# Patient Record
Sex: FEMALE | Race: BLACK OR AFRICAN AMERICAN | HISPANIC OR LATINO | Employment: UNEMPLOYED | URBAN - METROPOLITAN AREA
[De-identification: names, ages, dates, MRNs, and addresses within clinical notes are randomized per-mention and may not be internally consistent; named-entity substitution may affect disease eponyms.]

---

## 2018-11-14 ENCOUNTER — TELEPHONE (OUTPATIENT)
Dept: GASTROENTEROLOGY | Facility: CLINIC | Age: 48
End: 2018-11-14

## 2018-11-14 NOTE — TELEPHONE ENCOUNTER
Ma spoke to pt,     Pt stated she does not need to be seen anymore that her doctor decided that she does not need a second opinion    Pt stated to cancel appt and thank Ma

## 2018-11-26 ENCOUNTER — TELEPHONE (OUTPATIENT)
Dept: SURGERY | Facility: CLINIC | Age: 48
End: 2018-11-26

## 2018-11-26 NOTE — TELEPHONE ENCOUNTER
----- Message from Cynthia Flores sent at 11/26/2018 10:27 AM CST -----  Contact: [Patient   Pt would like to schedule to be seen for a consult with Dr Nelson for 2nd opinion for colon removal.  Was unsure if I could schedule so I decided to send a message. Pt also wants to be seen in [facility]? Pt can be reached at [phone]    Pt says Dr randolph will be sending a referral.    Thanks

## 2018-12-06 ENCOUNTER — OFFICE VISIT (OUTPATIENT)
Dept: SURGERY | Facility: CLINIC | Age: 48
End: 2018-12-06
Payer: MEDICARE

## 2018-12-06 VITALS — WEIGHT: 148.06 LBS | HEIGHT: 63 IN | BODY MASS INDEX: 26.23 KG/M2

## 2018-12-06 DIAGNOSIS — K57.92 DIVERTICULITIS: Primary | ICD-10-CM

## 2018-12-06 PROCEDURE — 99999 PR PBB SHADOW E&M-EST. PATIENT-LVL IV: CPT | Mod: PBBFAC,,, | Performed by: SURGERY

## 2018-12-06 PROCEDURE — 99214 OFFICE O/P EST MOD 30 MIN: CPT | Mod: PBBFAC | Performed by: SURGERY

## 2018-12-06 PROCEDURE — 99204 OFFICE O/P NEW MOD 45 MIN: CPT | Mod: S$PBB,,, | Performed by: SURGERY

## 2018-12-06 RX ORDER — ONDANSETRON HYDROCHLORIDE 8 MG/1
8 TABLET, FILM COATED ORAL
COMMUNITY
End: 2020-05-01

## 2018-12-06 RX ORDER — ASPIRIN 81 MG/1
81 TABLET ORAL DAILY
COMMUNITY
End: 2020-12-11

## 2018-12-06 RX ORDER — LUBIPROSTONE 24 UG/1
24 CAPSULE ORAL 2 TIMES DAILY
COMMUNITY
End: 2020-05-01

## 2018-12-06 RX ORDER — OXYCODONE AND ACETAMINOPHEN 7.5; 3 MG/1; MG/1
1 TABLET ORAL EVERY 4 HOURS PRN
COMMUNITY
End: 2020-12-11

## 2018-12-06 RX ORDER — TIZANIDINE 4 MG/1
4 TABLET ORAL EVERY 6 HOURS PRN
COMMUNITY
End: 2020-05-01

## 2018-12-06 RX ORDER — NEBIVOLOL 10 MG/1
10 TABLET ORAL 2 TIMES DAILY
COMMUNITY
End: 2020-05-01

## 2018-12-06 RX ORDER — METRONIDAZOLE 500 MG/1
500 TABLET ORAL SEE ADMIN INSTRUCTIONS
Qty: 3 TABLET | Refills: 0 | Status: ON HOLD | OUTPATIENT
Start: 2018-12-06 | End: 2018-12-17

## 2018-12-06 RX ORDER — DICYCLOMINE HYDROCHLORIDE 20 MG/1
20 TABLET ORAL EVERY 6 HOURS
COMMUNITY
End: 2020-05-01

## 2018-12-06 RX ORDER — ALENDRONATE SODIUM 10 MG/1
70 TABLET ORAL
COMMUNITY
End: 2020-12-11 | Stop reason: DRUGHIGH

## 2018-12-06 RX ORDER — NEOMYCIN SULFATE 500 MG/1
1000 TABLET ORAL SEE ADMIN INSTRUCTIONS
Qty: 6 TABLET | Refills: 0 | Status: ON HOLD | OUTPATIENT
Start: 2018-12-06 | End: 2018-12-20 | Stop reason: HOSPADM

## 2018-12-06 RX ORDER — POLYETHYLENE GLYCOL 3350, SODIUM SULFATE ANHYDROUS, SODIUM BICARBONATE, SODIUM CHLORIDE, POTASSIUM CHLORIDE 236; 22.74; 6.74; 5.86; 2.97 G/4L; G/4L; G/4L; G/4L; G/4L
4 POWDER, FOR SOLUTION ORAL ONCE
Qty: 4000 ML | Refills: 0 | Status: ON HOLD | OUTPATIENT
Start: 2018-12-09 | End: 2018-12-20 | Stop reason: HOSPADM

## 2018-12-06 RX ORDER — SODIUM CHLORIDE 9 MG/ML
INJECTION, SOLUTION INTRAVENOUS CONTINUOUS
Status: CANCELLED | OUTPATIENT
Start: 2018-12-06

## 2018-12-06 RX ORDER — DOCUSATE SODIUM 100 MG/1
100 CAPSULE, LIQUID FILLED ORAL 2 TIMES DAILY
Status: ON HOLD | COMMUNITY
End: 2018-12-20 | Stop reason: HOSPADM

## 2018-12-06 NOTE — PROGRESS NOTES
GENERAL SURGERY CLINIC  HISTORY AND PHYSICAL    CC:  History of Diverticulitis     HPI:  Gill Cleary is a 48 y.o.  female with Hx of HIV (viral loads currently undetectable), HTN, HLD, YANICK on home CPAP, and GERD who presents to clinic for evaluation of recurrent diverticulitis.  She has had 4-5 episodes of diverticulitis over the last 5 years, all requiring hospitalization.  Most recent episode was the end of October.  She was treated with IV antibiotics.  None of these episodes have been perforated diverticulitis associated with abscess that required drainage.  She did have an IR drainage of a cyst near her sigmoid colon during her last admission but there was no drain left and this cyst has recurred on her most recent CT scan.  She states that she currently feels good with just some suprapubic soreness.  She occasionally has N/V with dairy and eating too much.  She presents today to surgery clinic for second opinion regarding colectomy.  Has been off IV antibiotics for over a month      ROS: A 10-point review of systems is negative except for the above mentioned in the HPI.     PMHx:  HIV, HTN, HLD, YANICK, GERD    PSH:  Laparoscopic hysterectomy (5/2018)    SH:  Denies alcohol, tobacco, drug use    Review of patient's allergies indicates:   Allergen Reactions    Ace inhibitors Other (See Comments)     dizziness      Efavirenz-emtricitabin-tenofov Other (See Comments)     dizziness      Tramadol Nausea And Vomiting    Emtricita-rilpivirine-tenof df Other (See Comments)     Affecting patients kidneys      Labetalol     Metoprolol     Penicillins      Hives (skin)^       Current Outpatient Medications:     alendronate (FOSAMAX) 10 MG Tab, Take 5 mg by mouth once daily., Disp: , Rfl:     aspirin (ECOTRIN) 81 MG EC tablet, Take 81 mg by mouth once daily., Disp: , Rfl:     dicyclomine (BENTYL) 20 mg tablet, Take 20 mg by mouth every 6 (six) hours., Disp: , Rfl:     docusate sodium (COLACE) 100  MG capsule, Take 100 mg by mouth 2 (two) times daily., Disp: , Rfl:     lubiprostone (AMITIZA) 24 MCG Cap, Take 24 mcg by mouth 2 (two) times daily., Disp: , Rfl:     nebivolol (BYSTOLIC) 10 MG Tab, Take 10 mg by mouth once daily., Disp: , Rfl:     ondansetron (ZOFRAN) 8 MG tablet, Take 8 mg by mouth every 8 (eight) hours., Disp: , Rfl:     oxyCODONE-acetaminophen (LYNOX) 7.5-300 mg per tablet, Take 1 tablet by mouth every 4 (four) hours as needed for Pain., Disp: , Rfl:     tiZANidine (ZANAFLEX) 4 MG tablet, Take 4 mg by mouth every 6 (six) hours as needed., Disp: , Rfl:     bictegrav/emtricit/tenofov ala (BIKTARVY ORAL), Take by mouth., Disp: , Rfl:       PHYSICAL EXAM:  Vitals Reviewed     General: NAD  Neuro: AAOx3  Cardio: RRR  Resp: Breathing even and unlabored  Abd: Soft, ND, minimal suprapubic tenderness, lap hysterectomy scars well healed   Ext: Warm and well perfused      PERTINENT LABS:  Reviewed - WNL      PERTINENT IMAGING:  Reviewed outside imaging reports  - Multiple episodes of diverticulitis in the past  - Cystic mass adjacent to colon ~2cm    Endoscopy report 10/26/18  - Multiple diverticuli in sigmoid and descending colon  - No mass seen      ASSESSMENT/PLAN:  Gill Cleary is a 48 y.o. female with multiple episodes of diverticulitis in the past     - Will plan on lap/possible open sigmoid colectomy on Monday 12/10/2018  - Will order bowel prep pre op - abx and mechanical  - Consent signed in clinic today       Gage Davies M.D.  General Surgery PGY3  [phone]     I have personally performed a detailed history and physical examination on this patient. My findings are summarized in the resident's note included in the record.  Patient very anxious about deferring surgery any longer as she is convinced that she will suffer another bout of diverticulitis while waiting  She feels like she can tolerate a bowel prep  Will schedule for Monday 12/10  Will evaluate the cyst that is seen on imaging  If  oophorectomy is required we will do that procedure as well  Lap colectomy scheduled  All questions answered

## 2018-12-06 NOTE — PATIENT INSTRUCTIONS
After Bowel Surgery: Recovering in the Hospital and at Home    You may be in the hospital overnight or longer. Once you are out of the hospital, recovery may take up to several months. It depends on the type of surgery you had.  Right after surgery  After surgery, youll be taken to the recovery room. Here your blood pressure, pulse, and breathing will be checked. Youll also be given pain medicine as needed. When youre ready, youll be moved to a regular hospital room.  Your recovery in the hospital  · Soon after surgery youll be urged to get up and take short walks. This helps you heal faster. Gentle movement can help your digestive function. Walking also helps your heart and lungs, and can keep clots from forming in your legs.  · You may be able to have some liquids in the first day or two. If it seems unlikely that your bowel will recover quickly, you may get nutrition through an IV (intravenous) tube. You may first be given a tube that passes through your nose to your stomach (a nasogastric tube). This keeps your stomach empty. This can help your digestive tract heal.  · If you have a colostomy or ileostomy, you may also meet with an ostomy nurse. He or she will teach you how to care for yourself as you heal.  · You will be shown how to do breathing exercises using a special device (incentive spirometer). This can prevent complications such as pneumonia.  · You may have a tube (catheter) in your bladder to drain urine. This may be in place for the first day after surgery. In some cases it may be in place longer.  Getting back to normal at home  Depending on your surgery, even mild activity can make you tired in the first few weeks or months. After a few months, you may be feeling back to normal.  · Stay active. But avoid hard exercise and heavy lifting in the first few months.  · You can walk, climb stairs, shower, and bathe soon after surgery. But dont drive until your provider says you can.  · Follow all  special diet instructions you are given.  · Take care of your cut (incision) and any drains, as directed by your provider.  When to call your provider  Call your healthcare provider right away if you have any of the following:  · Fever of 100.4°F (38°C)  · Upset stomach (nausea) or vomiting  · Increased belly pain  · Constipation, diarrhea, or bloating  · Increased redness, swelling, drainage, or pain near the incision  · Trouble controlling bowel movements   · Bloody stool or black, tarry stool  · Vomiting blood  · Not able to urinate   Date Last Reviewed: 7/1/2016 © 2000-2017 [employer]. [address] [place] [address], [place] [address]. All rights reserved. This information is not intended as a substitute for professional medical care. Always follow your healthcare professional's instructions.        Recovering from Colorectal Surgery    When the surgery is done, youll be taken to the recovery room (also called the post-anesthesia care unit or PACU). Here, you will be carefully monitored for vital signs including breathing, temperature, blood pressure, and heart rate. Youll also receive pain medicine to keep you comfortable. When youre ready, youll be moved to a regular hospital room. Your hospital stay may last 5 to 10 days or longer.  Right after surgery  If you have a urinary catheter, it will probably be removed shortly after surgery. Your intravenous (IV) line will remain in place for a few days to give you fluids. And youll continue to receive medicine for pain. Soon after surgery, youll be up and walking around. This helps improve blood flow and prevent blood clots. It also helps your bowels return to normal. Youll be given breathing exercises to keep your lungs clear.  Eating again  You wont eat or drink much until your colon begins working again. You'll begin with a liquid diet, then move on to solid foods.  Recovering at home  In most cases, youll visit your healthcare provider within a  few weeks after leaving the hospital. You can get back to your normal routine about a month or 2 after surgery. Full recovery may take 6 weeks or longer. While your body heals, you may tire more easily. You also are likely to have some bloating. Loose stools and more frequent bowel movements are common after bowel surgery. This may get better over time, but may never disappear completely.  Resuming everyday activities  Being active helps your body heal. But you must protect your healing incisions:  · Walk as much as you feel up to.  · Avoid heavy lifting or vigorous exercise until your healthcare provider says its OK. Follow your healthcare providers advice about climbing stairs and bathing.  · You can drive when youre no longer taking pain medicines--in about 7 to 10 days.  · Follow your healthcare provider's advice about resuming sexual activity.         Call your healthcare provider  Call your healthcare provider if you have:  · Fever of 100.4°F (38°C) or higher, or as directed by your healthcare provider   · Persistent nausea or vomiting  · Unusual redness, swelling, drainage, or pain around your incision  · Severe constipation or diarrhea  · Worsening pain  · Leg swelling or trouble breathing  · Bleeding from the rectum  · Difficulty or inability to urinate   Date Last Reviewed: 8/1/2016  © 9031-9621 [employer]. [address] [place] [address], [place] [address]. All rights reserved. This information is not intended as a substitute for professional medical care. Always follow your healthcare professional's instructions.

## 2018-12-06 NOTE — H&P (VIEW-ONLY)
GENERAL SURGERY CLINIC  HISTORY AND PHYSICAL    CC:  History of Diverticulitis     HPI:  Gill Cleary is a 48 y.o.  female with Hx of HIV (viral loads currently undetectable), HTN, HLD, YANICK on home CPAP, and GERD who presents to clinic for evaluation of recurrent diverticulitis.  She has had 4-5 episodes of diverticulitis over the last 5 years, all requiring hospitalization.  Most recent episode was the end of October.  She was treated with IV antibiotics.  None of these episodes have been perforated diverticulitis associated with abscess that required drainage.  She did have an IR drainage of a cyst near her sigmoid colon during her last admission but there was no drain left and this cyst has recurred on her most recent CT scan.  She states that she currently feels good with just some suprapubic soreness.  She occasionally has N/V with dairy and eating too much.  She presents today to surgery clinic for second opinion regarding colectomy.  Has been off IV antibiotics for over a month      ROS: A 10-point review of systems is negative except for the above mentioned in the HPI.     PMHx:  HIV, HTN, HLD, YANICK, GERD    PSH:  Laparoscopic hysterectomy (5/2018)    SH:  Denies alcohol, tobacco, drug use    Review of patient's allergies indicates:   Allergen Reactions    Ace inhibitors Other (See Comments)     dizziness      Efavirenz-emtricitabin-tenofov Other (See Comments)     dizziness      Tramadol Nausea And Vomiting    Emtricita-rilpivirine-tenof df Other (See Comments)     Affecting patients kidneys      Labetalol     Metoprolol     Penicillins      Hives (skin)^       Current Outpatient Medications:     alendronate (FOSAMAX) 10 MG Tab, Take 5 mg by mouth once daily., Disp: , Rfl:     aspirin (ECOTRIN) 81 MG EC tablet, Take 81 mg by mouth once daily., Disp: , Rfl:     dicyclomine (BENTYL) 20 mg tablet, Take 20 mg by mouth every 6 (six) hours., Disp: , Rfl:     docusate sodium (COLACE) 100  MG capsule, Take 100 mg by mouth 2 (two) times daily., Disp: , Rfl:     lubiprostone (AMITIZA) 24 MCG Cap, Take 24 mcg by mouth 2 (two) times daily., Disp: , Rfl:     nebivolol (BYSTOLIC) 10 MG Tab, Take 10 mg by mouth once daily., Disp: , Rfl:     ondansetron (ZOFRAN) 8 MG tablet, Take 8 mg by mouth every 8 (eight) hours., Disp: , Rfl:     oxyCODONE-acetaminophen (LYNOX) 7.5-300 mg per tablet, Take 1 tablet by mouth every 4 (four) hours as needed for Pain., Disp: , Rfl:     tiZANidine (ZANAFLEX) 4 MG tablet, Take 4 mg by mouth every 6 (six) hours as needed., Disp: , Rfl:     bictegrav/emtricit/tenofov ala (BIKTARVY ORAL), Take by mouth., Disp: , Rfl:       PHYSICAL EXAM:  Vitals Reviewed     General: NAD  Neuro: AAOx3  Cardio: RRR  Resp: Breathing even and unlabored  Abd: Soft, ND, minimal suprapubic tenderness, lap hysterectomy scars well healed   Ext: Warm and well perfused      PERTINENT LABS:  Reviewed - WNL      PERTINENT IMAGING:  Reviewed outside imaging reports  - Multiple episodes of diverticulitis in the past  - Cystic mass adjacent to colon ~2cm    Endoscopy report 10/26/18  - Multiple diverticuli in sigmoid and descending colon  - No mass seen      ASSESSMENT/PLAN:  Gill Cleary is a 48 y.o. female with multiple episodes of diverticulitis in the past     - Will plan on lap/possible open sigmoid colectomy on Monday 12/10/2018  - Will order bowel prep pre op - abx and mechanical  - Consent signed in clinic today       Gage Davies M.D.  General Surgery PGY3  [phone]     I have personally performed a detailed history and physical examination on this patient. My findings are summarized in the resident's note included in the record.  Patient very anxious about deferring surgery any longer as she is convinced that she will suffer another bout of diverticulitis while waiting  She feels like she can tolerate a bowel prep  Will schedule for Monday 12/10  Will evaluate the cyst that is seen on imaging  If  oophorectomy is required we will do that procedure as well  Lap colectomy scheduled  All questions answered

## 2018-12-07 ENCOUNTER — TELEPHONE (OUTPATIENT)
Dept: SURGERY | Facility: CLINIC | Age: 48
End: 2018-12-07

## 2018-12-07 RX ORDER — HYDROMORPHONE HYDROCHLORIDE 2 MG/1
2 TABLET ORAL 2 TIMES DAILY
Status: ON HOLD | COMMUNITY
End: 2018-12-10

## 2018-12-07 NOTE — PRE-PROCEDURE INSTRUCTIONS
PreOp Instructions given:     - Verbal medication information (what to hold and what to take)   - NPO guidelines   - Arrival place directions given; time to be given the day before procedure by the   Surgeon's Office   - Bathing with antibacterial soap   - Don't wear any jewelry or bring any valuables AM of surgery   - No makeup or moisturizer to face   - No perfume/cologne, powder, lotions or aftershave     Pt. verbalized understanding.    Denies any  history of side effects or issues with anesthesia or sedation.    NO PORK PRODUCTS

## 2018-12-09 ENCOUNTER — NURSE TRIAGE (OUTPATIENT)
Dept: ADMINISTRATIVE | Facility: CLINIC | Age: 48
End: 2018-12-09

## 2018-12-09 NOTE — TELEPHONE ENCOUNTER
Reason for Disposition   Medication questions   Caller has URGENT medication question about med that PCP prescribed and triager unable to answer question    Protocols used: ST PCP CALL - NO TRIAGE-A-, ST MEDICATION QUESTION CALL-A-    Patient called with concerns she is having problems keeping down her bowel prep in advance of her colectomy on tomorrow with Dr. Nelson. Dr. Lee is on call and he said tell the patient to do as much as she can as every little bit helps. Patient verbalized understanding and states she will do her best.

## 2018-12-10 ENCOUNTER — ANESTHESIA EVENT (OUTPATIENT)
Dept: SURGERY | Facility: HOSPITAL | Age: 48
DRG: 330 | End: 2018-12-10
Payer: MEDICARE

## 2018-12-10 ENCOUNTER — HOSPITAL ENCOUNTER (INPATIENT)
Facility: HOSPITAL | Age: 48
LOS: 10 days | Discharge: HOME | DRG: 330 | End: 2018-12-20
Attending: SURGERY | Admitting: SURGERY
Payer: MEDICARE

## 2018-12-10 ENCOUNTER — ANESTHESIA (OUTPATIENT)
Dept: SURGERY | Facility: HOSPITAL | Age: 48
DRG: 330 | End: 2018-12-10
Payer: MEDICARE

## 2018-12-10 DIAGNOSIS — K57.92 DIVERTICULITIS: ICD-10-CM

## 2018-12-10 PROCEDURE — 63600175 PHARM REV CODE 636 W HCPCS: Performed by: NURSE ANESTHETIST, CERTIFIED REGISTERED

## 2018-12-10 PROCEDURE — 63600175 PHARM REV CODE 636 W HCPCS: Performed by: STUDENT IN AN ORGANIZED HEALTH CARE EDUCATION/TRAINING PROGRAM

## 2018-12-10 PROCEDURE — 25000003 PHARM REV CODE 250: Performed by: STUDENT IN AN ORGANIZED HEALTH CARE EDUCATION/TRAINING PROGRAM

## 2018-12-10 PROCEDURE — 71000033 HC RECOVERY, INTIAL HOUR: Performed by: SURGERY

## 2018-12-10 PROCEDURE — C1769 GUIDE WIRE: HCPCS | Performed by: SURGERY

## 2018-12-10 PROCEDURE — 44145 PARTIAL REMOVAL OF COLON: CPT | Mod: GC,,, | Performed by: SURGERY

## 2018-12-10 PROCEDURE — 25000003 PHARM REV CODE 250: Performed by: NURSE ANESTHETIST, CERTIFIED REGISTERED

## 2018-12-10 PROCEDURE — D9220A PRA ANESTHESIA: Mod: ANES,,, | Performed by: ANESTHESIOLOGY

## 2018-12-10 PROCEDURE — 71000039 HC RECOVERY, EACH ADD'L HOUR: Performed by: SURGERY

## 2018-12-10 PROCEDURE — C1758 CATHETER, URETERAL: HCPCS | Performed by: SURGERY

## 2018-12-10 PROCEDURE — 88307 TISSUE EXAM BY PATHOLOGIST: CPT | Performed by: PATHOLOGY

## 2018-12-10 PROCEDURE — 36000710: Performed by: SURGERY

## 2018-12-10 PROCEDURE — 52005 CYSTO W/URTRL CATHJ: CPT | Mod: ,,, | Performed by: UROLOGY

## 2018-12-10 PROCEDURE — 25000003 PHARM REV CODE 250: Performed by: PHYSICIAN ASSISTANT

## 2018-12-10 PROCEDURE — 37000009 HC ANESTHESIA EA ADD 15 MINS: Performed by: SURGERY

## 2018-12-10 PROCEDURE — 37000008 HC ANESTHESIA 1ST 15 MINUTES: Performed by: SURGERY

## 2018-12-10 PROCEDURE — 0T778DZ DILATION OF LEFT URETER WITH INTRALUMINAL DEVICE, VIA NATURAL OR ARTIFICIAL OPENING ENDOSCOPIC: ICD-10-PCS | Performed by: UROLOGY

## 2018-12-10 PROCEDURE — 25000242 PHARM REV CODE 250 ALT 637 W/ HCPCS: Performed by: STUDENT IN AN ORGANIZED HEALTH CARE EDUCATION/TRAINING PROGRAM

## 2018-12-10 PROCEDURE — 0WJJ4ZZ INSPECTION OF PELVIC CAVITY, PERCUTANEOUS ENDOSCOPIC APPROACH: ICD-10-PCS | Performed by: SURGERY

## 2018-12-10 PROCEDURE — 88307 TISSUE EXAM BY PATHOLOGIST: CPT | Mod: 26,,, | Performed by: PATHOLOGY

## 2018-12-10 PROCEDURE — D9220A PRA ANESTHESIA: Mod: CRNA,,, | Performed by: NURSE ANESTHETIST, CERTIFIED REGISTERED

## 2018-12-10 PROCEDURE — 36000711: Performed by: SURGERY

## 2018-12-10 PROCEDURE — 94761 N-INVAS EAR/PLS OXIMETRY MLT: CPT

## 2018-12-10 PROCEDURE — 27201423 OPTIME MED/SURG SUP & DEVICES STERILE SUPPLY: Performed by: SURGERY

## 2018-12-10 PROCEDURE — 94640 AIRWAY INHALATION TREATMENT: CPT

## 2018-12-10 PROCEDURE — S0077 INJECTION, CLINDAMYCIN PHOSP: HCPCS | Performed by: PHYSICIAN ASSISTANT

## 2018-12-10 PROCEDURE — 0DTN0ZZ RESECTION OF SIGMOID COLON, OPEN APPROACH: ICD-10-PCS | Performed by: SURGERY

## 2018-12-10 RX ORDER — ONDANSETRON 2 MG/ML
4 INJECTION INTRAMUSCULAR; INTRAVENOUS EVERY 6 HOURS PRN
Status: DISCONTINUED | OUTPATIENT
Start: 2018-12-10 | End: 2018-12-11

## 2018-12-10 RX ORDER — CLINDAMYCIN PHOSPHATE 900 MG/50ML
900 INJECTION, SOLUTION INTRAVENOUS
Status: COMPLETED | OUTPATIENT
Start: 2018-12-10 | End: 2018-12-10

## 2018-12-10 RX ORDER — LIDOCAINE HCL/PF 100 MG/5ML
SYRINGE (ML) INTRAVENOUS
Status: DISCONTINUED | OUTPATIENT
Start: 2018-12-10 | End: 2018-12-10

## 2018-12-10 RX ORDER — KETAMINE HCL IN 0.9 % NACL 50 MG/5 ML
SYRINGE (ML) INTRAVENOUS
Status: DISCONTINUED | OUTPATIENT
Start: 2018-12-10 | End: 2018-12-10

## 2018-12-10 RX ORDER — BUPROPION HYDROCHLORIDE 75 MG/1
75 TABLET ORAL 2 TIMES DAILY
COMMUNITY
End: 2020-05-01

## 2018-12-10 RX ORDER — ERGOCALCIFEROL 1.25 MG/1
50000 CAPSULE ORAL
COMMUNITY

## 2018-12-10 RX ORDER — NALOXONE HCL 0.4 MG/ML
0.02 VIAL (ML) INJECTION
Status: DISCONTINUED | OUTPATIENT
Start: 2018-12-10 | End: 2018-12-12

## 2018-12-10 RX ORDER — SODIUM CHLORIDE 0.9 % (FLUSH) 0.9 %
3 SYRINGE (ML) INJECTION
Status: DISCONTINUED | OUTPATIENT
Start: 2018-12-10 | End: 2018-12-10 | Stop reason: HOSPADM

## 2018-12-10 RX ORDER — SODIUM CHLORIDE 0.9 % (FLUSH) 0.9 %
3 SYRINGE (ML) INJECTION
Status: DISCONTINUED | OUTPATIENT
Start: 2018-12-10 | End: 2018-12-10

## 2018-12-10 RX ORDER — ONDANSETRON 2 MG/ML
INJECTION INTRAMUSCULAR; INTRAVENOUS
Status: DISCONTINUED | OUTPATIENT
Start: 2018-12-10 | End: 2018-12-10

## 2018-12-10 RX ORDER — VENLAFAXINE HYDROCHLORIDE 75 MG/1
150 CAPSULE, EXTENDED RELEASE ORAL DAILY
COMMUNITY
End: 2020-12-11

## 2018-12-10 RX ORDER — PROPOFOL 10 MG/ML
VIAL (ML) INTRAVENOUS
Status: DISCONTINUED | OUTPATIENT
Start: 2018-12-10 | End: 2018-12-10

## 2018-12-10 RX ORDER — FENTANYL CITRATE 50 UG/ML
25 INJECTION, SOLUTION INTRAMUSCULAR; INTRAVENOUS EVERY 5 MIN PRN
Status: DISCONTINUED | OUTPATIENT
Start: 2018-12-10 | End: 2018-12-10

## 2018-12-10 RX ORDER — FENTANYL CITRATE 50 UG/ML
INJECTION, SOLUTION INTRAMUSCULAR; INTRAVENOUS
Status: DISCONTINUED | OUTPATIENT
Start: 2018-12-10 | End: 2018-12-10

## 2018-12-10 RX ORDER — DEXAMETHASONE SODIUM PHOSPHATE 4 MG/ML
INJECTION, SOLUTION INTRA-ARTICULAR; INTRALESIONAL; INTRAMUSCULAR; INTRAVENOUS; SOFT TISSUE
Status: DISCONTINUED | OUTPATIENT
Start: 2018-12-10 | End: 2018-12-10

## 2018-12-10 RX ORDER — PHENYLEPHRINE HYDROCHLORIDE 10 MG/ML
INJECTION INTRAVENOUS
Status: DISCONTINUED | OUTPATIENT
Start: 2018-12-10 | End: 2018-12-10

## 2018-12-10 RX ORDER — CETIRIZINE HYDROCHLORIDE 5 MG/1
5 TABLET ORAL DAILY
COMMUNITY
End: 2020-12-11

## 2018-12-10 RX ORDER — HYDROMORPHONE HCL IN 0.9% NACL 6 MG/30 ML
PATIENT CONTROLLED ANALGESIA SYRINGE INTRAVENOUS CONTINUOUS
Status: DISCONTINUED | OUTPATIENT
Start: 2018-12-10 | End: 2018-12-12

## 2018-12-10 RX ORDER — ENOXAPARIN SODIUM 100 MG/ML
40 INJECTION SUBCUTANEOUS EVERY 24 HOURS
Status: DISCONTINUED | OUTPATIENT
Start: 2018-12-10 | End: 2018-12-20 | Stop reason: HOSPADM

## 2018-12-10 RX ORDER — LANSOPRAZOLE 30 MG/1
30 CAPSULE, DELAYED RELEASE ORAL DAILY
COMMUNITY
End: 2022-04-15

## 2018-12-10 RX ORDER — SODIUM CHLORIDE 9 MG/ML
INJECTION, SOLUTION INTRAVENOUS CONTINUOUS
Status: DISCONTINUED | OUTPATIENT
Start: 2018-12-10 | End: 2018-12-10

## 2018-12-10 RX ORDER — ACETAMINOPHEN 10 MG/ML
1000 INJECTION, SOLUTION INTRAVENOUS EVERY 8 HOURS
Status: COMPLETED | OUTPATIENT
Start: 2018-12-10 | End: 2018-12-10

## 2018-12-10 RX ORDER — DIPHENHYDRAMINE HYDROCHLORIDE 50 MG/ML
25 INJECTION INTRAMUSCULAR; INTRAVENOUS EVERY 6 HOURS PRN
Status: DISCONTINUED | OUTPATIENT
Start: 2018-12-10 | End: 2018-12-10

## 2018-12-10 RX ORDER — ATORVASTATIN CALCIUM 20 MG/1
20 TABLET, FILM COATED ORAL DAILY
COMMUNITY
End: 2020-12-11

## 2018-12-10 RX ORDER — EPHEDRINE SULFATE 50 MG/ML
INJECTION, SOLUTION INTRAVENOUS
Status: DISCONTINUED | OUTPATIENT
Start: 2018-12-10 | End: 2018-12-10

## 2018-12-10 RX ORDER — FENTANYL CITRATE 50 UG/ML
25 INJECTION, SOLUTION INTRAMUSCULAR; INTRAVENOUS EVERY 5 MIN PRN
Status: DISCONTINUED | OUTPATIENT
Start: 2018-12-10 | End: 2018-12-10 | Stop reason: HOSPADM

## 2018-12-10 RX ORDER — GLYCOPYRROLATE 0.2 MG/ML
INJECTION INTRAMUSCULAR; INTRAVENOUS
Status: DISCONTINUED | OUTPATIENT
Start: 2018-12-10 | End: 2018-12-10

## 2018-12-10 RX ORDER — HYDRALAZINE HYDROCHLORIDE 20 MG/ML
5 INJECTION INTRAMUSCULAR; INTRAVENOUS ONCE
Status: COMPLETED | OUTPATIENT
Start: 2018-12-10 | End: 2018-12-10

## 2018-12-10 RX ORDER — IPRATROPIUM BROMIDE AND ALBUTEROL SULFATE 2.5; .5 MG/3ML; MG/3ML
3 SOLUTION RESPIRATORY (INHALATION) EVERY 8 HOURS
Status: DISCONTINUED | OUTPATIENT
Start: 2018-12-10 | End: 2018-12-10 | Stop reason: HOSPADM

## 2018-12-10 RX ORDER — ROCURONIUM BROMIDE 10 MG/ML
INJECTION, SOLUTION INTRAVENOUS
Status: DISCONTINUED | OUTPATIENT
Start: 2018-12-10 | End: 2018-12-10

## 2018-12-10 RX ORDER — ONDANSETRON 2 MG/ML
4 INJECTION INTRAMUSCULAR; INTRAVENOUS DAILY PRN
Status: DISCONTINUED | OUTPATIENT
Start: 2018-12-10 | End: 2018-12-10 | Stop reason: HOSPADM

## 2018-12-10 RX ORDER — MIDAZOLAM HYDROCHLORIDE 1 MG/ML
INJECTION, SOLUTION INTRAMUSCULAR; INTRAVENOUS
Status: DISCONTINUED | OUTPATIENT
Start: 2018-12-10 | End: 2018-12-10

## 2018-12-10 RX ORDER — ACETAMINOPHEN 10 MG/ML
INJECTION, SOLUTION INTRAVENOUS
Status: DISCONTINUED | OUTPATIENT
Start: 2018-12-10 | End: 2018-12-10

## 2018-12-10 RX ORDER — NEOSTIGMINE METHYLSULFATE 1 MG/ML
INJECTION, SOLUTION INTRAVENOUS
Status: DISCONTINUED | OUTPATIENT
Start: 2018-12-10 | End: 2018-12-10

## 2018-12-10 RX ORDER — HYDRALAZINE HYDROCHLORIDE 20 MG/ML
INJECTION INTRAMUSCULAR; INTRAVENOUS
Status: DISPENSED
Start: 2018-12-10 | End: 2018-12-11

## 2018-12-10 RX ORDER — SODIUM CHLORIDE 9 MG/ML
INJECTION, SOLUTION INTRAVENOUS CONTINUOUS
Status: DISCONTINUED | OUTPATIENT
Start: 2018-12-10 | End: 2018-12-14

## 2018-12-10 RX ADMIN — EPHEDRINE SULFATE 10 MG: 50 INJECTION, SOLUTION INTRAMUSCULAR; INTRAVENOUS; SUBCUTANEOUS at 08:12

## 2018-12-10 RX ADMIN — SODIUM CHLORIDE 125 ML/HR: 0.9 INJECTION, SOLUTION INTRAVENOUS at 01:12

## 2018-12-10 RX ADMIN — CLINDAMYCIN IN 5 PERCENT DEXTROSE 900 MG: 18 INJECTION, SOLUTION INTRAVENOUS at 07:12

## 2018-12-10 RX ADMIN — Medication 10 MG: at 09:12

## 2018-12-10 RX ADMIN — ROCURONIUM BROMIDE 40 MG: 10 INJECTION, SOLUTION INTRAVENOUS at 07:12

## 2018-12-10 RX ADMIN — PROMETHAZINE HYDROCHLORIDE 6.25 MG: 25 INJECTION INTRAMUSCULAR; INTRAVENOUS at 02:12

## 2018-12-10 RX ADMIN — SODIUM CHLORIDE, SODIUM GLUCONATE, SODIUM ACETATE, POTASSIUM CHLORIDE, MAGNESIUM CHLORIDE, SODIUM PHOSPHATE, DIBASIC, AND POTASSIUM PHOSPHATE: .53; .5; .37; .037; .03; .012; .00082 INJECTION, SOLUTION INTRAVENOUS at 08:12

## 2018-12-10 RX ADMIN — PHENYLEPHRINE HYDROCHLORIDE 100 MCG: 10 INJECTION INTRAVENOUS at 08:12

## 2018-12-10 RX ADMIN — PHENYLEPHRINE HYDROCHLORIDE 100 MCG: 10 INJECTION INTRAVENOUS at 07:12

## 2018-12-10 RX ADMIN — LIDOCAINE HYDROCHLORIDE 100 MG: 20 INJECTION, SOLUTION INTRAVENOUS at 07:12

## 2018-12-10 RX ADMIN — ROCURONIUM BROMIDE 10 MG: 10 INJECTION, SOLUTION INTRAVENOUS at 11:12

## 2018-12-10 RX ADMIN — SODIUM CHLORIDE, SODIUM GLUCONATE, SODIUM ACETATE, POTASSIUM CHLORIDE, MAGNESIUM CHLORIDE, SODIUM PHOSPHATE, DIBASIC, AND POTASSIUM PHOSPHATE: .53; .5; .37; .037; .03; .012; .00082 INJECTION, SOLUTION INTRAVENOUS at 10:12

## 2018-12-10 RX ADMIN — Medication: at 01:12

## 2018-12-10 RX ADMIN — PHENYLEPHRINE HYDROCHLORIDE 200 MCG: 10 INJECTION INTRAVENOUS at 08:12

## 2018-12-10 RX ADMIN — FENTANYL CITRATE 100 MCG: 50 INJECTION, SOLUTION INTRAMUSCULAR; INTRAVENOUS at 07:12

## 2018-12-10 RX ADMIN — PROPOFOL 50 MG: 10 INJECTION, EMULSION INTRAVENOUS at 09:12

## 2018-12-10 RX ADMIN — ONDANSETRON 4 MG: 2 INJECTION INTRAMUSCULAR; INTRAVENOUS at 01:12

## 2018-12-10 RX ADMIN — PHENYLEPHRINE HYDROCHLORIDE 500 MCG: 10 INJECTION INTRAVENOUS at 08:12

## 2018-12-10 RX ADMIN — SODIUM CHLORIDE: 0.9 INJECTION, SOLUTION INTRAVENOUS at 08:12

## 2018-12-10 RX ADMIN — PHENYLEPHRINE HYDROCHLORIDE 100 MCG: 10 INJECTION INTRAVENOUS at 10:12

## 2018-12-10 RX ADMIN — FENTANYL CITRATE 50 MCG: 50 INJECTION, SOLUTION INTRAMUSCULAR; INTRAVENOUS at 11:12

## 2018-12-10 RX ADMIN — IPRATROPIUM BROMIDE AND ALBUTEROL SULFATE 3 ML: .5; 3 SOLUTION RESPIRATORY (INHALATION) at 04:12

## 2018-12-10 RX ADMIN — ROCURONIUM BROMIDE 20 MG: 10 INJECTION, SOLUTION INTRAVENOUS at 09:12

## 2018-12-10 RX ADMIN — ENOXAPARIN SODIUM 40 MG: 100 INJECTION SUBCUTANEOUS at 06:12

## 2018-12-10 RX ADMIN — ACETAMINOPHEN 1000 MG: 10 INJECTION, SOLUTION INTRAVENOUS at 08:12

## 2018-12-10 RX ADMIN — Medication 30 MG: at 07:12

## 2018-12-10 RX ADMIN — ACETAMINOPHEN 1000 MG: 10 INJECTION, SOLUTION INTRAVENOUS at 11:12

## 2018-12-10 RX ADMIN — HYDRALAZINE HYDROCHLORIDE 5 MG: 20 INJECTION INTRAMUSCULAR; INTRAVENOUS at 03:12

## 2018-12-10 RX ADMIN — PROPOFOL 150 MG: 10 INJECTION, EMULSION INTRAVENOUS at 07:12

## 2018-12-10 RX ADMIN — GLYCOPYRROLATE 0.4 MG: 0.2 INJECTION, SOLUTION INTRAMUSCULAR; INTRAVENOUS at 12:12

## 2018-12-10 RX ADMIN — EPHEDRINE SULFATE 5 MG: 50 INJECTION, SOLUTION INTRAMUSCULAR; INTRAVENOUS; SUBCUTANEOUS at 08:12

## 2018-12-10 RX ADMIN — DEXAMETHASONE SODIUM PHOSPHATE 8 MG: 4 INJECTION, SOLUTION INTRAMUSCULAR; INTRAVENOUS at 07:12

## 2018-12-10 RX ADMIN — SODIUM CHLORIDE: 0.9 INJECTION, SOLUTION INTRAVENOUS at 07:12

## 2018-12-10 RX ADMIN — MIDAZOLAM HYDROCHLORIDE 2 MG: 1 INJECTION, SOLUTION INTRAMUSCULAR; INTRAVENOUS at 07:12

## 2018-12-10 RX ADMIN — ONDANSETRON 4 MG: 2 INJECTION INTRAMUSCULAR; INTRAVENOUS at 11:12

## 2018-12-10 RX ADMIN — PROMETHAZINE HYDROCHLORIDE 6.25 MG: 25 INJECTION INTRAMUSCULAR; INTRAVENOUS at 03:12

## 2018-12-10 RX ADMIN — EPHEDRINE SULFATE 15 MG: 50 INJECTION, SOLUTION INTRAMUSCULAR; INTRAVENOUS; SUBCUTANEOUS at 08:12

## 2018-12-10 RX ADMIN — PHENYLEPHRINE HYDROCHLORIDE 200 MCG: 10 INJECTION INTRAVENOUS at 10:12

## 2018-12-10 RX ADMIN — NEOSTIGMINE METHYLSULFATE 4 MG: 1 INJECTION INTRAVENOUS at 12:12

## 2018-12-10 NOTE — PROGRESS NOTES
Pt's suitcase and belongings bag containing clothing, cell phone, contacts, and denture, taken to patient's room by PACU RN.

## 2018-12-10 NOTE — ANESTHESIA POSTPROCEDURE EVALUATION
"Anesthesia Post Evaluation    Patient: Gill Cleary    Procedure(s) Performed: Procedure(s) (LRB):  COLECTOMY, SIGMOID, LAPAROSCOPIC- (N/A)  INSERTION, STENT, URETER (Left)  COLECTOMY, SIGMOID (N/A)    Final Anesthesia Type: general  Patient location during evaluation: PACU  Patient participation: Yes- Able to Participate  Level of consciousness: awake and alert  Post-procedure vital signs: reviewed and stable  Pain management: adequate  Airway patency: patent  PONV status at discharge: No PONV  Anesthetic complications: no      Cardiovascular status: blood pressure returned to baseline and hemodynamically stable  Respiratory status: unassisted, spontaneous ventilation and room air  Hydration status: euvolemic  Follow-up not needed.        Visit Vitals  /66   Pulse 78   Temp 36.9 °C (98.4 °F) (Temporal)   Resp 18   Ht 5' 3" (1.6 m)   Wt 67.1 kg (148 lb)   SpO2 98%   Breastfeeding? No   BMI 26.22 kg/m²       Pain/Katy Score: Presence of Pain: non-verbal indicators absent (12/10/2018 12:45 PM)  Pain Rating Prior to Med Admin: 8 (12/10/2018  1:14 PM)  Katy Score: 9 (12/10/2018  2:40 PM)        "

## 2018-12-10 NOTE — OP NOTE
[facility]  Operative Note    Date: 12/10/2018    Pre-Op Diagnosis: diverticulitis  Patient Active Problem List   Diagnosis    Diverticulitis         Post-Op Diagnosis: same    Procedure(s) Performed:   1.  Cystoscopy with left ureteral catheter placement    Specimen(s): none    Staff Surgeon: Dr. Pearl White MD    Assistant Surgeon: Fern Cortes MD    Anesthesia: General endotracheal anesthesia    Indications: Gill Cleary is a 48 y.o. female with recurrent diverticulitis.  Dr. Nelson has requested intra-operative ureteral catheters to allow for early intra-operative identification and repair of any injuries.      Findings:   1.  No abnormalities noted, no tumors, no erythema  2.  L ureteral catheter placed in standard fashion    Estimated Blood Loss: min    Drains:   1.  left 5 Fr ureteral catheter  2.  16 Fr holliday catheter    Procedure in Detail: Upon entering the room the patient was under general anesthesia.  The patient was then placed in the dorsal lithotomy position and prepped and draped in the usual sterile fashion. Preoperative antibiotics were administered per the primary surgeon preference.  Timeout was performed.      A 22 Fr cystoscope was inserted into the urethra and formal cystourethroscopy was performed. The urethra was normal.  The right and left ureteral orifices were in the normal anatomic position. There were no mucosal abnormalities. A 0.38 glide wire was inserted into the left  ureteral orifice and advanced to the level of the left renal pelvis. A 5 Fr ureteral catheter was then inserted over the guide wire and the wire was removed. The cystoscope was then removed leaving the ureteral catheter in place.     A 16 Fr holliday catheter was inserted and the balloon was filled with 10mL of sterile water. The ureteral catheters were secured in the standard fashion. There were no complications with the procedure and the patient tolerated our procedure well.     The case was  then turned over to the primary surgeon.     Fern Cortes MD

## 2018-12-10 NOTE — PLAN OF CARE
Pt vital signs wnl.  Pt verbalizes pain is tolerable with pca.  Pt verbalizes nausea has improved.  Abdominal dressing intact.

## 2018-12-10 NOTE — ANESTHESIA PREPROCEDURE EVALUATION
12/10/2018  Pre-operative evaluation for Procedure(s) (LRB):  COLECTOMY, SIGMOID, LAPAROSCOPIC (N/A)    Gill Cleary is a 48 y.o. female HIV, YANICK on CPAP, HTN.     Patient Active Problem List   Diagnosis    Diverticulitis       Review of patient's allergies indicates:   Allergen Reactions    Ace inhibitors Other (See Comments)     dizziness      Efavirenz-emtricitabin-tenofov Other (See Comments)     dizziness      Penicillins Hives     Hives (skin)^ and causes yeast infection    Pork/porcine containing products      Pt would like pork added  Because of strong Pentecostalism beliefs    Tramadol Nausea And Vomiting    Emtricita-rilpivirine-tenof df Other (See Comments)     Affecting patients kidneys      Labetalol     Metoprolol        No current facility-administered medications on file prior to encounter.      Current Outpatient Medications on File Prior to Encounter   Medication Sig Dispense Refill    aspirin (ECOTRIN) 81 MG EC tablet Take 81 mg by mouth once daily.      atorvastatin (LIPITOR) 20 MG tablet Take 20 mg by mouth once daily.      bictegrav/emtricit/tenofov ala (BIKTARVY ORAL) Take by mouth every evening.       buPROPion (WELLBUTRIN) 75 MG tablet Take 75 mg by mouth 2 (two) times daily.      cetirizine (ZYRTEC) 5 MG tablet Take 5 mg by mouth once daily.      dicyclomine (BENTYL) 20 mg tablet Take 20 mg by mouth every 6 (six) hours.      docusate sodium (COLACE) 100 MG capsule Take 100 mg by mouth 2 (two) times daily.      ergocalciferol (VITAMIN D2) 50,000 unit Cap Take 50,000 Units by mouth every 7 days.      lansoprazole (PREVACID) 30 MG capsule Take 30 mg by mouth once daily.      lubiprostone (AMITIZA) 24 MCG Cap Take 24 mcg by mouth 2 (two) times daily.      metroNIDAZOLE (FLAGYL) 500 MG tablet Take 1 tablet (500 mg total) by mouth As instructed (Take one tablet at 2PM, one  tablet at 3PM, and one tablet at 10PM the day before surgery). 3 tablet 0    nebivolol (BYSTOLIC) 10 MG Tab Take 10 mg by mouth 2 (two) times daily.       neomycin (MYCIFRADIN) 500 mg Tab Take 2 tablets (1,000 mg total) by mouth As instructed (Take 2 tablets at 2PM, 2 tablets at 3PM, and 2 tablets at 10PM the day before surgery). 6 tablet 0    ondansetron (ZOFRAN) 8 MG tablet Take 8 mg by mouth every 8 (eight) hours.      oxyCODONE-acetaminophen (LYNOX) 7.5-300 mg per tablet Take 1 tablet by mouth every 4 (four) hours as needed for Pain.      polyethylene glycol (GOLYTELY,NULYTELY) 236-22.74-6.74 -5.86 gram suspension Take 4,000 mLs (4 L total) by mouth once. Start at 2PM the day before surgery for 1 dose 4000 mL 0    tiZANidine (ZANAFLEX) 4 MG tablet Take 4 mg by mouth every 6 (six) hours as needed.      venlafaxine (EFFEXOR-XR) 75 MG 24 hr capsule Take 150 mg by mouth once daily.      alendronate (FOSAMAX) 10 MG Tab Take 70 mg by mouth every 7 days.          Past Surgical History:   Procedure Laterality Date    HYSTERECTOMY         Social History     Socioeconomic History    Marital status:      Spouse name: Not on file    Number of children: Not on file    Years of education: Not on file    Highest education level: Not on file   Social Needs    Financial resource strain: Not on file    Food insecurity - worry: Not on file    Food insecurity - inability: Not on file    Transportation needs - medical: Not on file    Transportation needs - non-medical: Not on file   Occupational History    Not on file   Tobacco Use    Smoking status: Never Smoker    Smokeless tobacco: Never Used   Substance and Sexual Activity    Alcohol use: No     Frequency: Never    Drug use: Not on file    Sexual activity: No   Other Topics Concern    Not on file   Social History Narrative    Not on file         CBC: No results for input(s): WBC, RBC, HGB, HCT, PLT, MCV, MCH, MCHC in the last 72 hours.    CMP: No  results for input(s): NA, K, CL, CO2, BUN, CREATININE, GLU, MG, PHOS, CALCIUM, ALBUMIN, PROT, ALKPHOS, ALT, AST, BILITOT in the last 72 hours.    INR  No results for input(s): PT, INR, PROTIME, APTT in the last 72 hours.        Diagnostic Studies:      EKD Echo:  No results found for this or any previous visit.      Anesthesia Evaluation    I have reviewed the Patient Summary Reports.     I have reviewed the Medications.     Review of Systems  Anesthesia Hx:  History of prior surgery of interest to airway management or planning: Denies Family Hx of Anesthesia complications.   Denies Personal Hx of Anesthesia complications.       Physical Exam  General:  Well nourished    Airway/Jaw/Neck:  Airway Findings: Mouth Opening: Normal Tongue: Normal  General Airway Assessment: Adult  Mallampati: II  TM Distance: Normal, at least 6 cm  Jaw/Neck Findings:  Neck ROM: Normal ROM      Dental:  Dental Findings: In tact    Chest/Lungs:  Chest/Lungs Findings: Clear to auscultation, Normal Respiratory Rate         Mental Status:  Mental Status Findings:  Cooperative, Alert and Oriented         Anesthesia Plan  Type of Anesthesia, risks & benefits discussed:  Anesthesia Type:  general  Patient's Preference:   Intra-op Monitoring Plan: standard ASA monitors  Intra-op Monitoring Plan Comments:   Post Op Pain Control Plan: multimodal analgesia  Post Op Pain Control Plan Comments:   Induction:   IV  Beta Blocker:         Informed Consent: Patient understands risks and agrees with Anesthesia plan.  Questions answered. Anesthesia consent signed with patient.  ASA Score: 3     Day of Surgery Review of History & Physical:    H&P update referred to the surgeon.         Ready For Surgery From Anesthesia Perspective.

## 2018-12-10 NOTE — ANESTHESIA RELEASE NOTE
"Anesthesia Release from PACU Note    Patient: Gill Cleary    Procedure(s) Performed: Procedure(s) (LRB):  COLECTOMY, SIGMOID, LAPAROSCOPIC- (N/A)  INSERTION, STENT, URETER (Left)  COLECTOMY, SIGMOID (N/A)    Anesthesia type: general    Post pain: Adequate analgesia    Post assessment: no apparent anesthetic complications    Last Vitals:   Visit Vitals  BP (!) 150/81   Pulse 99   Temp 36.4 °C (97.6 °F) (Axillary)   Resp 18   Ht 5' 3" (1.6 m)   Wt 67.1 kg (148 lb)   SpO2 100%   Breastfeeding? No   BMI 26.22 kg/m²       Post vital signs: stable    Level of consciousness: awake    Nausea/Vomiting: no nausea/no vomiting    Complications: none    Airway Patency: patent    Respiratory: unassisted    Cardiovascular: stable and blood pressure at baseline    Hydration: euvolemic  "

## 2018-12-10 NOTE — OP NOTE
DATE OF PROCEDURE:  12/10/2018    PREOPERATIVE DIAGNOSIS:  Diverticulitis.    POSTOPERATIVE DIAGNOSIS:  Diverticulitis.    PROCEDURES PERFORMED:  Laparoscopy, mobilization of the splenic flexure, sigmoid   resection, primary anastomosis and flexible sigmoidoscopy.    SURGEON:  Reyes Nelson M.D.    ASSISTANTS:  Josefina Horton M.D. (RES) and Gage Davies M.D. (RES)    ANESTHESIA:  General.    BLOOD LOSS:  Minimal.    COMPLICATIONS:  None.    INDICATIONS FOR PROCEDURE:  This is a 48-year-old woman with multiple   hospitalizations with increasing frequency in the recent 6 months for   diverticulitis.  The patient presents for surgery now due to refractory   symptoms.    OPERATIVE REPORT IN DETAIL:  The patient was brought to the Operating Room,   placed in the supine position and prepped and draped in sterile fashion once   satisfactory general anesthesia was induced.  An incision was made in the lower   midline to allow for placement of a hand port.  Additionally, two 15 mm trocars   were placed in the right and left upper quadrants and then in the right lower   quadrant, a 5-mm trocar was placed.  The sigmoid colon was entirely mobilized   including complete mobilization of the splenic flexure.  Dissection of the   distal sigmoid and proximal rectum was also accomplished laparoscopically;   however, at the distalmost portion of the GI tract during the dissection, we   could not differentiate the anatomy, which ultimately proved due to a kinking of   the bowel and dense adherence of the small bowel to the distal sigmoid, so the   operation was converted to a lower midline incision where a loop of small bowel   was densely adherent to the distal sigmoid.  This was divided sharply and then   repaired with several interrupted Vicryl sutures.  The junction of the left   colon and sigmoid were divided with linear stapler.  The mesentery was taken   down with the LigaSure.  Several of the larger mesenteric vessels were    additionally controlled with 2-0 silk ligatures.  The distal sigmoid and   proximal rectum was circumferentially mobilized.  The kinking of the bowel was   corrected with this dissection and this kinking was the result of prior episodes   of inflammation.  Ultimately, the proximal rectum was divided with a linear   stapler and the specimen was removed.  An end-to-end 2-layer outer silk, inner   Vicryl anastomosis was performed of the distal left colon to the proximal   rectum.  A sigmoidoscope was introduced per rectum and it should be noted that   the patient was in lithotomy, not in supine position.  The sigmoidoscope was   introduced.  The anastomosis was evaluated.  It was noted to be patent without   evidence of leak on insufflation.  Next, the scope was removed.  The bowel was   decompressed.  The midline fascia was closed in 2 layers with an inner 2-0   Vicryl and an outer layer of #1 PDS.  Subq was irrigated.  The skin was closed   with clips.  Needle, sponge and instrument counts were correct.  The patient   tolerated the procedure well and was stable at the completion of the operation.      GF/IN  dd: 12/10/2018 11:45:22 (CST)  td: 12/10/2018 12:04:29 (CST)  Doc ID   #[id number]  Job ID #[id number]    CC:

## 2018-12-10 NOTE — TRANSFER OF CARE
"Anesthesia Transfer of Care Note    Patient: Gill Cleary    Procedure(s) Performed: Procedure(s) (LRB):  COLECTOMY, SIGMOID, LAPAROSCOPIC- (N/A)  INSERTION, STENT, URETER (Left)  COLECTOMY, SIGMOID (N/A)    Patient location: PACU    Anesthesia Type: general    Transport from OR: Transported from OR on 6-10 L/min O2 by face mask with adequate spontaneous ventilation    Post pain: adequate analgesia    Post assessment: no apparent anesthetic complications    Post vital signs: stable    Level of consciousness: awake and alert    Nausea/Vomiting: no nausea/vomiting    Complications: none    Transfer of care protocol was followed      Last vitals:   Visit Vitals  BP (!) 128/59   Pulse 73   Temp 36.7 °C (98 °F) (Oral)   Resp 19   Ht 5' 3" (1.6 m)   Wt 67.1 kg (148 lb)   SpO2 100%   Breastfeeding? No   BMI 26.22 kg/m²     "

## 2018-12-10 NOTE — INTERVAL H&P NOTE
The patient has been examined and the H&P has been reviewed:    I concur with the findings and no changes have occurred since H&P was written.   Her prep didn't go very well.     Anesthesia/Surgery risks, benefits and alternative options discussed and understood by patient/family.          Active Hospital Problems    Diagnosis  POA    Diverticulitis [K57.92]  Yes      Resolved Hospital Problems   No resolved problems to display.

## 2018-12-10 NOTE — NURSING TRANSFER
Nursing Transfer Note      12/10/2018     Transfer To: 1052 [address]    Transfer via bed    Transfer with IV pump    Transported by RN x 2    Medicines sent: MIVF and PCA    Chart send with patient: Yes    Notified: family    Patient reassessed at: 12/10/18

## 2018-12-10 NOTE — NURSING TRANSFER
Nursing Transfer Note      12/10/2018     Transfer To: 1052a    Transfer via bed    Transfer with ivf and pca    Transported by transporterx2    Medicines sent: ivf,pca    Chart send with patient: Yes    Notified: family informed during visiting at 1600    Patient reassessed at: 12/10/18

## 2018-12-11 LAB
ALBUMIN SERPL BCP-MCNC: 3.1 G/DL
ALP SERPL-CCNC: 59 U/L
ALT SERPL W/O P-5'-P-CCNC: 9 U/L
ANION GAP SERPL CALC-SCNC: 11 MMOL/L
AST SERPL-CCNC: 20 U/L
BASOPHILS # BLD AUTO: 0 K/UL
BASOPHILS NFR BLD: 0 %
BILIRUB SERPL-MCNC: 0.5 MG/DL
BUN SERPL-MCNC: 7 MG/DL
CALCIUM SERPL-MCNC: 8.2 MG/DL
CHLORIDE SERPL-SCNC: 106 MMOL/L
CO2 SERPL-SCNC: 20 MMOL/L
CREAT SERPL-MCNC: 0.7 MG/DL
DIFFERENTIAL METHOD: ABNORMAL
EOSINOPHIL # BLD AUTO: 0 K/UL
EOSINOPHIL NFR BLD: 0 %
ERYTHROCYTE [DISTWIDTH] IN BLOOD BY AUTOMATED COUNT: 13.3 %
EST. GFR  (AFRICAN AMERICAN): >60 ML/MIN/1.73 M^2
EST. GFR  (NON AFRICAN AMERICAN): >60 ML/MIN/1.73 M^2
GLUCOSE SERPL-MCNC: 107 MG/DL
HCT VFR BLD AUTO: 29.7 %
HGB BLD-MCNC: 10 G/DL
IMM GRANULOCYTES # BLD AUTO: 0.06 K/UL
IMM GRANULOCYTES NFR BLD AUTO: 0.4 %
LYMPHOCYTES # BLD AUTO: 1 K/UL
LYMPHOCYTES NFR BLD: 7 %
MAGNESIUM SERPL-MCNC: 2.1 MG/DL
MCH RBC QN AUTO: 29 PG
MCHC RBC AUTO-ENTMCNC: 33.7 G/DL
MCV RBC AUTO: 86 FL
MONOCYTES # BLD AUTO: 1 K/UL
MONOCYTES NFR BLD: 6.9 %
NEUTROPHILS # BLD AUTO: 12.5 K/UL
NEUTROPHILS NFR BLD: 85.7 %
NRBC BLD-RTO: 0 /100 WBC
PLATELET # BLD AUTO: 318 K/UL
PMV BLD AUTO: 11.9 FL
POTASSIUM SERPL-SCNC: 4.1 MMOL/L
POTASSIUM SERPL-SCNC: 4.1 MMOL/L
PROT SERPL-MCNC: 6.8 G/DL
RBC # BLD AUTO: 3.45 M/UL
SODIUM SERPL-SCNC: 137 MMOL/L
WBC # BLD AUTO: 14.61 K/UL

## 2018-12-11 PROCEDURE — 94761 N-INVAS EAR/PLS OXIMETRY MLT: CPT

## 2018-12-11 PROCEDURE — 80053 COMPREHEN METABOLIC PANEL: CPT

## 2018-12-11 PROCEDURE — 99900035 HC TECH TIME PER 15 MIN (STAT)

## 2018-12-11 PROCEDURE — 83735 ASSAY OF MAGNESIUM: CPT

## 2018-12-11 PROCEDURE — 63600175 PHARM REV CODE 636 W HCPCS: Performed by: STUDENT IN AN ORGANIZED HEALTH CARE EDUCATION/TRAINING PROGRAM

## 2018-12-11 PROCEDURE — 27000221 HC OXYGEN, UP TO 24 HOURS

## 2018-12-11 PROCEDURE — 20600001 HC STEP DOWN PRIVATE ROOM

## 2018-12-11 PROCEDURE — 25000003 PHARM REV CODE 250: Performed by: STUDENT IN AN ORGANIZED HEALTH CARE EDUCATION/TRAINING PROGRAM

## 2018-12-11 PROCEDURE — 27000190 HC CPAP FULL FACE MASK W/VALVE

## 2018-12-11 PROCEDURE — 94660 CPAP INITIATION&MGMT: CPT

## 2018-12-11 PROCEDURE — C9113 INJ PANTOPRAZOLE SODIUM, VIA: HCPCS | Performed by: STUDENT IN AN ORGANIZED HEALTH CARE EDUCATION/TRAINING PROGRAM

## 2018-12-11 PROCEDURE — 36415 COLL VENOUS BLD VENIPUNCTURE: CPT

## 2018-12-11 PROCEDURE — 85025 COMPLETE CBC W/AUTO DIFF WBC: CPT

## 2018-12-11 RX ORDER — ACETAMINOPHEN 10 MG/ML
1000 INJECTION, SOLUTION INTRAVENOUS EVERY 8 HOURS
Status: COMPLETED | OUTPATIENT
Start: 2018-12-11 | End: 2018-12-11

## 2018-12-11 RX ORDER — ONDANSETRON 2 MG/ML
4 INJECTION INTRAMUSCULAR; INTRAVENOUS EVERY 6 HOURS
Status: DISCONTINUED | OUTPATIENT
Start: 2018-12-11 | End: 2018-12-20 | Stop reason: HOSPADM

## 2018-12-11 RX ORDER — PANTOPRAZOLE SODIUM 40 MG/10ML
40 INJECTION, POWDER, LYOPHILIZED, FOR SOLUTION INTRAVENOUS DAILY
Status: DISCONTINUED | OUTPATIENT
Start: 2018-12-11 | End: 2018-12-16

## 2018-12-11 RX ADMIN — PROMETHAZINE HYDROCHLORIDE 12.5 MG: 25 INJECTION INTRAMUSCULAR; INTRAVENOUS at 12:12

## 2018-12-11 RX ADMIN — SODIUM CHLORIDE: 0.9 INJECTION, SOLUTION INTRAVENOUS at 05:12

## 2018-12-11 RX ADMIN — Medication: at 10:12

## 2018-12-11 RX ADMIN — ENOXAPARIN SODIUM 40 MG: 100 INJECTION SUBCUTANEOUS at 05:12

## 2018-12-11 RX ADMIN — PANTOPRAZOLE SODIUM 40 MG: 40 INJECTION, POWDER, FOR SOLUTION INTRAVENOUS at 10:12

## 2018-12-11 RX ADMIN — ACETAMINOPHEN 1000 MG: 10 INJECTION, SOLUTION INTRAVENOUS at 01:12

## 2018-12-11 RX ADMIN — ONDANSETRON 4 MG: 2 INJECTION INTRAMUSCULAR; INTRAVENOUS at 05:12

## 2018-12-11 RX ADMIN — Medication: at 12:12

## 2018-12-11 RX ADMIN — ONDANSETRON HYDROCHLORIDE 4 MG: 2 INJECTION, SOLUTION INTRAMUSCULAR; INTRAVENOUS at 05:12

## 2018-12-11 RX ADMIN — Medication: at 08:12

## 2018-12-11 NOTE — PLAN OF CARE
Patient lives in a 1 story house w/spouse & daughter. Her MIL is available to assist her spouse while she is in the hospital for patient states she is her spouse's caretaker at home. Her MIL will assist her as she needs, as well. Patient is independent & agile. No needs determined.     [facility] Packet given to patient after informed about it;patient verbalized their understanding.     FYI: [facility] due to she request BS delivery at discharge: Patient states PLEASE DO NOT FILL PAIN PILL RX for she has a pain contract w/her Pain management -Dr. Ariel Peralta. She wants other RX (non-narcotic) filled, though.       12/11/18 1150   Discharge Assessment   Assessment Type Discharge Planning Assessment   Confirmed/corrected address and phone number on facesheet? Yes   Assessment information obtained from? Patient;Medical Record   Expected Length of Stay (days) (4)   Communicated expected length of stay with patient/caregiver yes   Prior to hospitilization cognitive status: Alert/Oriented;No Deficits   Prior to hospitalization functional status: Independent;Assistive Equipment   Current cognitive status: Alert/Oriented;No Deficits   Current Functional Status: Independent;Assistive Equipment;Needs Assistance   Facility Arrived From: (N/A)   Lives With spouse;child(lena), dependent  (15 yo daughter,  w/frontal lobe syndrome s/p h/o stroke & stays in bed due to depression-he is physcially able to walk, & do ADL. )   Able to Return to Prior Arrangements yes   Is patient able to care for self after discharge? Yes   Who are your caregiver(s) and their phone number(s)? (Marleen Elizalde Relative     [phone] [facility] )   Patient's perception of discharge disposition home or selfcare   Readmission Within the Last 30 Days no previous admission in last 30 days   Patient currently being followed by outpatient case management? No   Patient currently receives any other outside agency  "services? No   Equipment Currently Used at Home other (see comments)  ("APAP")   Do you have any problems affording any of your prescribed medications? No   Is the patient taking medications as prescribed? yes   Does the patient have transportation home? Yes   Transportation Anticipated family or friend will provide   Dialysis Name and Scheduled days (N/A)   Does the patient receive services at the Coumadin Clinic? No   Discharge Plan A Home with family   Discharge Plan B Home with family   Patient/Family in Agreement with Plan yes     "

## 2018-12-11 NOTE — PROGRESS NOTES
[facility]  General Surgery  Progress Note    Subjective:     History of Present Illness:  No notes on file    Post-Op Info:  Procedure(s) (LRB):  COLECTOMY, SIGMOID, LAPAROSCOPIC- (N/A)  INSERTION, STENT, URETER (Left)  COLECTOMY, SIGMOID (N/A)   1 Day Post-Op     Interval History: NAEON.  Pain controlled.  Some nausea/vomiting but patient states that she takes zofran around the clock at home for nausea.  Making good urine.  VSS    Medications:  Continuous Infusions:   sodium chloride 0.9% 125 mL/hr at 12/11/18 0528    hydromorphone in 0.9 % NaCl 6 mg/30 ml       Scheduled Meds:   enoxaparin  40 mg Subcutaneous Daily     PRN Meds:naloxone, ondansetron, promethazine (PHENERGAN) IVPB     Review of patient's allergies indicates:   Allergen Reactions    Ace inhibitors Other (See Comments)     dizziness      Efavirenz-emtricitabin-tenofov Other (See Comments)     dizziness      Penicillins Hives     Hives (skin)^ and causes yeast infection    Pork/porcine containing products      Pt would like pork added  Because of strong Uatsdin beliefs    Tramadol Nausea And Vomiting    Emtricita-rilpivirine-tenof df Other (See Comments)     Affecting patients kidneys      Labetalol     Metoprolol      Objective:     Vital Signs (Most Recent):  Temp: 97.1 °F (36.2 °C) (12/11/18 0751)  Pulse: 84 (12/11/18 0751)  Resp: 15 (12/11/18 0751)  BP: (!) 122/59 (12/11/18 0751)  SpO2: 100 % (12/11/18 0751) Vital Signs (24h Range):  Temp:  [96.6 °F (35.9 °C)-98.4 °F (36.9 °C)] 97.1 °F (36.2 °C)  Pulse:  [] 84  Resp:  [0-23] 15  SpO2:  [96 %-100 %] 100 %  BP: (121-181)/(58-98) 122/59     Weight: 67.1 kg (148 lb)  Body mass index is 26.22 kg/m².    Intake/Output - Last 3 Shifts       12/09 0700 - 12/10 0659 12/10 0700 - 12/11 0659 12/11 0700 - 12/12 0659    P.O.  0     I.V. (mL/kg)  4599.2 (68.5)     IV Piggyback  200     Total Intake(mL/kg)  4799.2 (71.5)     Urine (mL/kg/hr)  1350 (0.8)     Stool  0      Blood  200     Total Output  1550     Net  +3249.2            Stool Occurrence  0 x           Physical Exam   Constitutional: She is oriented to person, place, and time. She appears well-developed and well-nourished. No distress.   Cardiovascular: Normal rate and intact distal pulses.   Pulmonary/Chest: Effort normal. No respiratory distress.   Abdominal:   Soft, ND, appropriately TTP, incision c/d/i with surgical dressing in place    Neurological: She is alert and oriented to person, place, and time.       Significant Labs:  CBC:   Recent Labs   Lab 12/11/18  0356   WBC 14.61*   RBC 3.45*   HGB 10.0*   HCT 29.7*      MCV 86   MCH 29.0   MCHC 33.7     CMP:   Recent Labs   Lab 12/11/18  0356      CALCIUM 8.2*   ALBUMIN 3.1*   PROT 6.8      K 4.1  4.1   CO2 20*      BUN 7   CREATININE 0.7   ALKPHOS 59   ALT 9*   AST 20   BILITOT 0.5       Significant Diagnostics:  None    Assessment/Plan:     * Diverticulitis    NPO  mIVF  PCA  PRN Zofran and Phenergan.  May schedule zofran if not effective  PT/OT  OOB/ambulate  DVT ppx         Gage Davies MD  General Surgery  [facility]

## 2018-12-11 NOTE — ASSESSMENT & PLAN NOTE
NPO  mIVF  PCA  PRN Zofran and Phenergan.  May schedule zofran if not effective  PT/OT  OOB/ambulate  DVT ppx

## 2018-12-11 NOTE — PLAN OF CARE
Problem: Pain Acute  Goal: Optimal Pain Control    Intervention: Prevent or Manage Pain  VS stable. PCA pump in use.Pt. States she was in pain overnight with minimal relief, c/o nausea this morning. PCa pump settings changed per MD's orders. Millan catheter with pinkish , clear urine. Midline dressing intact. NS at 125ml/hr. Will continue to monitor.

## 2018-12-11 NOTE — SUBJECTIVE & OBJECTIVE
Interval History: NAEON.  Pain controlled.  Some nausea/vomiting but patient states that she takes zofran around the clock at home for nausea.  Making good urine.  VSS    Medications:  Continuous Infusions:   sodium chloride 0.9% 125 mL/hr at 12/11/18 0528    hydromorphone in 0.9 % NaCl 6 mg/30 ml       Scheduled Meds:   enoxaparin  40 mg Subcutaneous Daily     PRN Meds:naloxone, ondansetron, promethazine (PHENERGAN) IVPB     Review of patient's allergies indicates:   Allergen Reactions    Ace inhibitors Other (See Comments)     dizziness      Efavirenz-emtricitabin-tenofov Other (See Comments)     dizziness      Penicillins Hives     Hives (skin)^ and causes yeast infection    Pork/porcine containing products      Pt would like pork added  Because of strong Mormonism beliefs    Tramadol Nausea And Vomiting    Emtricita-rilpivirine-tenof df Other (See Comments)     Affecting patients kidneys      Labetalol     Metoprolol      Objective:     Vital Signs (Most Recent):  Temp: 97.1 °F (36.2 °C) (12/11/18 0751)  Pulse: 84 (12/11/18 0751)  Resp: 15 (12/11/18 0751)  BP: (!) 122/59 (12/11/18 0751)  SpO2: 100 % (12/11/18 0751) Vital Signs (24h Range):  Temp:  [96.6 °F (35.9 °C)-98.4 °F (36.9 °C)] 97.1 °F (36.2 °C)  Pulse:  [] 84  Resp:  [0-23] 15  SpO2:  [96 %-100 %] 100 %  BP: (121-181)/(58-98) 122/59     Weight: 67.1 kg (148 lb)  Body mass index is 26.22 kg/m².    Intake/Output - Last 3 Shifts       12/09 0700 - 12/10 0659 12/10 0700 - 12/11 0659 12/11 0700 - 12/12 0659    P.O.  0     I.V. (mL/kg)  4599.2 (68.5)     IV Piggyback  200     Total Intake(mL/kg)  4799.2 (71.5)     Urine (mL/kg/hr)  1350 (0.8)     Stool  0     Blood  200     Total Output  1550     Net  +3249.2            Stool Occurrence  0 x           Physical Exam   Constitutional: She is oriented to person, place, and time. She appears well-developed and well-nourished. No distress.   Cardiovascular: Normal rate and intact distal pulses.    Pulmonary/Chest: Effort normal. No respiratory distress.   Abdominal:   Soft, ND, appropriately TTP, incision c/d/i with surgical dressing in place    Neurological: She is alert and oriented to person, place, and time.       Significant Labs:  CBC:   Recent Labs   Lab 12/11/18  0356   WBC 14.61*   RBC 3.45*   HGB 10.0*   HCT 29.7*      MCV 86   MCH 29.0   MCHC 33.7     CMP:   Recent Labs   Lab 12/11/18  0356      CALCIUM 8.2*   ALBUMIN 3.1*   PROT 6.8      K 4.1  4.1   CO2 20*      BUN 7   CREATININE 0.7   ALKPHOS 59   ALT 9*   AST 20   BILITOT 0.5       Significant Diagnostics:  None

## 2018-12-11 NOTE — PLAN OF CARE
Problem: Adult Inpatient Plan of Care  Goal: Plan of Care Review  Outcome: Ongoing (interventions implemented as appropriate)  POC reviewed with patient, verbalizes understanding. Received from PACU, aox4, drowsy & arouses easily. Receiving IVF & PCA. Midline dressing CDI. Millan patent with red/pink output. Swabs given for dry mouth, understands NPO status. IN bed with call light in reach, no s/s of distress.

## 2018-12-11 NOTE — PLAN OF CARE
Problem: Adult Inpatient Plan of Care  Goal: Plan of Care Review  Outcome: Ongoing (interventions implemented as appropriate)  Plan of care reviewed with pt. Pt AAOx's4, vital signs stable. Pt currently on room air. Pain well tolerated with pca pump. Midline with Telfa island dressing intact. Millan intact. Pt currently NPO but is tolerating small sips for sore throat. Pt has sat alongside edge of bed throughout shift, and remains free from falls. No acute events at this time. Bed in low and locked position with call light in reach. TM

## 2018-12-12 LAB
ALBUMIN SERPL BCP-MCNC: 3 G/DL
ALP SERPL-CCNC: 53 U/L
ALT SERPL W/O P-5'-P-CCNC: 9 U/L
ANION GAP SERPL CALC-SCNC: 11 MMOL/L
AST SERPL-CCNC: 19 U/L
BASOPHILS # BLD AUTO: 0.02 K/UL
BASOPHILS NFR BLD: 0.3 %
BILIRUB SERPL-MCNC: 0.4 MG/DL
BUN SERPL-MCNC: 5 MG/DL
CALCIUM SERPL-MCNC: 8.6 MG/DL
CHLORIDE SERPL-SCNC: 107 MMOL/L
CO2 SERPL-SCNC: 19 MMOL/L
CREAT SERPL-MCNC: 0.7 MG/DL
DIFFERENTIAL METHOD: ABNORMAL
EOSINOPHIL # BLD AUTO: 0 K/UL
EOSINOPHIL NFR BLD: 0.5 %
ERYTHROCYTE [DISTWIDTH] IN BLOOD BY AUTOMATED COUNT: 13.2 %
EST. GFR  (AFRICAN AMERICAN): >60 ML/MIN/1.73 M^2
EST. GFR  (NON AFRICAN AMERICAN): >60 ML/MIN/1.73 M^2
GLUCOSE SERPL-MCNC: 54 MG/DL
HCT VFR BLD AUTO: 30.1 %
HGB BLD-MCNC: 9.5 G/DL
IMM GRANULOCYTES # BLD AUTO: 0.03 K/UL
IMM GRANULOCYTES NFR BLD AUTO: 0.4 %
LYMPHOCYTES # BLD AUTO: 1.5 K/UL
LYMPHOCYTES NFR BLD: 20.3 %
MAGNESIUM SERPL-MCNC: 1.9 MG/DL
MCH RBC QN AUTO: 28.8 PG
MCHC RBC AUTO-ENTMCNC: 31.6 G/DL
MCV RBC AUTO: 91 FL
MONOCYTES # BLD AUTO: 0.7 K/UL
MONOCYTES NFR BLD: 9.1 %
NEUTROPHILS # BLD AUTO: 5.1 K/UL
NEUTROPHILS NFR BLD: 69.4 %
NRBC BLD-RTO: 0 /100 WBC
PLATELET # BLD AUTO: 269 K/UL
PMV BLD AUTO: 11.9 FL
POTASSIUM SERPL-SCNC: 3.9 MMOL/L
POTASSIUM SERPL-SCNC: 3.9 MMOL/L
PROT SERPL-MCNC: 6.8 G/DL
RBC # BLD AUTO: 3.3 M/UL
SODIUM SERPL-SCNC: 137 MMOL/L
WBC # BLD AUTO: 7.29 K/UL

## 2018-12-12 PROCEDURE — 25000003 PHARM REV CODE 250: Performed by: SURGERY

## 2018-12-12 PROCEDURE — 63600175 PHARM REV CODE 636 W HCPCS: Performed by: STUDENT IN AN ORGANIZED HEALTH CARE EDUCATION/TRAINING PROGRAM

## 2018-12-12 PROCEDURE — 94761 N-INVAS EAR/PLS OXIMETRY MLT: CPT

## 2018-12-12 PROCEDURE — 94660 CPAP INITIATION&MGMT: CPT

## 2018-12-12 PROCEDURE — 20600001 HC STEP DOWN PRIVATE ROOM

## 2018-12-12 PROCEDURE — 25000003 PHARM REV CODE 250: Performed by: STUDENT IN AN ORGANIZED HEALTH CARE EDUCATION/TRAINING PROGRAM

## 2018-12-12 PROCEDURE — 99900035 HC TECH TIME PER 15 MIN (STAT)

## 2018-12-12 PROCEDURE — 97161 PT EVAL LOW COMPLEX 20 MIN: CPT

## 2018-12-12 PROCEDURE — 80053 COMPREHEN METABOLIC PANEL: CPT

## 2018-12-12 PROCEDURE — C9113 INJ PANTOPRAZOLE SODIUM, VIA: HCPCS | Performed by: STUDENT IN AN ORGANIZED HEALTH CARE EDUCATION/TRAINING PROGRAM

## 2018-12-12 PROCEDURE — 85025 COMPLETE CBC W/AUTO DIFF WBC: CPT

## 2018-12-12 PROCEDURE — 94770 HC EXHALED C02 TEST: CPT

## 2018-12-12 PROCEDURE — 27000221 HC OXYGEN, UP TO 24 HOURS

## 2018-12-12 PROCEDURE — 36415 COLL VENOUS BLD VENIPUNCTURE: CPT

## 2018-12-12 PROCEDURE — 83735 ASSAY OF MAGNESIUM: CPT

## 2018-12-12 RX ORDER — NALOXONE HCL 0.4 MG/ML
0.02 VIAL (ML) INJECTION
Status: DISCONTINUED | OUTPATIENT
Start: 2018-12-12 | End: 2018-12-14

## 2018-12-12 RX ORDER — NEBIVOLOL 5 MG/1
10 TABLET ORAL 2 TIMES DAILY
Status: DISCONTINUED | OUTPATIENT
Start: 2018-12-12 | End: 2018-12-14

## 2018-12-12 RX ORDER — SCOLOPAMINE TRANSDERMAL SYSTEM 1 MG/1
1 PATCH, EXTENDED RELEASE TRANSDERMAL
Status: DISCONTINUED | OUTPATIENT
Start: 2018-12-12 | End: 2018-12-20 | Stop reason: HOSPADM

## 2018-12-12 RX ORDER — ACETAMINOPHEN 10 MG/ML
1000 INJECTION, SOLUTION INTRAVENOUS EVERY 8 HOURS
Status: COMPLETED | OUTPATIENT
Start: 2018-12-12 | End: 2018-12-12

## 2018-12-12 RX ORDER — HYDROMORPHONE HCL IN 0.9% NACL 6 MG/30 ML
PATIENT CONTROLLED ANALGESIA SYRINGE INTRAVENOUS CONTINUOUS
Status: DISCONTINUED | OUTPATIENT
Start: 2018-12-12 | End: 2018-12-14

## 2018-12-12 RX ORDER — DICYCLOMINE HYDROCHLORIDE 20 MG/1
20 TABLET ORAL EVERY 6 HOURS
Status: DISCONTINUED | OUTPATIENT
Start: 2018-12-12 | End: 2018-12-14

## 2018-12-12 RX ORDER — ACETAMINOPHEN 500 MG
1000 TABLET ORAL EVERY 8 HOURS
Status: DISCONTINUED | OUTPATIENT
Start: 2018-12-12 | End: 2018-12-14

## 2018-12-12 RX ORDER — OXYCODONE AND ACETAMINOPHEN 10; 325 MG/1; MG/1
1 TABLET ORAL EVERY 4 HOURS PRN
Status: DISCONTINUED | OUTPATIENT
Start: 2018-12-12 | End: 2018-12-12

## 2018-12-12 RX ORDER — OXYCODONE AND ACETAMINOPHEN 5; 325 MG/1; MG/1
1 TABLET ORAL EVERY 4 HOURS PRN
Status: DISCONTINUED | OUTPATIENT
Start: 2018-12-12 | End: 2018-12-12

## 2018-12-12 RX ORDER — SIMETHICONE 80 MG
1 TABLET,CHEWABLE ORAL 3 TIMES DAILY PRN
Status: DISCONTINUED | OUTPATIENT
Start: 2018-12-12 | End: 2018-12-20 | Stop reason: HOSPADM

## 2018-12-12 RX ORDER — VENLAFAXINE HYDROCHLORIDE 75 MG/1
150 CAPSULE, EXTENDED RELEASE ORAL DAILY
Status: DISCONTINUED | OUTPATIENT
Start: 2018-12-13 | End: 2018-12-14

## 2018-12-12 RX ADMIN — SCOPALAMINE 1 PATCH: 1 PATCH, EXTENDED RELEASE TRANSDERMAL at 11:12

## 2018-12-12 RX ADMIN — Medication: at 11:12

## 2018-12-12 RX ADMIN — SODIUM CHLORIDE: 0.9 INJECTION, SOLUTION INTRAVENOUS at 06:12

## 2018-12-12 RX ADMIN — Medication: at 07:12

## 2018-12-12 RX ADMIN — Medication: at 09:12

## 2018-12-12 RX ADMIN — ACETAMINOPHEN 1000 MG: 500 TABLET, FILM COATED ORAL at 12:12

## 2018-12-12 RX ADMIN — ACETAMINOPHEN 1000 MG: 500 TABLET, FILM COATED ORAL at 09:12

## 2018-12-12 RX ADMIN — ONDANSETRON HYDROCHLORIDE 4 MG: 2 INJECTION, SOLUTION INTRAMUSCULAR; INTRAVENOUS at 05:12

## 2018-12-12 RX ADMIN — ENOXAPARIN SODIUM 40 MG: 100 INJECTION SUBCUTANEOUS at 05:12

## 2018-12-12 RX ADMIN — SODIUM CHLORIDE: 0.9 INJECTION, SOLUTION INTRAVENOUS at 07:12

## 2018-12-12 RX ADMIN — SIMETHICONE CHEW TAB 80 MG 80 MG: 80 TABLET ORAL at 10:12

## 2018-12-12 RX ADMIN — NEBIVOLOL HYDROCHLORIDE 10 MG: 5 TABLET ORAL at 09:12

## 2018-12-12 RX ADMIN — PANTOPRAZOLE SODIUM 40 MG: 40 INJECTION, POWDER, FOR SOLUTION INTRAVENOUS at 09:12

## 2018-12-12 RX ADMIN — DICYCLOMINE HYDROCHLORIDE 20 MG: 20 TABLET ORAL at 05:12

## 2018-12-12 RX ADMIN — ACETAMINOPHEN 1000 MG: 10 INJECTION, SOLUTION INTRAVENOUS at 05:12

## 2018-12-12 RX ADMIN — ONDANSETRON HYDROCHLORIDE 4 MG: 2 INJECTION, SOLUTION INTRAMUSCULAR; INTRAVENOUS at 11:12

## 2018-12-12 RX ADMIN — ONDANSETRON HYDROCHLORIDE 4 MG: 2 INJECTION, SOLUTION INTRAMUSCULAR; INTRAVENOUS at 01:12

## 2018-12-12 NOTE — PT/OT/SLP EVAL
"Physical Therapy Evaluation    Patient Name:  Gill Cleary   MRN:  [id number]    Recommendations:     Discharge Recommendations:  Home  Discharge Equipment Recommendations: none   Barriers to discharge: Inaccessible home and Decreased caregiver support 2 JOSE MANUEL and pt cares for her  at home  Assessment:     Gill Cleary is a 48 y.o. female admitted with a medical diagnosis of Diverticulitis.  She presents with the following impairments/functional limitations:  weakness, impaired endurance, gait instability, impaired functional mobilty, pain . Pt is limited with gait distance due to pain and fatigue.    Rehab Prognosis: Good; patient would benefit from acute skilled PT services to address these deficits and reach maximum level of function.    Recent Surgery: Procedure(s) (LRB):  COLECTOMY, SIGMOID, LAPAROSCOPIC- (N/A)  INSERTION, STENT, URETER (Left)  COLECTOMY, SIGMOID (N/A) 2 Days Post-Op    Plan:     During this hospitalization, patient to be seen 3 x/week to address the identified rehab impairments via gait training, therapeutic activities, therapeutic exercises, neuromuscular re-education and progress toward the following goals:    GOALS:   Multidisciplinary Problems     Physical Therapy Goals        Problem: Physical Therapy Goal    Goal Priority Disciplines Outcome Goal Variances Interventions   Physical Therapy Goal     PT, PT/OT Ongoing (interventions implemented as appropriate)     Description:  PT goals until 12/19/18    1. Pt supine to sit with mod independent-not met  2. Pt sit to supine with mod independent-not met  3. Pt sit to stand with no AD with supervision-not met  4. Pt to perform gait 300ft with no AD with supervision.-not met  5. Pt to go up/down curb step with no AD with SBA.-not met                          · Plan of Care Expires:  01/11/19    Subjective   "It just hurts so bad, they are having a hard time controlling my pain"    Pain/Comfort:  · Pain Rating 1: 8/10  · Location - Side 1: " "(right more than left)  · Location - Orientation 1: generalized  · Location 1: abdomen  · Pain Addressed 1: Reposition, Cessation of Activity  · Pain Rating Post-Intervention 1: 8/10    Patients cultural, spiritual, Judaism conflicts given the current situation: no    Living Environment:  Pt lives in a 1 story with 2 steps with  who she cares for ("He has a frontal lobe injury so he needs constant supervision)  Prior to admission, patients level of function was independent.  Equipment used at home: none.  Upon discharge, patient will have assistance from unknown.    Objective:     Communicated with nurse prior to session.  Patient found all lines intact, call button in reach and nurse notified peripheral IV, PCA, oxygen  upon PT entry to room.    General Precautions: Standard, fall   Orthopedic Precautions:N/A   Braces: N/A     Exams:  · Cognitive Exam:  Patient is oriented to Person, Place, Time and Situation  · Sensation:    · -       Intact  light/touch B LE  · RLE ROM: WFL except hip flex limited due to pain  · RLE Strength: WFL except hip flex 3-/5  · LLE ROM: WFL except hip flex limited due to pain  · LLE Strength: WFL except hip flex 3-/5    Functional Mobility:  · Transfers:     · Sit to Stand:  contact guard assistance with no AD  · Gait: 120ft with no AD with minimal assist with pt reaching for the wall rails at times due to instability and pain. pt performed gait with flexed trunk, with decreased step length, and at slow pace    AM-PAC 6 CLICK MOBILITY  Total Score:18     Patient left up in chair with all lines intact, call button in reach and nurse notified.    GOALS:   Multidisciplinary Problems     Physical Therapy Goals        Problem: Physical Therapy Goal    Goal Priority Disciplines Outcome Goal Variances Interventions   Physical Therapy Goal     PT, PT/OT Ongoing (interventions implemented as appropriate)     Description:  PT goals until 12/19/18    1. Pt supine to sit with mod " independent-not met  2. Pt sit to supine with mod independent-not met  3. Pt sit to stand with no AD with supervision-not met  4. Pt to perform gait 300ft with no AD with supervision.-not met  5. Pt to go up/down curb step with no AD with SBA.-not met                          History:     Past Medical History:   Diagnosis Date    Diverticulitis     HIV (human immunodeficiency virus infection)     Hyperlipemia     Hypertension     Osteoporosis     Sleep apnea     Vertigo        Past Surgical History:   Procedure Laterality Date    COLECTOMY, SIGMOID N/A 12/10/2018    Performed by Reyes Nelson MD at [facility] OR [address] [place] [address]    COLECTOMY, SIGMOID, LAPAROSCOPIC- N/A 12/10/2018    Performed by Reyes Nelson MD at [facility] OR [address] [place] [address]    HYSTERECTOMY      INSERTION, STENT, URETER Left 12/10/2018    Performed by Reyes Nelson MD at [facility] OR [address] [place] [address]    LAPAROSCOPIC SIGMOIDECTOMY N/A 12/10/2018    Procedure: COLECTOMY, SIGMOID, LAPAROSCOPIC-;  Surgeon: Reyes Nelson MD;  Location: [facility] OR [address] [place] [address];  Service: General;  Laterality: N/A;    URETERAL STENT PLACEMENT Left 12/10/2018    Procedure: INSERTION, STENT, URETER;  Surgeon: Reyes Nelson MD;  Location: [facility] OR [address] [place] [address];  Service: General;  Laterality: Left;       Clinical Decision Making:     History  Co-morbidities and personal factors that may impact the plan of care Examination  Body Structures and Functions, activity limitations and participation restrictions that may impact the plan of care Clinical Presentation   Decision Making/ Complexity Score   Co-morbidities:   [] Time since onset of injury / illness / exacerbation  [] Status of current condition  []Patient's cognitive status and safety concerns    [] Multiple Medical Problems (see med hx)  Personal Factors:   [] Patient's age  [] Prior Level of function   [x] Patient's home situation (environment and family support)  [] Patient's level of motivation  [] Expected progression of  patient      HISTORY:(criteria)    [x] 93915 - no personal factors/history    [x] 76990 - has 1-2 personal factor/comorbidity     [] 19376 - has >3 personal factor/comorbidity     Body Regions:  [] Objective examination findings  [] Head     []  Neck  [] Trunk   [] Upper Extremity  [] Lower Extremity    Body Systems:  [] For communication ability, affect, cognition, language, and learning style: the assessment of the ability to make needs known, consciousness, orientation (person, place, and time), expected emotional /behavioral responses, and learning preferences (eg, learning barriers, education  needs)  [x] For the neuromuscular system: a general assessment of gross coordinated movement (eg, balance, gait, locomotion, transfers, and transitions) and motor function  (motor control and motor learning)  [] For the musculoskeletal system: the assessment of gross symmetry, gross range of motion, gross strength, height, and weight  [] For the integumentary system: the assessment of pliability(texture), presence of scar formation, skin color, and skin integrity  [] For cardiovascular/pulmonary system: the assessment of heart rate, respiratory rate, blood pressure, and edema     Activity limitations:    [] Patient's cognitive status and saf ety concerns          [x] Status of current condition      [] Weight bearing restriction  [] Cardiopulmunary Restriction    Participation Restrictions:   [] Goals and goal agreement with the patient     [] Rehab potential (prognosis) and probable outcome      Examination of Body System: (criteria)    [x] 81632 - addressing 1-2 elements    [] 41376 - addressing a total of 3 or more elements     [] 60744 -  Addressing a total of 4 or more elements         Clinical Presentation: (criteria)  Stable - 41898     On examination of body system using standardized tests and measures patient presents with (CHOOSE ONE) elements from any of the following: body structures and functions, activity  limitations, and/or participation restrictions.  Leading to a clinical presentation that is considered (CHOOSE ONE)                              Clinical Decision Making  (Eval Complexity):  Low- 19951     Time Tracking:     PT Received On: 12/12/18  PT Start Time: 1217     PT Stop Time: 1242  PT Total Time (min): 25 min     Billable Minutes: Evaluation 25      Sariah Almeida, PT  12/12/2018

## 2018-12-12 NOTE — SUBJECTIVE & OBJECTIVE
Interval History: NAEON.  Pain controlled.  Nausea/vomiting improved with scheduled zofran.  Still didn't get out of bed.  No BM/Flatus yet    Medications:  Continuous Infusions:   sodium chloride 0.9% 125 mL/hr at 12/12/18 0600     Scheduled Meds:   enoxaparin  40 mg Subcutaneous Daily    nebivolol  10 mg Oral BID    ondansetron  4 mg Intravenous Q6H    pantoprazole  40 mg Intravenous Daily    scopolamine  1 patch Transdermal Q3 Days     PRN Meds:oxyCODONE-acetaminophen, oxyCODONE-acetaminophen, promethazine (PHENERGAN) IVPB     Review of patient's allergies indicates:   Allergen Reactions    Ace inhibitors Other (See Comments)     dizziness      Efavirenz-emtricitabin-tenofov Other (See Comments)     dizziness      Penicillins Hives     Hives (skin)^ and causes yeast infection    Pork/porcine containing products      Pt would like pork added  Because of strong Samaritan beliefs    Tramadol Nausea And Vomiting    Emtricita-rilpivirine-tenof df Other (See Comments)     Affecting patients kidneys      Labetalol     Metoprolol      Objective:     Vital Signs (Most Recent):  Temp: 98.4 °F (36.9 °C) (12/12/18 0851)  Pulse: 85 (12/12/18 0851)  Resp: 18 (12/12/18 0851)  BP: 135/63 (12/12/18 0851)  SpO2: 98 % (12/12/18 0912) Vital Signs (24h Range):  Temp:  [97.9 °F (36.6 °C)-98.8 °F (37.1 °C)] 98.4 °F (36.9 °C)  Pulse:  [] 85  Resp:  [18-20] 18  SpO2:  [98 %-100 %] 98 %  BP: (131-158)/(61-91) 135/63     Weight: 67.1 kg (148 lb)  Body mass index is 26.22 kg/m².    Intake/Output - Last 3 Shifts       12/10 0700 - 12/11 0659 12/11 0700 - 12/12 0659 12/12 0700 - 12/13 0659    P.O. 0 120     I.V. (mL/kg) 4599.2 (68.5) 3087.8 (46)     IV Piggyback 200 150     Total Intake(mL/kg) 4799.2 (71.5) 3357.8 (50)     Urine (mL/kg/hr) 1350 (0.8) 2050 (1.3)     Stool 0      Blood 200      Total Output 1550 2050     Net +3249.2 +1307.8            Stool Occurrence 0 x            Physical Exam   Constitutional: She is  oriented to person, place, and time. She appears well-developed and well-nourished. No distress.   Cardiovascular: Normal rate and intact distal pulses.   Pulmonary/Chest: Effort normal. No respiratory distress.   Abdominal:   Soft, ND, appropriately TTP, incision c/d/i with staples    Neurological: She is alert and oriented to person, place, and time.       Significant Labs:  CBC:   Recent Labs   Lab 12/12/18  0445   WBC 7.29   RBC 3.30*   HGB 9.5*   HCT 30.1*      MCV 91   MCH 28.8   MCHC 31.6*     CMP:   Recent Labs   Lab 12/12/18  0445   GLU 54*   CALCIUM 8.6*   ALBUMIN 3.0*   PROT 6.8      K 3.9  3.9   CO2 19*      BUN 5*   CREATININE 0.7   ALKPHOS 53*   ALT 9*   AST 19   BILITOT 0.4       Significant Diagnostics:  None

## 2018-12-12 NOTE — PLAN OF CARE
Problem: Pain Acute  Goal: Optimal Pain Control    Intervention: Prevent or Manage Pain  VS stable. Pt. Remains NPO with ice chips sparingly. No c/o N/V. Scheduled Zofran given IVP. C/o headache, restarted Tylenol IVPB this morning. C/o muscle spasms, moderate abdominal pain through the night, relieved with PCA pump and hot packs. Millan catheter in place with yellow urine. Urinary catheter was placed by Urology, will hold to d/c'd per order until cleared by Urology, MD on call aware. NS at 125ml/hr. Midline abdominal dressing intact, will continue to monitor.

## 2018-12-12 NOTE — PLAN OF CARE
Problem: Physical Therapy Goal  Goal: Physical Therapy Goal  PT goals until 12/19/18    1. Pt supine to sit with mod independent-not met  2. Pt sit to supine with mod independent-not met  3. Pt sit to stand with no AD with supervision-not met  4. Pt to perform gait 300ft with no AD with supervision.-not met  5. Pt to go up/down curb step with no AD with SBA.-not met        Outcome: Ongoing (interventions implemented as appropriate)  Pt's goals set and pt will benefit from skilled PT services to work towards improved functional mobility including: bed mobility, transfers, up/down step, and gait.   Sariah Almeida, PT  12/12/2018

## 2018-12-12 NOTE — PROGRESS NOTES
[facility]  General Surgery  Progress Note    Subjective:     History of Present Illness:  No notes on file    Post-Op Info:  Procedure(s) (LRB):  COLECTOMY, SIGMOID, LAPAROSCOPIC- (N/A)  INSERTION, STENT, URETER (Left)  COLECTOMY, SIGMOID (N/A)   2 Days Post-Op     Interval History: NAEON.  Pain controlled.  Nausea/vomiting improved with scheduled zofran.  Still didn't get out of bed.  No BM/Flatus yet    Medications:  Continuous Infusions:   sodium chloride 0.9% 125 mL/hr at 12/12/18 0600     Scheduled Meds:   enoxaparin  40 mg Subcutaneous Daily    nebivolol  10 mg Oral BID    ondansetron  4 mg Intravenous Q6H    pantoprazole  40 mg Intravenous Daily    scopolamine  1 patch Transdermal Q3 Days     PRN Meds:oxyCODONE-acetaminophen, oxyCODONE-acetaminophen, promethazine (PHENERGAN) IVPB     Review of patient's allergies indicates:   Allergen Reactions    Ace inhibitors Other (See Comments)     dizziness      Efavirenz-emtricitabin-tenofov Other (See Comments)     dizziness      Penicillins Hives     Hives (skin)^ and causes yeast infection    Pork/porcine containing products      Pt would like pork added  Because of strong Adventist beliefs    Tramadol Nausea And Vomiting    Emtricita-rilpivirine-tenof df Other (See Comments)     Affecting patients kidneys      Labetalol     Metoprolol      Objective:     Vital Signs (Most Recent):  Temp: 98.4 °F (36.9 °C) (12/12/18 0851)  Pulse: 85 (12/12/18 0851)  Resp: 18 (12/12/18 0851)  BP: 135/63 (12/12/18 0851)  SpO2: 98 % (12/12/18 0912) Vital Signs (24h Range):  Temp:  [97.9 °F (36.6 °C)-98.8 °F (37.1 °C)] 98.4 °F (36.9 °C)  Pulse:  [] 85  Resp:  [18-20] 18  SpO2:  [98 %-100 %] 98 %  BP: (131-158)/(61-91) 135/63     Weight: 67.1 kg (148 lb)  Body mass index is 26.22 kg/m².    Intake/Output - Last 3 Shifts       12/10 0700 - 12/11 0659 12/11 0700 - 12/12 0659 12/12 0700 - 12/13 0659    P.O. 0 120     I.V. (mL/kg) 4599.2 (68.5) 3087.8  (46)     IV Piggyback 200 150     Total Intake(mL/kg) 4799.2 (71.5) 3357.8 (50)     Urine (mL/kg/hr) 1350 (0.8) 2050 (1.3)     Stool 0      Blood 200      Total Output 1550 2050     Net +3249.2 +1307.8            Stool Occurrence 0 x            Physical Exam   Constitutional: She is oriented to person, place, and time. She appears well-developed and well-nourished. No distress.   Cardiovascular: Normal rate and intact distal pulses.   Pulmonary/Chest: Effort normal. No respiratory distress.   Abdominal:   Soft, ND, appropriately TTP, incision c/d/i with staples    Neurological: She is alert and oriented to person, place, and time.       Significant Labs:  CBC:   Recent Labs   Lab 12/12/18  0445   WBC 7.29   RBC 3.30*   HGB 9.5*   HCT 30.1*      MCV 91   MCH 28.8   MCHC 31.6*     CMP:   Recent Labs   Lab 12/12/18  0445   GLU 54*   CALCIUM 8.6*   ALBUMIN 3.0*   PROT 6.8      K 3.9  3.9   CO2 19*      BUN 5*   CREATININE 0.7   ALKPHOS 53*   ALT 9*   AST 19   BILITOT 0.4       Significant Diagnostics:  None    Assessment/Plan:     * Diverticulitis    Clears  mIVF  D/c PCA, start PO pain meds  Scheduled zofran, PRN phenergan, scopolamine patch  PT/OT  OOB - must get out of bed today  Home CPAP QHS  DVT ppx         Gage Davies MD  General Surgery  [facility]

## 2018-12-12 NOTE — ASSESSMENT & PLAN NOTE
Clears  mIVF  D/c PCA, start PO pain meds  Scheduled zofran, PRN phenergan, scopolamine patch  PT/OT  OOB - must get out of bed today  Home CPAP QHS  DVT ppx

## 2018-12-13 LAB
ALBUMIN SERPL BCP-MCNC: 3 G/DL
ALP SERPL-CCNC: 56 U/L
ALT SERPL W/O P-5'-P-CCNC: 8 U/L
ANION GAP SERPL CALC-SCNC: 9 MMOL/L
AST SERPL-CCNC: 16 U/L
BASOPHILS # BLD AUTO: 0.03 K/UL
BASOPHILS NFR BLD: 0.6 %
BILIRUB SERPL-MCNC: 0.4 MG/DL
BUN SERPL-MCNC: 3 MG/DL
CALCIUM SERPL-MCNC: 9.1 MG/DL
CHLORIDE SERPL-SCNC: 104 MMOL/L
CO2 SERPL-SCNC: 27 MMOL/L
CREAT SERPL-MCNC: 0.7 MG/DL
DIFFERENTIAL METHOD: ABNORMAL
EOSINOPHIL # BLD AUTO: 0.1 K/UL
EOSINOPHIL NFR BLD: 1.7 %
ERYTHROCYTE [DISTWIDTH] IN BLOOD BY AUTOMATED COUNT: 13.2 %
EST. GFR  (AFRICAN AMERICAN): >60 ML/MIN/1.73 M^2
EST. GFR  (NON AFRICAN AMERICAN): >60 ML/MIN/1.73 M^2
GLUCOSE SERPL-MCNC: 80 MG/DL
HCT VFR BLD AUTO: 30.2 %
HGB BLD-MCNC: 9.9 G/DL
IMM GRANULOCYTES # BLD AUTO: 0.02 K/UL
IMM GRANULOCYTES NFR BLD AUTO: 0.4 %
LYMPHOCYTES # BLD AUTO: 1.5 K/UL
LYMPHOCYTES NFR BLD: 28.3 %
MAGNESIUM SERPL-MCNC: 1.6 MG/DL
MCH RBC QN AUTO: 28.4 PG
MCHC RBC AUTO-ENTMCNC: 32.8 G/DL
MCV RBC AUTO: 87 FL
MONOCYTES # BLD AUTO: 0.6 K/UL
MONOCYTES NFR BLD: 11.4 %
NEUTROPHILS # BLD AUTO: 3 K/UL
NEUTROPHILS NFR BLD: 57.6 %
NRBC BLD-RTO: 0 /100 WBC
PLATELET # BLD AUTO: 262 K/UL
PMV BLD AUTO: 11.9 FL
POTASSIUM SERPL-SCNC: 3.4 MMOL/L
POTASSIUM SERPL-SCNC: 3.4 MMOL/L
PROT SERPL-MCNC: 6.8 G/DL
RBC # BLD AUTO: 3.49 M/UL
SODIUM SERPL-SCNC: 140 MMOL/L
WBC # BLD AUTO: 5.16 K/UL

## 2018-12-13 PROCEDURE — 36415 COLL VENOUS BLD VENIPUNCTURE: CPT

## 2018-12-13 PROCEDURE — 83735 ASSAY OF MAGNESIUM: CPT

## 2018-12-13 PROCEDURE — 20600001 HC STEP DOWN PRIVATE ROOM

## 2018-12-13 PROCEDURE — C9113 INJ PANTOPRAZOLE SODIUM, VIA: HCPCS | Performed by: STUDENT IN AN ORGANIZED HEALTH CARE EDUCATION/TRAINING PROGRAM

## 2018-12-13 PROCEDURE — 25000003 PHARM REV CODE 250: Performed by: SURGERY

## 2018-12-13 PROCEDURE — 63600175 PHARM REV CODE 636 W HCPCS: Performed by: STUDENT IN AN ORGANIZED HEALTH CARE EDUCATION/TRAINING PROGRAM

## 2018-12-13 PROCEDURE — 97116 GAIT TRAINING THERAPY: CPT

## 2018-12-13 PROCEDURE — 97530 THERAPEUTIC ACTIVITIES: CPT

## 2018-12-13 PROCEDURE — 85025 COMPLETE CBC W/AUTO DIFF WBC: CPT

## 2018-12-13 PROCEDURE — 25000003 PHARM REV CODE 250: Performed by: STUDENT IN AN ORGANIZED HEALTH CARE EDUCATION/TRAINING PROGRAM

## 2018-12-13 PROCEDURE — 80053 COMPREHEN METABOLIC PANEL: CPT

## 2018-12-13 RX ORDER — LANOLIN ALCOHOL/MO/W.PET/CERES
400 CREAM (GRAM) TOPICAL ONCE
Status: COMPLETED | OUTPATIENT
Start: 2018-12-13 | End: 2018-12-13

## 2018-12-13 RX ORDER — KETOROLAC TROMETHAMINE 30 MG/ML
30 INJECTION, SOLUTION INTRAMUSCULAR; INTRAVENOUS EVERY 6 HOURS
Status: COMPLETED | OUTPATIENT
Start: 2018-12-13 | End: 2018-12-16

## 2018-12-13 RX ORDER — POTASSIUM CHLORIDE 20 MEQ/1
40 TABLET, EXTENDED RELEASE ORAL ONCE
Status: COMPLETED | OUTPATIENT
Start: 2018-12-13 | End: 2018-12-13

## 2018-12-13 RX ADMIN — NEBIVOLOL HYDROCHLORIDE 10 MG: 5 TABLET ORAL at 08:12

## 2018-12-13 RX ADMIN — Medication: at 12:12

## 2018-12-13 RX ADMIN — ONDANSETRON HYDROCHLORIDE 4 MG: 2 INJECTION, SOLUTION INTRAMUSCULAR; INTRAVENOUS at 05:12

## 2018-12-13 RX ADMIN — MAGNESIUM OXIDE TAB 400 MG (241.3 MG ELEMENTAL MG) 400 MG: 400 (241.3 MG) TAB at 08:12

## 2018-12-13 RX ADMIN — VENLAFAXINE HYDROCHLORIDE 150 MG: 75 CAPSULE, EXTENDED RELEASE ORAL at 08:12

## 2018-12-13 RX ADMIN — ACETAMINOPHEN 1000 MG: 500 TABLET, FILM COATED ORAL at 02:12

## 2018-12-13 RX ADMIN — KETOROLAC TROMETHAMINE 30 MG: 30 INJECTION, SOLUTION INTRAMUSCULAR at 11:12

## 2018-12-13 RX ADMIN — POTASSIUM CHLORIDE 40 MEQ: 1500 TABLET, EXTENDED RELEASE ORAL at 08:12

## 2018-12-13 RX ADMIN — ONDANSETRON HYDROCHLORIDE 4 MG: 2 INJECTION, SOLUTION INTRAMUSCULAR; INTRAVENOUS at 12:12

## 2018-12-13 RX ADMIN — PROMETHAZINE HYDROCHLORIDE 12.5 MG: 25 INJECTION INTRAMUSCULAR; INTRAVENOUS at 03:12

## 2018-12-13 RX ADMIN — ACETAMINOPHEN 1000 MG: 500 TABLET, FILM COATED ORAL at 06:12

## 2018-12-13 RX ADMIN — ONDANSETRON HYDROCHLORIDE 4 MG: 2 INJECTION, SOLUTION INTRAMUSCULAR; INTRAVENOUS at 11:12

## 2018-12-13 RX ADMIN — ENOXAPARIN SODIUM 40 MG: 100 INJECTION SUBCUTANEOUS at 05:12

## 2018-12-13 RX ADMIN — DICYCLOMINE HYDROCHLORIDE 20 MG: 20 TABLET ORAL at 12:12

## 2018-12-13 RX ADMIN — ONDANSETRON HYDROCHLORIDE 4 MG: 2 INJECTION, SOLUTION INTRAMUSCULAR; INTRAVENOUS at 06:12

## 2018-12-13 RX ADMIN — KETOROLAC TROMETHAMINE 30 MG: 30 INJECTION, SOLUTION INTRAMUSCULAR at 05:12

## 2018-12-13 RX ADMIN — PANTOPRAZOLE SODIUM 40 MG: 40 INJECTION, POWDER, FOR SOLUTION INTRAVENOUS at 09:12

## 2018-12-13 NOTE — PLAN OF CARE
Problem: Physical Therapy Goal  Goal: Physical Therapy Goal  PT goals until 12/19/18    1. Pt supine to sit with mod independent-not met  2. Pt sit to supine with mod independent-not met  3. Pt sit to stand with no AD with supervision-met  Revised goal: sit to stand with no AD with mod independent  4. Pt to perform gait 300ft with no AD with supervision.-not met  5. Pt to go up/down curb step with no AD with SBA.-not met         Outcome: Ongoing (interventions implemented as appropriate)  Pt's goals remain appropriate and pt will continue to benefit from skilled PT services to work towards improved functional mobility including: bed mobility, transfers, up/down step, and gait.   Sariah Almeida, PT  12/13/2018

## 2018-12-13 NOTE — NURSING
Spoke to Dr. Davies, pt. States pain is not being controlled with PCA Pump and that she is in excruciating pain. Per Dr. Davies, will place order for acetaminophen. Pt notified.

## 2018-12-13 NOTE — SUBJECTIVE & OBJECTIVE
Interval History: NAEON.  Pain controlled with increase in PCA.  Nausea/vomiting improved with scheduled zofran and scop patch.  Walked yesterday.  Tolerating clears.  No BM/Flatus yet    Medications:  Continuous Infusions:   sodium chloride 0.9% 75 mL/hr at 12/12/18 1926    hydromorphone in 0.9 % NaCl 6 mg/30 ml       Scheduled Meds:   acetaminophen  1,000 mg Oral Q8H    dicyclomine  20 mg Oral Q6H    enoxaparin  40 mg Subcutaneous Daily    magnesium oxide  400 mg Oral Once    nebivolol  10 mg Oral BID    ondansetron  4 mg Intravenous Q6H    pantoprazole  40 mg Intravenous Daily    potassium chloride  40 mEq Oral Once    scopolamine  1 patch Transdermal Q3 Days    venlafaxine  150 mg Oral Daily     PRN Meds:naloxone, promethazine (PHENERGAN) IVPB, simethicone     Review of patient's allergies indicates:   Allergen Reactions    Ace inhibitors Other (See Comments)     dizziness      Efavirenz-emtricitabin-tenofov Other (See Comments)     dizziness      Penicillins Hives     Hives (skin)^ and causes yeast infection    Pork/porcine containing products      Pt would like pork added  Because of strong Moravian beliefs    Tramadol Nausea And Vomiting    Emtricita-rilpivirine-tenof df Other (See Comments)     Affecting patients kidneys      Labetalol     Metoprolol      Objective:     Vital Signs (Most Recent):  Temp: 98 °F (36.7 °C) (12/13/18 0520)  Pulse: 74 (12/13/18 0520)  Resp: 18 (12/13/18 0520)  BP: 128/74 (12/13/18 0520)  SpO2: 100 % (12/13/18 0520) Vital Signs (24h Range):  Temp:  [97.4 °F (36.3 °C)-99.5 °F (37.5 °C)] 98 °F (36.7 °C)  Pulse:  [74-95] 74  Resp:  [17-18] 18  SpO2:  [90 %-100 %] 100 %  BP: (128-151)/(63-87) 128/74     Weight: 67.1 kg (148 lb)  Body mass index is 26.22 kg/m².    Intake/Output - Last 3 Shifts       12/11 0700 - 12/12 0659 12/12 0700 - 12/13 0659 12/13 0700 - 12/14 0659    P.O. 120 200     I.V. (mL/kg) 3087.8 (46) 1996.7 (29.8)     IV Piggyback 150      Total  Intake(mL/kg) 3357.8 (50) 2196.7 (32.7)     Urine (mL/kg/hr) 2050 (1.3) 1300 (0.8)     Stool  0     Blood       Total Output 2050 1300     Net +1307.8 +896.7            Urine Occurrence  6 x     Stool Occurrence  0 x           Physical Exam   Constitutional: She is oriented to person, place, and time. She appears well-developed and well-nourished. No distress.   Cardiovascular: Normal rate and intact distal pulses.   Pulmonary/Chest: Effort normal. No respiratory distress.   Abdominal:   Soft, ND, appropriately TTP, incision c/d/i with staples    Neurological: She is alert and oriented to person, place, and time.       Significant Labs:  CBC:   Recent Labs   Lab 12/13/18  0557   WBC 5.16   RBC 3.49*   HGB 9.9*   HCT 30.2*      MCV 87   MCH 28.4   MCHC 32.8     CMP:   Recent Labs   Lab 12/13/18  0557   GLU 80   CALCIUM 9.1   ALBUMIN 3.0*   PROT 6.8      K 3.4*  3.4*   CO2 27      BUN 3*   CREATININE 0.7   ALKPHOS 56   ALT 8*   AST 16   BILITOT 0.4       Significant Diagnostics:  None

## 2018-12-13 NOTE — ASSESSMENT & PLAN NOTE
Clears  mIVF  Cont PCA  Scheduled tylenol and toradol   Scheduled zofran, PRN phenergan, scopolamine patch  PT/OT  OOB - must get out of bed today  Home CPAP QHS  DVT ppx

## 2018-12-13 NOTE — NURSING
Spoke to Dr. Davies, pt. States pain is not being managed and that she wants to speak with the MD. Notified MD that PCA pump has reached limit and patient is unable to find relief. Per Dr. Davies, will increase PCA dose. Also notified MD that patient requests home medication bentyl for abdominal spasms, per MD will order bentyl. MD states currently in clinic and will be unable to come to see patient at this time. Patient notified and verbalizes understanding of medication changes and MD status.

## 2018-12-13 NOTE — NURSING
Pt PCA d'robel and oral pain medication ordered at 0930. Notified patient and pt. States that she will vomit if she takes oral pain medication without eating solid food. Md notified & Dc'ed oral pain meds to restart PCA pump.

## 2018-12-13 NOTE — PLAN OF CARE
Problem: Adult Inpatient Plan of Care  Goal: Plan of Care Review  Outcome: Ongoing (interventions implemented as appropriate)  Plan of care reviewed with patient. Pt verbalized understanding. AAO x 4, VSS on 2 L NC. Pain well tolerated with pca pump and heating packs. Midline QUIUQE, staples intact, no drainage noted. Pt voiding adequately to BS commode. Pt requested prn simethicone for gas pain, verbal order received from on call MD. Pt resting quietly with no signs of distress noted. Bed low and locked, call light within reach. Will continue to monitor.

## 2018-12-13 NOTE — PLAN OF CARE
Problem: Adult Inpatient Plan of Care  Goal: Plan of Care Review  Outcome: Ongoing (interventions implemented as appropriate)  POC is reviewed and understood by patient. BP elevated, other VS stable. Pt on clear liquid diet. Pt has little to no appetite. Pt c/o nausea. Scheduled Zofran given. PRN Phenergan offered, but denied at present moment. Pt AAOx4. Pt uses bedside commode to void. No BM noted. Pt on standby assist. Pt on Dilaudid PCA pump for pain control. Pt c/o spotting blood from nose. It is believed to be from irritation from NC used from the Dilaudid pump. MD Winn notified, and suggests patient stop using Dilaudid  PCA pump for pain, and take oral pain med. Pt prefers to continue on pain pump, due to excessive nausea. She's afraid she'll vomit the oral pain meds. HOB elevated 60 degrees. Call bell in reach. No signs of distress noted. Will continue to monitor.

## 2018-12-13 NOTE — PT/OT/SLP PROGRESS
"Physical Therapy Treatment    Patient Name:  Gill Cleary   MRN:  [id number]    Recommendations:     Discharge Recommendations:  nursing facility, skilled   Discharge Equipment Recommendations: none   Barriers to discharge: Decreased caregiver support pt cares for her      Assessment:     Gill Cleary is a 48 y.o. female admitted with a medical diagnosis of Diverticulitis.  She presents with the following impairments/functional limitations:  gait instability, impaired functional mobilty, pain . Pt is limited by pain but is motivated to walk with therapy    Rehab Prognosis:  good; patient would benefit from acute skilled PT services to address these deficits and reach maximum level of function.      Recent Surgery: Procedure(s) (LRB):  COLECTOMY, SIGMOID, LAPAROSCOPIC- (N/A)  INSERTION, STENT, URETER (Left)  COLECTOMY, SIGMOID (N/A) 3 Days Post-Op    Plan:     During this hospitalization, patient to be seen 3 x/week to address the above listed problems via gait training, therapeutic activities, therapeutic exercises, neuromuscular re-education  · Plan of Care Expires:  01/11/19   Plan of Care Reviewed with: patient    Subjective     Communicated with nurse prior to session.  Patient found supine upon PT entry to room, agreeable to treatment.    "I need to go to the bathroom first"    Pain/Comfort:  · Pain Rating 1: 6/10  · Location - Orientation 1: generalized  · Location 1: abdomen  · Pain Addressed 1: Reposition, Cessation of Activity  · Pain Rating Post-Intervention 1: 6/10    Patients cultural, spiritual, Sabianist conflicts given the current situation:      Objective:     Patient found with: peripheral IV, PCA, oxygen     General Precautions: Standard, fall   Orthopedic Precautions:N/A   Braces: N/A     Functional Mobility:  · Bed Mobility:     · Supine to Sit: supervision with HOB elevated  · Sit to Supine: supervision  · Transfers:     · Sit to Stand:  stand by assistance with no AD 1 trial from bed and 1 " trial from commode  · Gait: 150ft with no AD with pt reaching for the wall rail at times with CGA. pt performed gait with flexed trunk, wide ROCHELLE, decreased step length, and at slow pace.    AM-PAC 6 CLICK MOBILITY  Turning over in bed (including adjusting bedclothes, sheets and blankets)?: 4  Sitting down on and standing up from a chair with arms (e.g., wheelchair, bedside commode, etc.): 4  Moving from lying on back to sitting on the side of the bed?: 3  Moving to and from a bed to a chair (including a wheelchair)?: 3  Need to walk in hospital room?: 3  Climbing 3-5 steps with a railing?: 3  Basic Mobility Total Score: 20     Therapeutic Activities and Exercises:  Pt ambulated to the bathroom as above then urinated on the commode; able to clean herself with set up assist    Patient left supine with all lines intact, call button in reach and nurse notified..    GOALS:   Multidisciplinary Problems     Physical Therapy Goals        Problem: Physical Therapy Goal    Goal Priority Disciplines Outcome Goal Variances Interventions   Physical Therapy Goal     PT, PT/OT Ongoing (interventions implemented as appropriate)     Description:  PT goals until 12/19/18    1. Pt supine to sit with mod independent-not met  2. Pt sit to supine with mod independent-not met  3. Pt sit to stand with no AD with supervision-met  Revised goal: sit to stand with no AD with mod independent  4. Pt to perform gait 300ft with no AD with supervision.-not met  5. Pt to go up/down curb step with no AD with SBA.-not met                           Time Tracking:     PT Received On: 12/13/18  PT Start Time: 0928     PT Stop Time: 0953  PT Total Time (min): 25 min     Billable Minutes: Gait Training 15 and Therapeutic Activity 10    Treatment Type: Treatment  PT/PTA: PT           Sariah Almeida, PT  12/13/2018

## 2018-12-13 NOTE — PROGRESS NOTES
[facility]  General Surgery  Progress Note    Subjective:     History of Present Illness:  No notes on file    Post-Op Info:  Procedure(s) (LRB):  COLECTOMY, SIGMOID, LAPAROSCOPIC- (N/A)  INSERTION, STENT, URETER (Left)  COLECTOMY, SIGMOID (N/A)   3 Days Post-Op     Interval History: NAEON.  Pain controlled with increase in PCA.  Nausea/vomiting improved with scheduled zofran and scop patch.  Walked yesterday.  Tolerating clears.  No BM/Flatus yet    Medications:  Continuous Infusions:   sodium chloride 0.9% 75 mL/hr at 12/12/18 1926    hydromorphone in 0.9 % NaCl 6 mg/30 ml       Scheduled Meds:   acetaminophen  1,000 mg Oral Q8H    dicyclomine  20 mg Oral Q6H    enoxaparin  40 mg Subcutaneous Daily    magnesium oxide  400 mg Oral Once    nebivolol  10 mg Oral BID    ondansetron  4 mg Intravenous Q6H    pantoprazole  40 mg Intravenous Daily    potassium chloride  40 mEq Oral Once    scopolamine  1 patch Transdermal Q3 Days    venlafaxine  150 mg Oral Daily     PRN Meds:naloxone, promethazine (PHENERGAN) IVPB, simethicone     Review of patient's allergies indicates:   Allergen Reactions    Ace inhibitors Other (See Comments)     dizziness      Efavirenz-emtricitabin-tenofov Other (See Comments)     dizziness      Penicillins Hives     Hives (skin)^ and causes yeast infection    Pork/porcine containing products      Pt would like pork added  Because of strong Anglican beliefs    Tramadol Nausea And Vomiting    Emtricita-rilpivirine-tenof df Other (See Comments)     Affecting patients kidneys      Labetalol     Metoprolol      Objective:     Vital Signs (Most Recent):  Temp: 98 °F (36.7 °C) (12/13/18 0520)  Pulse: 74 (12/13/18 0520)  Resp: 18 (12/13/18 0520)  BP: 128/74 (12/13/18 0520)  SpO2: 100 % (12/13/18 0520) Vital Signs (24h Range):  Temp:  [97.4 °F (36.3 °C)-99.5 °F (37.5 °C)] 98 °F (36.7 °C)  Pulse:  [74-95] 74  Resp:  [17-18] 18  SpO2:  [90 %-100 %] 100 %  BP:  (128-151)/(63-87) 128/74     Weight: 67.1 kg (148 lb)  Body mass index is 26.22 kg/m².    Intake/Output - Last 3 Shifts       12/11 0700 - 12/12 0659 12/12 0700 - 12/13 0659 12/13 0700 - 12/14 0659    P.O. 120 200     I.V. (mL/kg) 3087.8 (46) 1996.7 (29.8)     IV Piggyback 150      Total Intake(mL/kg) 3357.8 (50) 2196.7 (32.7)     Urine (mL/kg/hr) 2050 (1.3) 1300 (0.8)     Stool  0     Blood       Total Output 2050 1300     Net +1307.8 +896.7            Urine Occurrence  6 x     Stool Occurrence  0 x           Physical Exam   Constitutional: She is oriented to person, place, and time. She appears well-developed and well-nourished. No distress.   Cardiovascular: Normal rate and intact distal pulses.   Pulmonary/Chest: Effort normal. No respiratory distress.   Abdominal:   Soft, ND, appropriately TTP, incision c/d/i with staples    Neurological: She is alert and oriented to person, place, and time.       Significant Labs:  CBC:   Recent Labs   Lab 12/13/18  0557   WBC 5.16   RBC 3.49*   HGB 9.9*   HCT 30.2*      MCV 87   MCH 28.4   MCHC 32.8     CMP:   Recent Labs   Lab 12/13/18  0557   GLU 80   CALCIUM 9.1   ALBUMIN 3.0*   PROT 6.8      K 3.4*  3.4*   CO2 27      BUN 3*   CREATININE 0.7   ALKPHOS 56   ALT 8*   AST 16   BILITOT 0.4       Significant Diagnostics:  None    Assessment/Plan:     * Diverticulitis    Clears  mIVF  Cont PCA  Scheduled tylenol and toradol   Scheduled zofran, PRN phenergan, scopolamine patch  PT/OT  OOB - must get out of bed today  Home CPAP QHS  DVT ppx         Gage Davies MD  General Surgery  [facility]

## 2018-12-13 NOTE — PLAN OF CARE
Problem: Adult Inpatient Plan of Care  Goal: Plan of Care Review  Outcome: Ongoing (interventions implemented as appropriate)  POC reviewed with patient, verbalizes understanding. Pt. Has difficulty managing pain & getting into a comfortable position. Pain medications adjusted by MD multiple times this shift & patient states unable to find relief. Encouraged mobility & ambulation to assist with gas pain. Also administered heating pads for comfort. Out of bed & in recliner for 1 1/2 hours then returned to bed due to pain. Ambulated with PT in gonzalez & did well with stand by. PtCindy Millan also NV'ed in a.m, voids without complications. IV LFA started leaking during shift change, IV attempt x1 to RUE unsuccessful, oncoming nurse notified. In bed with new heating pads given & call light within reach.

## 2018-12-14 PROBLEM — R11.0 CHRONIC NAUSEA: Status: ACTIVE | Noted: 2018-12-14

## 2018-12-14 LAB
ANION GAP SERPL CALC-SCNC: 7 MMOL/L
BASOPHILS # BLD AUTO: 0.02 K/UL
BASOPHILS NFR BLD: 0.4 %
BUN SERPL-MCNC: 5 MG/DL
CALCIUM SERPL-MCNC: 9 MG/DL
CHLORIDE SERPL-SCNC: 107 MMOL/L
CO2 SERPL-SCNC: 27 MMOL/L
CREAT SERPL-MCNC: 0.7 MG/DL
DIFFERENTIAL METHOD: ABNORMAL
EOSINOPHIL # BLD AUTO: 0.2 K/UL
EOSINOPHIL NFR BLD: 3.3 %
ERYTHROCYTE [DISTWIDTH] IN BLOOD BY AUTOMATED COUNT: 13 %
EST. GFR  (AFRICAN AMERICAN): >60 ML/MIN/1.73 M^2
EST. GFR  (NON AFRICAN AMERICAN): >60 ML/MIN/1.73 M^2
GLUCOSE SERPL-MCNC: 72 MG/DL
HCT VFR BLD AUTO: 29.9 %
HGB BLD-MCNC: 9.7 G/DL
IMM GRANULOCYTES # BLD AUTO: 0.02 K/UL
IMM GRANULOCYTES NFR BLD AUTO: 0.4 %
LYMPHOCYTES # BLD AUTO: 1 K/UL
LYMPHOCYTES NFR BLD: 22.9 %
MAGNESIUM SERPL-MCNC: 1.7 MG/DL
MCH RBC QN AUTO: 28.4 PG
MCHC RBC AUTO-ENTMCNC: 32.4 G/DL
MCV RBC AUTO: 88 FL
MONOCYTES # BLD AUTO: 0.4 K/UL
MONOCYTES NFR BLD: 9.3 %
NEUTROPHILS # BLD AUTO: 2.9 K/UL
NEUTROPHILS NFR BLD: 63.7 %
NRBC BLD-RTO: 0 /100 WBC
PHOSPHATE SERPL-MCNC: 3.4 MG/DL
PLATELET # BLD AUTO: 282 K/UL
PMV BLD AUTO: 11.1 FL
POTASSIUM SERPL-SCNC: 4 MMOL/L
RBC # BLD AUTO: 3.41 M/UL
SODIUM SERPL-SCNC: 141 MMOL/L
WBC # BLD AUTO: 4.5 K/UL

## 2018-12-14 PROCEDURE — 94761 N-INVAS EAR/PLS OXIMETRY MLT: CPT

## 2018-12-14 PROCEDURE — 63600175 PHARM REV CODE 636 W HCPCS: Performed by: STUDENT IN AN ORGANIZED HEALTH CARE EDUCATION/TRAINING PROGRAM

## 2018-12-14 PROCEDURE — 99900035 HC TECH TIME PER 15 MIN (STAT)

## 2018-12-14 PROCEDURE — 80048 BASIC METABOLIC PNL TOTAL CA: CPT

## 2018-12-14 PROCEDURE — 25000003 PHARM REV CODE 250: Performed by: SURGERY

## 2018-12-14 PROCEDURE — C9113 INJ PANTOPRAZOLE SODIUM, VIA: HCPCS | Performed by: STUDENT IN AN ORGANIZED HEALTH CARE EDUCATION/TRAINING PROGRAM

## 2018-12-14 PROCEDURE — 83735 ASSAY OF MAGNESIUM: CPT

## 2018-12-14 PROCEDURE — 25000003 PHARM REV CODE 250: Performed by: STUDENT IN AN ORGANIZED HEALTH CARE EDUCATION/TRAINING PROGRAM

## 2018-12-14 PROCEDURE — 36415 COLL VENOUS BLD VENIPUNCTURE: CPT

## 2018-12-14 PROCEDURE — 20600001 HC STEP DOWN PRIVATE ROOM

## 2018-12-14 PROCEDURE — 84100 ASSAY OF PHOSPHORUS: CPT

## 2018-12-14 PROCEDURE — 85025 COMPLETE CBC W/AUTO DIFF WBC: CPT

## 2018-12-14 PROCEDURE — 63600175 PHARM REV CODE 636 W HCPCS: Performed by: SURGERY

## 2018-12-14 PROCEDURE — 94660 CPAP INITIATION&MGMT: CPT

## 2018-12-14 RX ORDER — HYDROMORPHONE HYDROCHLORIDE 1 MG/ML
0.5 INJECTION, SOLUTION INTRAMUSCULAR; INTRAVENOUS; SUBCUTANEOUS
Status: DISCONTINUED | OUTPATIENT
Start: 2018-12-14 | End: 2018-12-16

## 2018-12-14 RX ORDER — HYDROMORPHONE HYDROCHLORIDE 1 MG/ML
0.2 INJECTION, SOLUTION INTRAMUSCULAR; INTRAVENOUS; SUBCUTANEOUS
Status: DISCONTINUED | OUTPATIENT
Start: 2018-12-14 | End: 2018-12-16

## 2018-12-14 RX ORDER — DEXTROSE MONOHYDRATE, SODIUM CHLORIDE, AND POTASSIUM CHLORIDE 50; 1.49; 4.5 G/1000ML; G/1000ML; G/1000ML
INJECTION, SOLUTION INTRAVENOUS CONTINUOUS
Status: DISCONTINUED | OUTPATIENT
Start: 2018-12-14 | End: 2018-12-16

## 2018-12-14 RX ADMIN — DICYCLOMINE HYDROCHLORIDE 20 MG: 20 TABLET ORAL at 05:12

## 2018-12-14 RX ADMIN — POTASSIUM CHLORIDE, DEXTROSE MONOHYDRATE AND SODIUM CHLORIDE: 150; 5; 450 INJECTION, SOLUTION INTRAVENOUS at 09:12

## 2018-12-14 RX ADMIN — KETOROLAC TROMETHAMINE 30 MG: 30 INJECTION, SOLUTION INTRAMUSCULAR at 05:12

## 2018-12-14 RX ADMIN — ONDANSETRON HYDROCHLORIDE 4 MG: 2 INJECTION, SOLUTION INTRAMUSCULAR; INTRAVENOUS at 05:12

## 2018-12-14 RX ADMIN — HYDROMORPHONE HYDROCHLORIDE 0.5 MG: 1 INJECTION, SOLUTION INTRAMUSCULAR; INTRAVENOUS; SUBCUTANEOUS at 02:12

## 2018-12-14 RX ADMIN — KETOROLAC TROMETHAMINE 30 MG: 30 INJECTION, SOLUTION INTRAMUSCULAR at 02:12

## 2018-12-14 RX ADMIN — DICYCLOMINE HYDROCHLORIDE 20 MG: 20 TABLET ORAL at 12:12

## 2018-12-14 RX ADMIN — ACETAMINOPHEN 1000 MG: 500 TABLET, FILM COATED ORAL at 12:12

## 2018-12-14 RX ADMIN — PANTOPRAZOLE SODIUM 40 MG: 40 INJECTION, POWDER, FOR SOLUTION INTRAVENOUS at 02:12

## 2018-12-14 RX ADMIN — ONDANSETRON HYDROCHLORIDE 4 MG: 2 INJECTION, SOLUTION INTRAMUSCULAR; INTRAVENOUS at 12:12

## 2018-12-14 RX ADMIN — ENOXAPARIN SODIUM 40 MG: 100 INJECTION SUBCUTANEOUS at 05:12

## 2018-12-14 RX ADMIN — HYDROMORPHONE HYDROCHLORIDE 0.5 MG: 1 INJECTION, SOLUTION INTRAMUSCULAR; INTRAVENOUS; SUBCUTANEOUS at 08:12

## 2018-12-14 RX ADMIN — ONDANSETRON HYDROCHLORIDE 4 MG: 2 INJECTION, SOLUTION INTRAMUSCULAR; INTRAVENOUS at 02:12

## 2018-12-14 RX ADMIN — KETOROLAC TROMETHAMINE 30 MG: 30 INJECTION, SOLUTION INTRAMUSCULAR at 12:12

## 2018-12-14 RX ADMIN — HYDROMORPHONE HYDROCHLORIDE 0.5 MG: 1 INJECTION, SOLUTION INTRAMUSCULAR; INTRAVENOUS; SUBCUTANEOUS at 05:12

## 2018-12-14 NOTE — PLAN OF CARE
Problem: Adult Inpatient Plan of Care  Goal: Plan of Care Review  Outcome: Ongoing (interventions implemented as appropriate)  Plan of care reviewed with patient. Pt verbalized understanding. AAO x 4, VSS on 2 L NC. Pain not well tolerated with pca pump, heat packs, and scheduled Toradol.  Midline QUIQUE, staples intact, no drainage noted. Pt up independently and voiding adequately to BS commode. Pt c/o no gas or BM. Pt nausea treated with scheduled Zofran per MAR. Pt resting quietly with no signs of distress noted. Bed low and locked, call light within reach. Will continue to monitor

## 2018-12-14 NOTE — PROGRESS NOTES
[facility]  General Surgery  Progress Note    Subjective:     History of Present Illness:  No notes on file    Post-Op Info:  Procedure(s) (LRB):  COLECTOMY, SIGMOID, LAPAROSCOPIC- (N/A)  INSERTION, STENT, URETER (Left)  COLECTOMY, SIGMOID (N/A)   4 Days Post-Op     Interval History: Patient continues to complain of nausea, pain, not ambulating, pt/ot recommending SNF    Medications:  Continuous Infusions:   sodium chloride 0.9% 75 mL/hr at 12/12/18 1926    hydromorphone in 0.9 % NaCl 6 mg/30 ml       Scheduled Meds:   acetaminophen  1,000 mg Oral Q8H    dicyclomine  20 mg Oral Q6H    enoxaparin  40 mg Subcutaneous Daily    ketorolac  30 mg Intravenous Q6H    nebivolol  10 mg Oral BID    ondansetron  4 mg Intravenous Q6H    pantoprazole  40 mg Intravenous Daily    scopolamine  1 patch Transdermal Q3 Days    venlafaxine  150 mg Oral Daily     PRN Meds:naloxone, promethazine (PHENERGAN) IVPB, simethicone     Review of patient's allergies indicates:   Allergen Reactions    Ace inhibitors Other (See Comments)     dizziness      Efavirenz-emtricitabin-tenofov Other (See Comments)     dizziness      Penicillins Hives     Hives (skin)^ and causes yeast infection    Pork/porcine containing products      Pt would like pork added  Because of strong Congregation beliefs    Tramadol Nausea And Vomiting    Emtricita-rilpivirine-tenof df Other (See Comments)     Affecting patients kidneys      Labetalol     Metoprolol      Objective:     Vital Signs (Most Recent):  Temp: 96.5 °F (35.8 °C) (12/14/18 0511)  Pulse: 78 (12/14/18 0746)  Resp: 16 (12/14/18 0746)  BP: (!) 146/67 (12/14/18 0746)  SpO2: 100 % (12/14/18 0746) Vital Signs (24h Range):  Temp:  [96.1 °F (35.6 °C)-98.5 °F (36.9 °C)] 96.5 °F (35.8 °C)  Pulse:  [68-78] 78  Resp:  [14-18] 16  SpO2:  [96 %-100 %] 100 %  BP: (119-159)/(58-83) 146/67     Weight: 67.1 kg (148 lb)  Body mass index is 26.22 kg/m².    Intake/Output - Last 3 Shifts        12/12 0700 - 12/13 0659 12/13 0700 - 12/14 0659 12/14 0700 - 12/15 0659    P.O. 200 400     I.V. (mL/kg) 1996.7 (29.8) 1548.8 (23.1)     IV Piggyback       Total Intake(mL/kg) 2196.7 (32.7) 1948.8 (29)     Urine (mL/kg/hr) 1300 (0.8) 580 (0.4) 125 (1.8)    Emesis/NG output  0     Stool 0 0     Total Output 1300 580 125    Net +896.7 +1368.8 -125           Urine Occurrence 6 x      Stool Occurrence 0 x 0 x     Emesis Occurrence  1 x           Physical Exam   Constitutional: She is oriented to person, place, and time. She appears well-developed and well-nourished. No distress.   Cardiovascular: Normal rate and intact distal pulses.   Pulmonary/Chest: Effort normal. No respiratory distress.   Abdominal:   Soft, ND, appropriately TTP, incision c/d/i with staples    Neurological: She is alert and oriented to person, place, and time.       Significant Labs:  CBC:   Recent Labs   Lab 12/13/18  0557   WBC 5.16   RBC 3.49*   HGB 9.9*   HCT 30.2*      MCV 87   MCH 28.4   MCHC 32.8     CMP:   Recent Labs   Lab 12/13/18  0557   GLU 80   CALCIUM 9.1   ALBUMIN 3.0*   PROT 6.8      K 3.4*  3.4*   CO2 27      BUN 3*   CREATININE 0.7   ALKPHOS 56   ALT 8*   AST 16   BILITOT 0.4       Significant Diagnostics:  None    Assessment/Plan:     * Diverticulitis    Check xray today.     Clears  mIVF  Cont PCA  Scheduled tylenol and toradol   Scheduled zofran, PRN phenergan, scopolamine patch  PT/OT  OOB - must get out of bed today  Home CPAP QHS  DVT ppx         Josefina Horton MD  General Surgery  [facility]

## 2018-12-14 NOTE — SUBJECTIVE & OBJECTIVE
Interval History: Patient continues to complain of nausea, pain, not ambulating, pt/ot recommending SNF    Medications:  Continuous Infusions:   sodium chloride 0.9% 75 mL/hr at 12/12/18 1926    hydromorphone in 0.9 % NaCl 6 mg/30 ml       Scheduled Meds:   acetaminophen  1,000 mg Oral Q8H    dicyclomine  20 mg Oral Q6H    enoxaparin  40 mg Subcutaneous Daily    ketorolac  30 mg Intravenous Q6H    nebivolol  10 mg Oral BID    ondansetron  4 mg Intravenous Q6H    pantoprazole  40 mg Intravenous Daily    scopolamine  1 patch Transdermal Q3 Days    venlafaxine  150 mg Oral Daily     PRN Meds:naloxone, promethazine (PHENERGAN) IVPB, simethicone     Review of patient's allergies indicates:   Allergen Reactions    Ace inhibitors Other (See Comments)     dizziness      Efavirenz-emtricitabin-tenofov Other (See Comments)     dizziness      Penicillins Hives     Hives (skin)^ and causes yeast infection    Pork/porcine containing products      Pt would like pork added  Because of strong Alevism beliefs    Tramadol Nausea And Vomiting    Emtricita-rilpivirine-tenof df Other (See Comments)     Affecting patients kidneys      Labetalol     Metoprolol      Objective:     Vital Signs (Most Recent):  Temp: 96.5 °F (35.8 °C) (12/14/18 0511)  Pulse: 78 (12/14/18 0746)  Resp: 16 (12/14/18 0746)  BP: (!) 146/67 (12/14/18 0746)  SpO2: 100 % (12/14/18 0746) Vital Signs (24h Range):  Temp:  [96.1 °F (35.6 °C)-98.5 °F (36.9 °C)] 96.5 °F (35.8 °C)  Pulse:  [68-78] 78  Resp:  [14-18] 16  SpO2:  [96 %-100 %] 100 %  BP: (119-159)/(58-83) 146/67     Weight: 67.1 kg (148 lb)  Body mass index is 26.22 kg/m².    Intake/Output - Last 3 Shifts       12/12 0700 - 12/13 0659 12/13 0700 - 12/14 0659 12/14 0700 - 12/15 0659    P.O. 200 400     I.V. (mL/kg) 1996.7 (29.8) 1548.8 (23.1)     IV Piggyback       Total Intake(mL/kg) 2196.7 (32.7) 1948.8 (29)     Urine (mL/kg/hr) 1300 (0.8) 580 (0.4) 125 (1.8)    Emesis/NG output  0      Stool 0 0     Total Output 1300 580 125    Net +896.7 +1368.8 -125           Urine Occurrence 6 x      Stool Occurrence 0 x 0 x     Emesis Occurrence  1 x           Physical Exam   Constitutional: She is oriented to person, place, and time. She appears well-developed and well-nourished. No distress.   Cardiovascular: Normal rate and intact distal pulses.   Pulmonary/Chest: Effort normal. No respiratory distress.   Abdominal:   Soft, ND, appropriately TTP, incision c/d/i with staples    Neurological: She is alert and oriented to person, place, and time.       Significant Labs:  CBC:   Recent Labs   Lab 12/13/18  0557   WBC 5.16   RBC 3.49*   HGB 9.9*   HCT 30.2*      MCV 87   MCH 28.4   MCHC 32.8     CMP:   Recent Labs   Lab 12/13/18  0557   GLU 80   CALCIUM 9.1   ALBUMIN 3.0*   PROT 6.8      K 3.4*  3.4*   CO2 27      BUN 3*   CREATININE 0.7   ALKPHOS 56   ALT 8*   AST 16   BILITOT 0.4       Significant Diagnostics:  None

## 2018-12-14 NOTE — PLAN OF CARE
Problem: Adult Inpatient Plan of Care  Goal: Plan of Care Review  Outcome: Ongoing (interventions implemented as appropriate)  POC is reviewed and understood by patient. Pt BP elevated at times. Other VS stable. Pt AAOx4. Patient on room air. Pt uses bedside commode to void. No BM noted. Pt on clear liquid diet. Dilaudid PCA pump discontinued. Patient continues to use heating pads for pain as well. Pt peripheral to left forearm was removed and inserted to right hand. Midline consult still ordered.  Pt remains in bed throughout the day. Call bell in reach. No signs of distress noted. Will continue to monitor.

## 2018-12-14 NOTE — ASSESSMENT & PLAN NOTE
Check xray today.     Clears  mIVF  Cont PCA  Scheduled tylenol and toradol   Scheduled zofran, PRN phenergan, scopolamine patch  PT/OT  OOB - must get out of bed today  Home CPAP QHS  DVT ppx

## 2018-12-15 LAB
ANION GAP SERPL CALC-SCNC: 8 MMOL/L
BASOPHILS # BLD AUTO: 0.02 K/UL
BASOPHILS NFR BLD: 0.4 %
BUN SERPL-MCNC: 2 MG/DL
CALCIUM SERPL-MCNC: 8.7 MG/DL
CHLORIDE SERPL-SCNC: 105 MMOL/L
CO2 SERPL-SCNC: 27 MMOL/L
CREAT SERPL-MCNC: 0.7 MG/DL
DIFFERENTIAL METHOD: ABNORMAL
EOSINOPHIL # BLD AUTO: 0.1 K/UL
EOSINOPHIL NFR BLD: 3 %
ERYTHROCYTE [DISTWIDTH] IN BLOOD BY AUTOMATED COUNT: 13.1 %
EST. GFR  (AFRICAN AMERICAN): >60 ML/MIN/1.73 M^2
EST. GFR  (NON AFRICAN AMERICAN): >60 ML/MIN/1.73 M^2
GLUCOSE SERPL-MCNC: 96 MG/DL
HCT VFR BLD AUTO: 26.9 %
HGB BLD-MCNC: 8.9 G/DL
IMM GRANULOCYTES # BLD AUTO: 0.01 K/UL
IMM GRANULOCYTES NFR BLD AUTO: 0.2 %
LYMPHOCYTES # BLD AUTO: 1.3 K/UL
LYMPHOCYTES NFR BLD: 28.2 %
MAGNESIUM SERPL-MCNC: 1.6 MG/DL
MCH RBC QN AUTO: 28.4 PG
MCHC RBC AUTO-ENTMCNC: 33.1 G/DL
MCV RBC AUTO: 86 FL
MONOCYTES # BLD AUTO: 0.5 K/UL
MONOCYTES NFR BLD: 9.8 %
NEUTROPHILS # BLD AUTO: 2.7 K/UL
NEUTROPHILS NFR BLD: 58.4 %
NRBC BLD-RTO: 0 /100 WBC
PHOSPHATE SERPL-MCNC: 3.1 MG/DL
PLATELET # BLD AUTO: 304 K/UL
PMV BLD AUTO: 11.7 FL
POTASSIUM SERPL-SCNC: 3.5 MMOL/L
RBC # BLD AUTO: 3.13 M/UL
SODIUM SERPL-SCNC: 140 MMOL/L
WBC # BLD AUTO: 4.61 K/UL

## 2018-12-15 PROCEDURE — 80048 BASIC METABOLIC PNL TOTAL CA: CPT

## 2018-12-15 PROCEDURE — 85025 COMPLETE CBC W/AUTO DIFF WBC: CPT

## 2018-12-15 PROCEDURE — 83735 ASSAY OF MAGNESIUM: CPT

## 2018-12-15 PROCEDURE — 20600001 HC STEP DOWN PRIVATE ROOM

## 2018-12-15 PROCEDURE — 36415 COLL VENOUS BLD VENIPUNCTURE: CPT

## 2018-12-15 PROCEDURE — 63600175 PHARM REV CODE 636 W HCPCS: Performed by: STUDENT IN AN ORGANIZED HEALTH CARE EDUCATION/TRAINING PROGRAM

## 2018-12-15 PROCEDURE — 94761 N-INVAS EAR/PLS OXIMETRY MLT: CPT

## 2018-12-15 PROCEDURE — 63600175 PHARM REV CODE 636 W HCPCS: Performed by: SURGERY

## 2018-12-15 PROCEDURE — C9113 INJ PANTOPRAZOLE SODIUM, VIA: HCPCS | Performed by: STUDENT IN AN ORGANIZED HEALTH CARE EDUCATION/TRAINING PROGRAM

## 2018-12-15 PROCEDURE — 25000003 PHARM REV CODE 250: Performed by: STUDENT IN AN ORGANIZED HEALTH CARE EDUCATION/TRAINING PROGRAM

## 2018-12-15 PROCEDURE — 25000003 PHARM REV CODE 250: Performed by: SURGERY

## 2018-12-15 PROCEDURE — 84100 ASSAY OF PHOSPHORUS: CPT

## 2018-12-15 RX ORDER — ADHESIVE BANDAGE
30 BANDAGE TOPICAL ONCE
Status: COMPLETED | OUTPATIENT
Start: 2018-12-15 | End: 2018-12-15

## 2018-12-15 RX ORDER — CYCLOBENZAPRINE HCL 5 MG
5 TABLET ORAL 3 TIMES DAILY PRN
Status: DISCONTINUED | OUTPATIENT
Start: 2018-12-15 | End: 2018-12-19

## 2018-12-15 RX ADMIN — HYDROMORPHONE HYDROCHLORIDE 0.5 MG: 1 INJECTION, SOLUTION INTRAMUSCULAR; INTRAVENOUS; SUBCUTANEOUS at 02:12

## 2018-12-15 RX ADMIN — HYDROMORPHONE HYDROCHLORIDE 0.5 MG: 1 INJECTION, SOLUTION INTRAMUSCULAR; INTRAVENOUS; SUBCUTANEOUS at 11:12

## 2018-12-15 RX ADMIN — CYCLOBENZAPRINE HYDROCHLORIDE 5 MG: 5 TABLET, FILM COATED ORAL at 11:12

## 2018-12-15 RX ADMIN — ONDANSETRON HYDROCHLORIDE 4 MG: 2 INJECTION, SOLUTION INTRAMUSCULAR; INTRAVENOUS at 05:12

## 2018-12-15 RX ADMIN — HYDROMORPHONE HYDROCHLORIDE 0.5 MG: 1 INJECTION, SOLUTION INTRAMUSCULAR; INTRAVENOUS; SUBCUTANEOUS at 04:12

## 2018-12-15 RX ADMIN — ENOXAPARIN SODIUM 40 MG: 100 INJECTION SUBCUTANEOUS at 04:12

## 2018-12-15 RX ADMIN — ONDANSETRON HYDROCHLORIDE 4 MG: 2 INJECTION, SOLUTION INTRAMUSCULAR; INTRAVENOUS at 11:12

## 2018-12-15 RX ADMIN — PROMETHAZINE HYDROCHLORIDE 12.5 MG: 25 INJECTION INTRAMUSCULAR; INTRAVENOUS at 03:12

## 2018-12-15 RX ADMIN — HYDROMORPHONE HYDROCHLORIDE 0.5 MG: 1 INJECTION, SOLUTION INTRAMUSCULAR; INTRAVENOUS; SUBCUTANEOUS at 07:12

## 2018-12-15 RX ADMIN — PANTOPRAZOLE SODIUM 40 MG: 40 INJECTION, POWDER, FOR SOLUTION INTRAVENOUS at 09:12

## 2018-12-15 RX ADMIN — ONDANSETRON HYDROCHLORIDE 4 MG: 2 INJECTION, SOLUTION INTRAMUSCULAR; INTRAVENOUS at 12:12

## 2018-12-15 RX ADMIN — MAGNESIUM HYDROXIDE 2400 MG: 400 SUSPENSION ORAL at 10:12

## 2018-12-15 RX ADMIN — SCOPALAMINE 1 PATCH: 1 PATCH, EXTENDED RELEASE TRANSDERMAL at 12:12

## 2018-12-15 RX ADMIN — HYDROMORPHONE HYDROCHLORIDE 0.5 MG: 1 INJECTION, SOLUTION INTRAMUSCULAR; INTRAVENOUS; SUBCUTANEOUS at 01:12

## 2018-12-15 RX ADMIN — KETOROLAC TROMETHAMINE 30 MG: 30 INJECTION, SOLUTION INTRAMUSCULAR at 05:12

## 2018-12-15 RX ADMIN — KETOROLAC TROMETHAMINE 30 MG: 30 INJECTION, SOLUTION INTRAMUSCULAR at 11:12

## 2018-12-15 RX ADMIN — KETOROLAC TROMETHAMINE 30 MG: 30 INJECTION, SOLUTION INTRAMUSCULAR at 12:12

## 2018-12-15 RX ADMIN — POTASSIUM CHLORIDE, DEXTROSE MONOHYDRATE AND SODIUM CHLORIDE: 150; 5; 450 INJECTION, SOLUTION INTRAVENOUS at 12:12

## 2018-12-15 RX ADMIN — POTASSIUM CHLORIDE, DEXTROSE MONOHYDRATE AND SODIUM CHLORIDE: 150; 5; 450 INJECTION, SOLUTION INTRAVENOUS at 07:12

## 2018-12-15 RX ADMIN — SIMETHICONE CHEW TAB 80 MG 80 MG: 80 TABLET ORAL at 11:12

## 2018-12-15 RX ADMIN — SIMETHICONE CHEW TAB 80 MG 80 MG: 80 TABLET ORAL at 03:12

## 2018-12-15 RX ADMIN — HYDROMORPHONE HYDROCHLORIDE 0.5 MG: 1 INJECTION, SOLUTION INTRAMUSCULAR; INTRAVENOUS; SUBCUTANEOUS at 10:12

## 2018-12-15 NOTE — ASSESSMENT & PLAN NOTE
48 y.o. female 5 Days Post-Op for Procedure(s) (LRB):  COLECTOMY, SIGMOID, LAPAROSCOPIC- (N/A)  INSERTION, STENT, URETER (Left)  COLECTOMY, SIGMOID (N/A)    Ileus. Milk of mag today.  Continue current care.

## 2018-12-15 NOTE — PLAN OF CARE
Problem: Adult Inpatient Plan of Care  Goal: Plan of Care Review  Outcome: Ongoing (interventions implemented as appropriate)  POC reviewed with patient. Verbalizes understanding. AAOx4. Patient denies any pain at this time. Tolerating full liquid diet. No reports of nausea or vomiting. Patient ambulated in gonzalez x2 independently. Remains free of any falls or trauma. Incision intact. Patient has no other concerns at this time. Call light within reach. [facility].

## 2018-12-15 NOTE — SUBJECTIVE & OBJECTIVE
Interval History: Patient continues to complain of nausea, pain. KUB yesterday wnl.    Medications:  Continuous Infusions:   dextrose 5 % and 0.45 % NaCl with KCl 20 mEq 75 mL/hr at 12/15/18 0028     Scheduled Meds:   enoxaparin  40 mg Subcutaneous Daily    ketorolac  30 mg Intravenous Q6H    magnesium hydroxide 400 mg/5 ml  30 mL Oral Once    ondansetron  4 mg Intravenous Q6H    pantoprazole  40 mg Intravenous Daily    scopolamine  1 patch Transdermal Q3 Days     PRN Meds:HYDROmorphone, HYDROmorphone, promethazine (PHENERGAN) IVPB, simethicone     Review of patient's allergies indicates:   Allergen Reactions    Ace inhibitors Other (See Comments)     dizziness      Efavirenz-emtricitabin-tenofov Other (See Comments)     dizziness      Penicillins Hives     Hives (skin)^ and causes yeast infection    Pork/porcine containing products      Pt would like pork added  Because of strong Rastafarian beliefs    Tramadol Nausea And Vomiting    Emtricita-rilpivirine-tenof df Other (See Comments)     Affecting patients kidneys      Labetalol     Metoprolol      Objective:     Vital Signs (Most Recent):  Temp: 98.2 °F (36.8 °C) (12/15/18 0758)  Pulse: 79 (12/15/18 0758)  Resp: 17 (12/15/18 0758)  BP: (!) 160/72 (12/15/18 0758)  SpO2: 98 % (12/15/18 0758) Vital Signs (24h Range):  Temp:  [98 °F (36.7 °C)-99 °F (37.2 °C)] 98.2 °F (36.8 °C)  Pulse:  [68-80] 79  Resp:  [15-18] 17  SpO2:  [96 %-100 %] 98 %  BP: (127-160)/(60-81) 160/72     Weight: 67.1 kg (148 lb)  Body mass index is 26.22 kg/m².    Intake/Output - Last 3 Shifts       12/13 0700 - 12/14 0659 12/14 0700 - 12/15 0659 12/15 0700 - 12/16 0659    P.O. 400 360     I.V. (mL/kg) 1548.8 (23.1)      Total Intake(mL/kg) 1948.8 (29) 360 (5.4)     Urine (mL/kg/hr) 580 (0.4) 775 (0.5)     Emesis/NG output 0      Stool 0 0     Total Output 580 775     Net +1368.8 -415            Stool Occurrence 0 x 0 x     Emesis Occurrence 1 x            Physical Exam    Constitutional: She is oriented to person, place, and time. She appears well-developed and well-nourished. No distress.   Cardiovascular: Normal rate and intact distal pulses.   Pulmonary/Chest: Effort normal. No respiratory distress.   Abdominal:   Soft, ND, appropriately TTP, incision c/d/i with staples    Neurological: She is alert and oriented to person, place, and time.       Significant Labs:  CBC:   Recent Labs   Lab 12/15/18  0557   WBC 4.61   RBC 3.13*   HGB 8.9*   HCT 26.9*      MCV 86   MCH 28.4   MCHC 33.1     CMP:   Recent Labs   Lab 12/13/18  0557  12/15/18  0557   GLU 80   < > 96   CALCIUM 9.1   < > 8.7   ALBUMIN 3.0*  --   --    PROT 6.8  --   --       < > 140   K 3.4*  3.4*   < > 3.5   CO2 27   < > 27      < > 105   BUN 3*   < > 2*   CREATININE 0.7   < > 0.7   ALKPHOS 56  --   --    ALT 8*  --   --    AST 16  --   --    BILITOT 0.4  --   --     < > = values in this interval not displayed.       Significant Diagnostics:  KUB reviewed, normal bowel gas

## 2018-12-15 NOTE — PLAN OF CARE
Problem: Adult Inpatient Plan of Care  Goal: Plan of Care Review  Outcome: Ongoing (interventions implemented as appropriate)  POC reviewed with patient, stated understanding, AAOx4, VSS, AMB to BCS to void, no BM reported this shift, [place] intact to ABD midline WDL. ABD rounded, soft.  PIV infusing per order, pain and nausea meds somewhat effective at managing symptoms, Transfers to [facility] IND, voids adequate amount. No adverse events this shift, bed low, call light in reach, will cont to manage POC.

## 2018-12-15 NOTE — PROGRESS NOTES
[facility]  General Surgery  Progress Note    Subjective:     History of Present Illness:  No notes on file    Post-Op Info:  Procedure(s) (LRB):  COLECTOMY, SIGMOID, LAPAROSCOPIC- (N/A)  INSERTION, STENT, URETER (Left)  COLECTOMY, SIGMOID (N/A)   5 Days Post-Op     Interval History: Patient continues to complain of nausea, pain. KUB yesterday wnl.    Medications:  Continuous Infusions:   dextrose 5 % and 0.45 % NaCl with KCl 20 mEq 75 mL/hr at 12/15/18 0028     Scheduled Meds:   enoxaparin  40 mg Subcutaneous Daily    ketorolac  30 mg Intravenous Q6H    magnesium hydroxide 400 mg/5 ml  30 mL Oral Once    ondansetron  4 mg Intravenous Q6H    pantoprazole  40 mg Intravenous Daily    scopolamine  1 patch Transdermal Q3 Days     PRN Meds:HYDROmorphone, HYDROmorphone, promethazine (PHENERGAN) IVPB, simethicone     Review of patient's allergies indicates:   Allergen Reactions    Ace inhibitors Other (See Comments)     dizziness      Efavirenz-emtricitabin-tenofov Other (See Comments)     dizziness      Penicillins Hives     Hives (skin)^ and causes yeast infection    Pork/porcine containing products      Pt would like pork added  Because of strong Oriental orthodox beliefs    Tramadol Nausea And Vomiting    Emtricita-rilpivirine-tenof df Other (See Comments)     Affecting patients kidneys      Labetalol     Metoprolol      Objective:     Vital Signs (Most Recent):  Temp: 98.2 °F (36.8 °C) (12/15/18 0758)  Pulse: 79 (12/15/18 0758)  Resp: 17 (12/15/18 0758)  BP: (!) 160/72 (12/15/18 0758)  SpO2: 98 % (12/15/18 0758) Vital Signs (24h Range):  Temp:  [98 °F (36.7 °C)-99 °F (37.2 °C)] 98.2 °F (36.8 °C)  Pulse:  [68-80] 79  Resp:  [15-18] 17  SpO2:  [96 %-100 %] 98 %  BP: (127-160)/(60-81) 160/72     Weight: 67.1 kg (148 lb)  Body mass index is 26.22 kg/m².    Intake/Output - Last 3 Shifts       12/13 0700 - 12/14 0659 12/14 0700 - 12/15 0659 12/15 0700 - 12/16 0659    P.O. 400 360     I.V. (mL/kg)  1548.8 (23.1)      Total Intake(mL/kg) 1948.8 (29) 360 (5.4)     Urine (mL/kg/hr) 580 (0.4) 775 (0.5)     Emesis/NG output 0      Stool 0 0     Total Output 580 775     Net +1368.8 -415            Stool Occurrence 0 x 0 x     Emesis Occurrence 1 x            Physical Exam   Constitutional: She is oriented to person, place, and time. She appears well-developed and well-nourished. No distress.   Cardiovascular: Normal rate and intact distal pulses.   Pulmonary/Chest: Effort normal. No respiratory distress.   Abdominal:   Soft, ND, appropriately TTP, incision c/d/i with staples    Neurological: She is alert and oriented to person, place, and time.       Significant Labs:  CBC:   Recent Labs   Lab 12/15/18  0557   WBC 4.61   RBC 3.13*   HGB 8.9*   HCT 26.9*      MCV 86   MCH 28.4   MCHC 33.1     CMP:   Recent Labs   Lab 12/13/18  0557  12/15/18  0557   GLU 80   < > 96   CALCIUM 9.1   < > 8.7   ALBUMIN 3.0*  --   --    PROT 6.8  --   --       < > 140   K 3.4*  3.4*   < > 3.5   CO2 27   < > 27      < > 105   BUN 3*   < > 2*   CREATININE 0.7   < > 0.7   ALKPHOS 56  --   --    ALT 8*  --   --    AST 16  --   --    BILITOT 0.4  --   --     < > = values in this interval not displayed.       Significant Diagnostics:  KUB reviewed, normal bowel gas    Assessment/Plan:     * Diverticulitis    48 y.o. female 5 Days Post-Op for Procedure(s) (LRB):  COLECTOMY, SIGMOID, LAPAROSCOPIC- (N/A)  INSERTION, STENT, URETER (Left)  COLECTOMY, SIGMOID (N/A)    Ileus. Milk of mag today.  Continue current care.         Josefina Horton MD  General Surgery  [facility]

## 2018-12-16 LAB
ANION GAP SERPL CALC-SCNC: 6 MMOL/L
BASOPHILS # BLD AUTO: 0.02 K/UL
BASOPHILS NFR BLD: 0.4 %
BUN SERPL-MCNC: 2 MG/DL
CALCIUM SERPL-MCNC: 8.7 MG/DL
CHLORIDE SERPL-SCNC: 109 MMOL/L
CO2 SERPL-SCNC: 27 MMOL/L
CREAT SERPL-MCNC: 0.7 MG/DL
DIFFERENTIAL METHOD: ABNORMAL
EOSINOPHIL # BLD AUTO: 0.1 K/UL
EOSINOPHIL NFR BLD: 2.5 %
ERYTHROCYTE [DISTWIDTH] IN BLOOD BY AUTOMATED COUNT: 13.2 %
EST. GFR  (AFRICAN AMERICAN): >60 ML/MIN/1.73 M^2
EST. GFR  (NON AFRICAN AMERICAN): >60 ML/MIN/1.73 M^2
GLUCOSE SERPL-MCNC: 96 MG/DL
HCT VFR BLD AUTO: 27.4 %
HGB BLD-MCNC: 9.1 G/DL
IMM GRANULOCYTES # BLD AUTO: 0.01 K/UL
IMM GRANULOCYTES NFR BLD AUTO: 0.2 %
LYMPHOCYTES # BLD AUTO: 1.4 K/UL
LYMPHOCYTES NFR BLD: 27.9 %
MAGNESIUM SERPL-MCNC: 1.7 MG/DL
MCH RBC QN AUTO: 28.7 PG
MCHC RBC AUTO-ENTMCNC: 33.2 G/DL
MCV RBC AUTO: 86 FL
MONOCYTES # BLD AUTO: 0.6 K/UL
MONOCYTES NFR BLD: 10.9 %
NEUTROPHILS # BLD AUTO: 3 K/UL
NEUTROPHILS NFR BLD: 58.1 %
NRBC BLD-RTO: 0 /100 WBC
PHOSPHATE SERPL-MCNC: 3.2 MG/DL
PLATELET # BLD AUTO: 327 K/UL
PMV BLD AUTO: 11.2 FL
POTASSIUM SERPL-SCNC: 3.5 MMOL/L
RBC # BLD AUTO: 3.17 M/UL
SODIUM SERPL-SCNC: 142 MMOL/L
WBC # BLD AUTO: 5.13 K/UL

## 2018-12-16 PROCEDURE — 25000003 PHARM REV CODE 250: Performed by: STUDENT IN AN ORGANIZED HEALTH CARE EDUCATION/TRAINING PROGRAM

## 2018-12-16 PROCEDURE — 63600175 PHARM REV CODE 636 W HCPCS: Performed by: STUDENT IN AN ORGANIZED HEALTH CARE EDUCATION/TRAINING PROGRAM

## 2018-12-16 PROCEDURE — 80048 BASIC METABOLIC PNL TOTAL CA: CPT

## 2018-12-16 PROCEDURE — 63600175 PHARM REV CODE 636 W HCPCS: Performed by: SURGERY

## 2018-12-16 PROCEDURE — 20600001 HC STEP DOWN PRIVATE ROOM

## 2018-12-16 PROCEDURE — 84100 ASSAY OF PHOSPHORUS: CPT

## 2018-12-16 PROCEDURE — 85025 COMPLETE CBC W/AUTO DIFF WBC: CPT

## 2018-12-16 PROCEDURE — C9113 INJ PANTOPRAZOLE SODIUM, VIA: HCPCS | Performed by: STUDENT IN AN ORGANIZED HEALTH CARE EDUCATION/TRAINING PROGRAM

## 2018-12-16 PROCEDURE — 83735 ASSAY OF MAGNESIUM: CPT

## 2018-12-16 PROCEDURE — 36415 COLL VENOUS BLD VENIPUNCTURE: CPT

## 2018-12-16 RX ORDER — OXYCODONE HYDROCHLORIDE 10 MG/1
10 TABLET ORAL EVERY 4 HOURS PRN
Status: DISCONTINUED | OUTPATIENT
Start: 2018-12-16 | End: 2018-12-17

## 2018-12-16 RX ORDER — HYDROMORPHONE HYDROCHLORIDE 1 MG/ML
0.5 INJECTION, SOLUTION INTRAMUSCULAR; INTRAVENOUS; SUBCUTANEOUS EVERY 6 HOURS PRN
Status: DISCONTINUED | OUTPATIENT
Start: 2018-12-16 | End: 2018-12-17

## 2018-12-16 RX ORDER — OXYCODONE HYDROCHLORIDE 5 MG/1
5 TABLET ORAL EVERY 6 HOURS PRN
Status: DISCONTINUED | OUTPATIENT
Start: 2018-12-16 | End: 2018-12-17

## 2018-12-16 RX ADMIN — CYCLOBENZAPRINE HYDROCHLORIDE 5 MG: 5 TABLET, FILM COATED ORAL at 05:12

## 2018-12-16 RX ADMIN — CYCLOBENZAPRINE HYDROCHLORIDE 5 MG: 5 TABLET, FILM COATED ORAL at 09:12

## 2018-12-16 RX ADMIN — ONDANSETRON HYDROCHLORIDE 4 MG: 2 INJECTION, SOLUTION INTRAMUSCULAR; INTRAVENOUS at 12:12

## 2018-12-16 RX ADMIN — HYDROMORPHONE HYDROCHLORIDE 0.5 MG: 1 INJECTION, SOLUTION INTRAMUSCULAR; INTRAVENOUS; SUBCUTANEOUS at 05:12

## 2018-12-16 RX ADMIN — ONDANSETRON HYDROCHLORIDE 4 MG: 2 INJECTION, SOLUTION INTRAMUSCULAR; INTRAVENOUS at 05:12

## 2018-12-16 RX ADMIN — ENOXAPARIN SODIUM 40 MG: 100 INJECTION SUBCUTANEOUS at 05:12

## 2018-12-16 RX ADMIN — SIMETHICONE CHEW TAB 80 MG 80 MG: 80 TABLET ORAL at 05:12

## 2018-12-16 RX ADMIN — PANTOPRAZOLE SODIUM 40 MG: 40 INJECTION, POWDER, FOR SOLUTION INTRAVENOUS at 09:12

## 2018-12-16 RX ADMIN — OXYCODONE HYDROCHLORIDE 10 MG: 10 TABLET ORAL at 05:12

## 2018-12-16 RX ADMIN — SIMETHICONE CHEW TAB 80 MG 80 MG: 80 TABLET ORAL at 09:12

## 2018-12-16 RX ADMIN — HYDROMORPHONE HYDROCHLORIDE 0.5 MG: 1 INJECTION, SOLUTION INTRAMUSCULAR; INTRAVENOUS; SUBCUTANEOUS at 09:12

## 2018-12-16 RX ADMIN — PROMETHAZINE HYDROCHLORIDE 12.5 MG: 25 INJECTION INTRAMUSCULAR; INTRAVENOUS at 10:12

## 2018-12-16 RX ADMIN — KETOROLAC TROMETHAMINE 30 MG: 30 INJECTION, SOLUTION INTRAMUSCULAR at 05:12

## 2018-12-16 RX ADMIN — HYDROMORPHONE HYDROCHLORIDE 0.5 MG: 1 INJECTION, SOLUTION INTRAMUSCULAR; INTRAVENOUS; SUBCUTANEOUS at 12:12

## 2018-12-16 RX ADMIN — OXYCODONE HYDROCHLORIDE 10 MG: 10 TABLET ORAL at 09:12

## 2018-12-16 RX ADMIN — HYDROMORPHONE HYDROCHLORIDE 0.5 MG: 1 INJECTION, SOLUTION INTRAMUSCULAR; INTRAVENOUS; SUBCUTANEOUS at 06:12

## 2018-12-16 NOTE — ASSESSMENT & PLAN NOTE
48 y.o. female 6 Days Post-Op for Procedure(s) (LRB):  COLECTOMY, SIGMOID, LAPAROSCOPIC- (N/A)  INSERTION, STENT, URETER (Left)  COLECTOMY, SIGMOID (N/A)    Ileus. Resolving. Reg diet, po pain meds later.  Continue current care.  Dispo: home tomorrow if tolerating po

## 2018-12-16 NOTE — PROGRESS NOTES
[facility]  General Surgery  Progress Note    Subjective:     History of Present Illness:  No notes on file    Post-Op Info:  Procedure(s) (LRB):  COLECTOMY, SIGMOID, LAPAROSCOPIC- (N/A)  INSERTION, STENT, URETER (Left)  COLECTOMY, SIGMOID (N/A)   6 Days Post-Op     Interval History: Passed gas, wants to eat food!     Medications:  Continuous Infusions:    Scheduled Meds:   enoxaparin  40 mg Subcutaneous Daily    ondansetron  4 mg Intravenous Q6H    scopolamine  1 patch Transdermal Q3 Days     PRN Meds:cyclobenzaprine, HYDROmorphone, HYDROmorphone, promethazine (PHENERGAN) IVPB, simethicone     Review of patient's allergies indicates:   Allergen Reactions    Ace inhibitors Other (See Comments)     dizziness      Efavirenz-emtricitabin-tenofov Other (See Comments)     dizziness      Penicillins Hives     Hives (skin)^ and causes yeast infection    Pork/porcine containing products      Pt would like pork added  Because of strong Mu-ism beliefs    Tramadol Nausea And Vomiting    Emtricita-rilpivirine-tenof df Other (See Comments)     Affecting patients kidneys      Labetalol     Metoprolol      Objective:     Vital Signs (Most Recent):  Temp: 98.3 °F (36.8 °C) (12/16/18 0734)  Pulse: 74 (12/16/18 0734)  Resp: 16 (12/16/18 0734)  BP: (!) 147/79 (12/16/18 0734)  SpO2: 98 % (12/16/18 0734) Vital Signs (24h Range):  Temp:  [98.3 °F (36.8 °C)-99.4 °F (37.4 °C)] 98.3 °F (36.8 °C)  Pulse:  [71-82] 74  Resp:  [16-18] 16  SpO2:  [96 %-99 %] 98 %  BP: (123-163)/(60-80) 147/79     Weight: 67.1 kg (148 lb)  Body mass index is 26.22 kg/m².    Intake/Output - Last 3 Shifts       12/14 0700 - 12/15 0659 12/15 0700 - 12/16 0659 12/16 0700 - 12/17 0659    P.O. 360 225     I.V. (mL/kg)  1420 (21.2)     Total Intake(mL/kg) 360 (5.4) 1645 (24.5)     Urine (mL/kg/hr) 775 (0.5) 300 (0.2)     Emesis/NG output       Stool 0      Total Output 775 300     Net -415 +1345            Urine Occurrence  6 x      Stool Occurrence 0 x 0 x 0 x          Physical Exam   Constitutional: She is oriented to person, place, and time. She appears well-developed and well-nourished. No distress.   Cardiovascular: Normal rate and intact distal pulses.   Pulmonary/Chest: Effort normal. No respiratory distress.   Abdominal:   Soft, ND, appropriately TTP, incision c/d/i with staples    Neurological: She is alert and oriented to person, place, and time.       Significant Labs:  CBC:   Recent Labs   Lab 12/16/18  0441   WBC 5.13   RBC 3.17*   HGB 9.1*   HCT 27.4*      MCV 86   MCH 28.7   MCHC 33.2     CMP:   Recent Labs   Lab 12/13/18  0557  12/16/18  0441   GLU 80   < > 96   CALCIUM 9.1   < > 8.7   ALBUMIN 3.0*  --   --    PROT 6.8  --   --       < > 142   K 3.4*  3.4*   < > 3.5   CO2 27   < > 27      < > 109   BUN 3*   < > 2*   CREATININE 0.7   < > 0.7   ALKPHOS 56  --   --    ALT 8*  --   --    AST 16  --   --    BILITOT 0.4  --   --     < > = values in this interval not displayed.       Significant Diagnostics:  KUB reviewed, normal bowel gas    Assessment/Plan:     * Diverticulitis    48 y.o. female 6 Days Post-Op for Procedure(s) (LRB):  COLECTOMY, SIGMOID, LAPAROSCOPIC- (N/A)  INSERTION, STENT, URETER (Left)  COLECTOMY, SIGMOID (N/A)    Ileus. Resolving. Reg diet, po pain meds later.  Continue current care.  Dispo: home tomorrow if tolerating po         Josefina Horton MD  General Surgery  [facility]

## 2018-12-16 NOTE — PLAN OF CARE
Problem: Adult Inpatient Plan of Care  Goal: Plan of Care Review  Outcome: Ongoing (interventions implemented as appropriate)  POC reviewed and understood by patient. Patient's AAOx4. Pain managed by prn pain meds per md order. IV remained patent and intact. Patient ambulated to [facility]. Gas discomfort managed by prn Simethicone per md order. Patient didn't c/o any nausea during the shift. Patient's VSS. Call light WNR. Bed in lowest position. No acute events at this time. TYLER.

## 2018-12-16 NOTE — SUBJECTIVE & OBJECTIVE
Interval History: Passed gas, wants to eat food!     Medications:  Continuous Infusions:    Scheduled Meds:   enoxaparin  40 mg Subcutaneous Daily    ondansetron  4 mg Intravenous Q6H    scopolamine  1 patch Transdermal Q3 Days     PRN Meds:cyclobenzaprine, HYDROmorphone, HYDROmorphone, promethazine (PHENERGAN) IVPB, simethicone     Review of patient's allergies indicates:   Allergen Reactions    Ace inhibitors Other (See Comments)     dizziness      Efavirenz-emtricitabin-tenofov Other (See Comments)     dizziness      Penicillins Hives     Hives (skin)^ and causes yeast infection    Pork/porcine containing products      Pt would like pork added  Because of strong Spiritism beliefs    Tramadol Nausea And Vomiting    Emtricita-rilpivirine-tenof df Other (See Comments)     Affecting patients kidneys      Labetalol     Metoprolol      Objective:     Vital Signs (Most Recent):  Temp: 98.3 °F (36.8 °C) (12/16/18 0734)  Pulse: 74 (12/16/18 0734)  Resp: 16 (12/16/18 0734)  BP: (!) 147/79 (12/16/18 0734)  SpO2: 98 % (12/16/18 0734) Vital Signs (24h Range):  Temp:  [98.3 °F (36.8 °C)-99.4 °F (37.4 °C)] 98.3 °F (36.8 °C)  Pulse:  [71-82] 74  Resp:  [16-18] 16  SpO2:  [96 %-99 %] 98 %  BP: (123-163)/(60-80) 147/79     Weight: 67.1 kg (148 lb)  Body mass index is 26.22 kg/m².    Intake/Output - Last 3 Shifts       12/14 0700 - 12/15 0659 12/15 0700 - 12/16 0659 12/16 0700 - 12/17 0659    P.O. 360 225     I.V. (mL/kg)  1420 (21.2)     Total Intake(mL/kg) 360 (5.4) 1645 (24.5)     Urine (mL/kg/hr) 775 (0.5) 300 (0.2)     Emesis/NG output       Stool 0      Total Output 775 300     Net -415 +1345            Urine Occurrence  6 x     Stool Occurrence 0 x 0 x 0 x          Physical Exam   Constitutional: She is oriented to person, place, and time. She appears well-developed and well-nourished. No distress.   Cardiovascular: Normal rate and intact distal pulses.   Pulmonary/Chest: Effort normal. No respiratory distress.    Abdominal:   Soft, ND, appropriately TTP, incision c/d/i with staples    Neurological: She is alert and oriented to person, place, and time.       Significant Labs:  CBC:   Recent Labs   Lab 12/16/18  0441   WBC 5.13   RBC 3.17*   HGB 9.1*   HCT 27.4*      MCV 86   MCH 28.7   MCHC 33.2     CMP:   Recent Labs   Lab 12/13/18  0557  12/16/18  0441   GLU 80   < > 96   CALCIUM 9.1   < > 8.7   ALBUMIN 3.0*  --   --    PROT 6.8  --   --       < > 142   K 3.4*  3.4*   < > 3.5   CO2 27   < > 27      < > 109   BUN 3*   < > 2*   CREATININE 0.7   < > 0.7   ALKPHOS 56  --   --    ALT 8*  --   --    AST 16  --   --    BILITOT 0.4  --   --     < > = values in this interval not displayed.       Significant Diagnostics:  KUB reviewed, normal bowel gas

## 2018-12-17 LAB
ANION GAP SERPL CALC-SCNC: 10 MMOL/L
BASOPHILS # BLD AUTO: 0.03 K/UL
BASOPHILS NFR BLD: 0.5 %
BUN SERPL-MCNC: 3 MG/DL
CALCIUM SERPL-MCNC: 9.1 MG/DL
CHLORIDE SERPL-SCNC: 104 MMOL/L
CO2 SERPL-SCNC: 27 MMOL/L
CREAT SERPL-MCNC: 0.8 MG/DL
DIFFERENTIAL METHOD: ABNORMAL
EOSINOPHIL # BLD AUTO: 0.2 K/UL
EOSINOPHIL NFR BLD: 2.4 %
ERYTHROCYTE [DISTWIDTH] IN BLOOD BY AUTOMATED COUNT: 13.2 %
EST. GFR  (AFRICAN AMERICAN): >60 ML/MIN/1.73 M^2
EST. GFR  (NON AFRICAN AMERICAN): >60 ML/MIN/1.73 M^2
GLUCOSE SERPL-MCNC: 88 MG/DL
HCT VFR BLD AUTO: 28.9 %
HGB BLD-MCNC: 9.3 G/DL
IMM GRANULOCYTES # BLD AUTO: 0.03 K/UL
IMM GRANULOCYTES NFR BLD AUTO: 0.5 %
LYMPHOCYTES # BLD AUTO: 1.9 K/UL
LYMPHOCYTES NFR BLD: 30.6 %
MAGNESIUM SERPL-MCNC: 1.7 MG/DL
MCH RBC QN AUTO: 27.6 PG
MCHC RBC AUTO-ENTMCNC: 32.2 G/DL
MCV RBC AUTO: 86 FL
MONOCYTES # BLD AUTO: 0.8 K/UL
MONOCYTES NFR BLD: 12.2 %
NEUTROPHILS # BLD AUTO: 3.3 K/UL
NEUTROPHILS NFR BLD: 53.8 %
NRBC BLD-RTO: 0 /100 WBC
PHOSPHATE SERPL-MCNC: 4.7 MG/DL
PLATELET # BLD AUTO: 305 K/UL
PMV BLD AUTO: 11.2 FL
POTASSIUM SERPL-SCNC: 3.3 MMOL/L
RBC # BLD AUTO: 3.37 M/UL
SODIUM SERPL-SCNC: 141 MMOL/L
WBC # BLD AUTO: 6.15 K/UL

## 2018-12-17 PROCEDURE — 99900035 HC TECH TIME PER 15 MIN (STAT)

## 2018-12-17 PROCEDURE — 25000003 PHARM REV CODE 250: Performed by: STUDENT IN AN ORGANIZED HEALTH CARE EDUCATION/TRAINING PROGRAM

## 2018-12-17 PROCEDURE — 25500020 PHARM REV CODE 255: Performed by: SURGERY

## 2018-12-17 PROCEDURE — 63600175 PHARM REV CODE 636 W HCPCS: Performed by: STUDENT IN AN ORGANIZED HEALTH CARE EDUCATION/TRAINING PROGRAM

## 2018-12-17 PROCEDURE — 25000003 PHARM REV CODE 250: Performed by: SURGERY

## 2018-12-17 PROCEDURE — 85025 COMPLETE CBC W/AUTO DIFF WBC: CPT

## 2018-12-17 PROCEDURE — 80048 BASIC METABOLIC PNL TOTAL CA: CPT

## 2018-12-17 PROCEDURE — 83735 ASSAY OF MAGNESIUM: CPT

## 2018-12-17 PROCEDURE — 94660 CPAP INITIATION&MGMT: CPT

## 2018-12-17 PROCEDURE — 63600175 PHARM REV CODE 636 W HCPCS: Performed by: SURGERY

## 2018-12-17 PROCEDURE — 84100 ASSAY OF PHOSPHORUS: CPT

## 2018-12-17 PROCEDURE — 20600001 HC STEP DOWN PRIVATE ROOM

## 2018-12-17 PROCEDURE — 36415 COLL VENOUS BLD VENIPUNCTURE: CPT

## 2018-12-17 RX ORDER — PANTOPRAZOLE SODIUM 40 MG/1
40 TABLET, DELAYED RELEASE ORAL DAILY
Status: DISCONTINUED | OUTPATIENT
Start: 2018-12-17 | End: 2018-12-20 | Stop reason: HOSPADM

## 2018-12-17 RX ORDER — NEBIVOLOL 5 MG/1
10 TABLET ORAL DAILY
Status: DISCONTINUED | OUTPATIENT
Start: 2018-12-17 | End: 2018-12-20 | Stop reason: HOSPADM

## 2018-12-17 RX ORDER — OXYCODONE AND ACETAMINOPHEN 5; 325 MG/1; MG/1
1 TABLET ORAL EVERY 4 HOURS PRN
Status: DISCONTINUED | OUTPATIENT
Start: 2018-12-17 | End: 2018-12-20 | Stop reason: HOSPADM

## 2018-12-17 RX ORDER — VENLAFAXINE HYDROCHLORIDE 75 MG/1
150 CAPSULE, EXTENDED RELEASE ORAL DAILY
Status: DISCONTINUED | OUTPATIENT
Start: 2018-12-17 | End: 2018-12-20 | Stop reason: HOSPADM

## 2018-12-17 RX ORDER — OXYCODONE AND ACETAMINOPHEN 10; 325 MG/1; MG/1
1 TABLET ORAL EVERY 4 HOURS PRN
Status: DISCONTINUED | OUTPATIENT
Start: 2018-12-17 | End: 2018-12-20 | Stop reason: HOSPADM

## 2018-12-17 RX ORDER — BUPROPION HYDROCHLORIDE 75 MG/1
75 TABLET ORAL 2 TIMES DAILY
Status: DISCONTINUED | OUTPATIENT
Start: 2018-12-17 | End: 2018-12-20 | Stop reason: HOSPADM

## 2018-12-17 RX ORDER — HYDROMORPHONE HYDROCHLORIDE 1 MG/ML
0.5 INJECTION, SOLUTION INTRAMUSCULAR; INTRAVENOUS; SUBCUTANEOUS
Status: DISCONTINUED | OUTPATIENT
Start: 2018-12-17 | End: 2018-12-17

## 2018-12-17 RX ADMIN — HYDROMORPHONE HYDROCHLORIDE 0.5 MG: 1 INJECTION, SOLUTION INTRAMUSCULAR; INTRAVENOUS; SUBCUTANEOUS at 12:12

## 2018-12-17 RX ADMIN — NEBIVOLOL HYDROCHLORIDE 10 MG: 5 TABLET ORAL at 12:12

## 2018-12-17 RX ADMIN — ONDANSETRON HYDROCHLORIDE 4 MG: 2 INJECTION, SOLUTION INTRAMUSCULAR; INTRAVENOUS at 06:12

## 2018-12-17 RX ADMIN — OXYCODONE HYDROCHLORIDE AND ACETAMINOPHEN 1 TABLET: 10; 325 TABLET ORAL at 01:12

## 2018-12-17 RX ADMIN — ONDANSETRON HYDROCHLORIDE 4 MG: 2 INJECTION, SOLUTION INTRAMUSCULAR; INTRAVENOUS at 11:12

## 2018-12-17 RX ADMIN — VENLAFAXINE HYDROCHLORIDE 150 MG: 75 CAPSULE, EXTENDED RELEASE ORAL at 12:12

## 2018-12-17 RX ADMIN — OXYCODONE HYDROCHLORIDE AND ACETAMINOPHEN 1 TABLET: 10; 325 TABLET ORAL at 06:12

## 2018-12-17 RX ADMIN — CYCLOBENZAPRINE HYDROCHLORIDE 5 MG: 5 TABLET, FILM COATED ORAL at 10:12

## 2018-12-17 RX ADMIN — ONDANSETRON HYDROCHLORIDE 4 MG: 2 INJECTION, SOLUTION INTRAMUSCULAR; INTRAVENOUS at 05:12

## 2018-12-17 RX ADMIN — HYDROMORPHONE HYDROCHLORIDE 0.5 MG: 1 INJECTION, SOLUTION INTRAMUSCULAR; INTRAVENOUS; SUBCUTANEOUS at 09:12

## 2018-12-17 RX ADMIN — SIMETHICONE CHEW TAB 80 MG 80 MG: 80 TABLET ORAL at 05:12

## 2018-12-17 RX ADMIN — ENOXAPARIN SODIUM 40 MG: 100 INJECTION SUBCUTANEOUS at 05:12

## 2018-12-17 RX ADMIN — PANTOPRAZOLE SODIUM 40 MG: 40 TABLET, DELAYED RELEASE ORAL at 12:12

## 2018-12-17 RX ADMIN — IOHEXOL 75 ML: 350 INJECTION, SOLUTION INTRAVENOUS at 08:12

## 2018-12-17 RX ADMIN — OXYCODONE HYDROCHLORIDE 10 MG: 10 TABLET ORAL at 01:12

## 2018-12-17 RX ADMIN — ONDANSETRON HYDROCHLORIDE 4 MG: 2 INJECTION, SOLUTION INTRAMUSCULAR; INTRAVENOUS at 01:12

## 2018-12-17 RX ADMIN — ONDANSETRON HYDROCHLORIDE 4 MG: 2 INJECTION, SOLUTION INTRAMUSCULAR; INTRAVENOUS at 12:12

## 2018-12-17 RX ADMIN — OXYCODONE HYDROCHLORIDE AND ACETAMINOPHEN 1 TABLET: 10; 325 TABLET ORAL at 11:12

## 2018-12-17 RX ADMIN — HYDROMORPHONE HYDROCHLORIDE 0.5 MG: 1 INJECTION, SOLUTION INTRAMUSCULAR; INTRAVENOUS; SUBCUTANEOUS at 06:12

## 2018-12-17 RX ADMIN — OXYCODONE HYDROCHLORIDE 10 MG: 10 TABLET ORAL at 05:12

## 2018-12-17 RX ADMIN — CYCLOBENZAPRINE HYDROCHLORIDE 5 MG: 5 TABLET, FILM COATED ORAL at 01:12

## 2018-12-17 RX ADMIN — BUPROPION HYDROCHLORIDE 75 MG: 75 TABLET, FILM COATED ORAL at 08:12

## 2018-12-17 RX ADMIN — BUPROPION HYDROCHLORIDE 75 MG: 75 TABLET, FILM COATED ORAL at 12:12

## 2018-12-17 RX ADMIN — CYCLOBENZAPRINE HYDROCHLORIDE 5 MG: 5 TABLET, FILM COATED ORAL at 08:12

## 2018-12-17 NOTE — RESIDENT HANDOFF
Was called about increasing abdominal pain/nausea.  Examined pt.  Abdomen is distended, but soft.  Still having flatus.  Elected for no further workup at this time.  Instructed nursing to call if clinical picture changes.  Receiving PRN nausea meds.

## 2018-12-17 NOTE — PLAN OF CARE
Problem: Adult Inpatient Plan of Care  Goal: Plan of Care Review  Care plan reviewed and understood by patient. Resp rate even and unlabored. VSS. Patient had CT Abd/plevis today. Patient tolerating diet with reports of nausea, meds given as ordered. Patient remain free of falls. No acute pain or distress noted. Will continue to monitor.

## 2018-12-17 NOTE — ASSESSMENT & PLAN NOTE
48 y.o. female 7 Days Post-Op for Procedure(s) (LRB):  COLECTOMY, SIGMOID, LAPAROSCOPIC- (N/A)  INSERTION, STENT, URETER (Left)  COLECTOMY, SIGMOID (N/A)    NPO  IV pain   Will scan today to r/o abscess at incision  Ambulate  DVT ppx

## 2018-12-17 NOTE — PHYSICIAN QUERY
PT Name: Gill Cleary  MR #: [id number]    Physician Query Form - HIV Clarification     CDS/: Anabel Mcrae RN               Contact information: [email or web address]    This form is a permanent document in the medical record.     Query Date: December 17, 2018      By submitting this query, we are merely seeking further clarification of documentation. Please utilize your independent clinical judgment when addressing the question(s) below.    The Medical record contains the following:   Indicators   Supporting Clinical Findings Location in Medical Record   x HIV or AIDS Hx of HIV (viral loads currently undetectable)     H&P    CD4 Count          CD4%        Viral Load      History of Opportunistic Infection      Cancer  Pneumocystis Pneumonia (PCP)  Cytomegalovirus (CMV)  Kaposi Sarcoma      HIV-Related Conditions (e.g. Dementia, Encephalopathy) documented     x Medications Tizanidine  Bictegrav/emtricit/tenofov ala (biktarvy oral) H&P     x Other 4-5 episodes of diverticulitis over the last 5 years, all requiring hospitalization.IR drainage of a cyst near her sigmoid colon during her last admission but there was no drain left and this cyst has recurred on her most recent CT scan    PERTINENT IMAGING:  Reviewed outside imaging reports  - Multiple episodes of diverticulitis in the past  - Cystic mass adjacent to colon ~2cm     Endoscopy report 10/26/18  - Multiple diverticuli in sigmoid and descending colon  - No mass seen    Laparoscopy, mobilization of the splenic flexure, sigmoid resection, primary anastomosis and flexible sigmoidoscopy.    7 cm heterogeneous collection in the dependent pelvis/cul-de-sac which may represent postoperative hematoma, seroma, or abscess.  Few scattered diverticula in the remaining sigmoid colon with surrounding fat haziness.  Findings may be postoperative in nature, however diverticulitis cannot be excluded.  Bilateral dependent consolidation which may represent  atelectasis versus airspace disease.  Recommend continued follow-up until resolution.  Postoperative inflammatory change in the abdominal wall without focal collection. H&P            H&P          H&P          OP note 12/10        CT abdomen 12/17     Please clarify the patients HIV status  Per CDC publication Vol 60 RR-17 https://www.cdc.gov/mmwr/preview/mmwrhtml/kd6904s4.htmRelating to the classification HIV Infection, once a patient is diagnosed with AIDS the diagnosis still stands even if, after treatment, the CD4+ T cell count rises to above 200 per ìL of blood or other AIDS-defining illnesses are cured.       The following are AIDS-Defining Illnesses or HIV-Related Diseases.  Bacterial infections, multiple or recurrent only in children aged <6 years Kaposi sarcoma   Candidiasis of bronchi, trachea, or lungs Lymphoma, Burkitt (or equivalent term)   Candidiasis of esophagus Lymphoma, immunoblastic (or equivalent term)   Cervical cancer, invasive Only among adults, adolescents, and children aged > 6 years. Lymphoma, primary, of brain   Coccidioidomycosis, disseminated or extrapulmonary Mycobacterium avium complex or Mycobacterium kansasii, disseminated or extrapulmonary   Cryptococcosis, extrapulmonary Mycobacterium tuberculosis of any site, pulmonary, disseminated, or extrapulmonary   Cryptosporidiosis, chronic intestinal (>1 months duration) Mycobacterium, other species or unidentified species, disseminated or extrapulmonary   Cytomegalovirus disease (other than liver, spleen, or nodes), onset at age >1 month Pneumocystis jirovecii (previously known as Pneumocystis carinii) pneumonia   Cytomegalovirus retinitis (with loss of vision) Pneumonia, recurrent Only among adults, adolescents, and children aged .6 years.   Encephalopathy attributed to HIV Progressive multifocal leukoencephalopathy   Herpes simplex: chronic ulcers (>1 months duration) or bronchitis, pneumonitis, or esophagitis (onset at age >1  month) Salmonella septicemia, recurrent   Histoplasmosis, disseminated or extrapulmonary Toxoplasmosis of brain, onset at age >1 month   Isosporiasis, chronic intestinal (>1 months duration) Wasting syndrome attributed to HIV       [ x  ] Asymptomatic HIV Infection - Positive Status Only - without any history of (or current) AIDS-Defining Illness or HIV-Related Illness     [   ] Patient is HIV Negative   [   ] Other (please specify):   [  ] Clinically Undetermined         Please document in your progress notes daily for the duration of treatment until resolved and include in your discharge summary.

## 2018-12-17 NOTE — PT/OT/SLP DISCHARGE
Physical Therapy Discharge Summary    Name: Gill Cleary  MRN: [id number]   Principal Problem: Diverticulitis     Patient Discharged from acute Physical Therapy on 12/17/2018.  Please refer to prior PT noted date on 12/13/2018 for functional status.    Pt. seen amb. in hallway without difficulty. Pt. denies need for further acute PT at this time.     Assessment:     Patient has met all goals and is not appropriate for therapy.    Objective:     GOALS:   Multidisciplinary Problems     Physical Therapy Goals     Not on file          Multidisciplinary Problems (Resolved)        Problem: Physical Therapy Goal    Goal Priority Disciplines Outcome Goal Variances Interventions   Physical Therapy Goal   (Resolved)     PT, PT/OT Outcome(s) achieved     Description:  PT goals until 12/19/18    1. Pt supine to sit with mod independent- met  2. Pt sit to supine with mod independent-met  3. Pt sit to stand with no AD with supervision-met  Revised goal: sit to stand with no AD with mod independent - met  4. Pt to perform gait 300ft with no AD with supervision.-met  5. Pt to go up/down curb step with no AD with SBA.-met                            Reasons for Discontinuation of Therapy Services  Satisfactory goal achievement.      Plan:     Patient Discharged to: In house with walking program.    David Pederson, PT  12/17/2018

## 2018-12-17 NOTE — PROGRESS NOTES
[facility]  General Surgery  Progress Note    Subjective:     History of Present Illness:  No notes on file    Post-Op Info:  Procedure(s) (LRB):  COLECTOMY, SIGMOID, LAPAROSCOPIC- (N/A)  INSERTION, STENT, URETER (Left)  COLECTOMY, SIGMOID (N/A)   7 Days Post-Op     Interval History: Still having pain. No BM/Flatus     Medications:  Continuous Infusions:    Scheduled Meds:   enoxaparin  40 mg Subcutaneous Daily    ondansetron  4 mg Intravenous Q6H    scopolamine  1 patch Transdermal Q3 Days     PRN Meds:cyclobenzaprine, HYDROmorphone, oxyCODONE, oxyCODONE, promethazine (PHENERGAN) IVPB, simethicone     Review of patient's allergies indicates:   Allergen Reactions    Ace inhibitors Other (See Comments)     dizziness      Efavirenz-emtricitabin-tenofov Other (See Comments)     dizziness      Penicillins Hives     Hives (skin)^ and causes yeast infection    Pork/porcine containing products      Pt would like pork added  Because of strong Christian beliefs    Tramadol Nausea And Vomiting    Emtricita-rilpivirine-tenof df Other (See Comments)     Affecting patients kidneys      Labetalol     Metoprolol      Objective:     Vital Signs (Most Recent):  Temp: 98.9 °F (37.2 °C) (12/17/18 0449)  Pulse: 68 (12/17/18 0449)  Resp: 18 (12/17/18 0449)  BP: (!) 140/78 (12/17/18 0449)  SpO2: 98 % (12/17/18 0449) Vital Signs (24h Range):  Temp:  [97 °F (36.1 °C)-100.2 °F (37.9 °C)] 98.9 °F (37.2 °C)  Pulse:  [68-82] 68  Resp:  [16-20] 18  SpO2:  [95 %-99 %] 98 %  BP: (140-164)/(78-84) 140/78     Weight: 67.1 kg (148 lb)  Body mass index is 26.22 kg/m².    Intake/Output - Last 3 Shifts       12/15 0700 - 12/16 0659 12/16 0700 - 12/17 0659    P.O. 225 1000    I.V. (mL/kg) 1420 (21.2)     IV Piggyback  150    Total Intake(mL/kg) 1645 (24.5) 1150 (17.1)    Urine (mL/kg/hr) 300 (0.2) 250 (0.2)    Stool  0    Total Output 300 250    Net +1345 +900          Urine Occurrence 6 x 7 x    Stool Occurrence 0 x 0 x           Physical Exam   Constitutional: She is oriented to person, place, and time. She appears well-developed and well-nourished. No distress.   Cardiovascular: Normal rate and intact distal pulses.   Pulmonary/Chest: Effort normal. No respiratory distress.   Abdominal:   Soft, ND, appropriately TTP, incision with some firmness lateral to staples   Neurological: She is alert and oriented to person, place, and time.       Significant Labs:  CBC:   Recent Labs   Lab 12/17/18  0523   WBC 6.15   RBC 3.37*   HGB 9.3*   HCT 28.9*      MCV 86   MCH 27.6   MCHC 32.2     CMP:   Recent Labs   Lab 12/13/18  0557  12/16/18  0441   GLU 80   < > 96   CALCIUM 9.1   < > 8.7   ALBUMIN 3.0*  --   --    PROT 6.8  --   --       < > 142   K 3.4*  3.4*   < > 3.5   CO2 27   < > 27      < > 109   BUN 3*   < > 2*   CREATININE 0.7   < > 0.7   ALKPHOS 56  --   --    ALT 8*  --   --    AST 16  --   --    BILITOT 0.4  --   --     < > = values in this interval not displayed.       Significant Diagnostics:  KUB reviewed, normal bowel gas    Assessment/Plan:     * Diverticulitis    48 y.o. female 7 Days Post-Op for Procedure(s) (LRB):  COLECTOMY, SIGMOID, LAPAROSCOPIC- (N/A)  INSERTION, STENT, URETER (Left)  COLECTOMY, SIGMOID (N/A)    NPO  IV pain   Will scan today to r/o abscess at incision  Ambulate  DVT ppx          Gage Davies MD  General Surgery  [facility]

## 2018-12-17 NOTE — PLAN OF CARE
12/17/18 1658   Discharge Reassessment   Assessment Type Discharge Planning Reassessment   Provided patient/caregiver education on the expected discharge date and the discharge plan No  (D/c date per MD. Not medically stable for discharge. D/c plan: Home, without any needs. )   Do you have any problems affording any of your prescribed medications? No   Discharge Plan A Home with family   Discharge Plan B Home with family   Patient choice form signed by patient/caregiver N/A   Anticipated Discharge Disposition Home

## 2018-12-17 NOTE — PLAN OF CARE
Problem: Adult Inpatient Plan of Care  Goal: Plan of Care Review  Outcome: Ongoing (interventions implemented as appropriate)  POC reviewed and understood by patient. Patient's AAOx4. Pain managed by prn pain meds per md order. IV remained patent and intact. Patient ambulated to Beds. Gas discomfort managed by prn Simethicone per md order. Patient abd distended but soft, MD is aware. Nausea managed by prn Phenergan and scheduled Zofran. Muscle spasms managed by prn Flexeril per md order. Patient's VSS. Call light WNR. Bed in lowest position. No acute events at this time. WCTM.

## 2018-12-17 NOTE — PLAN OF CARE
Problem: Adult Inpatient Plan of Care  Goal: Plan of Care Review  Outcome: Ongoing (interventions implemented as appropriate)  POC reviewed with patient. Verbalizes understanding. AAOx4. Patient pain managed by ordered pain medicine. Patient ambulated in [place] x2 independently; remains free of any falls or trauma. Patient tolerated breakfast; after lunch patient complained of pain in the abdomen. PRN medicine given as requested. No Bm reported this shift. Call light within reach. TM.

## 2018-12-17 NOTE — PLAN OF CARE
Problem: Physical Therapy Goal  Goal: Physical Therapy Goal  PT goals until 12/19/18    1. Pt supine to sit with mod independent- met  2. Pt sit to supine with mod independent-met  3. Pt sit to stand with no AD with supervision-met  Revised goal: sit to stand with no AD with mod independent  4. Pt to perform gait 300ft with no AD with supervision.-met  5. Pt to go up/down curb step with no AD with SBA.-met          Outcome: Outcome(s) achieved Date Met: 12/17/18  Goals grossly met. Pt. denies need for further acute PT.

## 2018-12-17 NOTE — SUBJECTIVE & OBJECTIVE
Interval History: Still having pain. No BM/Flatus     Medications:  Continuous Infusions:    Scheduled Meds:   enoxaparin  40 mg Subcutaneous Daily    ondansetron  4 mg Intravenous Q6H    scopolamine  1 patch Transdermal Q3 Days     PRN Meds:cyclobenzaprine, HYDROmorphone, oxyCODONE, oxyCODONE, promethazine (PHENERGAN) IVPB, simethicone     Review of patient's allergies indicates:   Allergen Reactions    Ace inhibitors Other (See Comments)     dizziness      Efavirenz-emtricitabin-tenofov Other (See Comments)     dizziness      Penicillins Hives     Hives (skin)^ and causes yeast infection    Pork/porcine containing products      Pt would like pork added  Because of strong Muslim beliefs    Tramadol Nausea And Vomiting    Emtricita-rilpivirine-tenof df Other (See Comments)     Affecting patients kidneys      Labetalol     Metoprolol      Objective:     Vital Signs (Most Recent):  Temp: 98.9 °F (37.2 °C) (12/17/18 0449)  Pulse: 68 (12/17/18 0449)  Resp: 18 (12/17/18 0449)  BP: (!) 140/78 (12/17/18 0449)  SpO2: 98 % (12/17/18 0449) Vital Signs (24h Range):  Temp:  [97 °F (36.1 °C)-100.2 °F (37.9 °C)] 98.9 °F (37.2 °C)  Pulse:  [68-82] 68  Resp:  [16-20] 18  SpO2:  [95 %-99 %] 98 %  BP: (140-164)/(78-84) 140/78     Weight: 67.1 kg (148 lb)  Body mass index is 26.22 kg/m².    Intake/Output - Last 3 Shifts       12/15 0700 - 12/16 0659 12/16 0700 - 12/17 0659    P.O. 225 1000    I.V. (mL/kg) 1420 (21.2)     IV Piggyback  150    Total Intake(mL/kg) 1645 (24.5) 1150 (17.1)    Urine (mL/kg/hr) 300 (0.2) 250 (0.2)    Stool  0    Total Output 300 250    Net +1345 +900          Urine Occurrence 6 x 7 x    Stool Occurrence 0 x 0 x          Physical Exam   Constitutional: She is oriented to person, place, and time. She appears well-developed and well-nourished. No distress.   Cardiovascular: Normal rate and intact distal pulses.   Pulmonary/Chest: Effort normal. No respiratory distress.   Abdominal:   Soft, ND,  appropriately TTP, incision with some firmness lateral to staples   Neurological: She is alert and oriented to person, place, and time.       Significant Labs:  CBC:   Recent Labs   Lab 12/17/18  0523   WBC 6.15   RBC 3.37*   HGB 9.3*   HCT 28.9*      MCV 86   MCH 27.6   MCHC 32.2     CMP:   Recent Labs   Lab 12/13/18  0557  12/16/18  0441   GLU 80   < > 96   CALCIUM 9.1   < > 8.7   ALBUMIN 3.0*  --   --    PROT 6.8  --   --       < > 142   K 3.4*  3.4*   < > 3.5   CO2 27   < > 27      < > 109   BUN 3*   < > 2*   CREATININE 0.7   < > 0.7   ALKPHOS 56  --   --    ALT 8*  --   --    AST 16  --   --    BILITOT 0.4  --   --     < > = values in this interval not displayed.       Significant Diagnostics:  KUB reviewed, normal bowel gas

## 2018-12-18 PROBLEM — B20 HIV DISEASE: Status: ACTIVE | Noted: 2018-12-18

## 2018-12-18 LAB
ANION GAP SERPL CALC-SCNC: 8 MMOL/L
BASOPHILS # BLD AUTO: 0.03 K/UL
BASOPHILS NFR BLD: 0.5 %
BUN SERPL-MCNC: 5 MG/DL
CALCIUM SERPL-MCNC: 9 MG/DL
CHLORIDE SERPL-SCNC: 102 MMOL/L
CO2 SERPL-SCNC: 30 MMOL/L
CREAT SERPL-MCNC: 0.8 MG/DL
DIFFERENTIAL METHOD: ABNORMAL
EOSINOPHIL # BLD AUTO: 0.2 K/UL
EOSINOPHIL NFR BLD: 2.8 %
ERYTHROCYTE [DISTWIDTH] IN BLOOD BY AUTOMATED COUNT: 13.4 %
EST. GFR  (AFRICAN AMERICAN): >60 ML/MIN/1.73 M^2
EST. GFR  (NON AFRICAN AMERICAN): >60 ML/MIN/1.73 M^2
GLUCOSE SERPL-MCNC: 89 MG/DL
HCT VFR BLD AUTO: 31.9 %
HGB BLD-MCNC: 10.2 G/DL
IMM GRANULOCYTES # BLD AUTO: 0.02 K/UL
IMM GRANULOCYTES NFR BLD AUTO: 0.3 %
LYMPHOCYTES # BLD AUTO: 1.8 K/UL
LYMPHOCYTES NFR BLD: 30.4 %
MAGNESIUM SERPL-MCNC: 1.9 MG/DL
MCH RBC QN AUTO: 27.9 PG
MCHC RBC AUTO-ENTMCNC: 32 G/DL
MCV RBC AUTO: 87 FL
MONOCYTES # BLD AUTO: 0.6 K/UL
MONOCYTES NFR BLD: 10.4 %
NEUTROPHILS # BLD AUTO: 3.2 K/UL
NEUTROPHILS NFR BLD: 55.6 %
NRBC BLD-RTO: 0 /100 WBC
PHOSPHATE SERPL-MCNC: 5.7 MG/DL
PLATELET # BLD AUTO: 329 K/UL
PMV BLD AUTO: 11.5 FL
POTASSIUM SERPL-SCNC: 4 MMOL/L
RBC # BLD AUTO: 3.65 M/UL
SODIUM SERPL-SCNC: 140 MMOL/L
WBC # BLD AUTO: 5.78 K/UL

## 2018-12-18 PROCEDURE — 80048 BASIC METABOLIC PNL TOTAL CA: CPT

## 2018-12-18 PROCEDURE — 85025 COMPLETE CBC W/AUTO DIFF WBC: CPT

## 2018-12-18 PROCEDURE — 83735 ASSAY OF MAGNESIUM: CPT

## 2018-12-18 PROCEDURE — 20600001 HC STEP DOWN PRIVATE ROOM

## 2018-12-18 PROCEDURE — 25000003 PHARM REV CODE 250: Performed by: STUDENT IN AN ORGANIZED HEALTH CARE EDUCATION/TRAINING PROGRAM

## 2018-12-18 PROCEDURE — 25000003 PHARM REV CODE 250: Performed by: SURGERY

## 2018-12-18 PROCEDURE — 99900035 HC TECH TIME PER 15 MIN (STAT)

## 2018-12-18 PROCEDURE — 84100 ASSAY OF PHOSPHORUS: CPT

## 2018-12-18 PROCEDURE — 63600175 PHARM REV CODE 636 W HCPCS: Performed by: STUDENT IN AN ORGANIZED HEALTH CARE EDUCATION/TRAINING PROGRAM

## 2018-12-18 PROCEDURE — 36415 COLL VENOUS BLD VENIPUNCTURE: CPT

## 2018-12-18 RX ORDER — POLYETHYLENE GLYCOL 3350, SODIUM SULFATE ANHYDROUS, SODIUM BICARBONATE, SODIUM CHLORIDE, POTASSIUM CHLORIDE 236; 22.74; 6.74; 5.86; 2.97 G/4L; G/4L; G/4L; G/4L; G/4L
1000 POWDER, FOR SOLUTION ORAL ONCE
Status: COMPLETED | OUTPATIENT
Start: 2018-12-18 | End: 2018-12-18

## 2018-12-18 RX ADMIN — SCOPALAMINE 1 PATCH: 1 PATCH, EXTENDED RELEASE TRANSDERMAL at 12:12

## 2018-12-18 RX ADMIN — BUPROPION HYDROCHLORIDE 75 MG: 75 TABLET, FILM COATED ORAL at 08:12

## 2018-12-18 RX ADMIN — CYCLOBENZAPRINE HYDROCHLORIDE 5 MG: 5 TABLET, FILM COATED ORAL at 06:12

## 2018-12-18 RX ADMIN — ONDANSETRON HYDROCHLORIDE 4 MG: 2 INJECTION, SOLUTION INTRAMUSCULAR; INTRAVENOUS at 12:12

## 2018-12-18 RX ADMIN — ONDANSETRON HYDROCHLORIDE 4 MG: 2 INJECTION, SOLUTION INTRAMUSCULAR; INTRAVENOUS at 06:12

## 2018-12-18 RX ADMIN — OXYCODONE HYDROCHLORIDE AND ACETAMINOPHEN 1 TABLET: 10; 325 TABLET ORAL at 06:12

## 2018-12-18 RX ADMIN — PANTOPRAZOLE SODIUM 40 MG: 40 TABLET, DELAYED RELEASE ORAL at 09:12

## 2018-12-18 RX ADMIN — OXYCODONE HYDROCHLORIDE AND ACETAMINOPHEN 1 TABLET: 10; 325 TABLET ORAL at 09:12

## 2018-12-18 RX ADMIN — NEBIVOLOL HYDROCHLORIDE 10 MG: 5 TABLET ORAL at 09:12

## 2018-12-18 RX ADMIN — SIMETHICONE CHEW TAB 80 MG 80 MG: 80 TABLET ORAL at 08:12

## 2018-12-18 RX ADMIN — POLYETHYLENE GLYCOL 3350, SODIUM SULFATE ANHYDROUS, SODIUM BICARBONATE, SODIUM CHLORIDE, POTASSIUM CHLORIDE 1000 ML: 236; 22.74; 6.74; 5.86; 2.97 POWDER, FOR SOLUTION ORAL at 09:12

## 2018-12-18 RX ADMIN — OXYCODONE HYDROCHLORIDE AND ACETAMINOPHEN 1 TABLET: 10; 325 TABLET ORAL at 01:12

## 2018-12-18 RX ADMIN — CYCLOBENZAPRINE HYDROCHLORIDE 5 MG: 5 TABLET, FILM COATED ORAL at 09:12

## 2018-12-18 RX ADMIN — BUPROPION HYDROCHLORIDE 75 MG: 75 TABLET, FILM COATED ORAL at 09:12

## 2018-12-18 RX ADMIN — ENOXAPARIN SODIUM 40 MG: 100 INJECTION SUBCUTANEOUS at 06:12

## 2018-12-18 RX ADMIN — VENLAFAXINE HYDROCHLORIDE 150 MG: 75 CAPSULE, EXTENDED RELEASE ORAL at 09:12

## 2018-12-18 RX ADMIN — OXYCODONE HYDROCHLORIDE AND ACETAMINOPHEN 1 TABLET: 10; 325 TABLET ORAL at 10:12

## 2018-12-18 NOTE — NURSING
"Called to room by pt. She drank one cup of Golytely and stated  "I got so nauseated and I felt like my stomach was about to bust". Pt stated she will not be able to continue drinking it. Will page and notify Dr. Horton.    1050--Dr. Horton returned call. Notified her of above. Stated to tell the patient just to try throughout the day. No new orders at time.  "

## 2018-12-18 NOTE — PLAN OF CARE
Problem: Adult Inpatient Plan of Care  Goal: Plan of Care Review  Outcome: Ongoing (interventions implemented as appropriate)  POC reviewed with pt. Pt verbalizes understanding of plan of care. Pt AAOx4. Questions and concerns addressed. Pt remained free of injury/trauma throughout the shift. Safety precautions maintained. Bedrails upx2, bed in lowest position and locked. Call bell in reach at all times. Pt able to ambulate and reposition self in bed independently. Pt c/o pain, prn administered. Pt c/o nausea, no emesis this shift. Pt progressing toward goals. No acute events throughout the shift.  WCTM

## 2018-12-18 NOTE — PROGRESS NOTES
[facility]  General Surgery  Progress Note    Subjective:     History of Present Illness:  No notes on file    Post-Op Info:  Procedure(s) (LRB):  COLECTOMY, SIGMOID, LAPAROSCOPIC- (N/A)  INSERTION, STENT, URETER (Left)  COLECTOMY, SIGMOID (N/A)   8 Days Post-Op     Interval History: Says she had a better night. No BM/Flatus     Medications:  Continuous Infusions:    Scheduled Meds:   buPROPion  75 mg Oral BID    enoxaparin  40 mg Subcutaneous Daily    nebivolol  10 mg Oral Daily    ondansetron  4 mg Intravenous Q6H    pantoprazole  40 mg Oral Daily    scopolamine  1 patch Transdermal Q3 Days    venlafaxine  150 mg Oral Daily     PRN Meds:cyclobenzaprine, oxyCODONE-acetaminophen, oxyCODONE-acetaminophen, promethazine (PHENERGAN) IVPB, simethicone     Review of patient's allergies indicates:   Allergen Reactions    Ace inhibitors Other (See Comments)     dizziness      Efavirenz-emtricitabin-tenofov Other (See Comments)     dizziness      Penicillins Hives     Hives (skin)^ and causes yeast infection    Pork/porcine containing products      Pt would like pork added  Because of strong Denominational beliefs    Tramadol Nausea And Vomiting    Emtricita-rilpivirine-tenof df Other (See Comments)     Affecting patients kidneys      Labetalol     Metoprolol      Objective:     Vital Signs (Most Recent):  Temp: 98 °F (36.7 °C) (12/18/18 0407)  Pulse: 60 (12/18/18 0407)  Resp: 16 (12/18/18 0407)  BP: 136/67 (12/18/18 0407)  SpO2: 95 % (12/18/18 0407) Vital Signs (24h Range):  Temp:  [96.3 °F (35.7 °C)-98.7 °F (37.1 °C)] 98 °F (36.7 °C)  Pulse:  [60-84] 60  Resp:  [16-20] 16  SpO2:  [95 %-98 %] 95 %  BP: (132-149)/(62-67) 136/67     Weight: 67.1 kg (148 lb)  Body mass index is 26.22 kg/m².    Intake/Output - Last 3 Shifts       12/16 0700 - 12/17 0659 12/17 0700 - 12/18 0659 12/18 0700 - 12/19 0659    P.O. 1000 1320 420    I.V. (mL/kg)       IV Piggyback 150      Total Intake(mL/kg) 1150 (17.1)  1320 (19.7) 420 (6.3)    Urine (mL/kg/hr) 250 (0.2)      Stool 0      Total Output 250      Net +900 +1320 +420           Urine Occurrence 7 x 4 x 2 x    Stool Occurrence 0 x            Physical Exam   Constitutional: She is oriented to person, place, and time. She appears well-developed and well-nourished. No distress.   Cardiovascular: Normal rate and intact distal pulses.   Pulmonary/Chest: Effort normal. No respiratory distress.   Abdominal:   Soft, ND, appropriately TTP, swelling at the incision (edema on CT scan, no fluid)   Neurological: She is alert and oriented to person, place, and time.       Significant Labs:  CBC:   Recent Labs   Lab 12/18/18  0416   WBC 5.78   RBC 3.65*   HGB 10.2*   HCT 31.9*      MCV 87   MCH 27.9   MCHC 32.0     CMP:   Recent Labs   Lab 12/13/18  0557  12/18/18  0416   GLU 80   < > 89   CALCIUM 9.1   < > 9.0   ALBUMIN 3.0*  --   --    PROT 6.8  --   --       < > 140   K 3.4*  3.4*   < > 4.0   CO2 27   < > 30*      < > 102   BUN 3*   < > 5*   CREATININE 0.7   < > 0.8   ALKPHOS 56  --   --    ALT 8*  --   --    AST 16  --   --    BILITOT 0.4  --   --     < > = values in this interval not displayed.       Significant Diagnostics:  KUB reviewed, normal bowel gas    Assessment/Plan:     * Diverticulitis    48 y.o. female 8 Days Post-Op for Procedure(s) (LRB):  COLECTOMY, SIGMOID, LAPAROSCOPIC- (N/A)  INSERTION, STENT, URETER (Left)  COLECTOMY, SIGMOID (N/A)    CT scan with depend fluid, clinically do not think this is a leak for now.  Also no infection at the incision.  Continue current care, try golytely today.         Josefina Horton MD  General Surgery  [facility]

## 2018-12-18 NOTE — SUBJECTIVE & OBJECTIVE
Interval History: Says she had a better night. No BM/Flatus     Medications:  Continuous Infusions:    Scheduled Meds:   buPROPion  75 mg Oral BID    enoxaparin  40 mg Subcutaneous Daily    nebivolol  10 mg Oral Daily    ondansetron  4 mg Intravenous Q6H    pantoprazole  40 mg Oral Daily    scopolamine  1 patch Transdermal Q3 Days    venlafaxine  150 mg Oral Daily     PRN Meds:cyclobenzaprine, oxyCODONE-acetaminophen, oxyCODONE-acetaminophen, promethazine (PHENERGAN) IVPB, simethicone     Review of patient's allergies indicates:   Allergen Reactions    Ace inhibitors Other (See Comments)     dizziness      Efavirenz-emtricitabin-tenofov Other (See Comments)     dizziness      Penicillins Hives     Hives (skin)^ and causes yeast infection    Pork/porcine containing products      Pt would like pork added  Because of strong Jain beliefs    Tramadol Nausea And Vomiting    Emtricita-rilpivirine-tenof df Other (See Comments)     Affecting patients kidneys      Labetalol     Metoprolol      Objective:     Vital Signs (Most Recent):  Temp: 98 °F (36.7 °C) (12/18/18 0407)  Pulse: 60 (12/18/18 0407)  Resp: 16 (12/18/18 0407)  BP: 136/67 (12/18/18 0407)  SpO2: 95 % (12/18/18 0407) Vital Signs (24h Range):  Temp:  [96.3 °F (35.7 °C)-98.7 °F (37.1 °C)] 98 °F (36.7 °C)  Pulse:  [60-84] 60  Resp:  [16-20] 16  SpO2:  [95 %-98 %] 95 %  BP: (132-149)/(62-67) 136/67     Weight: 67.1 kg (148 lb)  Body mass index is 26.22 kg/m².    Intake/Output - Last 3 Shifts       12/16 0700 - 12/17 0659 12/17 0700 - 12/18 0659 12/18 0700 - 12/19 0659    P.O. 1000 1320 420    I.V. (mL/kg)       IV Piggyback 150      Total Intake(mL/kg) 1150 (17.1) 1320 (19.7) 420 (6.3)    Urine (mL/kg/hr) 250 (0.2)      Stool 0      Total Output 250      Net +900 +1320 +420           Urine Occurrence 7 x 4 x 2 x    Stool Occurrence 0 x            Physical Exam   Constitutional: She is oriented to person, place, and time. She appears well-developed  and well-nourished. No distress.   Cardiovascular: Normal rate and intact distal pulses.   Pulmonary/Chest: Effort normal. No respiratory distress.   Abdominal:   Soft, ND, appropriately TTP, swelling at the incision (edema on CT scan, no fluid)   Neurological: She is alert and oriented to person, place, and time.       Significant Labs:  CBC:   Recent Labs   Lab 12/18/18  0416   WBC 5.78   RBC 3.65*   HGB 10.2*   HCT 31.9*      MCV 87   MCH 27.9   MCHC 32.0     CMP:   Recent Labs   Lab 12/13/18  0557  12/18/18  0416   GLU 80   < > 89   CALCIUM 9.1   < > 9.0   ALBUMIN 3.0*  --   --    PROT 6.8  --   --       < > 140   K 3.4*  3.4*   < > 4.0   CO2 27   < > 30*      < > 102   BUN 3*   < > 5*   CREATININE 0.7   < > 0.8   ALKPHOS 56  --   --    ALT 8*  --   --    AST 16  --   --    BILITOT 0.4  --   --     < > = values in this interval not displayed.       Significant Diagnostics:  KUB reviewed, normal bowel gas

## 2018-12-18 NOTE — PLAN OF CARE
Problem: Adult Inpatient Plan of Care  Goal: Plan of Care Review  Outcome: Ongoing (interventions implemented as appropriate)  POC reviewed with pt, pt verbalized understanding. pt c/o abdominal pain, prn given, pain moderately relieved. no c/o nausea during shift. pt AAOx4, VSS, no acute distress noted. instructed to call for assistance if needed, call light in reach. will continue to monitor.

## 2018-12-18 NOTE — NURSING
Report received. Care assumed. Pt lying supine in bed, respirations even and unlabored. Pt denies pain or any other concerns at this time. Siderails upx2, bed in lowest position and locked, call bell in reach. WCTM.

## 2018-12-18 NOTE — ASSESSMENT & PLAN NOTE
48 y.o. female 8 Days Post-Op for Procedure(s) (LRB):  COLECTOMY, SIGMOID, LAPAROSCOPIC- (N/A)  INSERTION, STENT, URETER (Left)  COLECTOMY, SIGMOID (N/A)    CT scan with depend fluid, clinically do not think this is a leak for now.  Also no infection at the incision.  Continue current care, try golytely today.

## 2018-12-18 NOTE — PROGRESS NOTES
Notified by Respiratory that patient is refusing to wear CPAP for rest of night. States that it makes her stomach feel bloated and uncomfortable, and she feels it is not necessary. As per respiratory's assessment patient is stable on RA on this point in time. She is breathing comfortably without distress. Currently maintaining good oxygenation saturations without difficulty. Respiratory states they feel patient is safe to go without CPAP for rest of night, as patient is refusing use. Will continue to monitor. Please monitor oxygen saturations and respiratory rate for any signs of respiratory distress throughout the night.    Caryn Wong MD  General Surgery, PGYI [facility]

## 2018-12-19 LAB
ANION GAP SERPL CALC-SCNC: 9 MMOL/L
BASOPHILS # BLD AUTO: 0.04 K/UL
BASOPHILS NFR BLD: 0.8 %
BUN SERPL-MCNC: 8 MG/DL
CALCIUM SERPL-MCNC: 9.2 MG/DL
CHLORIDE SERPL-SCNC: 103 MMOL/L
CO2 SERPL-SCNC: 29 MMOL/L
CREAT SERPL-MCNC: 0.8 MG/DL
DIFFERENTIAL METHOD: ABNORMAL
EOSINOPHIL # BLD AUTO: 0.2 K/UL
EOSINOPHIL NFR BLD: 4 %
ERYTHROCYTE [DISTWIDTH] IN BLOOD BY AUTOMATED COUNT: 13.5 %
EST. GFR  (AFRICAN AMERICAN): >60 ML/MIN/1.73 M^2
EST. GFR  (NON AFRICAN AMERICAN): >60 ML/MIN/1.73 M^2
GLUCOSE SERPL-MCNC: 80 MG/DL
HCT VFR BLD AUTO: 30.2 %
HGB BLD-MCNC: 9.3 G/DL
IMM GRANULOCYTES # BLD AUTO: 0.02 K/UL
IMM GRANULOCYTES NFR BLD AUTO: 0.4 %
LYMPHOCYTES # BLD AUTO: 1.7 K/UL
LYMPHOCYTES NFR BLD: 31.6 %
MAGNESIUM SERPL-MCNC: 2 MG/DL
MCH RBC QN AUTO: 27.8 PG
MCHC RBC AUTO-ENTMCNC: 30.8 G/DL
MCV RBC AUTO: 90 FL
MONOCYTES # BLD AUTO: 0.7 K/UL
MONOCYTES NFR BLD: 13.2 %
NEUTROPHILS # BLD AUTO: 2.7 K/UL
NEUTROPHILS NFR BLD: 50 %
NRBC BLD-RTO: 0 /100 WBC
PHOSPHATE SERPL-MCNC: 5 MG/DL
PLATELET # BLD AUTO: 343 K/UL
PMV BLD AUTO: 11.2 FL
POTASSIUM SERPL-SCNC: 3.9 MMOL/L
RBC # BLD AUTO: 3.35 M/UL
SODIUM SERPL-SCNC: 141 MMOL/L
WBC # BLD AUTO: 5.29 K/UL

## 2018-12-19 PROCEDURE — 63600175 PHARM REV CODE 636 W HCPCS: Performed by: STUDENT IN AN ORGANIZED HEALTH CARE EDUCATION/TRAINING PROGRAM

## 2018-12-19 PROCEDURE — 20600001 HC STEP DOWN PRIVATE ROOM

## 2018-12-19 PROCEDURE — 84100 ASSAY OF PHOSPHORUS: CPT

## 2018-12-19 PROCEDURE — 83735 ASSAY OF MAGNESIUM: CPT

## 2018-12-19 PROCEDURE — 94761 N-INVAS EAR/PLS OXIMETRY MLT: CPT

## 2018-12-19 PROCEDURE — 94660 CPAP INITIATION&MGMT: CPT

## 2018-12-19 PROCEDURE — 27000221 HC OXYGEN, UP TO 24 HOURS

## 2018-12-19 PROCEDURE — 85025 COMPLETE CBC W/AUTO DIFF WBC: CPT

## 2018-12-19 PROCEDURE — 80048 BASIC METABOLIC PNL TOTAL CA: CPT

## 2018-12-19 PROCEDURE — 36415 COLL VENOUS BLD VENIPUNCTURE: CPT

## 2018-12-19 PROCEDURE — 99900035 HC TECH TIME PER 15 MIN (STAT)

## 2018-12-19 PROCEDURE — 25000003 PHARM REV CODE 250: Performed by: SURGERY

## 2018-12-19 PROCEDURE — 25000003 PHARM REV CODE 250: Performed by: STUDENT IN AN ORGANIZED HEALTH CARE EDUCATION/TRAINING PROGRAM

## 2018-12-19 RX ORDER — DIAZEPAM 5 MG/1
5 TABLET ORAL EVERY 6 HOURS PRN
Status: DISCONTINUED | OUTPATIENT
Start: 2018-12-19 | End: 2018-12-20 | Stop reason: HOSPADM

## 2018-12-19 RX ORDER — CYCLOBENZAPRINE HCL 5 MG
5 TABLET ORAL 3 TIMES DAILY
Status: DISCONTINUED | OUTPATIENT
Start: 2018-12-19 | End: 2018-12-20 | Stop reason: HOSPADM

## 2018-12-19 RX ADMIN — CYCLOBENZAPRINE HYDROCHLORIDE 5 MG: 5 TABLET, FILM COATED ORAL at 09:12

## 2018-12-19 RX ADMIN — ONDANSETRON HYDROCHLORIDE 4 MG: 2 INJECTION, SOLUTION INTRAMUSCULAR; INTRAVENOUS at 12:12

## 2018-12-19 RX ADMIN — OXYCODONE HYDROCHLORIDE AND ACETAMINOPHEN 1 TABLET: 5; 325 TABLET ORAL at 03:12

## 2018-12-19 RX ADMIN — OXYCODONE HYDROCHLORIDE AND ACETAMINOPHEN 1 TABLET: 5; 325 TABLET ORAL at 09:12

## 2018-12-19 RX ADMIN — DIAZEPAM 5 MG: 5 TABLET ORAL at 09:12

## 2018-12-19 RX ADMIN — ONDANSETRON HYDROCHLORIDE 4 MG: 2 INJECTION, SOLUTION INTRAMUSCULAR; INTRAVENOUS at 05:12

## 2018-12-19 RX ADMIN — ONDANSETRON HYDROCHLORIDE 4 MG: 2 INJECTION, SOLUTION INTRAMUSCULAR; INTRAVENOUS at 06:12

## 2018-12-19 RX ADMIN — CYCLOBENZAPRINE HYDROCHLORIDE 5 MG: 5 TABLET, FILM COATED ORAL at 03:12

## 2018-12-19 RX ADMIN — BUPROPION HYDROCHLORIDE 75 MG: 75 TABLET, FILM COATED ORAL at 09:12

## 2018-12-19 RX ADMIN — SIMETHICONE CHEW TAB 80 MG 80 MG: 80 TABLET ORAL at 09:12

## 2018-12-19 RX ADMIN — ENOXAPARIN SODIUM 40 MG: 100 INJECTION SUBCUTANEOUS at 05:12

## 2018-12-19 RX ADMIN — VENLAFAXINE HYDROCHLORIDE 150 MG: 75 CAPSULE, EXTENDED RELEASE ORAL at 09:12

## 2018-12-19 RX ADMIN — NEBIVOLOL HYDROCHLORIDE 10 MG: 5 TABLET ORAL at 09:12

## 2018-12-19 RX ADMIN — DIAZEPAM 5 MG: 5 TABLET ORAL at 12:12

## 2018-12-19 RX ADMIN — PANTOPRAZOLE SODIUM 40 MG: 40 TABLET, DELAYED RELEASE ORAL at 09:12

## 2018-12-19 NOTE — SUBJECTIVE & OBJECTIVE
Interval History: Had BM yesterday.  Complains of muscle spasms      Medications:  Continuous Infusions:    Scheduled Meds:   buPROPion  75 mg Oral BID    enoxaparin  40 mg Subcutaneous Daily    nebivolol  10 mg Oral Daily    ondansetron  4 mg Intravenous Q6H    pantoprazole  40 mg Oral Daily    scopolamine  1 patch Transdermal Q3 Days    venlafaxine  150 mg Oral Daily     PRN Meds:cyclobenzaprine, oxyCODONE-acetaminophen, oxyCODONE-acetaminophen, promethazine (PHENERGAN) IVPB, simethicone     Review of patient's allergies indicates:   Allergen Reactions    Ace inhibitors Other (See Comments)     dizziness      Efavirenz-emtricitabin-tenofov Other (See Comments)     dizziness      Penicillins Hives     Hives (skin)^ and causes yeast infection    Pork/porcine containing products      Pt would like pork added  Because of strong Bahai beliefs    Tramadol Nausea And Vomiting    Emtricita-rilpivirine-tenof df Other (See Comments)     Affecting patients kidneys      Labetalol     Metoprolol      Objective:     Vital Signs (Most Recent):  Temp: 99 °F (37.2 °C) (12/19/18 0759)  Pulse: 97 (12/19/18 0759)  Resp: 20 (12/19/18 0759)  BP: 137/68 (12/19/18 0759)  SpO2: 98 % (12/19/18 0759) Vital Signs (24h Range):  Temp:  [97.4 °F (36.3 °C)-99 °F (37.2 °C)] 99 °F (37.2 °C)  Pulse:  [66-97] 97  Resp:  [17-20] 20  SpO2:  [95 %-99 %] 98 %  BP: (120-160)/(58-88) 137/68     Weight: 67.1 kg (148 lb)  Body mass index is 26.22 kg/m².    Intake/Output - Last 3 Shifts       12/17 0700 - 12/18 0659 12/18 0700 - 12/19 0659 12/19 0700 - 12/20 0659    P.O. 1320 1890     IV Piggyback       Total Intake(mL/kg) 1320 (19.7) 1890 (28.2)     Urine (mL/kg/hr)       Stool       Total Output       Net +1320 +1890            Urine Occurrence 4 x 9 x 1 x    Stool Occurrence  0 x 1 x    Emesis Occurrence  0 x           Physical Exam   Constitutional: She is oriented to person, place, and time. She appears well-developed and  well-nourished. No distress.   Cardiovascular: Normal rate and intact distal pulses.   Pulmonary/Chest: Effort normal. No respiratory distress.   Abdominal:   Soft, ND, appropriately TTP, swelling at the incision (edema on CT scan, no fluid)   Neurological: She is alert and oriented to person, place, and time.       Significant Labs:  CBC:   Recent Labs   Lab 12/19/18  0512   WBC 5.29   RBC 3.35*   HGB 9.3*   HCT 30.2*      MCV 90   MCH 27.8   MCHC 30.8*     CMP:   Recent Labs   Lab 12/13/18  0557  12/19/18  0512   GLU 80   < > 80   CALCIUM 9.1   < > 9.2   ALBUMIN 3.0*  --   --    PROT 6.8  --   --       < > 141   K 3.4*  3.4*   < > 3.9   CO2 27   < > 29      < > 103   BUN 3*   < > 8   CREATININE 0.7   < > 0.8   ALKPHOS 56  --   --    ALT 8*  --   --    AST 16  --   --    BILITOT 0.4  --   --     < > = values in this interval not displayed.       Significant Diagnostics:  KUB reviewed, normal bowel gas

## 2018-12-19 NOTE — PLAN OF CARE
Problem: Adult Inpatient Plan of Care  Goal: Plan of Care Review  Outcome: Ongoing (interventions implemented as appropriate)  VS stable. C/o abdominal distension and pain once. Percocet and simethicone given. Patient stopped taking Golytely due to nausea. No vomiting last night. Encouraged ambulation. Midline staples intact, dry, QUIQUE. Patient used CPAP last night. Will continue to monitor.

## 2018-12-19 NOTE — ASSESSMENT & PLAN NOTE
48 y.o. female 9 Days Post-Op for Procedure(s) (LRB):  COLECTOMY, SIGMOID, LAPAROSCOPIC- (N/A)  INSERTION, STENT, URETER (Left)  COLECTOMY, SIGMOID (N/A)    CT scan with depend fluid, clinically do not think this is a leak for now.  Had BM  Regular diet  Start valium for muscle spasms

## 2018-12-19 NOTE — PLAN OF CARE
Problem: Adult Inpatient Plan of Care  Goal: Plan of Care Review  Outcome: Ongoing (interventions implemented as appropriate)  POC reviewed with pt, all questions and concerns addressed. VSS on RA. Tolerating regular diet with no complaints of NVD. Pt is up independently to toilet, adequate UOP and one BM. Pt complains of abd muscle spasms, scheduled flexaril and PRN valium given but pt states no relief. Percocet 5mg given once to try and help with pain from spasms. Pt resting with call light in reach. Will continue to monitor.

## 2018-12-19 NOTE — PROGRESS NOTES
[facility]  General Surgery  Progress Note    Subjective:     History of Present Illness:  No notes on file    Post-Op Info:  Procedure(s) (LRB):  COLECTOMY, SIGMOID, LAPAROSCOPIC- (N/A)  INSERTION, STENT, URETER (Left)  COLECTOMY, SIGMOID (N/A)   9 Days Post-Op     Interval History: Had BM yesterday.  Complains of muscle spasms      Medications:  Continuous Infusions:    Scheduled Meds:   buPROPion  75 mg Oral BID    enoxaparin  40 mg Subcutaneous Daily    nebivolol  10 mg Oral Daily    ondansetron  4 mg Intravenous Q6H    pantoprazole  40 mg Oral Daily    scopolamine  1 patch Transdermal Q3 Days    venlafaxine  150 mg Oral Daily     PRN Meds:cyclobenzaprine, oxyCODONE-acetaminophen, oxyCODONE-acetaminophen, promethazine (PHENERGAN) IVPB, simethicone     Review of patient's allergies indicates:   Allergen Reactions    Ace inhibitors Other (See Comments)     dizziness      Efavirenz-emtricitabin-tenofov Other (See Comments)     dizziness      Penicillins Hives     Hives (skin)^ and causes yeast infection    Pork/porcine containing products      Pt would like pork added  Because of strong Judaism beliefs    Tramadol Nausea And Vomiting    Emtricita-rilpivirine-tenof df Other (See Comments)     Affecting patients kidneys      Labetalol     Metoprolol      Objective:     Vital Signs (Most Recent):  Temp: 99 °F (37.2 °C) (12/19/18 0759)  Pulse: 97 (12/19/18 0759)  Resp: 20 (12/19/18 0759)  BP: 137/68 (12/19/18 0759)  SpO2: 98 % (12/19/18 0759) Vital Signs (24h Range):  Temp:  [97.4 °F (36.3 °C)-99 °F (37.2 °C)] 99 °F (37.2 °C)  Pulse:  [66-97] 97  Resp:  [17-20] 20  SpO2:  [95 %-99 %] 98 %  BP: (120-160)/(58-88) 137/68     Weight: 67.1 kg (148 lb)  Body mass index is 26.22 kg/m².    Intake/Output - Last 3 Shifts       12/17 0700 - 12/18 0659 12/18 0700 - 12/19 0659 12/19 0700 - 12/20 0659    P.O. 1320 1890     IV Piggyback       Total Intake(mL/kg) 1320 (19.7) 1890 (28.2)     Urine  (mL/kg/hr)       Stool       Total Output       Net +1320 +1890            Urine Occurrence 4 x 9 x 1 x    Stool Occurrence  0 x 1 x    Emesis Occurrence  0 x           Physical Exam   Constitutional: She is oriented to person, place, and time. She appears well-developed and well-nourished. No distress.   Cardiovascular: Normal rate and intact distal pulses.   Pulmonary/Chest: Effort normal. No respiratory distress.   Abdominal:   Soft, ND, appropriately TTP, swelling at the incision (edema on CT scan, no fluid)   Neurological: She is alert and oriented to person, place, and time.       Significant Labs:  CBC:   Recent Labs   Lab 12/19/18  0512   WBC 5.29   RBC 3.35*   HGB 9.3*   HCT 30.2*      MCV 90   MCH 27.8   MCHC 30.8*     CMP:   Recent Labs   Lab 12/13/18  0557  12/19/18  0512   GLU 80   < > 80   CALCIUM 9.1   < > 9.2   ALBUMIN 3.0*  --   --    PROT 6.8  --   --       < > 141   K 3.4*  3.4*   < > 3.9   CO2 27   < > 29      < > 103   BUN 3*   < > 8   CREATININE 0.7   < > 0.8   ALKPHOS 56  --   --    ALT 8*  --   --    AST 16  --   --    BILITOT 0.4  --   --     < > = values in this interval not displayed.       Significant Diagnostics:  KUB reviewed, normal bowel gas    Assessment/Plan:     * Diverticulitis    48 y.o. female 9 Days Post-Op for Procedure(s) (LRB):  COLECTOMY, SIGMOID, LAPAROSCOPIC- (N/A)  INSERTION, STENT, URETER (Left)  COLECTOMY, SIGMOID (N/A)    CT scan with depend fluid, clinically do not think this is a leak for now.  Had BM  Regular diet  Start valium for muscle spasms         Gage Davies MD  General Surgery  [facility]

## 2018-12-20 VITALS
DIASTOLIC BLOOD PRESSURE: 71 MMHG | RESPIRATION RATE: 20 BRPM | BODY MASS INDEX: 26.22 KG/M2 | HEART RATE: 84 BPM | SYSTOLIC BLOOD PRESSURE: 136 MMHG | HEIGHT: 63 IN | OXYGEN SATURATION: 100 % | WEIGHT: 148 LBS | TEMPERATURE: 99 F

## 2018-12-20 LAB
ANION GAP SERPL CALC-SCNC: 11 MMOL/L
BASOPHILS # BLD AUTO: 0.03 K/UL
BASOPHILS NFR BLD: 0.5 %
BUN SERPL-MCNC: 10 MG/DL
CALCIUM SERPL-MCNC: 9.3 MG/DL
CHLORIDE SERPL-SCNC: 103 MMOL/L
CO2 SERPL-SCNC: 27 MMOL/L
CREAT SERPL-MCNC: 0.9 MG/DL
DIFFERENTIAL METHOD: ABNORMAL
EOSINOPHIL # BLD AUTO: 0.2 K/UL
EOSINOPHIL NFR BLD: 3.3 %
ERYTHROCYTE [DISTWIDTH] IN BLOOD BY AUTOMATED COUNT: 13.5 %
EST. GFR  (AFRICAN AMERICAN): >60 ML/MIN/1.73 M^2
EST. GFR  (NON AFRICAN AMERICAN): >60 ML/MIN/1.73 M^2
GLUCOSE SERPL-MCNC: 110 MG/DL
HCT VFR BLD AUTO: 29.8 %
HGB BLD-MCNC: 9.6 G/DL
IMM GRANULOCYTES # BLD AUTO: 0.03 K/UL
IMM GRANULOCYTES NFR BLD AUTO: 0.5 %
LYMPHOCYTES # BLD AUTO: 1.6 K/UL
LYMPHOCYTES NFR BLD: 30 %
MAGNESIUM SERPL-MCNC: 1.6 MG/DL
MCH RBC QN AUTO: 28 PG
MCHC RBC AUTO-ENTMCNC: 32.2 G/DL
MCV RBC AUTO: 87 FL
MONOCYTES # BLD AUTO: 0.7 K/UL
MONOCYTES NFR BLD: 12.4 %
NEUTROPHILS # BLD AUTO: 2.9 K/UL
NEUTROPHILS NFR BLD: 53.3 %
NRBC BLD-RTO: 0 /100 WBC
PHOSPHATE SERPL-MCNC: 4.7 MG/DL
PLATELET # BLD AUTO: 371 K/UL
PMV BLD AUTO: 11.4 FL
POTASSIUM SERPL-SCNC: 3.8 MMOL/L
RBC # BLD AUTO: 3.43 M/UL
SODIUM SERPL-SCNC: 141 MMOL/L
WBC # BLD AUTO: 5.47 K/UL

## 2018-12-20 PROCEDURE — 80048 BASIC METABOLIC PNL TOTAL CA: CPT

## 2018-12-20 PROCEDURE — 84100 ASSAY OF PHOSPHORUS: CPT

## 2018-12-20 PROCEDURE — 63600175 PHARM REV CODE 636 W HCPCS: Performed by: STUDENT IN AN ORGANIZED HEALTH CARE EDUCATION/TRAINING PROGRAM

## 2018-12-20 PROCEDURE — 85025 COMPLETE CBC W/AUTO DIFF WBC: CPT

## 2018-12-20 PROCEDURE — 25000003 PHARM REV CODE 250: Performed by: SURGERY

## 2018-12-20 PROCEDURE — 36415 COLL VENOUS BLD VENIPUNCTURE: CPT

## 2018-12-20 PROCEDURE — 99900035 HC TECH TIME PER 15 MIN (STAT)

## 2018-12-20 PROCEDURE — 83735 ASSAY OF MAGNESIUM: CPT

## 2018-12-20 PROCEDURE — 94761 N-INVAS EAR/PLS OXIMETRY MLT: CPT

## 2018-12-20 RX ORDER — POLYETHYLENE GLYCOL 3350 17 G/17G
17 POWDER, FOR SOLUTION ORAL DAILY
Qty: 255 G | Refills: 0 | Status: SHIPPED | OUTPATIENT
Start: 2018-12-20 | End: 2020-05-01

## 2018-12-20 RX ORDER — ONDANSETRON 4 MG/1
4 TABLET, ORALLY DISINTEGRATING ORAL EVERY 8 HOURS PRN
Qty: 20 TABLET | Refills: 0 | Status: SHIPPED | OUTPATIENT
Start: 2018-12-20 | End: 2020-05-01

## 2018-12-20 RX ORDER — CYCLOBENZAPRINE HCL 5 MG
5 TABLET ORAL 3 TIMES DAILY
Qty: 30 TABLET | Refills: 0 | Status: SHIPPED | OUTPATIENT
Start: 2018-12-20 | End: 2018-12-30

## 2018-12-20 RX ORDER — PROMETHAZINE HYDROCHLORIDE 25 MG/1
25 SUPPOSITORY RECTAL EVERY 6 HOURS PRN
Qty: 20 SUPPOSITORY | Refills: 0 | Status: SHIPPED | OUTPATIENT
Start: 2018-12-20 | End: 2020-05-01

## 2018-12-20 RX ADMIN — PANTOPRAZOLE SODIUM 40 MG: 40 TABLET, DELAYED RELEASE ORAL at 08:12

## 2018-12-20 RX ADMIN — NEBIVOLOL HYDROCHLORIDE 10 MG: 5 TABLET ORAL at 08:12

## 2018-12-20 RX ADMIN — VENLAFAXINE HYDROCHLORIDE 150 MG: 75 CAPSULE, EXTENDED RELEASE ORAL at 08:12

## 2018-12-20 RX ADMIN — BUPROPION HYDROCHLORIDE 75 MG: 75 TABLET, FILM COATED ORAL at 08:12

## 2018-12-20 RX ADMIN — ONDANSETRON HYDROCHLORIDE 4 MG: 2 INJECTION, SOLUTION INTRAMUSCULAR; INTRAVENOUS at 12:12

## 2018-12-20 RX ADMIN — ONDANSETRON HYDROCHLORIDE 4 MG: 2 INJECTION, SOLUTION INTRAMUSCULAR; INTRAVENOUS at 06:12

## 2018-12-20 RX ADMIN — CYCLOBENZAPRINE HYDROCHLORIDE 5 MG: 5 TABLET, FILM COATED ORAL at 08:12

## 2018-12-20 NOTE — PLAN OF CARE
12/20/18 1255   Final Note   Assessment Type Final Discharge Note   Anticipated Discharge Disposition Home   What phone number can be called within the next 1-3 days to see how you are doing after discharge? ([phone])   Hospital Follow Up  Appt(s) scheduled? Yes   Discharge plans and expectations educations in teach back method with documentation complete? Yes   Right Care Referral Info   Post Acute Recommendation (Incomplete)

## 2018-12-20 NOTE — PLAN OF CARE
12/20/18 1254   Final Note   Assessment Type Final Discharge Note   Anticipated Discharge Disposition Home   What phone number can be called within the next 1-3 days to see how you are doing after discharge? ([phone])   Hospital Follow Up  Appt(s) scheduled? Yes   Discharge plans and expectations educations in teach back method with documentation complete? Yes   Right Care Referral Info   Post Acute Recommendation No Care

## 2018-12-20 NOTE — PLAN OF CARE
Problem: Adult Inpatient Plan of Care  Goal: Plan of Care Review  Outcome: Ongoing (interventions implemented as appropriate)  VS stable. CPAP wore at nightime. Abdominal staples QUIQUE, dry, intact. Pt. Reports small BM x 1 last night. Abdomen is soft with normoactive bowel sounds, no N/V. No other complaints at this moment. Will continue to monitor.

## 2018-12-20 NOTE — NURSING
Discharge POC reviewed with pt, all questions and concerns addressed. Pt verbalized understanding of all instructions and follow up appointments. Refused flu shot. VSS at this time. Pt is waiting in her room for her ride.

## 2018-12-20 NOTE — NURSING
Spoke with Dr. Samson MD, about pt request to drive herself home from hospital. MD stated that he would prefer her not drive. Will speak with pt about possibility of someone coming to get her.

## 2018-12-25 NOTE — DISCHARGE SUMMARY
[facility]  General Surgery  Discharge Summary      Patient Name: Gill Cleary  MRN: [id number]  Admission Date: 12/10/2018  Hospital Length of Stay: 10 days  Discharge Date and Time:  12/20/18  Attending Physician: Corona  Discharging Provider: Josefina Horton MD  Primary Care Provider: Linette Odom MD (Inactive)     HPI: Gill Cleary is a 48 y.o.  female with Hx of HIV (viral loads currently undetectable), HTN, HLD, YANICK on home CPAP, and GERD who presents to clinic for evaluation of recurrent diverticulitis.  She has had 4-5 episodes of diverticulitis over the last 5 years, all requiring hospitalization.  Most recent episode was the end of October.  She was treated with IV antibiotics.  None of these episodes have been perforated diverticulitis associated with abscess that required drainage.  She did have an IR drainage of a cyst near her sigmoid colon during her last admission but there was no drain left and this cyst has recurred on her most recent CT scan.  She states that she currently feels good with just some suprapubic soreness.  She occasionally has N/V with dairy and eating too much.  She presents today to surgery clinic for second opinion regarding colectomy.  Has been off IV antibiotics for over a month        Procedure(s) (LRB):  COLECTOMY, SIGMOID, LAPAROSCOPIC- (N/A)  INSERTION, STENT, URETER (Left)  COLECTOMY, SIGMOID (N/A)     Hospital Course: Had an expected post op ileus. Had some swelling at the incision but CT negative for fluid. Treated expectantly and improved. She had some dependent fluid but no obvious leak on CT. Her ileus resolved. Pt having bm, tolerating regular diet, pain controlled, ambulating, spontaneously voiding, afebrile, stable vital signs prior to discharge.      Consults:   Consults (From admission, onward)        Status Ordering Provider     Inpatient consult to Midline team  Once     Provider:  (Not yet assigned)    Completed CORONA  MICHAEL WHITLEY          Significant Diagnostic Studies:     Pending Diagnostic Studies:     None        Final Active Diagnoses:    Diagnosis Date Noted POA    PRINCIPAL PROBLEM:  Diverticulitis [K57.92] 12/10/2018 Yes    HIV disease [B20] 12/18/2018 Yes    Chronic nausea [R11.0] 12/14/2018 Yes      Problems Resolved During this Admission:      Discharged Condition: good    Disposition: Home or Self Care    Follow Up:  Follow-up Information     Michael Nelson MD. Schedule an appointment as soon as possible for a visit in 2 weeks.    Specialties:  General Surgery, Surgery  Why:  For wound re-check appt scheduled for 1/10/19 1030a  Contact information:  [address]  [facility] [address]  [phone]                 Patient Instructions:      Diet Adult Regular     Lifting restrictions   Order Comments: No lifting greater than 10 pounds for 6 weeks from day of surgery.  No pushing/pulling such as vacuuming or raking.  No straining, avoid constipation and take stool softeners as described and laxatives as needed.  No driving while on narcotics and until you can react quickly without pain.     Call MD for:  temperature >100.4     Call MD for:  persistent nausea and vomiting or diarrhea     Call MD for:  severe uncontrolled pain     Call MD for:  redness, tenderness, or signs of infection (pain, swelling, redness, odor or green/yellow discharge around incision site)     Call MD for:  difficulty breathing or increased cough     Call MD for:  severe persistent headache     Call MD for:  worsening rash     Call MD for:  persistent dizziness, light-headedness, or visual disturbances     Call MD for:  increased confusion or weakness     No dressing needed     Activity as tolerated     Shower on day dressing removed (No bath)     Medications:  Reconciled Home Medications:      Medication List      START taking these medications    cyclobenzaprine 5 MG tablet  Commonly known as:  FLEXERIL  Take 1 tablet (5 mg total) by  mouth 3 (three) times daily for 10 days     ondansetron 4 MG Tbdl  Commonly known as:  ZOFRAN-ODT  Dissolve 1 tablet (4 mg total) by mouth every 8 (eight) hours as needed.     polyethylene glycol 17 gram/dose powder  Commonly known as:  GLYCOLAX  Mix 1 capful (17 grams total) with a liquid and take by mouth once daily.     PROMETHEGAN 25 MG suppository  Generic drug:  promethazine  Place 1 suppository (25 mg total) rectally every 6 (six) hours as needed for Nausea.        CONTINUE taking these medications    alendronate 10 MG Tab  Commonly known as:  FOSAMAX  Take 70 mg by mouth every 7 days.     aspirin 81 MG EC tablet  Commonly known as:  ECOTRIN  Take 81 mg by mouth once daily.     atorvastatin 20 MG tablet  Commonly known as:  LIPITOR  Take 20 mg by mouth once daily.     BIKTARVY ORAL  Take by mouth every evening.     buPROPion 75 MG tablet  Commonly known as:  WELLBUTRIN  Take 75 mg by mouth 2 (two) times daily.     cetirizine 5 MG tablet  Commonly known as:  ZYRTEC  Take 5 mg by mouth once daily.     dicyclomine 20 mg tablet  Commonly known as:  BENTYL  Take 20 mg by mouth every 6 (six) hours.     lansoprazole 30 MG capsule  Commonly known as:  PREVACID  Take 30 mg by mouth once daily.     lubiprostone 24 MCG Cap  Commonly known as:  AMITIZA  Take 24 mcg by mouth 2 (two) times daily.     nebivolol 10 MG Tab  Commonly known as:  BYSTOLIC  Take 10 mg by mouth 2 (two) times daily.     ondansetron 8 MG tablet  Commonly known as:  ZOFRAN  Take 8 mg by mouth every 8 (eight) hours.     oxyCODONE-acetaminophen 7.5-300 mg per tablet  Commonly known as:  LYNOX  Take 1 tablet by mouth every 4 (four) hours as needed for Pain.     tiZANidine 4 MG tablet  Commonly known as:  ZANAFLEX  Take 4 mg by mouth every 6 (six) hours as needed.     venlafaxine 75 MG 24 hr capsule  Commonly known as:  EFFEXOR-XR  Take 150 mg by mouth once daily.     VITAMIN D2 50,000 unit Cap  Generic drug:  ergocalciferol  Take 50,000 Units by mouth  every 7 days.        STOP taking these medications    docusate sodium 100 MG capsule  Commonly known as:  COLACE     metroNIDAZOLE 500 MG tablet  Commonly known as:  FLAGYL     neomycin 500 mg Tab  Commonly known as:  MYCIFRADIN     polyethylene glycol 236-22.74-6.74 -5.86 gram suspension  Commonly known as:  Glenny Garcia MD  General Surgery  [facility]

## 2018-12-27 ENCOUNTER — OFFICE VISIT (OUTPATIENT)
Dept: SURGERY | Facility: CLINIC | Age: 48
End: 2018-12-27
Payer: MEDICARE

## 2018-12-27 VITALS
TEMPERATURE: 98 F | WEIGHT: 141.88 LBS | BODY MASS INDEX: 25.14 KG/M2 | SYSTOLIC BLOOD PRESSURE: 132 MMHG | HEART RATE: 86 BPM | DIASTOLIC BLOOD PRESSURE: 67 MMHG | HEIGHT: 63 IN

## 2018-12-27 DIAGNOSIS — K57.92 DIVERTICULITIS: Primary | ICD-10-CM

## 2018-12-27 PROCEDURE — 99999 PR PBB SHADOW E&M-EST. PATIENT-LVL III: CPT | Mod: PBBFAC,,, | Performed by: SURGERY

## 2018-12-27 PROCEDURE — 99024 POSTOP FOLLOW-UP VISIT: CPT | Mod: POP,,, | Performed by: SURGERY

## 2018-12-27 PROCEDURE — 99213 OFFICE O/P EST LOW 20 MIN: CPT | Mod: PBBFAC | Performed by: SURGERY

## 2018-12-27 NOTE — PROGRESS NOTES
"GENERAL SURGERY CLINIC  Progress Note    CC:  History of Diverticulitis, s/p Laparoscopic Sigmoid Colectomy.    HPI:  Gill Cleary is a 48 y.o.  female with Hx of HIV (viral loads currently undetectable), HTN, HLD, YANICK on home CPAP, GERD is 2 weeks post op from laparoscopic sigmoid colectomy for recurrent diverticulitis on 12/10/18. She had an ileus post op, but was managed expectantly and was discharged on 12/20.     Since discharge she feels "like a new person." Having regular bowel movements, no constipation or diarrhea, no nausea or vomiting. No fevers or chills. No leakage or redness surrounding laparoscopic or midline incisions. No longer feels fatigued.       ROS: A 10-point review of systems is negative except for the above mentioned in the HPI.     PMHx:  HIV, HTN, HLD, YANICK, GERD    PSH:  Laparoscopic hysterectomy (5/2018)    SH:  Denies alcohol, tobacco, drug use    Review of patient's allergies indicates:   Allergen Reactions    Ace inhibitors Other (See Comments)     dizziness      Efavirenz-emtricitabin-tenofov Other (See Comments)     dizziness      Tramadol Nausea And Vomiting    Emtricita-rilpivirine-tenof df Other (See Comments)     Affecting patients kidneys      Labetalol     Metoprolol     Penicillins      Hives (skin)^       Current Outpatient Medications:     alendronate (FOSAMAX) 10 MG Tab, Take 70 mg by mouth every 7 days. , Disp: , Rfl:     aspirin (ECOTRIN) 81 MG EC tablet, Take 81 mg by mouth once daily., Disp: , Rfl:     atorvastatin (LIPITOR) 20 MG tablet, Take 20 mg by mouth once daily., Disp: , Rfl:     bictegrav/emtricit/tenofov ala (BIKTARVY ORAL), Take by mouth every evening. , Disp: , Rfl:     buPROPion (WELLBUTRIN) 75 MG tablet, Take 75 mg by mouth 2 (two) times daily., Disp: , Rfl:     cetirizine (ZYRTEC) 5 MG tablet, Take 5 mg by mouth once daily., Disp: , Rfl:     cyclobenzaprine (FLEXERIL) 5 MG tablet, Take 1 tablet (5 mg total) by mouth 3 (three) " times daily for 10 days, Disp: 30 tablet, Rfl: 0    dicyclomine (BENTYL) 20 mg tablet, Take 20 mg by mouth every 6 (six) hours., Disp: , Rfl:     ergocalciferol (VITAMIN D2) 50,000 unit Cap, Take 50,000 Units by mouth every 7 days., Disp: , Rfl:     lansoprazole (PREVACID) 30 MG capsule, Take 30 mg by mouth once daily., Disp: , Rfl:     lubiprostone (AMITIZA) 24 MCG Cap, Take 24 mcg by mouth 2 (two) times daily., Disp: , Rfl:     nebivolol (BYSTOLIC) 10 MG Tab, Take 10 mg by mouth 2 (two) times daily. , Disp: , Rfl:     ondansetron (ZOFRAN) 8 MG tablet, Take 8 mg by mouth every 8 (eight) hours., Disp: , Rfl:     ondansetron (ZOFRAN-ODT) 4 MG TbDL, Dissolve 1 tablet (4 mg total) by mouth every 8 (eight) hours as needed., Disp: 20 tablet, Rfl: 0    oxyCODONE-acetaminophen (LYNOX) 7.5-300 mg per tablet, Take 1 tablet by mouth every 4 (four) hours as needed for Pain., Disp: , Rfl:     polyethylene glycol (GLYCOLAX) 17 gram/dose powder, Mix 1 capful (17 grams total) with a liquid and take by mouth once daily., Disp: 255 g, Rfl: 0    promethazine (PHENERGAN) 25 MG suppository, Place 1 suppository (25 mg total) rectally every 6 (six) hours as needed for Nausea., Disp: 20 suppository, Rfl: 0    tiZANidine (ZANAFLEX) 4 MG tablet, Take 4 mg by mouth every 6 (six) hours as needed., Disp: , Rfl:     venlafaxine (EFFEXOR-XR) 75 MG 24 hr capsule, Take 150 mg by mouth once daily., Disp: , Rfl:       PHYSICAL EXAM:  Vitals Reviewed     General: NAD  Neuro: AAOx3  Cardio: RRR  Resp: Breathing even and unlabored  Abd: Soft, ND, minimal suprapubic tenderness, lap hysterectomy scars well healed   Ext: Warm and well perfused      PERTINENT LABS:  Reviewed - WNL      PERTINENT IMAGING:  Reviewed outside imaging reports  - Multiple episodes of diverticulitis in the past  - Cystic mass adjacent to colon ~2cm    Endoscopy report 10/26/18  - Multiple diverticuli in sigmoid and descending colon  - No mass  seen      ASSESSMENT/PLAN:  Gill Cleary is a 48 y.o. female s/p lap sigmoid colectomy for recurrent diverticulitis.     - Healing very well  - Follow up PRN    Nikki Galloway MD  General Surgery Resident, PGY 1  Pager [phone]     I have personally performed a detailed history and physical examination on this patient. My findings are summarized in the resident's note included in the record.

## 2019-01-09 ENCOUNTER — TELEPHONE (OUTPATIENT)
Dept: SURGERY | Facility: CLINIC | Age: 49
End: 2019-01-09

## 2019-01-09 NOTE — TELEPHONE ENCOUNTER
Phoned patient and discussed her recovery.  She stated that she is doing great and her incision is completely healed and she is eating and have bowel movements without issues.  Encouraged he rot call us if she had any further issue.  She verbalized understanding and appointment canceled as requested.

## 2019-01-09 NOTE — TELEPHONE ENCOUNTER
----- Message from Reyes Nelson MD sent at 1/9/2019 11:27 AM CST -----  Contact: pt   Yes  If she's doing well no need to come in  ----- Message -----  From: Vivian Harper RN  Sent: 1/9/2019   9:31 AM  To: Vivian Harper, RN, Reyes Nelson MD    Hi Dr Nelson,  Do you think that this patient can skip her 2 wk f/u appt?  Please advise.  Rama Mart    ----- Message -----  From: Sariah Little  Sent: 1/9/2019   9:14 AM  To: Omar HUNTER Staff    Needs Advice    Reason for call: pt is calling to speak with the nurse pt has an appt tomorrow morning and pt is asking if she needs to come pt was told by Dr Nelson told the pt she doesn't need to come if she doesn't have any problems pt said she isn't having any issues         Communication Preference: can you please call pt at [phone]    Additional Information: none    MAHI

## 2019-01-10 ENCOUNTER — TELEPHONE (OUTPATIENT)
Dept: SURGERY | Facility: CLINIC | Age: 49
End: 2019-01-10

## 2019-01-10 NOTE — TELEPHONE ENCOUNTER
Returned call to patient to discuss her concerns.  She is wondering if she can take the Amitiza every other day instead of twice a day due to it giving her diarrhea which she gets even when she takes it once a day.  Will confer with Dr Nelson and get back in touch with her.  She verbalized understanding.  Spoke with Dr Neslon and he said for her to contact the prescribing physician, Dr Fung and discuss the correct dosing with him.  Phoned patient back and informed her of this and she verbalized understanding.

## 2019-01-10 NOTE — TELEPHONE ENCOUNTER
----- Message from Juan Carlos Dirk sent at 1/10/2019  8:42 AM CST -----  Needs Advice    Reason for call:        Communication Preference:[phone]    Additional Information:Pt states Raciel gives her the runs but when she stops taking it she gets constipated.

## 2019-02-25 ENCOUNTER — TELEPHONE (OUTPATIENT)
Dept: SURGERY | Facility: CLINIC | Age: 49
End: 2019-02-25

## 2019-02-25 NOTE — TELEPHONE ENCOUNTER
Returned call and discussed her symptoms and seeing Dr Nelson on Thursday, February 28 at 1245 PM.  She stated that she would be able to make that and will see us then.

## 2019-02-25 NOTE — TELEPHONE ENCOUNTER
----- Message from Sariah Little sent at 2/25/2019  9:33 AM CST -----  Contact: pt   Needs Advice    Reason for call: pt is calling to speak with the nurse pt is experiencing some mild  pain from when she had surgery on Dec 10 pt said she doesn't need to be seen today just she would like to speak with the nurse to see what she needs to do         Communication Preference: can you please call pt at [phone]    Additional Information: none    MAHI

## 2019-04-04 DIAGNOSIS — M54.50 LOW BACK PAIN: Primary | ICD-10-CM

## 2019-04-04 DIAGNOSIS — M25.559 HIP PAIN: ICD-10-CM

## 2019-06-26 DIAGNOSIS — M54.50 LUMBAGO: Primary | ICD-10-CM

## 2019-06-26 DIAGNOSIS — M25.551 RIGHT HIP PAIN: ICD-10-CM

## 2019-11-15 ENCOUNTER — TELEPHONE (OUTPATIENT)
Dept: NEUROSURGERY | Facility: CLINIC | Age: 49
End: 2019-11-15

## 2020-05-01 ENCOUNTER — OFFICE VISIT (OUTPATIENT)
Dept: NEUROLOGY | Facility: CLINIC | Age: 50
End: 2020-05-01
Payer: MEDICARE

## 2020-05-01 DIAGNOSIS — G89.29 CHRONIC NONINTRACTABLE HEADACHE, UNSPECIFIED HEADACHE TYPE: ICD-10-CM

## 2020-05-01 DIAGNOSIS — R51.9 CHRONIC NONINTRACTABLE HEADACHE, UNSPECIFIED HEADACHE TYPE: ICD-10-CM

## 2020-05-01 PROBLEM — K57.90 DIVERTICULAR DISEASE: Status: ACTIVE | Noted: 2018-11-06

## 2020-05-01 PROCEDURE — 99204 OFFICE O/P NEW MOD 45 MIN: CPT | Mod: 95,,, | Performed by: PSYCHIATRY & NEUROLOGY

## 2020-05-01 PROCEDURE — 99204 PR OFFICE/OUTPT VISIT, NEW, LEVL IV, 45-59 MIN: ICD-10-PCS | Mod: 95,,, | Performed by: PSYCHIATRY & NEUROLOGY

## 2020-05-01 RX ORDER — TOPIRAMATE 100 MG/1
150 TABLET, FILM COATED ORAL NIGHTLY
COMMUNITY
Start: 2020-04-27 | End: 2020-05-07 | Stop reason: SDUPTHER

## 2020-05-01 RX ORDER — HYDROXYZINE HYDROCHLORIDE 25 MG/1
50 TABLET, FILM COATED ORAL
COMMUNITY
End: 2020-12-11

## 2020-05-01 RX ORDER — DILTIAZEM HYDROCHLORIDE 120 MG/1
240 TABLET, FILM COATED ORAL DAILY
COMMUNITY
Start: 2019-10-04 | End: 2022-06-08

## 2020-05-01 RX ORDER — ATOMOXETINE 100 MG/1
CAPSULE ORAL
COMMUNITY
End: 2020-12-11

## 2020-05-01 NOTE — PROGRESS NOTES
[facility] Department of Neurology  Virtual Visit      [facility] - NEUROLOGY EPILEPSY  [facility] [place]    Date: 5/1/20  Patient Name: Gill Cleary   MRN: [id number]   PCP: Linette Odom (Inactive)  Referring Provider: Self, Aaarefchelsea    The patient location is: Home  The chief complaint leading to consultation is: Headache  Visit type: Virtual visit with synchronous audio and video  Total time spent with patient: 17 minutes  Each patient to whom he or she provides medical services by telemedicine is:  (1) informed of the relationship between the physician and patient and the respective role of any other health care provider with respect to management of the patient; and (2) notified that he or she may decline to receive medical services by telemedicine and may withdraw from such care at any time.    Notes:   Assessment:   Gill Cleary is a 49 y.o. female presenting for management of headache provoked by focusing on tasks. Reviewed preventative options for headache including conservative measures. Will slightly increase patient's home topamax to 150 mg nightly. Counseled patient on appropriate use of OTC analgesics. Reviewed lifestyle modifications and strategies such as CBT. Patient states she is in ongoing therapy.   Plan:     Problem List Items Addressed This Visit        Neuro    Chronic nonintractable headache    Current Assessment & Plan     -- OTC analgesics PRN  -- increasing topamax to 150 mg nightly             Jevon Murray MD  [facility]   Department of Neurology    Patient note was created using MModal Dictation.  Any errors in syntax or even information may not have been identified and edited on initial review prior to signing this note.  Subjective:   HPI:   Ms. Gill Cleary is a 49 y.o. female presenting for evaluation of headache. The patient reports a history of headaches for decades. She states that her headaches occur only when she is  trying to focus on a task such as working outside the home or engaging in conversations. She states the headaches are a throbbing sensation over the top of her head and are not associated with aura, focal neurologic deficits or migrainous features. They last approximately an hour. She is uncertain as to their frequency and states it is variable. She does not take any medication for the headache which she states resolves on its own. She does use chronic narcotics for management of idiopathic bone pain and is on topamax for idiopathic facial pain (inconsistent with trigeminal neuralgia).       PAST MEDICAL HISTORY:  Past Medical History:   Diagnosis Date    Diverticulitis     HIV (human immunodeficiency virus infection)     Hyperlipemia     Hypertension     Osteoporosis     Sleep apnea     Vertigo        PAST SURGICAL HISTORY:  Past Surgical History:   Procedure Laterality Date    HYSTERECTOMY      LAPAROSCOPIC SIGMOIDECTOMY N/A 12/10/2018    Procedure: COLECTOMY, SIGMOID, LAPAROSCOPIC-;  Surgeon: Reyes Nelson MD;  Location: [facility] OR [address] [place] [address];  Service: General;  Laterality: N/A;    URETERAL STENT PLACEMENT Left 12/10/2018    Procedure: INSERTION, STENT, URETER;  Surgeon: Reyes Nelson MD;  Location: [facility] OR [address] [place] [address];  Service: General;  Laterality: Left;       CURRENT MEDS:  Current Outpatient Medications   Medication Sig Dispense Refill    diltiaZEM (CARDIZEM) 120 MG tablet diltiazem 120 mg tablet      tenofovir alafenamide (VEMLIDY) 25 mg Tab Vemlidy 25 mg tablet      alendronate (FOSAMAX) 10 MG Tab Take 70 mg by mouth every 7 days.       aspirin (ECOTRIN) 81 MG EC tablet Take 81 mg by mouth once daily.      atomoxetine (STRATTERA) 100 mg capsule atomoxetine 100 mg capsule      atorvastatin (LIPITOR) 20 MG tablet Take 20 mg by mouth once daily.      bictegrav/emtricit/tenofov ala (BIKTARVY ORAL) Take by mouth every evening.       cetirizine (ZYRTEC) 5 MG tablet Take 5 mg by mouth once  daily.      ergocalciferol (VITAMIN D2) 50,000 unit Cap Take 50,000 Units by mouth every 7 days.      hydroxyzine HCL (ATARAX) 25 MG tablet Take 50 mg by mouth.      lansoprazole (PREVACID) 30 MG capsule Take 30 mg by mouth once daily.      oxyCODONE 7.5 mg TbOr Take 7.5 mg by mouth.      oxyCODONE-acetaminophen (LYNOX) 7.5-300 mg per tablet Take 1 tablet by mouth every 4 (four) hours as needed for Pain.      raltegravir (ISENTRESS) 400 mg tablet Take 400 mg by mouth.      ranitidine (ZANTAC) 150 MG capsule Take 150 mg by mouth.      topiramate (TOPAMAX) 100 MG tablet Take 150 mg by mouth every evening.      venlafaxine (EFFEXOR-XR) 75 MG 24 hr capsule Take 150 mg by mouth once daily.       No current facility-administered medications for this visit.        ALLERGIES:  Review of patient's allergies indicates:   Allergen Reactions    Ace inhibitors Other (See Comments)     dizziness      Efavirenz-emtricitabin-tenofov Other (See Comments)     dizziness      Penicillins Hives     Hives (skin)^ and causes yeast infection    Pork/porcine containing products      Pt would like pork added  Because of strong Holiness beliefs    Tramadol Nausea And Vomiting    Emtricita-rilpivirine-tenof df Other (See Comments)     Affecting patients kidneys      Labetalol     Metoprolol        FAMILY HISTORY:  No family history on file.    SOCIAL HISTORY:  Social History     Tobacco Use    Smoking status: Never Smoker    Smokeless tobacco: Never Used   Substance Use Topics    Alcohol use: No     Frequency: Never    Drug use: Not on file       Review of Systems:  12 system review of systems is negative except for the symptoms mentioned in HPI.      Objective:   General: NAD, well nourished   Eyes: no tearing, discharge, no erythema   ENT: moist mucous membranes of the oral cavity, nares patent    Neck: Supple, full range of motion  Cardiovascular: Appears well perfused  Lungs: Normal work of breathing, normal chest  wall excursions  Skin: No rash, lesions, or breakdown on exposed skin  Psychiatry: Mood and affect are appropriate   Abdomen: nondistended, non tender  Extremeties: No cyanosis, clubbing or edema visible    Neurological   MENTAL STATUS: Alert and oriented to person, place, and time. Attention and concentration within normal limits. Speech without dysarthria, able to name and repeat without difficulty. Recent and remote memory within normal limits   CRANIAL NERVES: Visual fields intact. PERRL. EOMI. Facial sensation intact. Face symmetrical. Hearing grossly intact. Full shoulder shrug bilaterally. Tongue protrudes midline   SENSORY: Sensation is intact to light touch throughout.    MOTOR: Normal bulk. 5/5 deltoid, biceps, triceps, interosseous, hand  bilaterally. 5/5 iliopsoas, knee extension/flexion, foot dorsi/plantarflexion bilaterally.    REFLEXES: Deferred due to virtual visit  CEREBELLAR/COORDINATION/GAIT: Gait steady with normal arm swing and stride length. Finger to nose intact. Normal rapid alternating movements.

## 2020-05-07 ENCOUNTER — PATIENT MESSAGE (OUTPATIENT)
Dept: NEUROLOGY | Facility: CLINIC | Age: 50
End: 2020-05-07

## 2020-05-07 RX ORDER — TOPIRAMATE 100 MG/1
150 TABLET, FILM COATED ORAL NIGHTLY
Qty: 135 TABLET | Refills: 3 | Status: SHIPPED | OUTPATIENT
Start: 2020-05-07 | End: 2020-12-11

## 2020-06-11 ENCOUNTER — TELEPHONE (OUTPATIENT)
Dept: OPHTHALMOLOGY | Facility: CLINIC | Age: 50
End: 2020-06-11

## 2020-06-11 NOTE — TELEPHONE ENCOUNTER
Returned pt call, no answer, LM for pt that Dr Parras was gone for the day and would return the call tomorrow.

## 2020-06-11 NOTE — TELEPHONE ENCOUNTER
----- Message from Sandee Lee sent at 6/11/2020  2:47 PM CDT -----  Contact: Pt  Pt says a referral was sent over for to make an appt w/ the doctor. She couldn't confirm if her doctor faxed it over or electronically, but I'm not seeing it in her chart. If one was received, pt would like to be contacted to setup an appt.    Pts# [phone]

## 2020-06-12 ENCOUNTER — TELEPHONE (OUTPATIENT)
Dept: OPHTHALMOLOGY | Facility: CLINIC | Age: 50
End: 2020-06-12

## 2020-06-12 NOTE — TELEPHONE ENCOUNTER
----- Message from Radha Tracey MA sent at 6/11/2020  4:26 PM CDT -----  Contact: Pt  Tried to call pt, no answer.  Please call.  Thanks, Radha   ----- Message -----  From: Sandee Lee  Sent: 6/11/2020   2:47 PM CDT  To: Valdemar FLOWER Staff    Pt says a referral was sent over for to make an appt w/ the doctor. She couldn't confirm if her doctor faxed it over or electronically, but I'm not seeing it in her chart. If one was received, pt would like to be contacted to setup an appt.    Pts# [phone]

## 2020-06-30 ENCOUNTER — OFFICE VISIT (OUTPATIENT)
Dept: OPHTHALMOLOGY | Facility: CLINIC | Age: 50
End: 2020-06-30
Attending: OPHTHALMOLOGY
Payer: MEDICARE

## 2020-06-30 DIAGNOSIS — H18.719 CORNEAL ECTASIA DUE TO LASER IN SITU KERATOMILEUSIS: Primary | ICD-10-CM

## 2020-06-30 DIAGNOSIS — H59.89 CORNEAL ECTASIA DUE TO LASER IN SITU KERATOMILEUSIS: Primary | ICD-10-CM

## 2020-06-30 PROCEDURE — 99999 PR PBB SHADOW E&M-EST. PATIENT-LVL III: CPT | Mod: PBBFAC,,, | Performed by: OPHTHALMOLOGY

## 2020-06-30 PROCEDURE — 92004 PR EYE EXAM, NEW PATIENT,COMPREHESV: ICD-10-PCS | Mod: S$PBB,,, | Performed by: OPHTHALMOLOGY

## 2020-06-30 PROCEDURE — 99213 OFFICE O/P EST LOW 20 MIN: CPT | Mod: PBBFAC | Performed by: OPHTHALMOLOGY

## 2020-06-30 PROCEDURE — 92004 COMPRE OPH EXAM NEW PT 1/>: CPT | Mod: S$PBB,,, | Performed by: OPHTHALMOLOGY

## 2020-06-30 PROCEDURE — 99999 PR PBB SHADOW E&M-EST. PATIENT-LVL III: ICD-10-PCS | Mod: PBBFAC,,, | Performed by: OPHTHALMOLOGY

## 2020-06-30 NOTE — LETTER
June 30, 2020      Katie Cano II, MD  [address] [facility]  [address]  [place] [address]           [facility]  [address] [facility] [facility] [address]  Phone: [phone]  Fax: [phone]          Patient: Gill Cleary   MR Number: [id number]   YOB: 1970   Date of Visit: 6/30/2020       Dear Dr. Katie Cano II:    Thank you for referring Gill Cleary to me for evaluation. Attached you will find relevant portions of my assessment and plan of care.    If you have questions, please do not hesitate to call me. I look forward to following Gill Cleary along with you.    Sincerely,    Kim Aldrich MD    Enclosure  CC:  No Recipients    If you would like to receive this communication electronically, please contact [email or web address] or [phone] to request more information on [facility] access.    For providers and/or their staff who would like to refer a patient to [facility], please contact us through our one-stop-shop provider referral line, [facility], at [phone].    If you feel you have received this communication in error or would no longer like to receive these types of communications, please e-mail [email or web address]

## 2020-06-30 NOTE — LETTER
July 14, 2020    Gill Cleary  [address] [place] [address]             [facility] [address]  [address] 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 If you have any questions or concerns please contact our office at [phone].    Sincerely,     MD Maryjane Trevino COA

## 2020-06-30 NOTE — PROGRESS NOTES
HPI     Referred by: Dr. Cano @ 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 Patient worn hard contacts   in the past when she was 15. Currently patient is a soft contact lens   wearer     Last edited by Richardson Ying on 6/30/2020  2:54 PM. (History)            Assessment /Plan     For exam results, see Encounter Report.    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  Treatment with the use of specialty contact lenses, collagen cross linking, and corneal transplantation was explained in detail. This patient had LASIK in 1999.    This patient exhibits signs of progression of ectasia and failure of conservative treatments with spectacles and/or contact lenses.   Disease progression is indicated by   - An increase of >1D in the Kmax  - An increase of >1D of astigmatism in the MRx  - An increase in myopia of >0.50D on MRx    I recommend treatment with Collagen Crosslinking using the Avedro FDA approved epi-off protocol with Photrexa and the KXL System, in order to stabilize this condition.    Plan:   KXL treatment OD then OS  483/458    I have informed the patient that we will seek insurance pre-approval, but in case of failure of the insurance company to cover the cost of this medically necessary procedure, there may be a cost of $2,000 for the patient.

## 2020-07-01 ENCOUNTER — TELEPHONE (OUTPATIENT)
Dept: OPHTHALMOLOGY | Facility: CLINIC | Age: 50
End: 2020-07-01

## 2020-07-01 NOTE — TELEPHONE ENCOUNTER
----- Message from Sandee Lee sent at 7/1/2020  1:12 PM CDT -----  Contact: Pt @ [phone]  Pt was seen yesterday and says she is expecting a call from staff, but she doesn't remember the name of who she spoke to yesterday. The call is supposed to be about setting up another appt, but pt says she was also offered financial assistance. At first she declined, but now she is interested and wanting to learn more. She is also stating about a letter she was supposed to receive.

## 2020-07-14 ENCOUNTER — TELEPHONE (OUTPATIENT)
Dept: OPHTHALMOLOGY | Facility: CLINIC | Age: 50
End: 2020-07-14

## 2020-07-14 NOTE — TELEPHONE ENCOUNTER
----- Message from Brynn Remington sent at 7/14/2020  3:05 PM CDT -----  Regarding: Financial Assistance  Contact: Gill Lee calling to check on the status of her financial assistance application. She states it has been about a week and a half and she has not heard anything.  Please contact her at [phone]

## 2020-07-14 NOTE — TELEPHONE ENCOUNTER
----- Message from Tony Wagner sent at 7/14/2020  4:14 PM CDT -----  Regarding: Returning Maryjane's call  Contact: Gill  Ms. Cleary was returning [employer]'s call. She can be reached at [phone]

## 2020-07-15 ENCOUNTER — TELEPHONE (OUTPATIENT)
Dept: OPHTHALMOLOGY | Facility: CLINIC | Age: 50
End: 2020-07-15

## 2020-07-15 NOTE — TELEPHONE ENCOUNTER
----- Message from Brynn Macedo sent at 7/15/2020  3:01 PM CDT -----  Regarding: Returning Call  Contact: Gill Lee returning call from [place].  Please call back when you have a minute at [phone]

## 2020-07-15 NOTE — TELEPHONE ENCOUNTER
Spoke to patient answered questions in regards to needing CTL after CXL. Told her that would be with a different doctor not too sure how that goes, we will discuss after 1 month post-op visit. Also talked about recovery with patient.

## 2020-07-15 NOTE — TELEPHONE ENCOUNTER
----- Message from Majo Mayo sent at 7/15/2020  1:24 PM CDT -----  Contact: Gill  Pt is calling to speak with Maryjane. Pt contact number is [phone]

## 2020-09-01 ENCOUNTER — TELEPHONE (OUTPATIENT)
Dept: OPHTHALMOLOGY | Facility: CLINIC | Age: 50
End: 2020-09-01

## 2020-09-01 NOTE — TELEPHONE ENCOUNTER
Spoke to patient did not submit billing info due to a non-covered benefit and patient received financial assistance.

## 2020-09-01 NOTE — TELEPHONE ENCOUNTER
----- Message from Brynn Macedo sent at 9/1/2020 12:26 PM CDT -----  Regarding: Pre approval submission  Contact: Gill Lee calling to find out if we did a pre-submission for approval to medicare for her cross-linking?  If not she will need this to happen. Please contact her at [phone] for more detail

## 2020-09-16 ENCOUNTER — TELEPHONE (OUTPATIENT)
Dept: OPHTHALMOLOGY | Facility: CLINIC | Age: 50
End: 2020-09-16

## 2020-09-16 RX ORDER — PREDNISOLONE ACETATE 10 MG/ML
1 SUSPENSION/ DROPS OPHTHALMIC 4 TIMES DAILY
Qty: 1 BOTTLE | Refills: 1 | Status: SHIPPED | OUTPATIENT
Start: 2020-09-22 | End: 2021-09-22

## 2020-09-16 RX ORDER — OFLOXACIN 3 MG/ML
1 SOLUTION/ DROPS OPHTHALMIC 4 TIMES DAILY
Qty: 1 BOTTLE | Refills: 0 | Status: SHIPPED | OUTPATIENT
Start: 2020-09-22 | End: 2020-10-02

## 2020-09-16 NOTE — TELEPHONE ENCOUNTER
----- Message from Richardson Ying sent at 9/16/2020 10:33 AM CDT -----  Contact: Pt @ [phone]    ----- Message -----  From: Sandee Lee  Sent: 9/16/2020  10:28 AM CDT  To: Valdem[facility] FLOWER Staff    Pt has crosslinking procedure for next week. She says there is 2 medications listed on the paper. She is wanting to know if this is something the staff will be ordering for her or is it over the counter. I'm not seeing anything listed under her meds list.

## 2020-09-21 ENCOUNTER — TELEPHONE (OUTPATIENT)
Dept: OPHTHALMOLOGY | Facility: CLINIC | Age: 50
End: 2020-09-21

## 2020-09-21 NOTE — TELEPHONE ENCOUNTER
----- Message from Tony Wagner sent at 9/21/2020  3:35 PM CDT -----  Regarding: Would like to know if she can where her contact lens while using the eye drops  Contact: Gill  Ms. Cleary would like to know if she can still where her contact lens while using the eye drops for her surgery. She can be reached at [phone]

## 2020-09-21 NOTE — TELEPHONE ENCOUNTER
Spoke to patient and told her that she could still wear her contacts and use her drops as long as she removed the contacts before inserting the drops

## 2020-09-24 ENCOUNTER — CLINICAL SUPPORT (OUTPATIENT)
Dept: OPHTHALMOLOGY | Facility: CLINIC | Age: 50
End: 2020-09-24
Payer: MEDICARE

## 2020-09-24 DIAGNOSIS — H18.719 CORNEAL ECTASIA DUE TO LASER IN SITU KERATOMILEUSIS: Primary | ICD-10-CM

## 2020-09-24 DIAGNOSIS — H59.89 CORNEAL ECTASIA DUE TO LASER IN SITU KERATOMILEUSIS: Primary | ICD-10-CM

## 2020-09-24 PROCEDURE — 99999 PR PBB SHADOW E&M-EST. PATIENT-LVL III: CPT | Mod: PBBFAC,,,

## 2020-09-24 PROCEDURE — 66999 UNLISTED PX ANT SEGMENT EYE: CPT | Mod: CSM,,, | Performed by: OPHTHALMOLOGY

## 2020-09-24 PROCEDURE — 99999 PR PBB SHADOW E&M-EST. PATIENT-LVL III: ICD-10-PCS | Mod: PBBFAC,,,

## 2020-09-24 PROCEDURE — 92014 PR EYE EXAM, EST PATIENT,COMPREHESV: ICD-10-PCS | Mod: S$PBB,,, | Performed by: OPHTHALMOLOGY

## 2020-09-24 PROCEDURE — 66999 PR LIMBAL RELAXING INCISION: ICD-10-PCS | Mod: CSM,,, | Performed by: OPHTHALMOLOGY

## 2020-09-24 PROCEDURE — 92014 COMPRE OPH EXAM EST PT 1/>: CPT | Mod: S$PBB,,, | Performed by: OPHTHALMOLOGY

## 2020-09-24 PROCEDURE — 99213 OFFICE O/P EST LOW 20 MIN: CPT | Mod: PBBFAC

## 2020-09-25 ENCOUNTER — TELEPHONE (OUTPATIENT)
Dept: OPHTHALMOLOGY | Facility: CLINIC | Age: 50
End: 2020-09-25

## 2020-09-25 NOTE — TELEPHONE ENCOUNTER
Spoke to patient she can use cool compresses for pain. Leave eye un patched and put in drops QID.

## 2020-09-25 NOTE — PROGRESS NOTES
HPI     Patient presents today for CXL OD.    Pred OD QID  Ocuflox OD QID     Last edited by Maryjane Castillo, PCT on 9/24/2020 10:34 AM. (History)            Assessment /Plan     For exam results, see Encounter Report.    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    PREOPERATIVE DIAGNOSIS: Keratoconus    POSTOPERATIVE DIAGNOSIS:  keratoconus    PROCEDURES:    Collagen Crosslinking right eye  This patient was approved for the patiant assistance program at [facility], so we are charging $500 for this procedure only. Please do not process any other charges except the $500. Other charges should be written off.      ANESTHESIA: Topical Tetracaine 0.5%    COMPLICATIONS:  None    ESTIMATED BLOOD LOSS: None    SPECIMENS: None    INDICATIONS:  The patient has a history a progressive corneal ectasia.  During the initial consultation, a thorough discussion regarding of the risks, benefits, and alternatives to this procedure was undertaken.  Risks were listed on the consent form and were reviewed with emphasis on the remote possibility of infection, inflammation, scarring, and progression of ectasia - all of which can potentially lead to loss of vision.  Common side effects from the procedure, including pain, dry eye, glare, and haloes, were also explained, as well as defining realistic expectations of stabilizing the disease but not decreasing the need for glasses or contact lenses.  The patient voices understanding of these risks and side effects and communicates realistic expectations from the procedure.     DESCRIPTION OF PROCEDURE:  The patient was admitted to the [facility] at [facility] where the operative eye and procedure were verified, vital signs were taken, and pre-operatively 5-10mg of oral diazepam and drops of Zymar and Pred Forte were administered.  The patient was brought into the LASER suite and positioned on the bed.  A central 9mm epithelial debridement  was achieved with the [facility] epithelial scrubber, and the initial riboflavin drops administered. A 30 minute induction with drops every 2 minutes was followed by pachymetry. When corneal pachy is less than 400um, hypotonic riboflavin drops are used to thicken the cornea to a minimum of 400um. Next, a 30 min UV light treatment, centered on the cornea was delivered, and riboflavin drops were continued. Both 3ml vials (2 units) of Riboflavin were used or wasted upon completion of the procedure. Antibiotics and a bandage contact lens were placed.  The patient was discharged with care instructions and a follow up appointment in the eye clinic.

## 2020-09-25 NOTE — TELEPHONE ENCOUNTER
----- Message from Majo Mayo sent at 9/25/2020  9:22 AM CDT -----  Contact: Gill Wilcox  Patient is calling to speak with someone in the office. Patient wanted to know if she could tie on her eye. Patient contact number is [phone]. Patient stated she's having a hard time opening her eye. Patient stated the light is hurting her eye really bad.

## 2020-09-28 ENCOUNTER — TELEPHONE (OUTPATIENT)
Dept: OPTOMETRY | Facility: CLINIC | Age: 50
End: 2020-09-28

## 2020-09-28 NOTE — TELEPHONE ENCOUNTER
Called patient and had no answer was unable to leave VM. If patient calls back she needs to be informed that Dr. Aldrich has to leave and that she needs to come in at her scheduled time.

## 2020-09-28 NOTE — TELEPHONE ENCOUNTER
----- Message from Tony Wagner sent at 9/28/2020 11:14 AM CDT -----  Regarding: would like to know if she can come in around 11am for her post op tomorrow  Contact: Gill  Ms. Cleary would like to know if she can come in around 11am for her post op tomorrow. She can be reached at [phone]

## 2020-09-29 ENCOUNTER — OFFICE VISIT (OUTPATIENT)
Dept: OPHTHALMOLOGY | Facility: CLINIC | Age: 50
End: 2020-09-29
Attending: OPHTHALMOLOGY
Payer: MEDICARE

## 2020-09-29 DIAGNOSIS — H18.719 CORNEAL ECTASIA DUE TO LASER IN SITU KERATOMILEUSIS: Primary | ICD-10-CM

## 2020-09-29 DIAGNOSIS — H59.89 CORNEAL ECTASIA DUE TO LASER IN SITU KERATOMILEUSIS: Primary | ICD-10-CM

## 2020-09-29 PROCEDURE — 99213 OFFICE O/P EST LOW 20 MIN: CPT | Mod: PBBFAC | Performed by: OPHTHALMOLOGY

## 2020-09-29 PROCEDURE — 99999 PR PBB SHADOW E&M-EST. PATIENT-LVL III: ICD-10-PCS | Mod: PBBFAC,,, | Performed by: OPHTHALMOLOGY

## 2020-09-29 PROCEDURE — 99999 PR PBB SHADOW E&M-EST. PATIENT-LVL III: CPT | Mod: PBBFAC,,, | Performed by: OPHTHALMOLOGY

## 2020-09-29 PROCEDURE — 92014 PR EYE EXAM, EST PATIENT,COMPREHESV: ICD-10-PCS | Mod: S$PBB,,, | Performed by: OPHTHALMOLOGY

## 2020-09-29 PROCEDURE — 92014 COMPRE OPH EXAM EST PT 1/>: CPT | Mod: S$PBB,,, | Performed by: OPHTHALMOLOGY

## 2020-09-29 NOTE — PROGRESS NOTES
HPI     1 week PO KXL OD 09/24/2020    Drops:   PRED QID OD  Oflox QID OD     Patient reports that she is doing better than what she was after the   procedure, vision blurred. No pain. Using drops as directed     Last edited by Richardson Ying on 9/29/2020  9:02 AM. (History)            Assessment /Plan     For exam results, see Encounter Report.    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  No signs of infection.  Ok to d/c abx. Cont Pred bid for 1 month.  FU 1 month.

## 2020-11-02 ENCOUNTER — PATIENT OUTREACH (OUTPATIENT)
Dept: ADMINISTRATIVE | Facility: HOSPITAL | Age: 50
End: 2020-11-02

## 2020-11-02 NOTE — PROGRESS NOTES
Chart audit performed - Outreach regarding HM - patient states had not had a mammogram in many years and can't remember where she had it.  Patient had colectomy 2018.

## 2020-11-02 NOTE — LETTER
AUTHORIZATION FOR RELEASE OF   CONFIDENTIAL INFORMATION    Dear [facility],    We are seeing Gill Cleary, date of birth 1970, in the clinic at No Department Specified. Linette Odom MD (Inactive) is the patient's PCP. Gill Cleary has an outstanding lab/procedure at the time we reviewed her chart. In order to help keep her health information updated, she has authorized us to request the following medical record(s):        (X)  COLONOSCOPY              Please fax records to [facility], Shelly Swindler MD,         [facility]                                                                     Please Fax to [facility] at [phone].     Thank you for your help, Susana Felder LPLUCIUS-CCC. If I can be of any assistance you can call at [phone]                       Patient Name: Gill Cleary  : 1970  Patient Phone #: [phone]

## 2020-11-05 ENCOUNTER — TELEPHONE (OUTPATIENT)
Dept: INTERNAL MEDICINE | Facility: CLINIC | Age: 50
End: 2020-11-05

## 2020-11-05 NOTE — TELEPHONE ENCOUNTER
----- Message from Kenia Santos sent at 11/5/2020 12:27 PM CST -----  Contact: [phone]/self  Who Called: Pt  Regarding: appt questions   Would the patient rather a call back or a response via [facility]? 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

## 2020-11-10 ENCOUNTER — OFFICE VISIT (OUTPATIENT)
Dept: OPHTHALMOLOGY | Facility: CLINIC | Age: 50
End: 2020-11-10
Attending: OPHTHALMOLOGY
Payer: MEDICARE

## 2020-11-10 DIAGNOSIS — H18.719 CORNEAL ECTASIA DUE TO LASER IN SITU KERATOMILEUSIS: Primary | ICD-10-CM

## 2020-11-10 DIAGNOSIS — H59.89 CORNEAL ECTASIA DUE TO LASER IN SITU KERATOMILEUSIS: Primary | ICD-10-CM

## 2020-11-10 PROCEDURE — 99213 OFFICE O/P EST LOW 20 MIN: CPT | Mod: PBBFAC | Performed by: OPHTHALMOLOGY

## 2020-11-10 PROCEDURE — 92012 PR EYE EXAM, EST PATIENT,INTERMED: ICD-10-PCS | Mod: S$PBB,,, | Performed by: OPHTHALMOLOGY

## 2020-11-10 PROCEDURE — 99999 PR PBB SHADOW E&M-EST. PATIENT-LVL III: CPT | Mod: PBBFAC,,, | Performed by: OPHTHALMOLOGY

## 2020-11-10 PROCEDURE — 92012 INTRM OPH EXAM EST PATIENT: CPT | Mod: S$PBB,,, | Performed by: OPHTHALMOLOGY

## 2020-11-10 PROCEDURE — 99999 PR PBB SHADOW E&M-EST. PATIENT-LVL III: ICD-10-PCS | Mod: PBBFAC,,, | Performed by: OPHTHALMOLOGY

## 2020-11-10 NOTE — PROGRESS NOTES
HPI     1 month  PO KXL OD 09/24/2020    Pt rerports using AT's as directed , pt feel that vision   Is better than she expected       Drops:    AT's PRN OD            Last edited by Mahi Brooke on 11/10/2020  2:57 PM. (History)            Assessment /Plan     For exam results, see Encounter Report.    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  No complaints.  Pentacam done today.        The diagnosis of ectasia and its etiology and clinical course were discussed.  Treatment with the use of specialty contact lenses, collagen cross linking, and corneal transplantation was explained in detail. This patient had LASIK in 1999.    This patient exhibits signs of progression of ectasia and failure of conservative treatments with spectacles and/or contact lenses.   Disease progression is indicated by   - An increase of >1D in the Kmax  - An increase of >1D of astigmatism in the MRx  - An increase in myopia of >0.50D on MRx    I recommend treatment with Collagen Crosslinking using the Avedro FDA approved epi-off protocol with Photrexa and the KXL System, in order to stabilize this condition.    Plan:   KXL treatment  OS  458  [place] approved Medicaid    I have informed the patient that we will seek insurance pre-approval, but in case of failure of the insurance company to cover the cost of this medically necessary procedure, there may be a cost of $2,000 for the patient.

## 2020-11-12 ENCOUNTER — TELEPHONE (OUTPATIENT)
Dept: OPHTHALMOLOGY | Facility: CLINIC | Age: 50
End: 2020-11-12

## 2020-11-12 DIAGNOSIS — H18.603 KERATOCONUS OF BOTH EYES: Primary | ICD-10-CM

## 2020-11-24 LAB — HCV AB SER-ACNC: NORMAL

## 2020-12-11 ENCOUNTER — OFFICE VISIT (OUTPATIENT)
Dept: FAMILY MEDICINE | Facility: CLINIC | Age: 50
End: 2020-12-11
Payer: MEDICARE

## 2020-12-11 VITALS
TEMPERATURE: 97 F | HEART RATE: 95 BPM | OXYGEN SATURATION: 99 % | BODY MASS INDEX: 30.3 KG/M2 | WEIGHT: 171.06 LBS | DIASTOLIC BLOOD PRESSURE: 72 MMHG | SYSTOLIC BLOOD PRESSURE: 122 MMHG

## 2020-12-11 DIAGNOSIS — Z76.89 ESTABLISHING CARE WITH NEW DOCTOR, ENCOUNTER FOR: ICD-10-CM

## 2020-12-11 DIAGNOSIS — G89.29 CHRONIC NONINTRACTABLE HEADACHE, UNSPECIFIED HEADACHE TYPE: ICD-10-CM

## 2020-12-11 DIAGNOSIS — R51.9 CHRONIC NONINTRACTABLE HEADACHE, UNSPECIFIED HEADACHE TYPE: ICD-10-CM

## 2020-12-11 DIAGNOSIS — F41.9 ANXIETY: ICD-10-CM

## 2020-12-11 DIAGNOSIS — F32.A DEPRESSION, UNSPECIFIED DEPRESSION TYPE: ICD-10-CM

## 2020-12-11 DIAGNOSIS — B20 HUMAN IMMUNODEFICIENCY VIRUS (HIV) DISEASE: ICD-10-CM

## 2020-12-11 DIAGNOSIS — I10 ESSENTIAL HYPERTENSION: Primary | ICD-10-CM

## 2020-12-11 PROCEDURE — 99999 PR PBB SHADOW E&M-EST. PATIENT-LVL III: ICD-10-PCS | Mod: PBBFAC,,, | Performed by: STUDENT IN AN ORGANIZED HEALTH CARE EDUCATION/TRAINING PROGRAM

## 2020-12-11 PROCEDURE — 99213 OFFICE O/P EST LOW 20 MIN: CPT | Mod: PBBFAC,PO | Performed by: STUDENT IN AN ORGANIZED HEALTH CARE EDUCATION/TRAINING PROGRAM

## 2020-12-11 PROCEDURE — 99204 OFFICE O/P NEW MOD 45 MIN: CPT | Mod: S$PBB,,, | Performed by: STUDENT IN AN ORGANIZED HEALTH CARE EDUCATION/TRAINING PROGRAM

## 2020-12-11 PROCEDURE — 99999 PR PBB SHADOW E&M-EST. PATIENT-LVL III: CPT | Mod: PBBFAC,,, | Performed by: STUDENT IN AN ORGANIZED HEALTH CARE EDUCATION/TRAINING PROGRAM

## 2020-12-11 PROCEDURE — 99204 PR OFFICE/OUTPT VISIT, NEW, LEVL IV, 45-59 MIN: ICD-10-PCS | Mod: S$PBB,,, | Performed by: STUDENT IN AN ORGANIZED HEALTH CARE EDUCATION/TRAINING PROGRAM

## 2020-12-11 RX ORDER — DICYCLOMINE HYDROCHLORIDE 20 MG/1
TABLET ORAL
COMMUNITY
End: 2021-11-01

## 2020-12-11 RX ORDER — BICTEGRAVIR SODIUM, EMTRICITABINE, AND TENOFOVIR ALAFENAMIDE FUMARATE 50; 200; 25 MG/1; MG/1; MG/1
1 TABLET ORAL DAILY
COMMUNITY

## 2020-12-11 RX ORDER — ALENDRONATE SODIUM 70 MG/1
TABLET ORAL
COMMUNITY
End: 2022-02-22 | Stop reason: SDUPTHER

## 2020-12-11 RX ORDER — ERGOCALCIFEROL 1.25 MG/1
50000 CAPSULE ORAL
COMMUNITY
End: 2021-11-01

## 2020-12-11 RX ORDER — VENLAFAXINE HYDROCHLORIDE 150 MG/1
150 CAPSULE, EXTENDED RELEASE ORAL EVERY MORNING
COMMUNITY

## 2020-12-11 RX ORDER — SUMATRIPTAN SUCCINATE 25 MG/1
25 TABLET ORAL EVERY 4 HOURS PRN
Qty: 90 TABLET | Refills: 0 | Status: SHIPPED | OUTPATIENT
Start: 2020-12-11 | End: 2021-11-01

## 2020-12-11 RX ORDER — TOPIRAMATE 200 MG/1
200 TABLET ORAL NIGHTLY
Qty: 90 TABLET | Refills: 1 | Status: SHIPPED | OUTPATIENT
Start: 2020-12-11 | End: 2022-04-15

## 2020-12-11 RX ORDER — VENLAFAXINE 75 MG/1
150 TABLET ORAL
COMMUNITY
End: 2022-04-15

## 2020-12-11 RX ORDER — BUPROPION HYDROCHLORIDE 150 MG/1
TABLET ORAL
COMMUNITY
End: 2020-12-11

## 2020-12-11 RX ORDER — LISDEXAMFETAMINE DIMESYLATE 60 MG/1
CAPSULE ORAL
COMMUNITY
Start: 2020-11-18 | End: 2021-11-01

## 2020-12-11 RX ORDER — ATORVASTATIN CALCIUM 10 MG/1
10 TABLET, FILM COATED ORAL
COMMUNITY
End: 2021-11-01

## 2020-12-11 RX ORDER — OXYCODONE AND ACETAMINOPHEN 7.5; 325 MG/1; MG/1
TABLET ORAL
COMMUNITY
End: 2021-11-01

## 2020-12-11 RX ORDER — BREXPIPRAZOLE 1 MG/1
TABLET ORAL
COMMUNITY
Start: 2020-11-18 | End: 2020-12-11

## 2020-12-11 NOTE — PATIENT INSTRUCTIONS
It was a pleasure seeing you again today Mrs. Gill Cleary!     Riboflavin 400mg take daily for headaches  Imitrex abortive headache medication.   Topamax     If you have any questions, please call the [facility] [phone] or message through [employer].       Have a great day!  Dr. Capone

## 2020-12-11 NOTE — PROGRESS NOTES
Subjective:       Patient ID: Gill Cleary is a 50 y.o. female.    Chief Complaint: Establish Care    HPI    Past Medical History    Hypertension  She reports adherence to medications (Cardizem 120mg) . She has not check it lately. Her blood pressure today is 122/72.  She attempts to maintain a low Na+ diet.She does not exercise.  Shortness of breath with activity such as movement. She reports visual changes due to hx of corneal ectasia due to laser in situ keratomileusis. Denies episodes of  urinary changes, dizziness or chest pain. She is seen by Nephrology who manages her Hypertension. She denies kidney problems and seen Nephrology (Dr. Gianna Espinoza-[place])  due to side effect with one of her hypertensive medications.    She has several allergies to other hypertensive medications (Ace inhibitors, Labetalol, Metoprolol, Amlodipine, HCTZ).     Hx of Depression/Anxiety  Several years. She has tried multiple medications for Depression/Anxiety. She currently on Effexor 225mg at max dose. She reports increase panic attacks this year which last about 30-60 minutes.  She is seen by a counselor who she speaks to on the phone weekly. She is seen by Dr. Gandhi ([place], [place]) who recommended due to multiple unsuccessful treatment for TMS therapy.      HIV  She is currently on Biktarvy by ID (Dr. Michael Hernandes MD- [facility]) seen every 4 months. Last seen by ID yesterday      Chronic nonintractable headache   She currently on Topamax 150mg for relief. She has tried Aleve, Tylenol, and Excedrin. She seen by Neurology x1 in 05/2020. She reports her headache are constant. Frontal headaches and occipital and occurs all throughout the day. She reports stress maybe secondary. She reports the topamax relieves the headaches. Denies nausea or vomiting.   She reports some symptoms include photophobia, noise, and easily agitated.     Obstructive Sleep Apnea  CPAP use 4 years. She reports use nightly and use it  entire sleep.      Diverticulitis hx of colonectomy (2018)     Ob/Gyn  Hysterectomy  Pregnancy? 4   Abortions/ Miscarriages? 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   Alcohol: Denies  Smoker: Denies; 8 years ago Cigarettes 15 years old started about 1 ppd  Recreational drugs:Denies  Diet: Healthy choice meals for lunch and dinner, vegetables, fruits. Snacks are veggie and fruit tray.     Past medical, family, and social histories were reviewed and updated.    Review of Systems   Constitutional: Negative for activity change, appetite change, fatigue and fever.   HENT: Negative for rhinorrhea and sneezing.    Eyes: Negative for redness.   Respiratory: Negative for chest tightness, shortness of breath and wheezing.    Cardiovascular: Positive for palpitations (due to panic attacks). Negative for chest pain and leg swelling.   Gastrointestinal: Negative for abdominal pain, nausea and vomiting.   Genitourinary: Negative for decreased urine volume, difficulty urinating and urgency.   Musculoskeletal: Negative for arthralgias and myalgias.   Neurological: Positive for headaches. Negative for dizziness and weakness.   Psychiatric/Behavioral: Positive for agitation and sleep disturbance. Negative for behavioral problems and suicidal ideas. The patient is nervous/anxious.        Objective:      Vitals:    20 1501   BP: 122/72   BP Location: Left arm   Patient Position: Sitting   BP Method: Large (Manual)   Pulse: 95   Temp: 97.2 °F (36.2 °C)   SpO2: 99%   Weight: 77.6 kg (171 lb 1.2 oz)      77.6 kg (171 lb 1.2 oz)  Physical Exam  Constitutional:       Appearance: Normal appearance. She is normal weight.   HENT:      Head: Normocephalic.      Right Ear: Tympanic membrane, ear canal and external ear normal.      Left Ear: Tympanic membrane, ear canal and external ear normal.      Nose: Nose normal.   Eyes:      General: Lids are normal.      Conjunctiva/sclera:  Conjunctivae normal.   Neck:      Thyroid: No thyroid mass or thyroid tenderness.   Cardiovascular:      Rate and Rhythm: Normal rate and regular rhythm.      Pulses: Normal pulses.      Heart sounds: Normal heart sounds, S1 normal and S2 normal.   Pulmonary:      Effort: Pulmonary effort is normal.      Breath sounds: Normal breath sounds. No stridor. No wheezing or rales.   Abdominal:      General: Abdomen is flat. Bowel sounds are normal.      Palpations: Abdomen is soft.      Tenderness: There is no abdominal tenderness.   Musculoskeletal:      Right lower leg: No edema.      Left lower leg: No edema.   Skin:     General: Skin is warm and dry.   Neurological:      Mental Status: She is alert.   Psychiatric:         Attention and Perception: Attention normal.         Mood and Affect: Mood normal.         Behavior: Behavior normal. Behavior is cooperative.         Judgment: Judgment normal.         Assessment:       1. Essential hypertension    2. Establishing care with new doctor, encounter for    3. Chronic nonintractable headache, unspecified headache type    4. Depression, unspecified depression type    5. Anxiety    6. Human immunodeficiency virus (HIV) disease        Plan:     1. Establishing care with new doctor, encounter for  - Blood pressure is within normal limits today     2. Essential hypertension  - Lipid Panel; Future  - Comprehensive Metabolic Panel; Future  - Microalbumin/Creatinine Ratio, Urine; Future  - Seen by Nephrology once a year     3. Chronic nonintractable headache, unspecified headache type  - Recommend Sumatriptan as abortive for headache. Increase Topamax to 200mg nightly. Advise to take Riboflavin daily for headache.   - sumatriptan (IMITREX) 25 MG Tab; Take 1 tablet (25 mg total) by mouth every 4 (four) hours as needed.  Dispense: 90 tablet; Refill: 0  - topiramate (TOPAMAX) 200 MG Tab; Take 1 tablet (200 mg total) by mouth every evening.  Dispense: 90 tablet; Refill: 1    4.  Depression, unspecified depression type  5. Anxiety  - Seen by Dr. Brunner/[facility]  ([facility])    6. Human immunodeficiency virus (HIV) disease  - Seen by  (Dr. Michael Hernnades MD- [facility])

## 2021-01-19 ENCOUNTER — TELEPHONE (OUTPATIENT)
Dept: OPHTHALMOLOGY | Facility: CLINIC | Age: 51
End: 2021-01-19

## 2021-01-29 ENCOUNTER — TELEPHONE (OUTPATIENT)
Dept: OPHTHALMOLOGY | Facility: CLINIC | Age: 51
End: 2021-01-29

## 2021-02-05 RX ORDER — OFLOXACIN 3 MG/ML
1 SOLUTION/ DROPS OPHTHALMIC 4 TIMES DAILY
Qty: 1 BOTTLE | Refills: 0 | Status: SHIPPED | OUTPATIENT
Start: 2021-02-09 | End: 2021-02-19

## 2021-02-05 RX ORDER — PREDNISOLONE ACETATE 10 MG/ML
1 SUSPENSION/ DROPS OPHTHALMIC 4 TIMES DAILY
Qty: 1 BOTTLE | Refills: 1 | Status: SHIPPED | OUTPATIENT
Start: 2021-02-09 | End: 2021-11-01

## 2021-02-11 ENCOUNTER — CLINICAL SUPPORT (OUTPATIENT)
Dept: OPHTHALMOLOGY | Facility: CLINIC | Age: 51
End: 2021-02-11
Payer: MEDICARE

## 2021-02-11 DIAGNOSIS — H18.603 KERATOCONUS OF BOTH EYES: Primary | ICD-10-CM

## 2021-02-11 PROCEDURE — 66999 UNLISTED PX ANT SEGMENT EYE: CPT | Mod: LT,,, | Performed by: OPHTHALMOLOGY

## 2021-02-11 PROCEDURE — 66999 PR LIMBAL RELAXING INCISION: ICD-10-PCS | Mod: LT,,, | Performed by: OPHTHALMOLOGY

## 2021-02-11 PROCEDURE — 0402T COLGN CRS-LINK CRN&PACHYMTRY: CPT | Mod: PBBFAC,LT | Performed by: OPHTHALMOLOGY

## 2021-02-11 PROCEDURE — 92012 PR EYE EXAM, EST PATIENT,INTERMED: ICD-10-PCS | Mod: S$PBB,,, | Performed by: OPHTHALMOLOGY

## 2021-02-11 PROCEDURE — 92012 INTRM OPH EXAM EST PATIENT: CPT | Mod: S$PBB,,, | Performed by: OPHTHALMOLOGY

## 2021-02-11 PROCEDURE — 96372 PR INJECTION,THERAP/PROPH/DIAG2ST, IM OR SUBCUT: ICD-10-PCS | Mod: LT,,, | Performed by: OPHTHALMOLOGY

## 2021-02-11 PROCEDURE — 0402T PR COLLAGEN CROSSLINKING CORNEA: ICD-10-PCS | Mod: S$PBB,LT,, | Performed by: OPHTHALMOLOGY

## 2021-02-11 PROCEDURE — 0402T COLGN CRS-LINK CRN&PACHYMTRY: CPT | Mod: S$PBB,LT,, | Performed by: OPHTHALMOLOGY

## 2021-02-11 PROCEDURE — 96372 THER/PROPH/DIAG INJ SC/IM: CPT | Mod: LT,,, | Performed by: OPHTHALMOLOGY

## 2021-02-22 ENCOUNTER — TELEPHONE (OUTPATIENT)
Dept: OPHTHALMOLOGY | Facility: CLINIC | Age: 51
End: 2021-02-22

## 2021-03-11 ENCOUNTER — TELEPHONE (OUTPATIENT)
Dept: OPHTHALMOLOGY | Facility: CLINIC | Age: 51
End: 2021-03-11

## 2021-03-12 ENCOUNTER — OFFICE VISIT (OUTPATIENT)
Dept: OPHTHALMOLOGY | Facility: CLINIC | Age: 51
End: 2021-03-12
Attending: OPHTHALMOLOGY
Payer: MEDICARE

## 2021-03-12 DIAGNOSIS — H18.603 KERATOCONUS OF BOTH EYES: Primary | ICD-10-CM

## 2021-03-12 PROCEDURE — 92012 PR EYE EXAM, EST PATIENT,INTERMED: ICD-10-PCS | Mod: S$GLB,,, | Performed by: OPHTHALMOLOGY

## 2021-03-12 PROCEDURE — 92012 INTRM OPH EXAM EST PATIENT: CPT | Mod: S$GLB,,, | Performed by: OPHTHALMOLOGY

## 2021-03-12 PROCEDURE — 99999 PR PBB SHADOW E&M-EST. PATIENT-LVL III: ICD-10-PCS | Mod: PBBFAC,,, | Performed by: OPHTHALMOLOGY

## 2021-03-12 PROCEDURE — 99213 OFFICE O/P EST LOW 20 MIN: CPT | Mod: PBBFAC | Performed by: OPHTHALMOLOGY

## 2021-03-12 PROCEDURE — 99999 PR PBB SHADOW E&M-EST. PATIENT-LVL III: CPT | Mod: PBBFAC,,, | Performed by: OPHTHALMOLOGY

## 2021-03-17 ENCOUNTER — TELEPHONE (OUTPATIENT)
Dept: OPHTHALMOLOGY | Facility: CLINIC | Age: 51
End: 2021-03-17

## 2021-03-19 ENCOUNTER — TELEPHONE (OUTPATIENT)
Dept: OPHTHALMOLOGY | Facility: CLINIC | Age: 51
End: 2021-03-19

## 2021-03-22 ENCOUNTER — TELEPHONE (OUTPATIENT)
Dept: OPHTHALMOLOGY | Facility: CLINIC | Age: 51
End: 2021-03-22

## 2021-03-24 NOTE — TELEPHONE ENCOUNTER
----- Message from Clinton Rosas sent at 11/26/2018  1:56 PM CST -----  Pt is being referred to Dr Nelson. I have scanned the referral and records into media mgr. Please review and contact pt for scheduling,thanks   
Phoned patient to see what she needed to be seen for and scheduled appointment for December 6 at 9:00 AM.  
24-Mar-2021 13:34

## 2021-03-27 ENCOUNTER — IMMUNIZATION (OUTPATIENT)
Dept: PRIMARY CARE CLINIC | Facility: CLINIC | Age: 51
End: 2021-03-27
Payer: MEDICARE

## 2021-03-27 DIAGNOSIS — Z23 NEED FOR VACCINATION: Primary | ICD-10-CM

## 2021-03-27 PROCEDURE — 0011A COVID-19, MRNA, LNP-S, PF, 100 MCG/0.5 ML DOSE VACCINE: CPT | Mod: PBBFAC | Performed by: EMERGENCY MEDICINE

## 2021-04-23 LAB — HM MAMMOGRAM: NORMAL

## 2021-04-24 ENCOUNTER — IMMUNIZATION (OUTPATIENT)
Dept: PRIMARY CARE CLINIC | Facility: CLINIC | Age: 51
End: 2021-04-24
Payer: MEDICARE

## 2021-04-24 DIAGNOSIS — Z23 NEED FOR VACCINATION: Primary | ICD-10-CM

## 2021-04-24 PROCEDURE — 91301 COVID-19, MRNA, LNP-S, PF, 100 MCG/0.5 ML DOSE VACCINE: ICD-10-PCS | Mod: S$GLB,,, | Performed by: EMERGENCY MEDICINE

## 2021-04-24 PROCEDURE — 91301 COVID-19, MRNA, LNP-S, PF, 100 MCG/0.5 ML DOSE VACCINE: CPT | Mod: S$GLB,,, | Performed by: EMERGENCY MEDICINE

## 2021-04-24 PROCEDURE — 0012A COVID-19, MRNA, LNP-S, PF, 100 MCG/0.5 ML DOSE VACCINE: CPT | Mod: CV19,S$GLB,, | Performed by: EMERGENCY MEDICINE

## 2021-04-24 PROCEDURE — 0012A COVID-19, MRNA, LNP-S, PF, 100 MCG/0.5 ML DOSE VACCINE: ICD-10-PCS | Mod: CV19,S$GLB,, | Performed by: EMERGENCY MEDICINE

## 2021-09-08 ENCOUNTER — TELEPHONE (OUTPATIENT)
Dept: NEUROLOGY | Facility: CLINIC | Age: 51
End: 2021-09-08

## 2021-11-01 ENCOUNTER — OFFICE VISIT (OUTPATIENT)
Dept: OBSTETRICS AND GYNECOLOGY | Facility: CLINIC | Age: 51
End: 2021-11-01
Payer: MEDICARE

## 2021-11-01 VITALS
DIASTOLIC BLOOD PRESSURE: 70 MMHG | SYSTOLIC BLOOD PRESSURE: 138 MMHG | BODY MASS INDEX: 34.83 KG/M2 | WEIGHT: 196.56 LBS | HEIGHT: 63 IN

## 2021-11-01 DIAGNOSIS — G47.30 SLEEP APNEA, UNSPECIFIED TYPE: ICD-10-CM

## 2021-11-01 DIAGNOSIS — Z12.31 VISIT FOR SCREENING MAMMOGRAM: ICD-10-CM

## 2021-11-01 DIAGNOSIS — M81.0 OSTEOPOROSIS, UNSPECIFIED OSTEOPOROSIS TYPE, UNSPECIFIED PATHOLOGICAL FRACTURE PRESENCE: ICD-10-CM

## 2021-11-01 DIAGNOSIS — Z00.00 PHYSICAL EXAM: ICD-10-CM

## 2021-11-01 DIAGNOSIS — I10 HYPERTENSION, UNSPECIFIED TYPE: ICD-10-CM

## 2021-11-01 DIAGNOSIS — Z01.419 ENCOUNTER FOR GYNECOLOGICAL EXAMINATION WITHOUT ABNORMAL FINDING: Primary | ICD-10-CM

## 2021-11-01 DIAGNOSIS — E78.5 HYPERLIPIDEMIA, UNSPECIFIED HYPERLIPIDEMIA TYPE: ICD-10-CM

## 2021-11-01 DIAGNOSIS — B20 HIV INFECTION, UNSPECIFIED SYMPTOM STATUS: ICD-10-CM

## 2021-11-01 PROCEDURE — 3008F BODY MASS INDEX DOCD: CPT | Mod: CPTII,S$GLB,, | Performed by: OBSTETRICS & GYNECOLOGY

## 2021-11-01 PROCEDURE — 1160F RVW MEDS BY RX/DR IN RCRD: CPT | Mod: CPTII,S$GLB,, | Performed by: OBSTETRICS & GYNECOLOGY

## 2021-11-01 PROCEDURE — 3008F PR BODY MASS INDEX (BMI) DOCUMENTED: ICD-10-PCS | Mod: CPTII,S$GLB,, | Performed by: OBSTETRICS & GYNECOLOGY

## 2021-11-01 PROCEDURE — 3078F DIAST BP <80 MM HG: CPT | Mod: CPTII,S$GLB,, | Performed by: OBSTETRICS & GYNECOLOGY

## 2021-11-01 PROCEDURE — G0101 CA SCREEN;PELVIC/BREAST EXAM: HCPCS | Mod: S$GLB,,, | Performed by: OBSTETRICS & GYNECOLOGY

## 2021-11-01 PROCEDURE — 3078F PR MOST RECENT DIASTOLIC BLOOD PRESSURE < 80 MM HG: ICD-10-PCS | Mod: CPTII,S$GLB,, | Performed by: OBSTETRICS & GYNECOLOGY

## 2021-11-01 PROCEDURE — 1160F PR REVIEW ALL MEDS BY PRESCRIBER/CLIN PHARMACIST DOCUMENTED: ICD-10-PCS | Mod: CPTII,S$GLB,, | Performed by: OBSTETRICS & GYNECOLOGY

## 2021-11-01 PROCEDURE — 88175 CYTOPATH C/V AUTO FLUID REDO: CPT | Performed by: OBSTETRICS & GYNECOLOGY

## 2021-11-01 PROCEDURE — 3075F PR MOST RECENT SYSTOLIC BLOOD PRESS GE 130-139MM HG: ICD-10-PCS | Mod: CPTII,S$GLB,, | Performed by: OBSTETRICS & GYNECOLOGY

## 2021-11-01 PROCEDURE — 99999 PR PBB SHADOW E&M-EST. PATIENT-LVL IV: ICD-10-PCS | Mod: PBBFAC,,, | Performed by: OBSTETRICS & GYNECOLOGY

## 2021-11-01 PROCEDURE — 1159F MED LIST DOCD IN RCRD: CPT | Mod: CPTII,S$GLB,, | Performed by: OBSTETRICS & GYNECOLOGY

## 2021-11-01 PROCEDURE — 3075F SYST BP GE 130 - 139MM HG: CPT | Mod: CPTII,S$GLB,, | Performed by: OBSTETRICS & GYNECOLOGY

## 2021-11-01 PROCEDURE — 99999 PR PBB SHADOW E&M-EST. PATIENT-LVL IV: CPT | Mod: PBBFAC,,, | Performed by: OBSTETRICS & GYNECOLOGY

## 2021-11-01 PROCEDURE — G0101 PR CA SCREEN;PELVIC/BREAST EXAM: ICD-10-PCS | Mod: S$GLB,,, | Performed by: OBSTETRICS & GYNECOLOGY

## 2021-11-01 PROCEDURE — 1159F PR MEDICATION LIST DOCUMENTED IN MEDICAL RECORD: ICD-10-PCS | Mod: CPTII,S$GLB,, | Performed by: OBSTETRICS & GYNECOLOGY

## 2021-11-01 PROCEDURE — 87624 HPV HI-RISK TYP POOLED RSLT: CPT | Performed by: OBSTETRICS & GYNECOLOGY

## 2021-11-01 RX ORDER — DILTIAZEM HYDROCHLORIDE 120 MG/1
120 TABLET, FILM COATED ORAL
COMMUNITY
Start: 2021-01-29 | End: 2021-11-01

## 2021-11-01 RX ORDER — ERGOCALCIFEROL 1.25 MG/1
50000 CAPSULE ORAL
COMMUNITY
Start: 2021-01-29 | End: 2021-11-01

## 2021-11-01 RX ORDER — OXYCODONE HYDROCHLORIDE 5 MG/1
TABLET ORAL
COMMUNITY
Start: 2021-08-19 | End: 2021-11-01

## 2021-11-01 RX ORDER — PROPRANOLOL HYDROCHLORIDE 10 MG/1
10 TABLET ORAL
COMMUNITY
End: 2022-04-15

## 2021-11-01 RX ORDER — VENLAFAXINE HYDROCHLORIDE 75 MG/1
CAPSULE, EXTENDED RELEASE ORAL
COMMUNITY
Start: 2021-08-09 | End: 2021-11-01

## 2021-11-01 RX ORDER — PROPRANOLOL HYDROCHLORIDE 40 MG/1
40 TABLET ORAL NIGHTLY
COMMUNITY
Start: 2021-08-09

## 2021-11-01 RX ORDER — ALENDRONATE SODIUM 70 MG/1
1 TABLET ORAL
COMMUNITY
End: 2021-11-01

## 2021-11-01 RX ORDER — POLYETHYLENE GLYCOL 3350 17 G/17G
POWDER, FOR SOLUTION ORAL
COMMUNITY
End: 2021-11-01

## 2021-11-01 RX ORDER — RISPERIDONE 1 MG/1
TABLET ORAL
COMMUNITY
Start: 2021-04-16 | End: 2021-11-01

## 2021-11-01 RX ORDER — ATORVASTATIN CALCIUM 80 MG/1
TABLET, FILM COATED ORAL
COMMUNITY
Start: 2021-08-09 | End: 2021-11-01

## 2021-11-01 RX ORDER — LISDEXAMFETAMINE DIMESYLATE 60 MG/1
60 CAPSULE ORAL EVERY MORNING
COMMUNITY

## 2021-11-01 RX ORDER — RISPERIDONE 1 MG/1
1 TABLET ORAL NIGHTLY
COMMUNITY
Start: 2021-08-09

## 2021-11-01 RX ORDER — TIZANIDINE 4 MG/1
TABLET ORAL
COMMUNITY
End: 2022-04-15

## 2021-11-01 RX ORDER — ATORVASTATIN CALCIUM 80 MG/1
1 TABLET, FILM COATED ORAL DAILY
COMMUNITY
Start: 2021-05-12 | End: 2023-10-10

## 2021-11-01 RX ORDER — BICTEGRAVIR SODIUM, EMTRICITABINE, AND TENOFOVIR ALAFENAMIDE FUMARATE 50; 200; 25 MG/1; MG/1; MG/1
1 TABLET ORAL
COMMUNITY
Start: 2021-08-19 | End: 2021-11-01

## 2021-11-01 RX ORDER — HYDRALAZINE HYDROCHLORIDE 50 MG/1
50 TABLET, FILM COATED ORAL NIGHTLY
COMMUNITY
Start: 2021-03-29

## 2021-11-05 LAB
FINAL PATHOLOGIC DIAGNOSIS: NORMAL
Lab: NORMAL

## 2021-11-09 LAB
HPV HR 12 DNA SPEC QL NAA+PROBE: POSITIVE
HPV16 AG SPEC QL: NEGATIVE
HPV18 DNA SPEC QL NAA+PROBE: NEGATIVE

## 2021-11-15 ENCOUNTER — TELEPHONE (OUTPATIENT)
Dept: OBSTETRICS AND GYNECOLOGY | Facility: CLINIC | Age: 51
End: 2021-11-15
Payer: MEDICARE

## 2021-11-18 ENCOUNTER — PATIENT OUTREACH (OUTPATIENT)
Dept: ADMINISTRATIVE | Facility: HOSPITAL | Age: 51
End: 2021-11-18
Payer: MEDICARE

## 2021-12-02 ENCOUNTER — OFFICE VISIT (OUTPATIENT)
Dept: PRIMARY CARE CLINIC | Facility: CLINIC | Age: 51
End: 2021-12-02
Payer: MEDICARE

## 2021-12-02 ENCOUNTER — LAB VISIT (OUTPATIENT)
Dept: PRIMARY CARE CLINIC | Facility: CLINIC | Age: 51
End: 2021-12-02
Payer: MEDICARE

## 2021-12-02 VITALS
SYSTOLIC BLOOD PRESSURE: 136 MMHG | RESPIRATION RATE: 18 BRPM | BODY MASS INDEX: 35.08 KG/M2 | WEIGHT: 198 LBS | HEIGHT: 63 IN | TEMPERATURE: 98 F | OXYGEN SATURATION: 98 % | HEART RATE: 62 BPM | DIASTOLIC BLOOD PRESSURE: 66 MMHG

## 2021-12-02 DIAGNOSIS — Z13.6 SCREENING FOR CARDIOVASCULAR CONDITION: ICD-10-CM

## 2021-12-02 DIAGNOSIS — R63.5 WEIGHT GAIN: Primary | ICD-10-CM

## 2021-12-02 DIAGNOSIS — K21.9 GASTROESOPHAGEAL REFLUX DISEASE WITHOUT ESOPHAGITIS: Chronic | ICD-10-CM

## 2021-12-02 DIAGNOSIS — G89.29 CHRONIC NONINTRACTABLE HEADACHE, UNSPECIFIED HEADACHE TYPE: Chronic | ICD-10-CM

## 2021-12-02 DIAGNOSIS — I10 PRIMARY HYPERTENSION: Chronic | ICD-10-CM

## 2021-12-02 DIAGNOSIS — E66.01 SEVERE OBESITY (BMI 35.0-39.9) WITH COMORBIDITY: Chronic | ICD-10-CM

## 2021-12-02 DIAGNOSIS — R51.9 CHRONIC NONINTRACTABLE HEADACHE, UNSPECIFIED HEADACHE TYPE: Chronic | ICD-10-CM

## 2021-12-02 DIAGNOSIS — B20 HUMAN IMMUNODEFICIENCY VIRUS (HIV) DISEASE: Chronic | ICD-10-CM

## 2021-12-02 DIAGNOSIS — M81.0 OSTEOPOROSIS, UNSPECIFIED OSTEOPOROSIS TYPE, UNSPECIFIED PATHOLOGICAL FRACTURE PRESENCE: Chronic | ICD-10-CM

## 2021-12-02 DIAGNOSIS — F11.20 UNCOMPLICATED OPIOID DEPENDENCE: Chronic | ICD-10-CM

## 2021-12-02 DIAGNOSIS — F90.2 ATTENTION DEFICIT HYPERACTIVITY DISORDER, COMBINED TYPE: Chronic | ICD-10-CM

## 2021-12-02 DIAGNOSIS — E78.5 HYPERLIPIDEMIA, UNSPECIFIED HYPERLIPIDEMIA TYPE: Chronic | ICD-10-CM

## 2021-12-02 DIAGNOSIS — E55.9 VITAMIN D DEFICIENCY: Chronic | ICD-10-CM

## 2021-12-02 DIAGNOSIS — R63.5 WEIGHT GAIN: ICD-10-CM

## 2021-12-02 DIAGNOSIS — G89.4 CHRONIC PAIN SYNDROME: Chronic | ICD-10-CM

## 2021-12-02 DIAGNOSIS — I77.9 CAROTID ARTERY DISEASE, UNSPECIFIED LATERALITY, UNSPECIFIED TYPE: Chronic | ICD-10-CM

## 2021-12-02 DIAGNOSIS — G47.33 OSA (OBSTRUCTIVE SLEEP APNEA): Chronic | ICD-10-CM

## 2021-12-02 PROBLEM — D63.1 ANEMIA OF CHRONIC RENAL FAILURE: Status: ACTIVE | Noted: 2021-01-25

## 2021-12-02 PROBLEM — R80.9 PROTEINURIA: Status: ACTIVE | Noted: 2021-01-25

## 2021-12-02 PROBLEM — N18.9 ANEMIA OF CHRONIC RENAL FAILURE: Status: ACTIVE | Noted: 2021-01-25

## 2021-12-02 PROBLEM — E87.20 METABOLIC ACIDOSIS: Status: ACTIVE | Noted: 2021-01-29

## 2021-12-02 PROBLEM — I12.9 HYPERTENSIVE CHRONIC KIDNEY DISEASE WITH STAGE 1 THROUGH STAGE 4 CHRONIC KIDNEY DISEASE, OR UNSPECIFIED CHRONIC KIDNEY DISEASE: Status: ACTIVE | Noted: 2021-01-25

## 2021-12-02 PROBLEM — Z91.89 QUIT USING TOBACCO IN REMOTE PAST: Status: ACTIVE | Noted: 2020-09-10

## 2021-12-02 LAB
ALBUMIN SERPL BCP-MCNC: 4.3 G/DL (ref 3.5–5.2)
ALP SERPL-CCNC: 98 U/L (ref 55–135)
ALT SERPL W/O P-5'-P-CCNC: 19 U/L (ref 10–44)
ANION GAP SERPL CALC-SCNC: 12 MMOL/L (ref 8–16)
AST SERPL-CCNC: 20 U/L (ref 10–40)
BASOPHILS # BLD AUTO: 0.04 K/UL (ref 0–0.2)
BASOPHILS NFR BLD: 0.7 % (ref 0–1.9)
BILIRUB SERPL-MCNC: 0.6 MG/DL (ref 0.1–1)
BUN SERPL-MCNC: 8 MG/DL (ref 6–20)
CALCIUM SERPL-MCNC: 9.9 MG/DL (ref 8.7–10.5)
CHLORIDE SERPL-SCNC: 104 MMOL/L (ref 95–110)
CHOLEST SERPL-MCNC: 155 MG/DL (ref 120–199)
CHOLEST/HDLC SERPL: 3.1 {RATIO} (ref 2–5)
CO2 SERPL-SCNC: 23 MMOL/L (ref 23–29)
CREAT SERPL-MCNC: 0.9 MG/DL (ref 0.5–1.4)
DIFFERENTIAL METHOD: NORMAL
EOSINOPHIL # BLD AUTO: 0 K/UL (ref 0–0.5)
EOSINOPHIL NFR BLD: 0.7 % (ref 0–8)
ERYTHROCYTE [DISTWIDTH] IN BLOOD BY AUTOMATED COUNT: 12 % (ref 11.5–14.5)
EST. GFR  (AFRICAN AMERICAN): >60 ML/MIN/1.73 M^2
EST. GFR  (NON AFRICAN AMERICAN): >60 ML/MIN/1.73 M^2
FSH SERPL-ACNC: 68.73 MIU/ML
GLUCOSE SERPL-MCNC: 90 MG/DL (ref 70–110)
HCT VFR BLD AUTO: 40.1 % (ref 37–48.5)
HDLC SERPL-MCNC: 50 MG/DL (ref 40–75)
HDLC SERPL: 32.3 % (ref 20–50)
HGB BLD-MCNC: 13.1 G/DL (ref 12–16)
IMM GRANULOCYTES # BLD AUTO: 0.01 K/UL (ref 0–0.04)
IMM GRANULOCYTES NFR BLD AUTO: 0.2 % (ref 0–0.5)
LDLC SERPL CALC-MCNC: 91.4 MG/DL (ref 63–159)
LYMPHOCYTES # BLD AUTO: 1.8 K/UL (ref 1–4.8)
LYMPHOCYTES NFR BLD: 29.4 % (ref 18–48)
MCH RBC QN AUTO: 30.1 PG (ref 27–31)
MCHC RBC AUTO-ENTMCNC: 32.7 G/DL (ref 32–36)
MCV RBC AUTO: 92 FL (ref 82–98)
MONOCYTES # BLD AUTO: 0.6 K/UL (ref 0.3–1)
MONOCYTES NFR BLD: 9.7 % (ref 4–15)
NEUTROPHILS # BLD AUTO: 3.6 K/UL (ref 1.8–7.7)
NEUTROPHILS NFR BLD: 59.3 % (ref 38–73)
NONHDLC SERPL-MCNC: 105 MG/DL
NRBC BLD-RTO: 0 /100 WBC
PLATELET # BLD AUTO: 322 K/UL (ref 150–450)
PMV BLD AUTO: 12.2 FL (ref 9.2–12.9)
POTASSIUM SERPL-SCNC: 4.1 MMOL/L (ref 3.5–5.1)
PROT SERPL-MCNC: 7.8 G/DL (ref 6–8.4)
RBC # BLD AUTO: 4.35 M/UL (ref 4–5.4)
SODIUM SERPL-SCNC: 139 MMOL/L (ref 136–145)
TRIGL SERPL-MCNC: 68 MG/DL (ref 30–150)
TSH SERPL DL<=0.005 MIU/L-ACNC: 1.44 UIU/ML (ref 0.4–4)
WBC # BLD AUTO: 5.99 K/UL (ref 3.9–12.7)

## 2021-12-02 PROCEDURE — 36415 PR COLLECTION VENOUS BLOOD,VENIPUNCTURE: ICD-10-PCS | Mod: S$GLB,,, | Performed by: FAMILY MEDICINE

## 2021-12-02 PROCEDURE — 36415 COLL VENOUS BLD VENIPUNCTURE: CPT | Performed by: FAMILY MEDICINE

## 2021-12-02 PROCEDURE — 36415 COLL VENOUS BLD VENIPUNCTURE: CPT | Mod: S$GLB,,, | Performed by: FAMILY MEDICINE

## 2021-12-02 PROCEDURE — 80061 LIPID PANEL: CPT | Performed by: FAMILY MEDICINE

## 2021-12-02 PROCEDURE — 83036 HEMOGLOBIN GLYCOSYLATED A1C: CPT | Performed by: FAMILY MEDICINE

## 2021-12-02 PROCEDURE — 99215 OFFICE O/P EST HI 40 MIN: CPT | Mod: S$GLB,,, | Performed by: FAMILY MEDICINE

## 2021-12-02 PROCEDURE — 99999 PR PBB SHADOW E&M-EST. PATIENT-LVL IV: ICD-10-PCS | Mod: PBBFAC,,, | Performed by: FAMILY MEDICINE

## 2021-12-02 PROCEDURE — 99999 PR PBB SHADOW E&M-EST. PATIENT-LVL IV: CPT | Mod: PBBFAC,,, | Performed by: FAMILY MEDICINE

## 2021-12-02 PROCEDURE — 99215 PR OFFICE/OUTPT VISIT, EST, LEVL V, 40-54 MIN: ICD-10-PCS | Mod: S$GLB,,, | Performed by: FAMILY MEDICINE

## 2021-12-02 PROCEDURE — 83001 ASSAY OF GONADOTROPIN (FSH): CPT | Performed by: FAMILY MEDICINE

## 2021-12-02 PROCEDURE — 85025 COMPLETE CBC W/AUTO DIFF WBC: CPT | Performed by: FAMILY MEDICINE

## 2021-12-02 PROCEDURE — 80053 COMPREHEN METABOLIC PANEL: CPT | Performed by: FAMILY MEDICINE

## 2021-12-02 PROCEDURE — 84443 ASSAY THYROID STIM HORMONE: CPT | Performed by: FAMILY MEDICINE

## 2021-12-03 LAB
ESTIMATED AVG GLUCOSE: 105 MG/DL (ref 68–131)
HBA1C MFR BLD: 5.3 % (ref 4–5.6)

## 2021-12-05 ENCOUNTER — PATIENT MESSAGE (OUTPATIENT)
Dept: PRIMARY CARE CLINIC | Facility: CLINIC | Age: 51
End: 2021-12-05
Payer: MEDICARE

## 2021-12-05 DIAGNOSIS — R63.5 WEIGHT GAIN: Primary | ICD-10-CM

## 2021-12-05 DIAGNOSIS — E66.01 CLASS 2 SEVERE OBESITY WITH SERIOUS COMORBIDITY AND BODY MASS INDEX (BMI) OF 35.0 TO 35.9 IN ADULT, UNSPECIFIED OBESITY TYPE: ICD-10-CM

## 2021-12-05 PROBLEM — D63.1 ANEMIA OF CHRONIC RENAL FAILURE: Status: RESOLVED | Noted: 2021-01-25 | Resolved: 2021-12-05

## 2021-12-05 PROBLEM — B20 HIV DISEASE: Status: RESOLVED | Noted: 2018-12-18 | Resolved: 2021-12-05

## 2021-12-05 PROBLEM — N18.9 ANEMIA OF CHRONIC RENAL FAILURE: Status: RESOLVED | Noted: 2021-01-25 | Resolved: 2021-12-05

## 2021-12-05 PROBLEM — E87.20 METABOLIC ACIDOSIS: Status: RESOLVED | Noted: 2021-01-29 | Resolved: 2021-12-05

## 2021-12-06 ENCOUNTER — TELEPHONE (OUTPATIENT)
Dept: BARIATRICS | Facility: CLINIC | Age: 51
End: 2021-12-06
Payer: MEDICARE

## 2022-01-19 ENCOUNTER — PATIENT MESSAGE (OUTPATIENT)
Dept: PRIMARY CARE CLINIC | Facility: CLINIC | Age: 52
End: 2022-01-19
Payer: MEDICARE

## 2022-01-19 DIAGNOSIS — G89.29 CHRONIC NONINTRACTABLE HEADACHE, UNSPECIFIED HEADACHE TYPE: ICD-10-CM

## 2022-01-19 DIAGNOSIS — R51.9 CHRONIC NONINTRACTABLE HEADACHE, UNSPECIFIED HEADACHE TYPE: ICD-10-CM

## 2022-01-19 RX ORDER — ALENDRONATE SODIUM 70 MG/1
TABLET ORAL
OUTPATIENT
Start: 2022-01-19

## 2022-01-19 RX ORDER — TOPIRAMATE 200 MG/1
200 TABLET ORAL NIGHTLY
Qty: 90 TABLET | Refills: 1 | OUTPATIENT
Start: 2022-01-19 | End: 2023-01-19

## 2022-02-12 ENCOUNTER — PATIENT MESSAGE (OUTPATIENT)
Dept: PRIMARY CARE CLINIC | Facility: CLINIC | Age: 52
End: 2022-02-12
Payer: MEDICARE

## 2022-02-12 DIAGNOSIS — Z23 NEED FOR SHINGLES VACCINE: Primary | ICD-10-CM

## 2022-02-14 ENCOUNTER — PATIENT MESSAGE (OUTPATIENT)
Dept: PRIMARY CARE CLINIC | Facility: CLINIC | Age: 52
End: 2022-02-14
Payer: MEDICARE

## 2022-02-14 RX ORDER — ZOSTER VACCINE RECOMBINANT, ADJUVANTED 50 MCG/0.5
0.5 KIT INTRAMUSCULAR ONCE
Qty: 1 EACH | Refills: 0 | Status: SHIPPED | OUTPATIENT
Start: 2022-02-14 | End: 2022-02-14

## 2022-02-15 ENCOUNTER — TELEPHONE (OUTPATIENT)
Dept: BARIATRICS | Facility: CLINIC | Age: 52
End: 2022-02-15
Payer: MEDICARE

## 2022-02-22 ENCOUNTER — TELEPHONE (OUTPATIENT)
Dept: PRIMARY CARE CLINIC | Facility: CLINIC | Age: 52
End: 2022-02-22
Payer: MEDICARE

## 2022-02-22 ENCOUNTER — PATIENT MESSAGE (OUTPATIENT)
Dept: PRIMARY CARE CLINIC | Facility: CLINIC | Age: 52
End: 2022-02-22
Payer: MEDICARE

## 2022-02-22 DIAGNOSIS — M81.0 OSTEOPOROSIS, UNSPECIFIED OSTEOPOROSIS TYPE, UNSPECIFIED PATHOLOGICAL FRACTURE PRESENCE: Primary | ICD-10-CM

## 2022-02-22 RX ORDER — ALENDRONATE SODIUM 70 MG/1
70 TABLET ORAL
Qty: 12 TABLET | Refills: 0 | Status: SHIPPED | OUTPATIENT
Start: 2022-02-22 | End: 2022-12-18 | Stop reason: SDUPTHER

## 2022-02-22 NOTE — TELEPHONE ENCOUNTER
Please let patient know she is due for bone density screen. Order placed for DEXA.     Dr. Boykin    Pt verbalized understanding of information communicated in this encounter.

## 2022-02-24 ENCOUNTER — PATIENT MESSAGE (OUTPATIENT)
Dept: PRIMARY CARE CLINIC | Facility: CLINIC | Age: 52
End: 2022-02-24
Payer: MEDICARE

## 2022-03-21 ENCOUNTER — PATIENT MESSAGE (OUTPATIENT)
Dept: PRIMARY CARE CLINIC | Facility: CLINIC | Age: 52
End: 2022-03-21
Payer: MEDICARE

## 2022-03-21 DIAGNOSIS — J30.2 SEASONAL ALLERGIES: Primary | ICD-10-CM

## 2022-03-21 RX ORDER — HYDROXYZINE PAMOATE 25 MG/1
25 CAPSULE ORAL EVERY 8 HOURS PRN
Qty: 30 CAPSULE | Refills: 1 | Status: SHIPPED | OUTPATIENT
Start: 2022-03-21 | End: 2022-04-15

## 2022-03-21 RX ORDER — CETIRIZINE HYDROCHLORIDE 10 MG/1
10 TABLET ORAL DAILY
Qty: 90 TABLET | Refills: 1 | Status: SHIPPED | OUTPATIENT
Start: 2022-03-21 | End: 2022-12-28 | Stop reason: SDUPTHER

## 2022-03-23 ENCOUNTER — TELEPHONE (OUTPATIENT)
Dept: BARIATRICS | Facility: CLINIC | Age: 52
End: 2022-03-23
Payer: MEDICARE

## 2022-03-23 NOTE — TELEPHONE ENCOUNTER
----- Message from Elisabeth Swift sent at 3/23/2022  7:55 AM CDT -----  Regarding: Appt R/S  Regarding:Pt called to r/s appt set for today at 1:30.      Requesting Call back number:[phone]

## 2022-03-23 NOTE — TELEPHONE ENCOUNTER
Spoke to pt in regard to rescheduling consult for medical weight loss. Pt stated she had to cancel appt on today and would like the soonest available consult. Scheduled pt successfully.

## 2022-04-13 ENCOUNTER — PATIENT MESSAGE (OUTPATIENT)
Dept: PRIMARY CARE CLINIC | Facility: CLINIC | Age: 52
End: 2022-04-13
Payer: MEDICARE

## 2022-04-13 ENCOUNTER — TELEPHONE (OUTPATIENT)
Dept: PRIMARY CARE CLINIC | Facility: CLINIC | Age: 52
End: 2022-04-13
Payer: MEDICARE

## 2022-04-13 NOTE — TELEPHONE ENCOUNTER
Relayed message from provider for patient to go to the emergency room for the concerns she is having patient verbalize understanding.

## 2022-04-14 ENCOUNTER — HOSPITAL ENCOUNTER (OUTPATIENT)
Facility: HOSPITAL | Age: 52
Discharge: HOME | End: 2022-04-16
Attending: EMERGENCY MEDICINE | Admitting: INTERNAL MEDICINE
Payer: MEDICARE

## 2022-04-14 DIAGNOSIS — R07.9 CHEST PAIN: ICD-10-CM

## 2022-04-14 DIAGNOSIS — R10.9 ABDOMINAL PAIN: ICD-10-CM

## 2022-04-14 DIAGNOSIS — N12 PYELONEPHRITIS: Primary | ICD-10-CM

## 2022-04-14 DIAGNOSIS — R11.10 VOMITING: ICD-10-CM

## 2022-04-14 LAB
ALBUMIN SERPL BCP-MCNC: 3.9 G/DL (ref 3.5–5.2)
ALLENS TEST: NORMAL
ALP SERPL-CCNC: 99 U/L (ref 55–135)
ALT SERPL W/O P-5'-P-CCNC: 18 U/L (ref 10–44)
ANION GAP SERPL CALC-SCNC: 14 MMOL/L (ref 8–16)
AST SERPL-CCNC: 17 U/L (ref 10–40)
B-HCG UR QL: NEGATIVE
BACTERIA #/AREA URNS AUTO: ABNORMAL /HPF
BASOPHILS # BLD AUTO: 0.04 K/UL (ref 0–0.2)
BASOPHILS NFR BLD: 0.6 % (ref 0–1.9)
BILIRUB SERPL-MCNC: 0.6 MG/DL (ref 0.1–1)
BILIRUB UR QL STRIP: NEGATIVE
BUN SERPL-MCNC: 14 MG/DL (ref 6–20)
BUN SERPL-MCNC: 16 MG/DL (ref 6–30)
CALCIUM SERPL-MCNC: 9.1 MG/DL (ref 8.7–10.5)
CHLORIDE SERPL-SCNC: 102 MMOL/L (ref 95–110)
CHLORIDE SERPL-SCNC: 103 MMOL/L (ref 95–110)
CLARITY UR REFRACT.AUTO: ABNORMAL
CO2 SERPL-SCNC: 19 MMOL/L (ref 23–29)
COLOR UR AUTO: YELLOW
CREAT SERPL-MCNC: 0.8 MG/DL (ref 0.5–1.4)
CREAT SERPL-MCNC: 0.9 MG/DL (ref 0.5–1.4)
CTP QC/QA: YES
DIFFERENTIAL METHOD: ABNORMAL
EOSINOPHIL # BLD AUTO: 0.3 K/UL (ref 0–0.5)
EOSINOPHIL NFR BLD: 3.9 % (ref 0–8)
ERYTHROCYTE [DISTWIDTH] IN BLOOD BY AUTOMATED COUNT: 12.4 % (ref 11.5–14.5)
EST. GFR  (AFRICAN AMERICAN): >60 ML/MIN/1.73 M^2
EST. GFR  (NON AFRICAN AMERICAN): >60 ML/MIN/1.73 M^2
GLUCOSE SERPL-MCNC: 126 MG/DL (ref 70–110)
GLUCOSE SERPL-MCNC: 132 MG/DL (ref 70–110)
GLUCOSE UR QL STRIP: NEGATIVE
HCT VFR BLD AUTO: 49 % (ref 37–48.5)
HCT VFR BLD CALC: 51 %PCV (ref 36–54)
HGB BLD-MCNC: 16.9 G/DL (ref 12–16)
HGB UR QL STRIP: ABNORMAL
HYALINE CASTS UR QL AUTO: 0 /LPF
IMM GRANULOCYTES # BLD AUTO: 0.07 K/UL (ref 0–0.04)
IMM GRANULOCYTES NFR BLD AUTO: 1 % (ref 0–0.5)
KETONES UR QL STRIP: ABNORMAL
LACTATE SERPL-SCNC: 2.3 MMOL/L (ref 0.5–2.2)
LDH SERPL L TO P-CCNC: 1.13 MMOL/L (ref 0.5–2.2)
LEUKOCYTE ESTERASE UR QL STRIP: ABNORMAL
LIPASE SERPL-CCNC: 19 U/L (ref 4–60)
LYMPHOCYTES # BLD AUTO: 1.5 K/UL (ref 1–4.8)
LYMPHOCYTES NFR BLD: 21.7 % (ref 18–48)
MCH RBC QN AUTO: 30 PG (ref 27–31)
MCHC RBC AUTO-ENTMCNC: 34.5 G/DL (ref 32–36)
MCV RBC AUTO: 87 FL (ref 82–98)
MICROSCOPIC COMMENT: ABNORMAL
MONOCYTES # BLD AUTO: 1.2 K/UL (ref 0.3–1)
MONOCYTES NFR BLD: 17.3 % (ref 4–15)
NEUTROPHILS # BLD AUTO: 3.8 K/UL (ref 1.8–7.7)
NEUTROPHILS NFR BLD: 55.5 % (ref 38–73)
NITRITE UR QL STRIP: NEGATIVE
NRBC BLD-RTO: 0 /100 WBC
PH UR STRIP: 5 [PH] (ref 5–8)
PLATELET # BLD AUTO: 300 K/UL (ref 150–450)
PMV BLD AUTO: 11.1 FL (ref 9.2–12.9)
POC IONIZED CALCIUM: 1.12 MMOL/L (ref 1.06–1.42)
POC MOLECULAR INFLUENZA A AGN: NEGATIVE
POC MOLECULAR INFLUENZA B AGN: NEGATIVE
POC TCO2 (MEASURED): 22 MMOL/L (ref 23–29)
POTASSIUM BLD-SCNC: 3.6 MMOL/L (ref 3.5–5.1)
POTASSIUM SERPL-SCNC: 3.6 MMOL/L (ref 3.5–5.1)
PROT SERPL-MCNC: 8.3 G/DL (ref 6–8.4)
PROT UR QL STRIP: ABNORMAL
RBC # BLD AUTO: 5.63 M/UL (ref 4–5.4)
RBC #/AREA URNS AUTO: 15 /HPF (ref 0–4)
SAMPLE: ABNORMAL
SAMPLE: NORMAL
SARS-COV-2 RDRP RESP QL NAA+PROBE: NEGATIVE
SITE: NORMAL
SODIUM BLD-SCNC: 137 MMOL/L (ref 136–145)
SODIUM SERPL-SCNC: 135 MMOL/L (ref 136–145)
SP GR UR STRIP: 1.03 (ref 1–1.03)
SQUAMOUS #/AREA URNS AUTO: 8 /HPF
URN SPEC COLLECT METH UR: ABNORMAL
WBC # BLD AUTO: 6.92 K/UL (ref 3.9–12.7)
WBC #/AREA URNS AUTO: >100 /HPF (ref 0–5)

## 2022-04-14 PROCEDURE — 85025 COMPLETE CBC W/AUTO DIFF WBC: CPT | Performed by: NURSE PRACTITIONER

## 2022-04-14 PROCEDURE — 99285 EMERGENCY DEPT VISIT HI MDM: CPT | Mod: GC,CS,, | Performed by: EMERGENCY MEDICINE

## 2022-04-14 PROCEDURE — 83690 ASSAY OF LIPASE: CPT | Performed by: NURSE PRACTITIONER

## 2022-04-14 PROCEDURE — 96375 TX/PRO/DX INJ NEW DRUG ADDON: CPT

## 2022-04-14 PROCEDURE — 80053 COMPREHEN METABOLIC PANEL: CPT | Performed by: NURSE PRACTITIONER

## 2022-04-14 PROCEDURE — 63600175 PHARM REV CODE 636 W HCPCS

## 2022-04-14 PROCEDURE — 87502 INFLUENZA DNA AMP PROBE: CPT

## 2022-04-14 PROCEDURE — 96361 HYDRATE IV INFUSION ADD-ON: CPT

## 2022-04-14 PROCEDURE — U0002 COVID-19 LAB TEST NON-CDC: HCPCS

## 2022-04-14 PROCEDURE — 25500020 PHARM REV CODE 255: Performed by: EMERGENCY MEDICINE

## 2022-04-14 PROCEDURE — 81025 URINE PREGNANCY TEST: CPT | Performed by: EMERGENCY MEDICINE

## 2022-04-14 PROCEDURE — 83605 ASSAY OF LACTIC ACID: CPT

## 2022-04-14 PROCEDURE — 87086 URINE CULTURE/COLONY COUNT: CPT | Performed by: NURSE PRACTITIONER

## 2022-04-14 PROCEDURE — 25000003 PHARM REV CODE 250

## 2022-04-14 PROCEDURE — 96365 THER/PROPH/DIAG IV INF INIT: CPT

## 2022-04-14 PROCEDURE — 96367 TX/PROPH/DG ADDL SEQ IV INF: CPT

## 2022-04-14 PROCEDURE — 99285 EMERGENCY DEPT VISIT HI MDM: CPT | Mod: 25,CS

## 2022-04-14 PROCEDURE — 99285 PR EMERGENCY DEPT VISIT,LEVEL V: ICD-10-PCS | Mod: GC,CS,, | Performed by: EMERGENCY MEDICINE

## 2022-04-14 PROCEDURE — 80047 BASIC METABLC PNL IONIZED CA: CPT

## 2022-04-14 PROCEDURE — 63600175 PHARM REV CODE 636 W HCPCS: Performed by: EMERGENCY MEDICINE

## 2022-04-14 PROCEDURE — 81001 URINALYSIS AUTO W/SCOPE: CPT | Performed by: NURSE PRACTITIONER

## 2022-04-14 PROCEDURE — 99900035 HC TECH TIME PER 15 MIN (STAT)

## 2022-04-14 RX ORDER — ONDANSETRON 2 MG/ML
4 INJECTION INTRAMUSCULAR; INTRAVENOUS
Status: COMPLETED | OUTPATIENT
Start: 2022-04-14 | End: 2022-04-14

## 2022-04-14 RX ORDER — KETOROLAC TROMETHAMINE 30 MG/ML
15 INJECTION, SOLUTION INTRAMUSCULAR; INTRAVENOUS
Status: COMPLETED | OUTPATIENT
Start: 2022-04-14 | End: 2022-04-14

## 2022-04-14 RX ADMIN — ONDANSETRON 4 MG: 2 INJECTION INTRAMUSCULAR; INTRAVENOUS at 07:04

## 2022-04-14 RX ADMIN — KETOROLAC TROMETHAMINE 15 MG: 30 INJECTION, SOLUTION INTRAMUSCULAR at 11:04

## 2022-04-14 RX ADMIN — CEFTRIAXONE 1 G: 1 INJECTION, SOLUTION INTRAVENOUS at 09:04

## 2022-04-14 RX ADMIN — SODIUM CHLORIDE, SODIUM LACTATE, POTASSIUM CHLORIDE, AND CALCIUM CHLORIDE 1000 ML: .6; .31; .03; .02 INJECTION, SOLUTION INTRAVENOUS at 07:04

## 2022-04-14 RX ADMIN — SODIUM CHLORIDE, SODIUM LACTATE, POTASSIUM CHLORIDE, AND CALCIUM CHLORIDE 1000 ML: .6; .31; .03; .02 INJECTION, SOLUTION INTRAVENOUS at 10:04

## 2022-04-14 RX ADMIN — IOHEXOL 100 ML: 350 INJECTION, SOLUTION INTRAVENOUS at 08:04

## 2022-04-14 RX ADMIN — PROMETHAZINE HYDROCHLORIDE 12.5 MG: 25 INJECTION INTRAMUSCULAR; INTRAVENOUS at 11:04

## 2022-04-14 NOTE — ED NOTES
iSTAT Chem 8    Na+ 137   K+ 3.6   Cl 103   iCa 1.12   TCO2 22   Glu 132   BUN 16   Creat 0.8   Hct% 51

## 2022-04-14 NOTE — ED PROVIDER NOTES
Encounter Date: 4/14/2022       History     Chief Complaint   Patient presents with    Diarrhea     N/V/D x 2 days. Received 4mg zofran IV in route.      Gill Cleary 51F with HIV, HTN, GERD, YANICK, ADHD, DJD w/ opioid dependence, and h/o diverticulitis requiring multiple hospital admissions who presented to the ED with 2 days of intractable N/V/D. Per patient, her symptoms started the night before last and have not relented. She took Imodium which decreased the frequency of her diarrhea, but did not stop it. She has been unable to keep and food and minimal liquids down since symptoms began. Denies any recent travel, medication changes, or food changes. She c/o fevers/chills, and full body myalgias. 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    The history is provided by the patient and medical records. No  was used.     Review of patient's allergies indicates:   Allergen Reactions    Ace inhibitors Other (See Comments)     dizziness      Efavirenz-emtricitabin-tenofov Other (See Comments)     dizziness      Penicillins Hives     Hives (skin)^ and causes yeast infection    Pork extract      Pt would like pork added  Because of strong Alevism beliefs    Pork/porcine containing products      Pt would like pork added  Because of strong Alevism beliefs    Tramadol Nausea And Vomiting    Emtricita-rilpivirine-tenof df Other (See Comments)     Affecting patients kidneys      Labetalol     Lactase     Metoprolol     Amlodipine Rash    Clonidine Rash    Doxazosin Rash    Hydrochlorothiazide Rash     Past Medical History:   Diagnosis Date    Diverticulitis     HIV (human immunodeficiency virus infection)     Hyperlipemia     Hypertension     Osteoporosis     Sleep apnea     Vertigo      Past Surgical History:   Procedure Laterality Date    LAPAROSCOPIC SIGMOIDECTOMY N/A 12/10/2018    Procedure: COLECTOMY,  SIGMOID, LAPAROSCOPIC-;  Surgeon: Reyes Nelson MD;  Location: [facility] OR [address] [place] [address];  Service: General;  Laterality: N/A;    LAPAROSCOPIC TOTAL HYSTERECTOMY  2018    URETERAL STENT PLACEMENT Left 12/10/2018    Procedure: INSERTION, STENT, URETER;  Surgeon: Reyes Nelson MD;  Location: [facility] OR [address] [place] [address];  Service: General;  Laterality: Left;     Family History   Problem Relation Age of Onset    Hypertension Mother     Thyroid disease Mother     Hypertension Father     Heart attacks under age 50 Father     Other Father         Brain tumor    Other Sister         Brain tumor     Social History     Tobacco Use    Smoking status: Never Smoker    Smokeless tobacco: Never Used   Substance Use Topics    Alcohol use: No    Drug use: Never     Review of Systems   Constitutional: Positive for appetite change, chills and fever. Negative for fatigue.   HENT: Negative for congestion, rhinorrhea and sore throat.    Eyes: Negative for visual disturbance.   Respiratory: Negative for cough and shortness of breath.    Cardiovascular: Negative for palpitations and leg swelling.   Gastrointestinal: Positive for abdominal distention, abdominal pain, diarrhea, nausea and vomiting. Negative for constipation.   Genitourinary: Negative for dysuria and hematuria.   Musculoskeletal: Positive for myalgias. Negative for arthralgias.   Skin: Negative for rash.   Neurological: Negative for syncope.   Psychiatric/Behavioral: Negative for agitation and confusion. The patient is not nervous/anxious.        Physical Exam     Initial Vitals   BP Pulse Resp Temp SpO2   04/14/22 1713 04/14/22 1713 04/14/22 1713 04/14/22 1714 04/14/22 1713   (!) 160/96 (!) 114 18 98.6 °F (37 °C) 99 %      MAP       --                Physical Exam    Nursing note and vitals reviewed.  Constitutional: She appears well-developed.   HENT:   Head: Normocephalic and atraumatic.   Eyes: Conjunctivae are normal. Right eye exhibits no discharge. Left eye exhibits no  discharge.   Neck: Neck supple. No tracheal deviation present.   Cardiovascular: Regular rhythm. Tachycardia present.    Pulmonary/Chest: Breath sounds normal. No respiratory distress. She has no wheezes. She has no rales.   Abdominal: Abdomen is soft. She exhibits distension. Bowel sounds are increased. There is abdominal tenderness.   Tender with light palpation that induced vomiting   Musculoskeletal:         General: No tenderness or edema. Normal range of motion.      Cervical back: Neck supple.     Neurological: She is alert and oriented to person, place, and time.   Skin: Skin is warm.   Psychiatric: She has a normal mood and affect. Her behavior is normal. Judgment and thought content normal.         ED Course   Procedures  Labs Reviewed   CBC W/ AUTO DIFFERENTIAL - Abnormal; Notable for the following components:       Result Value    RBC 5.63 (*)     Hemoglobin 16.9 (*)     Hematocrit 49.0 (*)     Immature Granulocytes 1.0 (*)     Immature Grans (Abs) 0.07 (*)     Mono # 1.2 (*)     Mono % 17.3 (*)     All other components within normal limits   COMPREHENSIVE METABOLIC PANEL - Abnormal; Notable for the following components:    Sodium 135 (*)     CO2 19 (*)     Glucose 126 (*)     All other components within normal limits   URINALYSIS, REFLEX TO URINE CULTURE - Abnormal; Notable for the following components:    Appearance, UA Cloudy (*)     Protein, UA 2+ (*)     Ketones, UA 1+ (*)     Occult Blood UA 2+ (*)     Leukocytes, UA 3+ (*)     All other components within normal limits    Narrative:     Specimen Source->Urine   URINALYSIS MICROSCOPIC - Abnormal; Notable for the following components:    RBC, UA 15 (*)     WBC, UA >100 (*)     Bacteria Few (*)     All other components within normal limits    Narrative:     Specimen Source->Urine   LACTIC ACID, PLASMA - Abnormal; Notable for the following components:    Lactate (Lactic Acid) 2.3 (*)     All other components within normal limits   ISTAT PROCEDURE -  Abnormal; Notable for the following components:    POC Glucose 132 (*)     POC TCO2 (MEASURED) 22 (*)     All other components within normal limits   CULTURE, URINE   LIPASE   SARS-COV-2 RDRP GENE   POCT INFLUENZA A/B MOLECULAR   POCT URINE PREGNANCY   ISTAT LACTATE   ISTAT CHEM8     EKG Readings: (Independently Interpreted)   Initial Reading: No STEMI. Rhythm: Normal Sinus Rhythm. Heart Rate: 87. Ectopy: No Ectopy. Clinical Impression: Normal Sinus Rhythm       Imaging Results          CT Abdomen Pelvis With Contrast (Final result)  Result time 04/14/22 20:37:59    Final result by David Wade MD (04/14/22 20:37:59)                 Impression:      1. No acute findings in the abdomen or pelvis.  2. Diverticulosis coli without evidence of acute diverticulitis.    Electronically signed by resident: Modesto Felder  Date:    04/14/2022  Time:    20:18    Electronically signed by: David Wade  Date:    04/14/2022  Time:    20:37             Narrative:    EXAMINATION:  CT ABDOMEN PELVIS WITH CONTRAST    CLINICAL HISTORY:  Nausea/vomiting;    TECHNIQUE:  Axial images of the abdomen and pelvis were acquired  after the use of 100 cc [id number] IV contrast.  Coronal and sagittal reconstructions were also obtained    COMPARISON:  CT abdomen pelvis 12/17/2018    FINDINGS:  Heart: Normal in size. No pericardial effusion.    Lungs: Visualized portions of the lungs are clear.    Liver: Normal in size and contour.  No focal hepatic lesion.    Gallbladder: No calcified gallstones.    Bile Ducts: No evidence of dilated ducts.    Pancreas: No mass or peripancreatic fat stranding.    Spleen: Unremarkable.    Stomach and duodenum: Unremarkable.    Adrenals: Unremarkable.    Kidneys/Ureters: Normal in size and location. Normal enhancement. No hydronephrosis or nephrolithiasis. No ureteral dilatation.  Right renal hypodensity too small to characterize.    Bladder: No evidence of wall thickening.    Reproductive organs: Uterus  surgically absent.    Bowel/Mesentery: Small bowel is normal in caliber with no evidence of obstruction. No evidence of inflammation or wall thickening.  Appendix is visualized and appears normal.  Colon demonstrates no focal wall thickening.  Diverticulosis coli without evidence of acute diverticulitis.    Peritoneum: No intraperitoneal free air or fluid    Lymph nodes: No retroperitoneal lymphadenopathy.    Vasculature: No aneurysm. No calcific atherosclerosis.    Abdominal wall:  Unremarkable.    Bones: No acute fracture. No suspicious osseous lesions.                                 Medications   sodium chloride 0.9% flush 10 mL (has no administration in time range)   melatonin tablet 6 mg (has no administration in time range)   lactated ringers bolus 1,000 mL (0 mLs Intravenous Stopped 4/14/22 1953)   ondansetron injection 4 mg (4 mg Intravenous Given 4/14/22 1907)   iohexoL (OMNIPAQUE 350) injection 100 mL (100 mLs Intravenous Given 4/14/22 2016)   cefTRIAXone (ROCEPHIN) 1 g/50 mL D5W IVPB (0 g Intravenous Stopped 4/14/22 2212)   lactated ringers bolus 1,000 mL (0 mLs Intravenous Stopped 4/14/22 2347)   promethazine (PHENERGAN) 12.5 mg in dextrose 5 % 50 mL IVPB (0 mg Intravenous Stopped 4/15/22 0033)   ketorolac injection 15 mg (15 mg Intravenous Given 4/14/22 2348)     Medical Decision Making:   History:   Old Medical Records: I decided to obtain old medical records.  Initial Assessment:   51F with HIV, HTN, HLD, GERD, YANICK, ADHD, chronic opioid use, and h/o diverticulitis s/p sigmoid colectomy who presented with 2 days of intractable nausea, vomiting, and diarrhea. On arrival to the ED she was hypertensive and tachycardic. Afebrile. She was diaphoretic and actively vomiting on exam. Light palpation to abdomen induced vomiting. She says she has had almost no PO intake since symptoms started. No relief with Imodium.   Differential Diagnosis:   Including, but not limited to:  Viral vs bacterial gastroenteritis,  diverticulitis, colitis, pyelonephritis  Independently Interpreted Test(s):   I have ordered and independently interpreted EKG Reading(s) - see prior notes  Clinical Tests:   Lab Tests: Ordered and Reviewed  The following lab test(s) were unremarkable: CBC       <> Summary of Lab: Mild hypokalemia -- K 3.6  UA c/w UTI -- 2+ prot, 1+ ket, 2+ blood, 3+ leuk  Urine microscopy with 15 RBC, >100 WBC  Radiological Study: Ordered and Reviewed  Medical Tests: Reviewed and Ordered  ED Management:  Basic labs, lipase, COVID and flu ordered. Patient given Zofran 4mg for nausea and 1L LR. Given hx of diverticulitis and previous sigmoid colectomy, CT A/P w/ contrast ordered. No evidence of diverticulitis on CT A/P. Urine microscopy with >100 WBC. Patient's presentation c/w pyelonephritis. After IVF + anti-emetics, patient still persistently nauseous and not tolerating PO. Needs to be admitted to hospital for further management of pyelonephritis.            Attending Attestation:   Physician Attestation Statement for Resident:  As the supervising MD   Physician Attestation Statement: I have personally seen and examined this patient.   I agree with the above history. -: 51-year-old female presenting with nausea, vomiting, diarrhea.  Status post hysterectomy, lap sigmoid colectomy.     As the supervising MD I agree with the above PE.   -: Actively vomiting  Non peritonitic abdomen  Mild tenderness lower quadrants  Appears uncomfortable secondary to pain   As the supervising MD I agree with the above treatment, course, plan, and disposition.   -: Lipase normal, no leukocytosis, UA consistent with infection.  Received Rocephin.  CT abdomen/pelvis without acute diverticulitis.  Patient required multiple rounds of IV medication for her symptoms, unable to tolerate p.o..  Plan to admit for clinical pyelo, intractable N/V.  I have reviewed and agree with the residents interpretation of the following: lab data, CT scans and EKG.  I have  reviewed the following: old records at this facility.                ED Course as of 04/15/22 0205   Thu Apr 14, 2022 1920 WBC: 6.92  No leukocytosis  [AB]      ED Course User Index  [AB] Shaw Andino MD             Clinical Impression:   Final diagnoses:  [R11.10] Vomiting  [N12] Pyelonephritis (Primary)  [R10.9] Abdominal pain          ED Disposition Condition    Observation               Joselo Randall MD  Resident  04/15/22 0115       Shaw Andino MD  04/15/22 0206

## 2022-04-14 NOTE — FIRST PROVIDER EVALUATION
Emergency Department TeleTriage Encounter Note      CHIEF COMPLAINT    Chief Complaint   Patient presents with    Diarrhea     N/V/D x 2 days. Received 4mg zofran IV in route.        VITAL SIGNS   Initial Vitals   BP Pulse Resp Temp SpO2   04/14/22 1713 04/14/22 1713 04/14/22 1713 04/14/22 1714 04/14/22 1713   (!) 160/96 (!) 114 18 98.6 °F (37 °C) 99 %      MAP       --                   ALLERGIES    Review of patient's allergies indicates:   Allergen Reactions    Ace inhibitors Other (See Comments)     dizziness      Efavirenz-emtricitabin-tenofov Other (See Comments)     dizziness      Penicillins Hives     Hives (skin)^ and causes yeast infection    Pork extract      Pt would like pork added  Because of strong Samaritan beliefs    Pork/porcine containing products      Pt would like pork added  Because of strong Samaritan beliefs    Tramadol Nausea And Vomiting    Emtricita-rilpivirine-tenof df Other (See Comments)     Affecting patients kidneys      Labetalol     Lactase     Metoprolol     Amlodipine Rash    Clonidine Rash    Doxazosin Rash    Hydrochlorothiazide Rash       PROVIDER TRIAGE NOTE  TeleTriage Note: Gill Cleary, a nontoxic/well appearing, 51 y.o. female, presented to the ED with c/o N/V/D since night before last. Feels like she is having fever because . Upper abdominal pain with distention. Pt given zofran in via EMS. Patient actively vomiting in triage.     All ED beds are full at present; patient notified of this status.  Patient seen and medically screened by Nurse Practitioner via teletriage. Orders initiated at triage to expedite care.  Patient is stable to return to the waiting room and will be placed in an ED bed when available.  Care will be transferred to an alternate provider when patient has been placed in an Exam Room from the [facility] for physical exam, additional orders, and disposition.  6:10 PM Mireille Finley DNP, RAYO        ORDERS  Labs Reviewed   CBC W/ AUTO  DIFFERENTIAL   COMPREHENSIVE METABOLIC PANEL   LIPASE   URINALYSIS, REFLEX TO URINE CULTURE   ISTAT CHEM8       ED Orders (720h ago, onward)    Start Ordered     Status Ordering Provider    04/14/22 1814 04/14/22 1813  Vital signs  Every 2 hours         Ordered SANGEETA, PIPENOE WHITLEY    04/14/22 1813 04/14/22 1813  Diet NPO  Diet effective now         Ordered SANGEETA, PIPE WHITLEY    04/14/22 1813 04/14/22 1813  Insert peripheral IV  Once         Ordered SANGEETA, PIPE WHITLEY    04/14/22 1813 04/14/22 1813  CBC W/ AUTO DIFFERENTIAL  STAT         Ordered SANGEETA, PIPE WHITLEY    04/14/22 1813 04/14/22 1813  Comp. Metabolic Panel  STAT         Ordered SANGEETA, PIPE WHITLEY    04/14/22 1813 04/14/22 1813  ISTAT CHEM8  Once         Ordered SANGEETA, PIPE WHITLEY    04/14/22 1813 04/14/22 1813  Lipase  STAT         Ordered SANGEETA, PIPE WHITLEY    04/14/22 1813 04/14/22 1813  Urinalysis, Reflex to Urine Culture Urine, Clean Catch  STAT         Ordered SANGEETAPIPE            Virtual Visit Note: The provider triage portion of this emergency department evaluation and documentation was performed via [employer], a HIPAA-compliant telemedicine application, in concert with a tele-presenter in the room. A face to face patient evaluation with one of my colleagues will occur once the patient is placed in an emergency department room.      DISCLAIMER: This note was prepared with [employer] voice recognition transcription software. Garbled syntax, mangled pronouns, and other bizarre constructions may be attributed to that software system.

## 2022-04-15 ENCOUNTER — PATIENT MESSAGE (OUTPATIENT)
Dept: PRIMARY CARE CLINIC | Facility: CLINIC | Age: 52
End: 2022-04-15
Payer: MEDICARE

## 2022-04-15 PROBLEM — N12 PYELONEPHRITIS: Status: ACTIVE | Noted: 2022-04-15

## 2022-04-15 LAB
ALBUMIN SERPL BCP-MCNC: 3.3 G/DL (ref 3.5–5.2)
ALP SERPL-CCNC: 76 U/L (ref 55–135)
ALT SERPL W/O P-5'-P-CCNC: 16 U/L (ref 10–44)
ANION GAP SERPL CALC-SCNC: 10 MMOL/L (ref 8–16)
AST SERPL-CCNC: 15 U/L (ref 10–40)
BILIRUB SERPL-MCNC: 0.5 MG/DL (ref 0.1–1)
BUN SERPL-MCNC: 10 MG/DL (ref 6–20)
CALCIUM SERPL-MCNC: 8.5 MG/DL (ref 8.7–10.5)
CHLORIDE SERPL-SCNC: 107 MMOL/L (ref 95–110)
CO2 SERPL-SCNC: 24 MMOL/L (ref 23–29)
CREAT SERPL-MCNC: 0.8 MG/DL (ref 0.5–1.4)
EST. GFR  (AFRICAN AMERICAN): >60 ML/MIN/1.73 M^2
EST. GFR  (NON AFRICAN AMERICAN): >60 ML/MIN/1.73 M^2
GLUCOSE SERPL-MCNC: 110 MG/DL (ref 70–110)
GLUCOSE SERPL-MCNC: 135 MG/DL (ref 70–110)
LACTATE SERPL-SCNC: 0.8 MMOL/L (ref 0.5–2.2)
POCT GLUCOSE: 113 MG/DL (ref 70–110)
POCT GLUCOSE: 135 MG/DL (ref 70–110)
POTASSIUM SERPL-SCNC: 3.8 MMOL/L (ref 3.5–5.1)
PROT SERPL-MCNC: 6.8 G/DL (ref 6–8.4)
SODIUM SERPL-SCNC: 141 MMOL/L (ref 136–145)

## 2022-04-15 PROCEDURE — 25000003 PHARM REV CODE 250: Performed by: PHYSICIAN ASSISTANT

## 2022-04-15 PROCEDURE — 27000190 HC CPAP FULL FACE MASK W/VALVE

## 2022-04-15 PROCEDURE — 94761 N-INVAS EAR/PLS OXIMETRY MLT: CPT

## 2022-04-15 PROCEDURE — 27000221 HC OXYGEN, UP TO 24 HOURS

## 2022-04-15 PROCEDURE — G0378 HOSPITAL OBSERVATION PER HR: HCPCS

## 2022-04-15 PROCEDURE — 94660 CPAP INITIATION&MGMT: CPT

## 2022-04-15 PROCEDURE — 63600175 PHARM REV CODE 636 W HCPCS

## 2022-04-15 PROCEDURE — 96366 THER/PROPH/DIAG IV INF ADDON: CPT

## 2022-04-15 PROCEDURE — 80053 COMPREHEN METABOLIC PANEL: CPT

## 2022-04-15 PROCEDURE — 93010 ELECTROCARDIOGRAM REPORT: CPT | Mod: ,,, | Performed by: INTERNAL MEDICINE

## 2022-04-15 PROCEDURE — 99499 UNLISTED E&M SERVICE: CPT | Mod: ,,,

## 2022-04-15 PROCEDURE — 99220 PR INITIAL OBSERVATION CARE,LEVL III: ICD-10-PCS | Mod: ,,, | Performed by: PHYSICIAN ASSISTANT

## 2022-04-15 PROCEDURE — 99900035 HC TECH TIME PER 15 MIN (STAT)

## 2022-04-15 PROCEDURE — 83605 ASSAY OF LACTIC ACID: CPT

## 2022-04-15 PROCEDURE — 93010 EKG 12-LEAD: ICD-10-PCS | Mod: ,,, | Performed by: INTERNAL MEDICINE

## 2022-04-15 PROCEDURE — 82962 GLUCOSE BLOOD TEST: CPT

## 2022-04-15 PROCEDURE — 99220 PR INITIAL OBSERVATION CARE,LEVL III: CPT | Mod: ,,, | Performed by: PHYSICIAN ASSISTANT

## 2022-04-15 PROCEDURE — 93005 ELECTROCARDIOGRAM TRACING: CPT

## 2022-04-15 PROCEDURE — 99499 NO LOS: ICD-10-PCS | Mod: ,,,

## 2022-04-15 RX ORDER — MAG HYDROX/ALUMINUM HYD/SIMETH 200-200-20
30 SUSPENSION, ORAL (FINAL DOSE FORM) ORAL 4 TIMES DAILY PRN
Status: DISCONTINUED | OUTPATIENT
Start: 2022-04-15 | End: 2022-04-16 | Stop reason: HOSPADM

## 2022-04-15 RX ORDER — ACETAMINOPHEN 325 MG/1
650 TABLET ORAL EVERY 4 HOURS PRN
Status: DISCONTINUED | OUTPATIENT
Start: 2022-04-15 | End: 2022-04-16 | Stop reason: HOSPADM

## 2022-04-15 RX ORDER — RISPERIDONE 1 MG/1
4 TABLET ORAL NIGHTLY
Status: DISCONTINUED | OUTPATIENT
Start: 2022-04-15 | End: 2022-04-16 | Stop reason: HOSPADM

## 2022-04-15 RX ORDER — OXYCODONE HYDROCHLORIDE 5 MG/1
5 TABLET ORAL EVERY 6 HOURS PRN
Status: DISCONTINUED | OUTPATIENT
Start: 2022-04-15 | End: 2022-04-16 | Stop reason: HOSPADM

## 2022-04-15 RX ORDER — POLYETHYLENE GLYCOL 3350 17 G/17G
17 POWDER, FOR SOLUTION ORAL DAILY
Status: DISCONTINUED | OUTPATIENT
Start: 2022-04-15 | End: 2022-04-15

## 2022-04-15 RX ORDER — NALOXONE HCL 0.4 MG/ML
0.02 VIAL (ML) INJECTION
Status: DISCONTINUED | OUTPATIENT
Start: 2022-04-15 | End: 2022-04-15

## 2022-04-15 RX ORDER — ONDANSETRON 8 MG/1
8 TABLET, ORALLY DISINTEGRATING ORAL EVERY 8 HOURS PRN
Status: DISCONTINUED | OUTPATIENT
Start: 2022-04-15 | End: 2022-04-15

## 2022-04-15 RX ORDER — LANOLIN ALCOHOL/MO/W.PET/CERES
800 CREAM (GRAM) TOPICAL
Status: DISCONTINUED | OUTPATIENT
Start: 2022-04-15 | End: 2022-04-15

## 2022-04-15 RX ORDER — PROPRANOLOL HYDROCHLORIDE 10 MG/1
20 TABLET ORAL 2 TIMES DAILY
Status: DISCONTINUED | OUTPATIENT
Start: 2022-04-15 | End: 2022-04-16 | Stop reason: HOSPADM

## 2022-04-15 RX ORDER — TALC
6 POWDER (GRAM) TOPICAL NIGHTLY PRN
Status: CANCELLED | OUTPATIENT
Start: 2022-04-15

## 2022-04-15 RX ORDER — OXYCODONE HYDROCHLORIDE 5 MG/1
7.5 TABLET ORAL 3 TIMES DAILY PRN
COMMUNITY
Start: 2022-03-21

## 2022-04-15 RX ORDER — HYDRALAZINE HYDROCHLORIDE 50 MG/1
50 TABLET, FILM COATED ORAL 3 TIMES DAILY
Status: DISCONTINUED | OUTPATIENT
Start: 2022-04-15 | End: 2022-04-16 | Stop reason: HOSPADM

## 2022-04-15 RX ORDER — BISACODYL 10 MG
10 SUPPOSITORY, RECTAL RECTAL DAILY PRN
Status: DISCONTINUED | OUTPATIENT
Start: 2022-04-15 | End: 2022-04-16 | Stop reason: HOSPADM

## 2022-04-15 RX ORDER — IBUPROFEN 200 MG
16 TABLET ORAL
Status: DISCONTINUED | OUTPATIENT
Start: 2022-04-15 | End: 2022-04-16 | Stop reason: HOSPADM

## 2022-04-15 RX ORDER — IBUPROFEN 200 MG
24 TABLET ORAL
Status: DISCONTINUED | OUTPATIENT
Start: 2022-04-15 | End: 2022-04-16 | Stop reason: HOSPADM

## 2022-04-15 RX ORDER — SODIUM CHLORIDE 0.9 % (FLUSH) 0.9 %
10 SYRINGE (ML) INJECTION
Status: DISCONTINUED | OUTPATIENT
Start: 2022-04-15 | End: 2022-04-16 | Stop reason: HOSPADM

## 2022-04-15 RX ORDER — PROCHLORPERAZINE EDISYLATE 5 MG/ML
5 INJECTION INTRAMUSCULAR; INTRAVENOUS EVERY 6 HOURS PRN
Status: DISCONTINUED | OUTPATIENT
Start: 2022-04-15 | End: 2022-04-15

## 2022-04-15 RX ORDER — GLUCAGON 1 MG
1 KIT INJECTION
Status: DISCONTINUED | OUTPATIENT
Start: 2022-04-15 | End: 2022-04-16 | Stop reason: HOSPADM

## 2022-04-15 RX ORDER — TALC
6 POWDER (GRAM) TOPICAL NIGHTLY PRN
Status: DISCONTINUED | OUTPATIENT
Start: 2022-04-15 | End: 2022-04-16 | Stop reason: HOSPADM

## 2022-04-15 RX ORDER — ACETAMINOPHEN 500 MG
1000 TABLET ORAL EVERY 8 HOURS PRN
Status: DISCONTINUED | OUTPATIENT
Start: 2022-04-15 | End: 2022-04-16 | Stop reason: HOSPADM

## 2022-04-15 RX ORDER — IPRATROPIUM BROMIDE AND ALBUTEROL SULFATE 2.5; .5 MG/3ML; MG/3ML
3 SOLUTION RESPIRATORY (INHALATION) EVERY 4 HOURS PRN
Status: DISCONTINUED | OUTPATIENT
Start: 2022-04-15 | End: 2022-04-16 | Stop reason: HOSPADM

## 2022-04-15 RX ORDER — SODIUM CHLORIDE 0.9 % (FLUSH) 0.9 %
5 SYRINGE (ML) INJECTION
Status: DISCONTINUED | OUTPATIENT
Start: 2022-04-15 | End: 2022-04-15

## 2022-04-15 RX ORDER — SIMETHICONE 80 MG
1 TABLET,CHEWABLE ORAL 4 TIMES DAILY PRN
Status: DISCONTINUED | OUTPATIENT
Start: 2022-04-15 | End: 2022-04-16 | Stop reason: HOSPADM

## 2022-04-15 RX ORDER — ATORVASTATIN CALCIUM 40 MG/1
80 TABLET, FILM COATED ORAL NIGHTLY
Status: DISCONTINUED | OUTPATIENT
Start: 2022-04-15 | End: 2022-04-16 | Stop reason: HOSPADM

## 2022-04-15 RX ORDER — SODIUM,POTASSIUM PHOSPHATES 280-250MG
2 POWDER IN PACKET (EA) ORAL
Status: DISCONTINUED | OUTPATIENT
Start: 2022-04-15 | End: 2022-04-15

## 2022-04-15 RX ORDER — ONDANSETRON 8 MG/1
8 TABLET, ORALLY DISINTEGRATING ORAL EVERY 6 HOURS
Status: DISCONTINUED | OUTPATIENT
Start: 2022-04-15 | End: 2022-04-16 | Stop reason: HOSPADM

## 2022-04-15 RX ORDER — VENLAFAXINE HYDROCHLORIDE 75 MG/1
75 CAPSULE, EXTENDED RELEASE ORAL EVERY MORNING
COMMUNITY
Start: 2022-04-06

## 2022-04-15 RX ADMIN — ATORVASTATIN CALCIUM 80 MG: 40 TABLET, FILM COATED ORAL at 10:04

## 2022-04-15 RX ADMIN — PROPRANOLOL HYDROCHLORIDE 20 MG: 20 TABLET ORAL at 10:04

## 2022-04-15 RX ADMIN — ONDANSETRON 8 MG: 8 TABLET, ORALLY DISINTEGRATING ORAL at 11:04

## 2022-04-15 RX ADMIN — RISPERIDONE 4 MG: 1 TABLET ORAL at 10:04

## 2022-04-15 RX ADMIN — CEFTRIAXONE 1 G: 1 INJECTION, SOLUTION INTRAVENOUS at 03:04

## 2022-04-15 RX ADMIN — ATORVASTATIN CALCIUM 80 MG: 40 TABLET, FILM COATED ORAL at 03:04

## 2022-04-15 RX ADMIN — ONDANSETRON 8 MG: 8 TABLET, ORALLY DISINTEGRATING ORAL at 03:04

## 2022-04-15 RX ADMIN — HYDRALAZINE HYDROCHLORIDE 50 MG: 25 TABLET, FILM COATED ORAL at 09:04

## 2022-04-15 RX ADMIN — HYDRALAZINE HYDROCHLORIDE 50 MG: 25 TABLET, FILM COATED ORAL at 10:04

## 2022-04-15 RX ADMIN — HYDRALAZINE HYDROCHLORIDE 50 MG: 25 TABLET, FILM COATED ORAL at 03:04

## 2022-04-15 RX ADMIN — BICTEGRAVIR SODIUM, EMTRICITABINE, AND TENOFOVIR ALAFENAMIDE FUMARATE 1 TABLET: 50; 200; 25 TABLET ORAL at 10:04

## 2022-04-15 RX ADMIN — VENLAFAXINE HYDROCHLORIDE 225 MG: 150 CAPSULE, EXTENDED RELEASE ORAL at 09:04

## 2022-04-15 RX ADMIN — ONDANSETRON 8 MG: 8 TABLET, ORALLY DISINTEGRATING ORAL at 07:04

## 2022-04-15 RX ADMIN — RISPERIDONE 4 MG: 1 TABLET ORAL at 03:04

## 2022-04-15 NOTE — ASSESSMENT & PLAN NOTE
There is no height or weight on file to calculate BMI. Morbid obesity complicates all aspects of disease management from diagnostic modalities to treatment. Weight loss encouraged and health benefits explained to patient.

## 2022-04-15 NOTE — PROGRESS NOTES
[facility] - Emergency Dept  [facility] Medicine  Progress Note    Patient Name: Gill Cleary  MRN: [id number]  Patient Class: OP- Observation   Admission Date: 4/14/2022  Length of Stay: 0 days  Attending Physician: Bi Templeton MD  Primary Care Provider: Jaya Boykin MD        Subjective:     Principal Problem:Pyelonephritis        HPI:  Patient is a 51 y.o female with hx of HIV, HTN, HLD, YANICK, DJD w/ opioid dependence, and h/o of diverticulitis being admitted to observation for pyelonephritis. She presented with 2 days of intractable nausea, vomiting and 7/10 diffuse abdominal pain. Pt states she has not been able to tolerate anything PO. She reports diarrhea that improved mildly with Imodium. Diarrhea has since resolved. No BM today. She denies changes to her diet prior to onset of illness. She also reports fever, diaphoresis, fatigue, and lightheadedness. She denies hematemesis, hematochezia, or pain with urination. Patient reports lower back pain, but states that it is not new and takes pain medication at home.     ED: Hypertensive and tachycardic. Zofran 4mg for nausea. CT abdomen and pelvis displayed no acute findings. EKG NSR. Lactic acid 2.3. Urinalysis displayed WBC>100. Rocephin initiated. She was given 2L LR and admitted for further management.      Overview/Hospital Course:  Patient admitted to observation for pyelnonephritis. Continuing IV ceftriaxone 1g q24 hours. Repeat lactic WNL. Advancing diet as tolerated.      Interval History: Patient seen and examined today. VSSAF, NAEON, resting comfortably in bed. Patient still complaining of epigastric pain and nausea but reports that it is improving. Patient requests to advance to soft mechanical diet. Continue IV ceftriaxone and prn pain medications and antiemetics. Urine culture pending.     Review of Systems   Constitutional:  Positive for appetite change, diaphoresis and fatigue.   HENT: Negative.     Eyes: Negative.    Respiratory:  Positive for  wheezing. Negative for chest tightness and shortness of breath.    Cardiovascular: Negative.  Negative for chest pain, palpitations and leg swelling.   Gastrointestinal:  Positive for abdominal distention, abdominal pain, diarrhea, nausea and vomiting.   Genitourinary:  Negative for dysuria, flank pain and hematuria.   Musculoskeletal:  Positive for back pain (lumbar).   Skin:  Negative for rash and wound.   Neurological:  Positive for light-headedness. Negative for dizziness and weakness.   Psychiatric/Behavioral: Negative.  Negative for confusion and hallucinations.    Objective:     Vital Signs (Most Recent):  Temp: 97.9 °F (36.6 °C) (04/15/22 1634)  Pulse: 96 (04/15/22 1634)  Resp: 18 (04/15/22 1634)  BP: 109/69 (04/15/22 1634)  SpO2: 96 % (04/15/22 1634) Vital Signs (24h Range):  Temp:  [97.9 °F (36.6 °C)-99 °F (37.2 °C)] 97.9 °F (36.6 °C)  Pulse:  [] 96  Resp:  [16-18] 18  SpO2:  [95 %-99 %] 96 %  BP: (109-183)/() 109/69        There is no height or weight on file to calculate BMI.    Intake/Output Summary (Last 24 hours) at 4/15/2022 1642  Last data filed at 4/14/2022 2212  Gross per 24 hour   Intake 1050 ml   Output --   Net 1050 ml      Physical Exam  Constitutional:       Appearance: She is diaphoretic.   HENT:      Head: Normocephalic and atraumatic.   Eyes:      Extraocular Movements: Extraocular movements intact.      Pupils: Pupils are equal, round, and reactive to light.   Cardiovascular:      Rate and Rhythm: Regular rhythm. Tachycardia present.      Heart sounds: Normal heart sounds.   Pulmonary:      Effort: Pulmonary effort is normal.      Breath sounds: Wheezing present.   Abdominal:      General: Bowel sounds are increased. There is distension.      Tenderness: There is abdominal tenderness (diffuse, increased in epigastric region). There is no right CVA tenderness or left CVA tenderness.   Musculoskeletal:         General: No swelling or tenderness.      Right lower leg: No edema.       Left lower leg: No edema.   Skin:     General: Skin is warm.   Neurological:      General: No focal deficit present.      Mental Status: She is alert and oriented to person, place, and time.   Psychiatric:         Mood and Affect: Mood normal.         Behavior: Behavior normal.       Significant Labs: All pertinent labs within the past 24 hours have been reviewed.  BMP:   Recent Labs   Lab 04/15/22  1352         K 3.8      CO2 24   BUN 10   CREATININE 0.8   CALCIUM 8.5*     CBC:   Recent Labs   Lab 04/14/22  1820 04/14/22  1832   WBC 6.92  --    HGB 16.9*  --    HCT 49.0* 51     --      Lactic Acid:   Recent Labs   Lab 04/14/22  2055 04/15/22  1352   LACTATE 2.3* 0.8       Significant Imaging: I have reviewed all pertinent imaging results/findings within the past 24 hours.      Assessment/Plan:      * Pyelonephritis  - Treat empirically with IV Rocephin  - Urine culture pending  - Urine Microscopy WBC>100  - CT abdomen and pelvis negative for diverticulitis  - Clear liquid diet as tolerated >> advance to soft mechanical diet  - IV Zofran and phenergen PRN for nausea  - Hold PO meds until tolerable >> able to tolerate    Severe obesity (BMI 35.0-39.9) with comorbidity  There is no height or weight on file to calculate BMI. Morbid obesity complicates all aspects of disease management from diagnostic modalities to treatment. Weight loss encouraged and health benefits explained to patient.     Opioid dependence  -IV oxycodone TID PRN for pain    YANICK (obstructive sleep apnea)  -CPAP at night    Essential hypertension  - stable  - PO Hydralazine and and diltiazem    Human immunodeficiency virus (HIV) disease  This patient in known to have HIV+ status (Have detected HIV PCR but never CD4 <200 or AIDS defining illness). Labs reviewed- No results found for: CD4, No results found for: HIVDNAPCR. Patient is on HAART. Will continue HAART. Prophylaxis was not indicated at this time- . Continue to  monitor routine labs. Last CD4 count was No results found for: ABSOLUTECD4    We will not consult Infectious disease at this time. Evaluate and treat HIV-associated diseases as indicated by specific problems listed below.   - continue Biktarvy daily      VTE Risk Mitigation (From admission, onward)         Ordered     IP VTE LOW RISK PATIENT  Once         04/15/22 0228     Place sequential compression device  Until discontinued         04/15/22 0228                Discharge Planning   LINH: 4/16/2022     Code Status: Full Code   Is the patient medically ready for discharge?: No    Reason for patient still in hospital (select all that apply): Patient trending condition and Treatment           Discussed patient's plan of care with Dr. Jareth Yin, PA-C  Department of Hospital Medicine   [facility] - Emergency Dept

## 2022-04-15 NOTE — HPI
Patient is a 51 y.o female with hx of HIV, HTN, HLD, YANICK, DJD w/ opioid dependence, and h/o of diverticulitis being admitted to observation for pyelonephritis. She presented with 2 days of intractable nausea, vomiting and 7/10 diffuse abdominal pain. Pt states she has not been able to tolerate anything PO. She reports diarrhea that improved mildly with Imodium. Diarrhea has since resolved. No BM today. She denies changes to her diet prior to onset of illness. She also reports fever, diaphoresis, fatigue, and lightheadedness. She denies hematemesis, hematochezia, or pain with urination. Patient reports lower back pain, but states that it is not new and takes pain medication at home.     ED: Hypertensive and tachycardic. Zofran 4mg for nausea. CT abdomen and pelvis displayed no acute findings. EKG NSR. Lactic acid 2.3. Urinalysis displayed WBC>100. Rocephin initiated. She was given 2L LR and admitted for further management.

## 2022-04-15 NOTE — ASSESSMENT & PLAN NOTE
-Treat empirically with IV Rocephin  -Urine culture pending  -Urine Microscopy WBC>100  -CT abdomen and pelvis negative for diverticulitis  -Clear liquid diet as tolerated  -IV Zofran and phenergen PRN for nausea  -Hold PO meds until tolerable

## 2022-04-15 NOTE — PHARMACY MED REC
"Admission Medication History     The home medication history was taken by Michelle Glaser.    You may go to "Admission" then "Reconcile Home Medications" tabs to review and/or act upon these items.      The home medication list has been updated by the Pharmacy department.    Please read ALL comments highlighted in yellow.    Please address this information as you see fit.     Feel free to contact us if you have any questions or require assistance.      The medications listed below were removed from the home medication list. Please reorder if appropriate:  Patient reports no longer taking the following medication(s):   HYDROXYZINE PAMOATE 25 MG   LANSOPRAZOLE 30 MG   TIZANIDINE 4 MG   TOPIRAMATE 200 MG      Medications listed below were obtained from: Patient/family  Current Outpatient Medications on File Prior to Encounter   Medication Sig    alendronate (FOSAMAX) 70 MG tablet Take 70 mg by mouth every Sunday.    atorvastatin (LIPITOR) 80 MG tablet Take 1 tablet by mouth once daily.    nheevtkcb-oyznubdh-fqqeoss ala (BIKTARVY) -25 mg per tablet Take 1 tablet by mouth once daily.    cetirizine (ZYRTEC) 10 MG tablet Take 1 tablet (10 mg total) by mouth once daily.    diltiaZEM (CARDIZEM) 120 MG tablet Take 240 mg by mouth once daily.    ergocalciferol (ERGOCALCIFEROL) 50,000 unit Cap Take 50,000 Units by mouth every 30 days.    hydrALAZINE (APRESOLINE) 50 MG tablet Take 50 mg by mouth every evening.    lisdexamfetamine (VYVANSE) 60 MG capsule Take 60 mg by mouth every morning.    oxyCODONE (ROXICODONE) 5 MG immediate release tablet Take 7.5 mg by mouth 3 (three) times daily as needed for Pain.    propranoloL (INDERAL) 40 MG tablet Take 40 mg by mouth every evening.    risperiDONE (RISPERDAL) 1 MG tablet Take 1 mg by mouth every evening.    venlafaxine (EFFEXOR-XR) 150 MG Cp24 Take 150 mg by mouth every morning.    venlafaxine (EFFEXOR-XR) 75 MG 24 hr capsule Take 75 mg by mouth every morning. "           Michelle Glaser  EXT [id number]                    .

## 2022-04-15 NOTE — ED NOTES
Pt reports feeling a lot better. Reports she wants to eat and have something to drink. 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 Will notify MD.

## 2022-04-15 NOTE — SUBJECTIVE & OBJECTIVE
Interval History: Patient seen and examined today. ESTHER, SHELBY, resting comfortably in bed. Patient still complaining of epigastric pain and nausea but reports that it is improving. Patient requests to advance to soft mechanical diet. Continue IV ceftriaxone and prn pain medications and antiemetics. Urine culture pending.     Review of Systems   Constitutional:  Positive for appetite change, diaphoresis and fatigue.   HENT: Negative.     Eyes: Negative.    Respiratory:  Positive for wheezing. Negative for chest tightness and shortness of breath.    Cardiovascular: Negative.  Negative for chest pain, palpitations and leg swelling.   Gastrointestinal:  Positive for abdominal distention, abdominal pain, diarrhea, nausea and vomiting.   Genitourinary:  Negative for dysuria, flank pain and hematuria.   Musculoskeletal:  Positive for back pain (lumbar).   Skin:  Negative for rash and wound.   Neurological:  Positive for light-headedness. Negative for dizziness and weakness.   Psychiatric/Behavioral: Negative.  Negative for confusion and hallucinations.    Objective:     Vital Signs (Most Recent):  Temp: 97.9 °F (36.6 °C) (04/15/22 1634)  Pulse: 96 (04/15/22 1634)  Resp: 18 (04/15/22 1634)  BP: 109/69 (04/15/22 1634)  SpO2: 96 % (04/15/22 1634) Vital Signs (24h Range):  Temp:  [97.9 °F (36.6 °C)-99 °F (37.2 °C)] 97.9 °F (36.6 °C)  Pulse:  [] 96  Resp:  [16-18] 18  SpO2:  [95 %-99 %] 96 %  BP: (109-183)/() 109/69        There is no height or weight on file to calculate BMI.    Intake/Output Summary (Last 24 hours) at 4/15/2022 1642  Last data filed at 4/14/2022 2212  Gross per 24 hour   Intake 1050 ml   Output --   Net 1050 ml      Physical Exam  Constitutional:       Appearance: She is diaphoretic.   HENT:      Head: Normocephalic and atraumatic.   Eyes:      Extraocular Movements: Extraocular movements intact.      Pupils: Pupils are equal, round, and reactive to light.   Cardiovascular:      Rate and Rhythm:  Regular rhythm. Tachycardia present.      Heart sounds: Normal heart sounds.   Pulmonary:      Effort: Pulmonary effort is normal.      Breath sounds: Wheezing present.   Abdominal:      General: Bowel sounds are increased. There is distension.      Tenderness: There is abdominal tenderness (diffuse, increased in epigastric region). There is no right CVA tenderness or left CVA tenderness.   Musculoskeletal:         General: No swelling or tenderness.      Right lower leg: No edema.      Left lower leg: No edema.   Skin:     General: Skin is warm.   Neurological:      General: No focal deficit present.      Mental Status: She is alert and oriented to person, place, and time.   Psychiatric:         Mood and Affect: Mood normal.         Behavior: Behavior normal.       Significant Labs: All pertinent labs within the past 24 hours have been reviewed.  BMP:   Recent Labs   Lab 04/15/22  1352         K 3.8      CO2 24   BUN 10   CREATININE 0.8   CALCIUM 8.5*     CBC:   Recent Labs   Lab 04/14/22  1820 04/14/22  1832   WBC 6.92  --    HGB 16.9*  --    HCT 49.0* 51     --      Lactic Acid:   Recent Labs   Lab 04/14/22  2055 04/15/22  1352   LACTATE 2.3* 0.8       Significant Imaging: I have reviewed all pertinent imaging results/findings within the past 24 hours.

## 2022-04-15 NOTE — ED TRIAGE NOTES
Pt to ED with complaints of epigastric pain, headache, nausea and vomiting x 2 days.     Patient identifiers for Gill Cleary 51 y.o. female checked and correct.  Chief Complaint   Patient presents with    Diarrhea     N/V/D x 2 days. Received 4mg zofran IV in route.      Past Medical History:   Diagnosis Date    Diverticulitis     HIV (human immunodeficiency virus infection)     Hyperlipemia     Hypertension     Osteoporosis     Sleep apnea     Vertigo      Allergies reported:   Review of patient's allergies indicates:   Allergen Reactions    Ace inhibitors Other (See Comments)     dizziness      Efavirenz-emtricitabin-tenofov Other (See Comments)     dizziness      Penicillins Hives     Hives (skin)^ and causes yeast infection    Pork extract      Pt would like pork added  Because of strong Oriental orthodox beliefs    Pork/porcine containing products      Pt would like pork added  Because of strong Oriental orthodox beliefs    Tramadol Nausea And Vomiting    Emtricita-rilpivirine-tenof df Other (See Comments)     Affecting patients kidneys      Labetalol     Lactase     Metoprolol     Amlodipine Rash    Clonidine Rash    Doxazosin Rash    Hydrochlorothiazide Rash         LOC: Patient is awake, alert, and aware of environment with an appropriate affect. Patient is oriented x 4 and speaking appropriately.  APPEARANCE: Patient resting comfortably and in no acute distress. Patient is clean and well groomed, patient's clothing is properly fastened.  HEENT: mm pink, moist, intact.   SKIN: The skin is warm and dry. Patient has normal skin turgor and moist mucus membranes.   MUSKULOSKELETAL: Patient is moving all extremities well, no obvious deformities noted. Pulses intact.   RESPIRATORY: Airway is open and patent. Respirations are spontaneous and non-labored with normal effort and rate.  CARDIAC: Patient has a normal rate and rhythm.  No peripheral edema noted.   ABDOMEN: No distention noted. Soft and non-tender  upon palpation.  NEUROLOGICAL: pupils 4mm, PERRL. Facial expression is symmetrical. Hand grasps are equal bilaterally. Normal sensation in all extremities when touched with finger.

## 2022-04-15 NOTE — ASSESSMENT & PLAN NOTE
This patient in known to have HIV+ status (Have detected HIV PCR but never CD4 <200 or AIDS defining illness). Labs reviewed- No results found for: CD4, No results found for: HIVDNAPCR. Patient is on HAART. Will continue HAART. Prophylaxis was not indicated at this time- . Continue to monitor routine labs. Last CD4 count was No results found for: ABSOLUTECD4    We will not consult Infectious disease at this time. Evaluate and treat HIV-associated diseases as indicated by specific problems listed below.   - continue Biktarvy daily

## 2022-04-15 NOTE — HOSPITAL COURSE
Patient admitted to observation for nausea, vomiting, and diarrhea. UTI positive, continuing IV ceftriaxone 1g q24 hours. Repeat lactic WNL. Advancing diet as tolerated. Urine culture negative, discontinue abx. Patient able to tolerate solid foods. Denies any further nausea or vomiting. VSSAF, no electrolyte abnormalities. Nausea, vomiting, and diarrhea likely gastroenteritis due to eating processed food that she usually obtains from. Stable for discharge. Return precautions given and patient verbalized understanding.

## 2022-04-15 NOTE — ED NOTES
Pt wants to know if she can drink juice, feeling up to po intake. Will ask provider if we can po challenge patient.

## 2022-04-15 NOTE — SUBJECTIVE & OBJECTIVE
Past Medical History:   Diagnosis Date    Diverticulitis     HIV (human immunodeficiency virus infection)     Hyperlipemia     Hypertension     Osteoporosis     Sleep apnea     Vertigo        Past Surgical History:   Procedure Laterality Date    LAPAROSCOPIC SIGMOIDECTOMY N/A 12/10/2018    Procedure: COLECTOMY, SIGMOID, LAPAROSCOPIC-;  Surgeon: Reyes Nelson MD;  Location: [address];  Service: General;  Laterality: N/A;    LAPAROSCOPIC TOTAL HYSTERECTOMY  2018    URETERAL STENT PLACEMENT Left 12/10/2018    Procedure: INSERTION, STENT, URETER;  Surgeon: Reyes Nelson MD;  Location: [address];  Service: General;  Laterality: Left;       Review of patient's allergies indicates:   Allergen Reactions    Ace inhibitors Other (See Comments)     dizziness      Efavirenz-emtricitabin-tenofov Other (See Comments)     dizziness      Penicillins Hives     Hives (skin)^ and causes yeast infection    Pork extract      Pt would like pork added  Because of strong Yazidism beliefs    Pork/porcine containing products      Pt would like pork added  Because of strong Yazidism beliefs    Tramadol Nausea And Vomiting    Emtricita-rilpivirine-tenof df Other (See Comments)     Affecting patients kidneys      Labetalol     Lactase     Metoprolol     Amlodipine Rash    Clonidine Rash    Doxazosin Rash    Hydrochlorothiazide Rash       No current facility-administered medications on file prior to encounter.     Current Outpatient Medications on File Prior to Encounter   Medication Sig    alendronate (FOSAMAX) 70 MG tablet Take 1 tablet (70 mg total) by mouth every 7 days.    atorvastatin (LIPITOR) 80 MG tablet Take 1 tablet by mouth once daily.    buiubwqxy-tahtctph-cjbdseh ala (BIKTARVY) -25 mg per tablet Biktarvy 50 mg-200 mg-25 mg tablet    cetirizine (ZYRTEC) 10 MG tablet Take 1 tablet (10 mg total) by mouth once daily.    diltiaZEM (CARDIZEM) 120 MG tablet diltiazem 120 mg tablet    ergocalciferol  (ERGOCALCIFEROL) 50,000 unit Cap Take 50,000 Units by mouth every 7 days.    hydrALAZINE (APRESOLINE) 50 MG tablet Take 50 mg by mouth.    hydrOXYzine pamoate (VISTARIL) 25 MG Cap Take 1 capsule (25 mg total) by mouth every 8 (eight) hours as needed (allergies).    lansoprazole (PREVACID) 30 MG capsule Take 30 mg by mouth once daily.    lisdexamfetamine (VYVANSE) 60 MG capsule Take 60 mg by mouth.    oxyCODONE (OXAYDO) 7.5 mg TbOr Take 1 tablet by mouth as needed.    propranoloL (INDERAL) 10 MG tablet Take 10 mg by mouth.    propranoloL (INDERAL) 40 MG tablet TKAE 1/2 TABLEET BY MOUTH TWICE DAILY    risperiDONE (RISPERDAL) 0.5 MG Tab     tiZANidine (ZANAFLEX) 4 MG tablet tizanidine 4 mg tablet    topiramate (TOPAMAX) 200 MG Tab Take 1 tablet (200 mg total) by mouth every evening.    venlafaxine (EFFEXOR) 75 MG tablet Take 150 mg by mouth.    venlafaxine (EFFEXOR-XR) 150 MG Cp24 venlafaxine  mg capsule,extended release 24 hr     Family History       Problem Relation (Age of Onset)    Heart attacks under age 50 Father    Hypertension Mother, Father    Other Father, Sister    Thyroid disease Mother          Tobacco Use    Smoking status: Never Smoker    Smokeless tobacco: Never Used   Substance and Sexual Activity    Alcohol use: No    Drug use: Never    Sexual activity: Never     Review of Systems   Constitutional:  Positive for appetite change, diaphoresis and fatigue.   HENT: Negative.     Eyes: Negative.    Respiratory:  Positive for wheezing. Negative for chest tightness and shortness of breath.    Cardiovascular: Negative.  Negative for chest pain, palpitations and leg swelling.   Gastrointestinal:  Positive for abdominal distention, abdominal pain, diarrhea, nausea and vomiting.   Genitourinary:  Negative for dysuria, flank pain and hematuria.   Musculoskeletal:  Positive for back pain (lumbar).   Skin:  Negative for rash and wound.   Neurological:  Positive for light-headedness. Negative for dizziness and  weakness.   Psychiatric/Behavioral: Negative.  Negative for confusion and hallucinations.    Objective:     Vital Signs (Most Recent):  Temp: 99 °F (37.2 °C) (04/14/22 2355)  Pulse: 93 (04/15/22 0030)  Resp: 16 (04/14/22 2355)  BP: (!) 167/74 (04/15/22 0030)  SpO2: 96 % (04/15/22 0030)   Vital Signs (24h Range):  Temp:  [98.6 °F (37 °C)-99 °F (37.2 °C)] 99 °F (37.2 °C)  Pulse:  [] 93  Resp:  [16-18] 16  SpO2:  [96 %-99 %] 96 %  BP: (144-183)/() 167/74        There is no height or weight on file to calculate BMI.    Physical Exam  Constitutional:       Appearance: She is diaphoretic.   HENT:      Head: Normocephalic and atraumatic.   Eyes:      Extraocular Movements: Extraocular movements intact.      Pupils: Pupils are equal, round, and reactive to light.   Cardiovascular:      Rate and Rhythm: Regular rhythm. Tachycardia present.      Heart sounds: Normal heart sounds.   Pulmonary:      Effort: Pulmonary effort is normal.      Breath sounds: Wheezing present.   Abdominal:      General: Bowel sounds are increased. There is distension.      Tenderness: There is abdominal tenderness (diffuse, increased in epigastric region). There is no right CVA tenderness or left CVA tenderness.   Musculoskeletal:         General: No swelling or tenderness.      Right lower leg: No edema.      Left lower leg: No edema.   Skin:     General: Skin is warm.   Neurological:      General: No focal deficit present.      Mental Status: She is alert and oriented to person, place, and time.   Psychiatric:         Mood and Affect: Mood normal.         Behavior: Behavior normal.         CRANIAL NERVES     CN III, IV, VI   Pupils are equal, round, and reactive to light.     Significant Labs: All pertinent labs within the past 24 hours have been reviewed.  CBC:   Recent Labs   Lab 04/14/22 1820 04/14/22 1832   WBC 6.92  --    HGB 16.9*  --    HCT 49.0* 51     --      CMP:   Recent Labs   Lab 04/14/22 1820   *   K 3.6       CO2 19*   *   BUN 14   CREATININE 0.9   CALCIUM 9.1   PROT 8.3   ALBUMIN 3.9   BILITOT 0.6   ALKPHOS 99   AST 17   ALT 18   ANIONGAP 14   EGFRNONAA >60.0     Lactic Acid:   Recent Labs   Lab 04/14/22 2055   LACTATE 2.3*       Urine Studies:   Recent Labs   Lab 04/14/22  1844   COLORU Yellow   APPEARANCEUA Cloudy*   PHUR 5.0   SPECGRAV 1.030   PROTEINUA 2+*   GLUCUA Negative   KETONESU 1+*   BILIRUBINUA Negative   OCCULTUA 2+*   NITRITE Negative   LEUKOCYTESUR 3+*   RBCUA 15*   WBCUA >100*   BACTERIA Few*   SQUAMEPITHEL 8   HYALINECASTS 0       Significant Imaging: I have reviewed all pertinent imaging results/findings within the past 24 hours.

## 2022-04-15 NOTE — ED NOTES
Pt sleeping with resp even/non-labored.  Siderails up/call light in reach, will continue to monitor

## 2022-04-15 NOTE — ASSESSMENT & PLAN NOTE
- Treat empirically with IV Rocephin  - Urine culture pending  - Urine Microscopy WBC>100  - CT abdomen and pelvis negative for diverticulitis  - Clear liquid diet as tolerated >> advance to soft mechanical diet  - IV Zofran and phenergen PRN for nausea  - Hold PO meds until tolerable >> able to tolerate

## 2022-04-15 NOTE — ED NOTES
Pt sleeping, arouses to verbal stimuli, reports relief of pain at present.  Siderails up and call light in reach.  Pt updated on status and v/u.      LOC: The patient is awake, alert and aware of environment with an appropriate affect, the patient is oriented x 3 and speaking appropriately.  APPEARANCE: Patient resting comfortably and in no acute distress, patient is clean and well groomed, patient's clothing is properly fastened.  MUSCULOSKELETAL: Patient moving all extremities spontaneously, no obvious swelling or deformities noted.  RESPIRATORY: Airway is open and patent, respirations are spontaneous, patient has a normal effort and rate, no accessory muscle use noted.  ABDOMEN: Soft and non tender to palpation, no distention noted.

## 2022-04-15 NOTE — H&P
[facility] Replaced by [facility] - Emergency Dept  [facility]  History & Physical    Patient Name: Gill Cleary  MRN: [id number]  Patient Class: OP- Observation  Admission Date: 4/14/2022  Attending Physician: Bi Templeton MD   Primary Care Provider: Jaya Boykin MD         Patient information was obtained from patient, past medical records and ER records.     Subjective:     Principal Problem:Pyelonephritis    Chief Complaint:   Chief Complaint   Patient presents with    Diarrhea     N/V/D x 2 days. Received 4mg zofran IV in route.         HPI: Patient is a 51 y.o female with hx of HIV, HTN, HLD, YANICK, DJD w/ opioid dependence, and h/o of diverticulitis being admitted to observation for pyelonephritis. She presented with 2 days of intractable nausea, vomiting and 7/10 diffuse abdominal pain. Pt states she has not been able to tolerate anything PO. She reports diarrhea that improved mildly with Imodium. Diarrhea has since resolved. No BM today. She denies changes to her diet prior to onset of illness. She also reports fever, diaphoresis, fatigue, and lightheadedness. She denies hematemesis, hematochezia, or pain with urination. Patient reports lower back pain, but states that it is not new and takes pain medication at home.     ED: Hypertensive and tachycardic. Zofran 4mg for nausea. CT abdomen and pelvis displayed no acute findings. EKG NSR. Lactic acid 2.3. Urinalysis displayed WBC>100. Rocephin initiated. She was given 2L LR and admitted for further management.      Past Medical History:   Diagnosis Date    Diverticulitis     HIV (human immunodeficiency virus infection)     Hyperlipemia     Hypertension     Osteoporosis     Sleep apnea     Vertigo        Past Surgical History:   Procedure Laterality Date    LAPAROSCOPIC SIGMOIDECTOMY N/A 12/10/2018    Procedure: COLECTOMY, SIGMOID, LAPAROSCOPIC-;  Surgeon: Reyes Nelson MD;  Location: [facility] OR [address] [place] [address];  Service: General;  Laterality: N/A;    LAPAROSCOPIC  TOTAL HYSTERECTOMY  2018    URETERAL STENT PLACEMENT Left 12/10/2018    Procedure: INSERTION, STENT, URETER;  Surgeon: Reyes Nelson MD;  Location: [facility] OR [address] [place] [address];  Service: General;  Laterality: Left;       Review of patient's allergies indicates:   Allergen Reactions    Ace inhibitors Other (See Comments)     dizziness      Efavirenz-emtricitabin-tenofov Other (See Comments)     dizziness      Penicillins Hives     Hives (skin)^ and causes yeast infection    Pork extract      Pt would like pork added  Because of strong Zoroastrianism beliefs    Pork/porcine containing products      Pt would like pork added  Because of strong Zoroastrianism beliefs    Tramadol Nausea And Vomiting    Emtricita-rilpivirine-tenof df Other (See Comments)     Affecting patients kidneys      Labetalol     Lactase     Metoprolol     Amlodipine Rash    Clonidine Rash    Doxazosin Rash    Hydrochlorothiazide Rash       No current facility-administered medications on file prior to encounter.     Current Outpatient Medications on File Prior to Encounter   Medication Sig    alendronate (FOSAMAX) 70 MG tablet Take 1 tablet (70 mg total) by mouth every 7 days.    atorvastatin (LIPITOR) 80 MG tablet Take 1 tablet by mouth once daily.    rihqzeseo-mpszuehz-ugupxfp ala (BIKTARVY) -25 mg per tablet Biktarvy 50 mg-200 mg-25 mg tablet    cetirizine (ZYRTEC) 10 MG tablet Take 1 tablet (10 mg total) by mouth once daily.    diltiaZEM (CARDIZEM) 120 MG tablet diltiazem 120 mg tablet    ergocalciferol (ERGOCALCIFEROL) 50,000 unit Cap Take 50,000 Units by mouth every 7 days.    hydrALAZINE (APRESOLINE) 50 MG tablet Take 50 mg by mouth.    hydrOXYzine pamoate (VISTARIL) 25 MG Cap Take 1 capsule (25 mg total) by mouth every 8 (eight) hours as needed (allergies).    lansoprazole (PREVACID) 30 MG capsule Take 30 mg by mouth once daily.    lisdexamfetamine (VYVANSE) 60 MG capsule Take 60 mg by mouth.    oxyCODONE (OXAYDO) 7.5 mg TbOr  Take 1 tablet by mouth as needed.    propranoloL (INDERAL) 10 MG tablet Take 10 mg by mouth.    propranoloL (INDERAL) 40 MG tablet TKAE 1/2 TABLEET BY MOUTH TWICE DAILY    risperiDONE (RISPERDAL) 0.5 MG Tab     tiZANidine (ZANAFLEX) 4 MG tablet tizanidine 4 mg tablet    topiramate (TOPAMAX) 200 MG Tab Take 1 tablet (200 mg total) by mouth every evening.    venlafaxine (EFFEXOR) 75 MG tablet Take 150 mg by mouth.    venlafaxine (EFFEXOR-XR) 150 MG Cp24 venlafaxine  mg capsule,extended release 24 hr     Family History       Problem Relation (Age of Onset)    Heart attacks under age 50 Father    Hypertension Mother, Father    Other Father, Sister    Thyroid disease Mother          Tobacco Use    Smoking status: Never Smoker    Smokeless tobacco: Never Used   Substance and Sexual Activity    Alcohol use: No    Drug use: Never    Sexual activity: Never     Review of Systems   Constitutional:  Positive for appetite change, diaphoresis and fatigue.   HENT: Negative.     Eyes: Negative.    Respiratory:  Positive for wheezing. Negative for chest tightness and shortness of breath.    Cardiovascular: Negative.  Negative for chest pain, palpitations and leg swelling.   Gastrointestinal:  Positive for abdominal distention, abdominal pain, diarrhea, nausea and vomiting.   Genitourinary:  Negative for dysuria, flank pain and hematuria.   Musculoskeletal:  Positive for back pain (lumbar).   Skin:  Negative for rash and wound.   Neurological:  Positive for light-headedness. Negative for dizziness and weakness.   Psychiatric/Behavioral: Negative.  Negative for confusion and hallucinations.    Objective:     Vital Signs (Most Recent):  Temp: 99 °F (37.2 °C) (04/14/22 2355)  Pulse: 93 (04/15/22 0030)  Resp: 16 (04/14/22 2355)  BP: (!) 167/74 (04/15/22 0030)  SpO2: 96 % (04/15/22 0030)   Vital Signs (24h Range):  Temp:  [98.6 °F (37 °C)-99 °F (37.2 °C)] 99 °F (37.2 °C)  Pulse:  [] 93  Resp:  [16-18] 16  SpO2:   [96 %-99 %] 96 %  BP: (144-183)/() 167/74        There is no height or weight on file to calculate BMI.    Physical Exam  Constitutional:       Appearance: She is diaphoretic.   HENT:      Head: Normocephalic and atraumatic.   Eyes:      Extraocular Movements: Extraocular movements intact.      Pupils: Pupils are equal, round, and reactive to light.   Cardiovascular:      Rate and Rhythm: Regular rhythm. Tachycardia present.      Heart sounds: Normal heart sounds.   Pulmonary:      Effort: Pulmonary effort is normal.      Breath sounds: Wheezing present.   Abdominal:      General: Bowel sounds are increased. There is distension.      Tenderness: There is abdominal tenderness (diffuse, increased in epigastric region). There is no right CVA tenderness or left CVA tenderness.   Musculoskeletal:         General: No swelling or tenderness.      Right lower leg: No edema.      Left lower leg: No edema.   Skin:     General: Skin is warm.   Neurological:      General: No focal deficit present.      Mental Status: She is alert and oriented to person, place, and time.   Psychiatric:         Mood and Affect: Mood normal.         Behavior: Behavior normal.         CRANIAL NERVES     CN III, IV, VI   Pupils are equal, round, and reactive to light.     Significant Labs: All pertinent labs within the past 24 hours have been reviewed.  CBC:   Recent Labs   Lab 04/14/22  1820 04/14/22  1832   WBC 6.92  --    HGB 16.9*  --    HCT 49.0* 51     --      CMP:   Recent Labs   Lab 04/14/22  1820   *   K 3.6      CO2 19*   *   BUN 14   CREATININE 0.9   CALCIUM 9.1   PROT 8.3   ALBUMIN 3.9   BILITOT 0.6   ALKPHOS 99   AST 17   ALT 18   ANIONGAP 14   EGFRNONAA >60.0     Lactic Acid:   Recent Labs   Lab 04/14/22  2055   LACTATE 2.3*       Urine Studies:   Recent Labs   Lab 04/14/22  1844   COLORU Yellow   APPEARANCEUA Cloudy*   PHUR 5.0   SPECGRAV 1.030   PROTEINUA 2+*   GLUCUA Negative   KETONESU 1+*    BILIRUBINUA Negative   OCCULTUA 2+*   NITRITE Negative   LEUKOCYTESUR 3+*   RBCUA 15*   WBCUA >100*   BACTERIA Few*   SQUAMEPITHEL 8   HYALINECASTS 0       Significant Imaging: I have reviewed all pertinent imaging results/findings within the past 24 hours.    Assessment/Plan:     * Pyelonephritis  -Treat empirically with IV Rocephin  -Urine culture pending  -Urine Microscopy WBC>100  -CT abdomen and pelvis negative for diverticulitis  -Clear liquid diet as tolerated  -IV Zofran and phenergen PRN for nausea  -Hold PO meds until tolerable     Severe obesity (BMI 35.0-39.9) with comorbidity  There is no height or weight on file to calculate BMI. Morbid obesity complicates all aspects of disease management from diagnostic modalities to treatment. Weight loss encouraged and health benefits explained to patient.     Opioid dependence  -IV oxycodone TID PRN for pain    YANICK (obstructive sleep apnea)  -CPAP at night    Essential hypertension  -IV hydralazine   -PO Hydralazine and and diltiazem once pt can tolerate PO    Human immunodeficiency virus (HIV) disease  This patient in known to have HIV+ status (Have detected HIV PCR but never CD4 <200 or AIDS defining illness). Labs reviewed- No results found for: CD4, No results found for: HIVDNAPCR. Patient is on HAART. Will continue HAART. Prophylaxis was not indicated at this time- . Continue to monitor routine labs. Last CD4 count was No results found for: ABSOLUTECD4    We will not consult Infectious disease at this time. Evaluate and treat HIV-associated diseases as indicated by specific problems listed below.   - continue Biktarvy daily      VTE Risk Mitigation (From admission, onward)         Ordered     IP VTE LOW RISK PATIENT  Once         04/15/22 0228     Place sequential compression device  Until discontinued         04/15/22 0228                   Daljit Matias PA-C  Department of Hospital Medicine   [facility] - Emergency Dept

## 2022-04-16 VITALS
RESPIRATION RATE: 18 BRPM | OXYGEN SATURATION: 95 % | TEMPERATURE: 98 F | DIASTOLIC BLOOD PRESSURE: 58 MMHG | SYSTOLIC BLOOD PRESSURE: 120 MMHG | HEART RATE: 83 BPM

## 2022-04-16 LAB
ALBUMIN SERPL BCP-MCNC: 3.1 G/DL (ref 3.5–5.2)
ALP SERPL-CCNC: 69 U/L (ref 55–135)
ALT SERPL W/O P-5'-P-CCNC: 15 U/L (ref 10–44)
ANION GAP SERPL CALC-SCNC: 9 MMOL/L (ref 8–16)
AST SERPL-CCNC: 13 U/L (ref 10–40)
BACTERIA UR CULT: NORMAL
BACTERIA UR CULT: NORMAL
BASOPHILS # BLD AUTO: 0.01 K/UL (ref 0–0.2)
BASOPHILS NFR BLD: 0.2 % (ref 0–1.9)
BILIRUB SERPL-MCNC: 0.5 MG/DL (ref 0.1–1)
BUN SERPL-MCNC: 9 MG/DL (ref 6–20)
CALCIUM SERPL-MCNC: 8.5 MG/DL (ref 8.7–10.5)
CHLORIDE SERPL-SCNC: 107 MMOL/L (ref 95–110)
CO2 SERPL-SCNC: 24 MMOL/L (ref 23–29)
CREAT SERPL-MCNC: 0.8 MG/DL (ref 0.5–1.4)
DIFFERENTIAL METHOD: ABNORMAL
EOSINOPHIL # BLD AUTO: 0.2 K/UL (ref 0–0.5)
EOSINOPHIL NFR BLD: 2.8 % (ref 0–8)
ERYTHROCYTE [DISTWIDTH] IN BLOOD BY AUTOMATED COUNT: 12.3 % (ref 11.5–14.5)
EST. GFR  (AFRICAN AMERICAN): >60 ML/MIN/1.73 M^2
EST. GFR  (NON AFRICAN AMERICAN): >60 ML/MIN/1.73 M^2
GLUCOSE SERPL-MCNC: 111 MG/DL (ref 70–110)
HCT VFR BLD AUTO: 36.6 % (ref 37–48.5)
HGB BLD-MCNC: 12.2 G/DL (ref 12–16)
IMM GRANULOCYTES # BLD AUTO: 0.02 K/UL (ref 0–0.04)
IMM GRANULOCYTES NFR BLD AUTO: 0.3 % (ref 0–0.5)
LYMPHOCYTES # BLD AUTO: 1.4 K/UL (ref 1–4.8)
LYMPHOCYTES NFR BLD: 20.8 % (ref 18–48)
MCH RBC QN AUTO: 29.7 PG (ref 27–31)
MCHC RBC AUTO-ENTMCNC: 33.3 G/DL (ref 32–36)
MCV RBC AUTO: 89 FL (ref 82–98)
MONOCYTES # BLD AUTO: 0.5 K/UL (ref 0.3–1)
MONOCYTES NFR BLD: 8.2 % (ref 4–15)
NEUTROPHILS # BLD AUTO: 4.4 K/UL (ref 1.8–7.7)
NEUTROPHILS NFR BLD: 67.7 % (ref 38–73)
NRBC BLD-RTO: 0 /100 WBC
PLATELET # BLD AUTO: 230 K/UL (ref 150–450)
PMV BLD AUTO: 11.1 FL (ref 9.2–12.9)
POCT GLUCOSE: 94 MG/DL (ref 70–110)
POCT GLUCOSE: 99 MG/DL (ref 70–110)
POTASSIUM SERPL-SCNC: 3.9 MMOL/L (ref 3.5–5.1)
PROT SERPL-MCNC: 5.8 G/DL (ref 6–8.4)
RBC # BLD AUTO: 4.11 M/UL (ref 4–5.4)
SODIUM SERPL-SCNC: 140 MMOL/L (ref 136–145)
WBC # BLD AUTO: 6.48 K/UL (ref 3.9–12.7)

## 2022-04-16 PROCEDURE — 25000003 PHARM REV CODE 250

## 2022-04-16 PROCEDURE — G0378 HOSPITAL OBSERVATION PER HR: HCPCS

## 2022-04-16 PROCEDURE — 80053 COMPREHEN METABOLIC PANEL: CPT

## 2022-04-16 PROCEDURE — 25000003 PHARM REV CODE 250: Performed by: PHYSICIAN ASSISTANT

## 2022-04-16 PROCEDURE — 99217 PR OBSERVATION CARE DISCHARGE: CPT | Mod: ,,,

## 2022-04-16 PROCEDURE — 99217 PR OBSERVATION CARE DISCHARGE: ICD-10-PCS | Mod: ,,,

## 2022-04-16 PROCEDURE — 85025 COMPLETE CBC W/AUTO DIFF WBC: CPT

## 2022-04-16 PROCEDURE — 36415 COLL VENOUS BLD VENIPUNCTURE: CPT

## 2022-04-16 RX ORDER — FAMOTIDINE 20 MG/1
20 TABLET, FILM COATED ORAL 2 TIMES DAILY
Status: DISCONTINUED | OUTPATIENT
Start: 2022-04-16 | End: 2022-04-16 | Stop reason: HOSPADM

## 2022-04-16 RX ORDER — MAG HYDROX/ALUMINUM HYD/SIMETH 200-200-20
30 SUSPENSION, ORAL (FINAL DOSE FORM) ORAL
Status: DISCONTINUED | OUTPATIENT
Start: 2022-04-16 | End: 2022-04-16 | Stop reason: HOSPADM

## 2022-04-16 RX ADMIN — ONDANSETRON 8 MG: 8 TABLET, ORALLY DISINTEGRATING ORAL at 12:04

## 2022-04-16 RX ADMIN — ONDANSETRON 8 MG: 8 TABLET, ORALLY DISINTEGRATING ORAL at 06:04

## 2022-04-16 RX ADMIN — FAMOTIDINE 20 MG: 20 TABLET, FILM COATED ORAL at 11:04

## 2022-04-16 RX ADMIN — ALUMINUM HYDROXIDE, MAGNESIUM HYDROXIDE, AND SIMETHICONE 30 ML: 200; 200; 20 SUSPENSION ORAL at 11:04

## 2022-04-16 RX ADMIN — ONDANSETRON 8 MG: 8 TABLET, ORALLY DISINTEGRATING ORAL at 01:04

## 2022-04-16 RX ADMIN — VENLAFAXINE HYDROCHLORIDE 225 MG: 150 CAPSULE, EXTENDED RELEASE ORAL at 09:04

## 2022-04-16 NOTE — NURSING
Pt refused cpap due to the noise it makes while in use.  Asked if she can sleep safely without a cpap and she stated she has not worn one in a couple weeks.  Instructed to call for assistance if she detects sob.  Stated understanding.  Call light in reach. Will cont to monitor.

## 2022-04-16 NOTE — NURSING
"[facility] medicine F reference patient stated, " she takes Hydralazine 50 mg at night and cardizem 240 mg in am . Shanon EUGENE in to see patient   "

## 2022-04-16 NOTE — NURSING
Received report from Nahed MORALES patient alert oriented x 3 independent 18 g to left ac saline lock. CPAP at night . No c/o of pain no c/o of N/V accucheks ac and hs last BS was 99 no noted distress.

## 2022-04-16 NOTE — NURSING
Pt received copy of discharged papers instructions. Pt denies pain at this time. Pt educated on signs and symptoms of when to call the doctor. All belongings with the patient . Pt verbalized understanding. Patient waiting for trasport

## 2022-04-16 NOTE — PLAN OF CARE
POC reviewed with patient. All questions and concerns reviewed. Safety precautions implemented and maintained. No acute changes noted since pts floor admit. Pt VSS and pt denies any further needs. Pt resting in bed in NAD at this time. Bed locked in lowest position. Call bell within reach.        Problem: Adult Inpatient Plan of Care  Goal: Plan of Care Review  Outcome: Ongoing, Progressing  Goal: Patient-Specific Goal (Individualized)  Outcome: Ongoing, Progressing  Goal: Absence of Hospital-Acquired Illness or Injury  Outcome: Ongoing, Progressing  Goal: Optimal Comfort and Wellbeing  Outcome: Ongoing, Progressing  Goal: Readiness for Transition of Care  Outcome: Ongoing, Progressing     Problem: Infection  Goal: Absence of Infection Signs and Symptoms  Outcome: Ongoing, Progressing

## 2022-04-16 NOTE — NURSING
Pt up to bedside requesting a gown to take a shower this morning.  No acute changes noted during shift.  Call light  In reach.  Safety maintained.

## 2022-04-28 PROBLEM — R19.7 NAUSEA, VOMITING, AND DIARRHEA: Status: ACTIVE | Noted: 2022-04-28

## 2022-04-28 PROBLEM — R11.2 NAUSEA, VOMITING, AND DIARRHEA: Status: ACTIVE | Noted: 2022-04-28

## 2022-04-28 NOTE — DISCHARGE SUMMARY
[facility] [facility]  Discharge Summary      Patient Name: Gill Cleary  MRN: [id number]  Patient Class: OP- Observation  Admission Date: 4/14/2022  Hospital Length of Stay: 0 days  Discharge Date and Time: 4/16/2022  3:02 PM  Attending Physician: Alysia att. providers found   Discharging Provider: Shanon Yin PA-C  Primary Care Provider: Jaya Boykin MD  [facility] Medicine Team: [facility] HOSP MED F Shanon Yin PA-C    HPI:   Patient is a 51 y.o female with hx of HIV, HTN, HLD, YANICK, DJD w/ opioid dependence, and h/o of diverticulitis being admitted to observation for pyelonephritis. She presented with 2 days of intractable nausea, vomiting and 7/10 diffuse abdominal pain. Pt states she has not been able to tolerate anything PO. She reports diarrhea that improved mildly with Imodium. Diarrhea has since resolved. No BM today. She denies changes to her diet prior to onset of illness. She also reports fever, diaphoresis, fatigue, and lightheadedness. She denies hematemesis, hematochezia, or pain with urination. Patient reports lower back pain, but states that it is not new and takes pain medication at home.     ED: Hypertensive and tachycardic. Zofran 4mg for nausea. CT abdomen and pelvis displayed no acute findings. EKG NSR. Lactic acid 2.3. Urinalysis displayed WBC>100. Rocephin initiated. She was given 2L LR and admitted for further management.      * No surgery found *      Hospital Course:   Patient admitted to observation for nausea, vomiting, and diarrhea. UTI positive, continuing IV ceftriaxone 1g q24 hours. Repeat lactic WNL. Advancing diet as tolerated. Urine culture negative, discontinue abx. Patient able to tolerate solid foods. Denies any further nausea or vomiting. VSSAF, no electrolyte abnormalities. Nausea, vomiting, and diarrhea likely gastroenteritis due to eating processed food that she usually obtains from. Stable for discharge. Return precautions given  and patient verbalized understanding.        Goals of Care Treatment Preferences:  Code Status: Full Code      Consults:     * Nausea, vomiting, and diarrhea  - Urine Microscopy WBC>100   - Treat empirically with IV Rocephin   - Urine culture negative -- d/c abx  - CT abdomen and pelvis without acute findings  - Advanced to regular diet without issues  - IV Zofran and phenergen PRN for nausea  - Nausea, vomiting, and diarrhea resolved  - likely caused by gastroenteritis from eating processed food that she is not accustomed to  - stable for discharge      Severe obesity (BMI 35.0-39.9) with comorbidity  Morbid obesity complicates all aspects of disease management from diagnostic modalities to treatment. Weight loss encouraged and health benefits explained to patient.     Essential hypertension  - stable  - PO Hydralazine and and diltiazem    Human immunodeficiency virus (HIV) disease  This patient in known to have HIV+ status (Have detected HIV PCR but never CD4 <200 or AIDS defining illness). Labs reviewed- No results found for: CD4, No results found for: HIVDNAPCR. Patient is on HAART. Will continue HAART. Prophylaxis was not indicated at this time- . Continue to monitor routine labs. Last CD4 count was No results found for: ABSOLUTECD4    We will not consult Infectious disease at this time. Evaluate and treat HIV-associated diseases as indicated by specific problems listed below.   - continue Biktarvy daily      Final Active Diagnoses:    Diagnosis Date Noted POA    PRINCIPAL PROBLEM:  Nausea, vomiting, and diarrhea [R11.2, R19.7] 04/28/2022 Yes    Severe obesity (BMI 35.0-39.9) with comorbidity [E66.01] 12/05/2021 Yes    YANICK (obstructive sleep apnea) [G47.33]  Yes    Opioid dependence [F11.20] 08/26/2016 Yes    Essential hypertension [I10] 01/19/2012 Yes    Human immunodeficiency virus (HIV) disease [B20] 07/16/2008 Yes      Problems Resolved During this Admission:       Discharged Condition:  good    Disposition: Home or Self Care    Follow Up:   Follow-up Information     Jaya Boykin MD In 1 week.    Specialty: Family Medicine  Contact information:  [address]  [address]  [facility] [address]  [phone]             [facility] - Emergency Dept.    Specialty: Emergency Medicine  Why: As needed, If symptoms worsen  Contact information:  [address]  [facility] [address] [phone]                     Patient Instructions:      Diet Cardiac     Notify your health care provider if you experience any of the following:  temperature >100.4     Notify your health care provider if you experience any of the following:  severe uncontrolled pain     Notify your health care provider if you experience any of the following:  persistent dizziness, light-headedness, or visual disturbances     Activity as tolerated       Significant Diagnostic Studies: Radiology: CT scan: CT ABDOMEN PELVIS WITH CONTRAST:   Results for orders placed or performed during the hospital encounter of 04/14/22   CT Abdomen Pelvis With Contrast    Narrative    EXAMINATION:  CT ABDOMEN PELVIS WITH CONTRAST    CLINICAL HISTORY:  Nausea/vomiting;    TECHNIQUE:  Axial images of the abdomen and pelvis were acquired  after the use of 100 cc Iwoi235 IV contrast.  Coronal and sagittal reconstructions were also obtained    COMPARISON:  CT abdomen pelvis 12/17/2018    FINDINGS:  Heart: Normal in size. No pericardial effusion.    Lungs: Visualized portions of the lungs are clear.    Liver: Normal in size and contour.  No focal hepatic lesion.    Gallbladder: No calcified gallstones.    Bile Ducts: No evidence of dilated ducts.    Pancreas: No mass or peripancreatic fat stranding.    Spleen: Unremarkable.    Stomach and duodenum: Unremarkable.    Adrenals: Unremarkable.    Kidneys/Ureters: Normal in size and location. Normal enhancement. No hydronephrosis or nephrolithiasis. No ureteral dilatation.  Right renal hypodensity  too small to characterize.    Bladder: No evidence of wall thickening.    Reproductive organs: Uterus surgically absent.    Bowel/Mesentery: Small bowel is normal in caliber with no evidence of obstruction. No evidence of inflammation or wall thickening.  Appendix is visualized and appears normal.  Colon demonstrates no focal wall thickening.  Diverticulosis coli without evidence of acute diverticulitis.    Peritoneum: No intraperitoneal free air or fluid    Lymph nodes: No retroperitoneal lymphadenopathy.    Vasculature: No aneurysm. No calcific atherosclerosis.    Abdominal wall:  Unremarkable.    Bones: No acute fracture. No suspicious osseous lesions.      Impression    1. No acute findings in the abdomen or pelvis.  2. Diverticulosis coli without evidence of acute diverticulitis.    Electronically signed by resident: Modesto Felder  Date:    04/14/2022  Time:    20:18    Electronically signed by: David Wade  Date:    04/14/2022  Time:    20:37       Pending Diagnostic Studies:     None         Medications:  Reconciled Home Medications:      Medication List      CHANGE how you take these medications    alendronate 70 MG tablet  Commonly known as: FOSAMAX  Take 1 tablet (70 mg total) by mouth every 7 days.  What changed: when to take this        CONTINUE taking these medications    atorvastatin 80 MG tablet  Commonly known as: LIPITOR  Take 1 tablet by mouth once daily.     BIKTARVY -25 mg (25 kg or greater)  Generic drug: jdhvuxaog-toiqdjjf-rczwiue ala  Take 1 tablet by mouth once daily.     cetirizine 10 MG tablet  Commonly known as: ZYRTEC  Take 1 tablet (10 mg total) by mouth once daily.     diltiaZEM 120 MG tablet  Commonly known as: CARDIZEM  Take 240 mg by mouth once daily.     ergocalciferol 50,000 unit Cap  Commonly known as: ERGOCALCIFEROL  Take 50,000 Units by mouth every 30 days.     hydrALAZINE 50 MG tablet  Commonly known as: APRESOLINE  Take 50 mg by mouth every evening.      lisdexamfetamine 60 MG capsule  Commonly known as: VYVANSE  Take 60 mg by mouth every morning.     oxyCODONE 5 MG immediate release tablet  Commonly known as: ROXICODONE  Take 7.5 mg by mouth 3 (three) times daily as needed for Pain.     propranoloL 40 MG tablet  Commonly known as: INDERAL  Take 40 mg by mouth every evening.     risperiDONE 1 MG tablet  Commonly known as: RISPERDAL  Take 1 mg by mouth every evening.     * venlafaxine 150 MG Cp24  Commonly known as: EFFEXOR-XR  Take 150 mg by mouth every morning.     * venlafaxine 75 MG 24 hr capsule  Commonly known as: EFFEXOR-XR  Take 75 mg by mouth every morning.         * This list has 2 medication(s) that are the same as other medications prescribed for you. Read the directions carefully, and ask your doctor or other care provider to review them with you.                Indwelling Lines/Drains at time of discharge:   Lines/Drains/Airways     None                 Time spent on the discharge of patient: 35 minutes     Discussed patient's plan of care with Dr. Jareth Yin PA-C  Department of Hospital Medicine  [facility] - Telemetry Stepdown ([place]-[facility])

## 2022-04-28 NOTE — ASSESSMENT & PLAN NOTE
- Urine Microscopy WBC>100   - Treat empirically with IV Rocephin   - Urine culture negative -- d/c abx  - CT abdomen and pelvis without acute findings  - Advanced to regular diet without issues  - IV Zofran and phenergen PRN for nausea  - Nausea, vomiting, and diarrhea resolved  - likely caused by gastroenteritis from eating processed food that she is not accustomed to  - stable for discharge

## 2022-04-28 NOTE — ASSESSMENT & PLAN NOTE
- Treat empirically with IV Rocephin  - Urine culture negative -- d/c abx  - Urine Microscopy WBC>100  - CT abdomen and pelvis negative for diverticulitis  - Clear liquid diet as tolerated >> advance to soft mechanical diet  - IV Zofran and phenergen PRN for nausea  - Hold PO meds until tolerable >> able to tolerate

## 2022-04-28 NOTE — ASSESSMENT & PLAN NOTE
Morbid obesity complicates all aspects of disease management from diagnostic modalities to treatment. Weight loss encouraged and health benefits explained to patient.

## 2022-05-06 ENCOUNTER — PATIENT MESSAGE (OUTPATIENT)
Dept: PRIMARY CARE CLINIC | Facility: CLINIC | Age: 52
End: 2022-05-06
Payer: MEDICARE

## 2022-05-09 LAB — POCT GLUCOSE: 102 MG/DL (ref 70–110)

## 2022-05-24 ENCOUNTER — PATIENT MESSAGE (OUTPATIENT)
Dept: PRIMARY CARE CLINIC | Facility: CLINIC | Age: 52
End: 2022-05-24
Payer: MEDICARE

## 2022-06-08 ENCOUNTER — PATIENT MESSAGE (OUTPATIENT)
Dept: PRIMARY CARE CLINIC | Facility: CLINIC | Age: 52
End: 2022-06-08
Payer: MEDICARE

## 2022-06-08 DIAGNOSIS — I10 PRIMARY HYPERTENSION: Primary | ICD-10-CM

## 2022-06-08 RX ORDER — DILTIAZEM HYDROCHLORIDE 240 MG/1
240 CAPSULE, COATED, EXTENDED RELEASE ORAL DAILY
Qty: 30 CAPSULE | Refills: 1 | Status: SHIPPED | OUTPATIENT
Start: 2022-06-08 | End: 2023-08-03 | Stop reason: SDUPTHER

## 2022-08-15 ENCOUNTER — TELEPHONE (OUTPATIENT)
Dept: PRIMARY CARE CLINIC | Facility: CLINIC | Age: 52
End: 2022-08-15
Payer: MEDICARE

## 2022-08-15 NOTE — TELEPHONE ENCOUNTER
----- Message from Clovis Kang sent at 8/15/2022 12:05 PM CDT -----  Contact: self [phone]  Pt requesting a call stated she is out the hospital.    Please call and advise

## 2022-08-20 ENCOUNTER — PATIENT MESSAGE (OUTPATIENT)
Dept: OBSTETRICS AND GYNECOLOGY | Facility: CLINIC | Age: 52
End: 2022-08-20
Payer: MEDICARE

## 2022-08-23 ENCOUNTER — PATIENT MESSAGE (OUTPATIENT)
Dept: PRIMARY CARE CLINIC | Facility: CLINIC | Age: 52
End: 2022-08-23

## 2022-08-23 ENCOUNTER — TELEMEDICINE (OUTPATIENT)
Dept: PRIMARY CARE CLINIC | Facility: CLINIC | Age: 52
End: 2022-08-23
Payer: MEDICARE

## 2022-08-23 DIAGNOSIS — U07.1 COVID-19: Primary | ICD-10-CM

## 2022-08-23 DIAGNOSIS — U07.1 COVID-19: ICD-10-CM

## 2022-08-23 RX ORDER — BENZONATATE 100 MG/1
100 CAPSULE ORAL 3 TIMES DAILY PRN
Qty: 30 CAPSULE | Refills: 0 | Status: SHIPPED | OUTPATIENT
Start: 2022-08-23 | End: 2022-09-02

## 2022-08-23 RX ORDER — PROMETHAZINE HYDROCHLORIDE AND DEXTROMETHORPHAN HYDROBROMIDE 6.25; 15 MG/5ML; MG/5ML
5 SYRUP ORAL EVERY 4 HOURS PRN
Qty: 180 ML | Refills: 0 | Status: SHIPPED | OUTPATIENT
Start: 2022-08-23 | End: 2022-08-23 | Stop reason: SDUPTHER

## 2022-08-23 RX ORDER — PROMETHAZINE HYDROCHLORIDE AND DEXTROMETHORPHAN HYDROBROMIDE 6.25; 15 MG/5ML; MG/5ML
5 SYRUP ORAL EVERY 4 HOURS PRN
Qty: 180 ML | Refills: 0 | Status: SHIPPED | OUTPATIENT
Start: 2022-08-23 | End: 2022-09-02

## 2022-08-23 RX ORDER — BENZONATATE 100 MG/1
100 CAPSULE ORAL 3 TIMES DAILY PRN
Qty: 30 CAPSULE | Refills: 0 | Status: SHIPPED | OUTPATIENT
Start: 2022-08-23 | End: 2022-08-23 | Stop reason: SDUPTHER

## 2022-08-23 RX ORDER — FLUTICASONE PROPIONATE 50 MCG
2 SPRAY, SUSPENSION (ML) NASAL DAILY
Qty: 16 G | Refills: 0 | Status: SHIPPED | OUTPATIENT
Start: 2022-08-23 | End: 2022-10-07 | Stop reason: SDUPTHER

## 2022-08-23 NOTE — PROGRESS NOTES
The patient location is: [place]  The chief complaint leading to consultation is: ER discharge f/u visit    Visit type: audiovisual    Face to Face time with patient: 15 min  44 minutes of total time spent on the encounter, which includes face to face time and non-face to face time preparing to see the patient (eg, review of tests), Obtaining and/or reviewing separately obtained history, Documenting clinical information in the electronic or other health record, Independently interpreting results (not separately reported) and communicating results to the patient/family/caregiver, or Care coordination (not separately reported).     Each patient to whom he or she provides medical services by telemedicine is:  (1) informed of the relationship between the physician and patient and the respective role of any other health care provider with respect to management of the patient; and (2) notified that he or she may decline to receive medical services by telemedicine and may withdraw from such care at any time.    Notes: See Below  Subjective:       Patient ID: Gill Cleary is a 51 y.o. female.    Chief Complaint: ER Discharge f/u visit     50 yo F, established pt of mine (last visit 12/2021) with PMH significant for Obesity,  HIV, HTN, HLD, Carotid Artery Disease, YANICK, Osteoporosis, DJD and opioid dependence, Vitamin D deficiency, GERD, ADHD, Chronic Headaches, and h/o diverticulitis. She presents for ED discharge f/u visit. States she has been sick for the past 1 month. Evaluated 08/05/2022 with c/o SOB x 3 days with fever/chills. Temp 100.7 F. CXR showed interstitial findings could reflect edema noting accentuation by habitus. Flu, rapid strep, COVID all neg. EKG showed NSR with no ST-T wave changes. CBC showed no leukocytosis and no left shift. CMP wnl. Discharged with oral pred + albuterol. Went to [facility] on 08/07/2022 due to fever and AMS. Dx'd with ?sepsis due to PNA and required IV abx. Admitted until 8/11/2022  and discharged with oral abx. Reports after completion of antibiotics she felt better, but still felt tired. Over next few days, she developed increasing cough, constipation, and runny nose. Mother-in-law with pos COVID--exact date of diagnosis; last contact around 08/13/2022. Report she had COVID test on 08/21/22 and found out it was positive on 08/23/2022. Has had COVID-19 vaccine x 3 doses. Also c/o worsening allergies with mucus in eyes and runny nose.      Past Medical History:   Diagnosis Date    Diverticulitis     HIV (human immunodeficiency virus infection)     Hyperlipemia     Hypertension     Osteoporosis     Sleep apnea     Vertigo        Patient Active Problem List   Diagnosis    Diverticulitis    Chronic nausea    Human immunodeficiency virus (HIV) disease    Essential hypertension    Diverticular disease    Chronic nonintractable headache    HIV (human immunodeficiency virus infection)    Hyperlipemia    Hypertension    Osteoporosis    YANICK (obstructive sleep apnea)    Vitamin D deficiency    Quit using tobacco in remote past    Proteinuria    Opioid dependence    Gastroesophageal reflux disease    Hypertensive chronic kidney disease with stage 1 through stage 4 chronic kidney disease, or unspecified chronic kidney disease    Carotid artery disease    Attention deficit hyperactivity disorder, combined type    Severe obesity (BMI 35.0-39.9) with comorbidity    Nausea, vomiting, and diarrhea       Past Surgical History:   Procedure Laterality Date    LAPAROSCOPIC SIGMOIDECTOMY N/A 12/10/2018    Procedure: COLECTOMY, SIGMOID, LAPAROSCOPIC-;  Surgeon: Reyes Nelson MD;  Location: [facility] OR [address] [place] [address];  Service: General;  Laterality: N/A;    LAPAROSCOPIC TOTAL HYSTERECTOMY  2018    URETERAL STENT PLACEMENT Left 12/10/2018    Procedure: INSERTION, STENT, URETER;  Surgeon: Reyes Nelson MD;  Location: [facility] OR [address] [place] [address];  Service: General;  Laterality: Left;       Family  History   Problem Relation Age of Onset    Hypertension Mother     Thyroid disease Mother     Hypertension Father     Heart attacks under age 50 Father     Other Father         Brain tumor    Other Sister         Brain tumor       Social History     Tobacco Use   Smoking Status Never Smoker   Smokeless Tobacco Never Used       Social History     Social History Narrative    Tobacco: None    EtOH: None    Drug: None    Employment: Caregiver for      Education:2 year college    Lives with  and 19 yo daughter       Medications have been reviewed and reconciled.     Review of patient's allergies indicates:   Allergen Reactions    Ace inhibitors Other (See Comments)     dizziness      Efavirenz-emtricitabin-tenofov Other (See Comments)     dizziness      Penicillins Hives     Hives (skin)^ and causes yeast infection    Pork extract      Pt would like pork added  Because of strong Oriental orthodox beliefs    Pork/porcine containing products      Pt would like pork added  Because of strong Oriental orthodox beliefs    Tramadol Nausea And Vomiting    Emtricita-rilpivirine-tenof df Other (See Comments)     Affecting patients kidneys      Labetalol     Lactase     Metoprolol     Amlodipine Rash    Clonidine Rash    Doxazosin Rash    Hydrochlorothiazide Rash        Review of Systems   Constitutional: Negative for chills and fatigue.   HENT: Positive for rhinorrhea.    Eyes: Positive for discharge.   Respiratory: Positive for cough and shortness of breath. Negative for wheezing.    Cardiovascular: Negative for chest pain, palpitations and leg swelling.   Musculoskeletal: Negative for myalgias.         Objective:      Vitals Unable to be Obtained    Physical Exam  Constitutional:       General: She is not in acute distress.     Appearance: Normal appearance.   HENT:      Head: Normocephalic and atraumatic.      Right Ear: External ear normal.      Left Ear: External ear normal.   Eyes:      General: No scleral  icterus.     Extraocular Movements: Extraocular movements intact.      Conjunctiva/sclera: Conjunctivae normal.   Pulmonary:      Effort: Pulmonary effort is normal. No respiratory distress.      Comments: Speaking in full sentences without difficulty. No audible wheezing.  Musculoskeletal:         General: Normal range of motion.      Cervical back: Normal range of motion and neck supple.   Skin:     General: Skin is warm and dry.   Neurological:      Mental Status: She is alert and oriented to person, place, and time.   Psychiatric:         Mood and Affect: Mood normal.         Behavior: Behavior normal.           Assessment:       1. COVID-19        Plan:       Diagnoses and all orders for this visit:    COVID-19  -     fluticasone propionate (FLONASE) 50 mcg/actuation nasal spray; 2 sprays (100 mcg total) by Each Nostril route once daily.  -     benzonatate (TESSALON) 100 MG capsule; Take 1 capsule (100 mg total) by mouth 3 (three) times daily as needed for Cough.  -     promethazine-dextromethorphan (PROMETHAZINE-DM) 6.25-15 mg/5 mL Syrp; Take 5 mLs by mouth every 4 (four) hours as needed (cough).    COVID-19   Positive test from 08/21/22  Has had symptoms > 5 days which are mild  Recommend ton continue use of mask until symptoms improving   Treat symptomatically with benzonatate and promethazine-DM   Start Flonase  Recommend nasal irrigation with neti pot for congestion/runny nose   Olopatadine for allergic conjunctivitis symptoms  Cetirizine 10 mg daily for allergy symptoms   Adequate fluids  Warm tea + honey/lemon  OTC motrin/tylenol prn pain    F/U if symptoms unimproved or worsening over the next 7-10 days. Will need exam and repeat CXR      Previous Visit(s)  ============    Weight gain  Comments:  r/o medical causes with labs above. Uncontrolled YANICK could be a contributor as well. Diet/lifestyle modifications reviewed.   Orders:  -     Ambulatory referral/consult to Family Practice  -     [facility] Auto  Differential; Future  -     Comprehensive Metabolic Panel; Future  -     Hemoglobin A1C; Future  -     TSH; Future  -     Follicle Stimulating Hormone; Future    Severe obesity (BMI 35.0-39.9) with comorbidity  Comments:  26 lb unintentional weight gain over past 1 year. Discussed diet/lifestyle modifications. May benefit from weight management vs bariatric med referral.    Human immunodeficiency virus (HIV) disease  Comments:  Management per ID. Continue Biktarvy daily    Primary hypertension  Comments:  Controlled. Continue diltiazem 120 mg daily, hydralazine 50 (? frequency) and propranolol (? dose and frequency)    Hyperlipidemia, unspecified hyperlipidemia type  Comments:  Continue atorvastatin 80 mg hs. Lipid panel today  Orders:  -     Lipid Panel; Future    Carotid artery disease, unspecified laterality, unspecified type  Comments:  Last carotid artery duplex 4/2021 at [facility]. Need to request records.    YANICK (obstructive sleep apnea)  Comments:  Reports having CPAP. Not following with sleep med physician. Referral should be considered.    Osteoporosis, unspecified osteoporosis type, unspecified pathological fracture presence  Comments:  No DEXA on file. Will obtain on future visit. Continue alendronate 70 mg qweek    Chronic pain syndrome  Comments:  Due to DJD. Followed by [facility] Dr. Ariel Peralta. Adherent with oxycodone 5 mg q6 prn    Uncomplicated opioid dependence  Comments:  Due to DJD. Followed by [facility] Dr. Ariel Peralta. Adherent with oxycodone 5 mg q6 prn    Vitamin D deficiency  Comments:  Vitamin D due to be assessed. Continue ergocalciferol 50K U qweek x 7 weeks.    Gastroesophageal reflux disease without esophagitis  Comments:  Continue lansoprazole 30 mg daily    Attention deficit hyperactivity disorder, combined type  Comments:  Continue following with psych. Rx'd Vyvanse 60 mg daily    Chronic nonintractable headache, unspecified headache type  Comments:  rx'd topamax 200 mg hs--not  taking      R/o metabolic pathology. Could be associated with menopause. YANICK also could cause this issue  Consider referral to nutrition vs bariatric medicine pending labs    Need to determine all psychiatric diagnoses as she is rx'd Effexor , risperidone 0.5. ? H/o affective psychosis.

## 2022-08-27 ENCOUNTER — PATIENT MESSAGE (OUTPATIENT)
Dept: PRIMARY CARE CLINIC | Facility: CLINIC | Age: 52
End: 2022-08-27
Payer: MEDICARE

## 2022-09-10 ENCOUNTER — PATIENT MESSAGE (OUTPATIENT)
Dept: PRIMARY CARE CLINIC | Facility: CLINIC | Age: 52
End: 2022-09-10
Payer: MEDICARE

## 2022-10-05 ENCOUNTER — PATIENT MESSAGE (OUTPATIENT)
Dept: PRIMARY CARE CLINIC | Facility: CLINIC | Age: 52
End: 2022-10-05
Payer: MEDICARE

## 2022-10-07 ENCOUNTER — OFFICE VISIT (OUTPATIENT)
Dept: PRIMARY CARE CLINIC | Facility: CLINIC | Age: 52
End: 2022-10-07
Payer: MEDICARE

## 2022-10-07 ENCOUNTER — PATIENT OUTREACH (OUTPATIENT)
Dept: ADMINISTRATIVE | Facility: OTHER | Age: 52
End: 2022-10-07
Payer: MEDICARE

## 2022-10-07 VITALS
BODY MASS INDEX: 35.16 KG/M2 | OXYGEN SATURATION: 97 % | RESPIRATION RATE: 18 BRPM | DIASTOLIC BLOOD PRESSURE: 79 MMHG | HEART RATE: 67 BPM | SYSTOLIC BLOOD PRESSURE: 127 MMHG | WEIGHT: 198.44 LBS | TEMPERATURE: 98 F | HEIGHT: 63 IN

## 2022-10-07 DIAGNOSIS — Z23 NEED FOR VACCINATION FOR STREP PNEUMONIAE: ICD-10-CM

## 2022-10-07 DIAGNOSIS — R05.3 CHRONIC COUGH: Primary | ICD-10-CM

## 2022-10-07 PROCEDURE — 99999 PR PBB SHADOW E&M-EST. PATIENT-LVL IV: CPT | Mod: PBBFAC,,, | Performed by: FAMILY MEDICINE

## 2022-10-07 PROCEDURE — 3074F SYST BP LT 130 MM HG: CPT | Mod: CPTII,S$GLB,, | Performed by: FAMILY MEDICINE

## 2022-10-07 PROCEDURE — G0009 ADMIN PNEUMOCOCCAL VACCINE: HCPCS | Mod: S$GLB,,, | Performed by: FAMILY MEDICINE

## 2022-10-07 PROCEDURE — 3008F PR BODY MASS INDEX (BMI) DOCUMENTED: ICD-10-PCS | Mod: CPTII,S$GLB,, | Performed by: FAMILY MEDICINE

## 2022-10-07 PROCEDURE — 99214 OFFICE O/P EST MOD 30 MIN: CPT | Mod: 25,S$GLB,, | Performed by: FAMILY MEDICINE

## 2022-10-07 PROCEDURE — 90677 PNEUMOCOCCAL CONJUGATE VACCINE 20-VALENT: ICD-10-PCS | Mod: S$GLB,,, | Performed by: FAMILY MEDICINE

## 2022-10-07 PROCEDURE — 3078F DIAST BP <80 MM HG: CPT | Mod: CPTII,S$GLB,, | Performed by: FAMILY MEDICINE

## 2022-10-07 PROCEDURE — 99499 UNLISTED E&M SERVICE: CPT | Mod: S$GLB,,, | Performed by: FAMILY MEDICINE

## 2022-10-07 PROCEDURE — 90677 PCV20 VACCINE IM: CPT | Mod: S$GLB,,, | Performed by: FAMILY MEDICINE

## 2022-10-07 PROCEDURE — 99499 RISK ADDL DX/OHS AUDIT: ICD-10-PCS | Mod: S$GLB,,, | Performed by: FAMILY MEDICINE

## 2022-10-07 PROCEDURE — G0009 PNEUMOCOCCAL CONJUGATE VACCINE 20-VALENT: ICD-10-PCS | Mod: S$GLB,,, | Performed by: FAMILY MEDICINE

## 2022-10-07 PROCEDURE — 99214 PR OFFICE/OUTPT VISIT, EST, LEVL IV, 30-39 MIN: ICD-10-PCS | Mod: 25,S$GLB,, | Performed by: FAMILY MEDICINE

## 2022-10-07 PROCEDURE — 1159F MED LIST DOCD IN RCRD: CPT | Mod: CPTII,S$GLB,, | Performed by: FAMILY MEDICINE

## 2022-10-07 PROCEDURE — 3078F PR MOST RECENT DIASTOLIC BLOOD PRESSURE < 80 MM HG: ICD-10-PCS | Mod: CPTII,S$GLB,, | Performed by: FAMILY MEDICINE

## 2022-10-07 PROCEDURE — 3074F PR MOST RECENT SYSTOLIC BLOOD PRESSURE < 130 MM HG: ICD-10-PCS | Mod: CPTII,S$GLB,, | Performed by: FAMILY MEDICINE

## 2022-10-07 PROCEDURE — 99999 PR PBB SHADOW E&M-EST. PATIENT-LVL IV: ICD-10-PCS | Mod: PBBFAC,,, | Performed by: FAMILY MEDICINE

## 2022-10-07 PROCEDURE — 1159F PR MEDICATION LIST DOCUMENTED IN MEDICAL RECORD: ICD-10-PCS | Mod: CPTII,S$GLB,, | Performed by: FAMILY MEDICINE

## 2022-10-07 PROCEDURE — 3008F BODY MASS INDEX DOCD: CPT | Mod: CPTII,S$GLB,, | Performed by: FAMILY MEDICINE

## 2022-10-07 RX ORDER — MONTELUKAST SODIUM 10 MG/1
10 TABLET ORAL NIGHTLY
Qty: 30 TABLET | Refills: 0 | Status: SHIPPED | OUTPATIENT
Start: 2022-10-07 | End: 2022-11-14 | Stop reason: SDUPTHER

## 2022-10-07 RX ORDER — FLUTICASONE PROPIONATE 50 MCG
2 SPRAY, SUSPENSION (ML) NASAL DAILY
Qty: 16 G | Refills: 0 | Status: SHIPPED | OUTPATIENT
Start: 2022-10-07 | End: 2023-08-15

## 2022-10-07 NOTE — PROGRESS NOTES
Subjective:       Patient ID: Gill Cleary is a 51 y.o. female.    Chief Complaint: Cough    52 yo F, established pt of mine (last evaluated 08/23/2022), with PMH significant for Obesity, HIV, HTN, HLD, Carotid Artery Disease, YANICK, Osteoporosis, DJD and opioid dependence, Vitamin D deficiency, GERD, ADHD, Chronic Headaches, and h/o diverticulitis. She presents with c/o chronic cough. Experiencing post-nasal drip which triggers symptoms. Cough is also seems to be stimulated from chest. Cough is productive. No fevers/chills. No wheezing, SOB, chest tightness. No itching of eyes. + intermittent redness and mucus to eyes. No eye pain, vision changes. + runny nose and sneezing. No sore throat. Occasional popping of ears. Using cetirizine 10 mg daily. Not using intranasal steroid. No improvement with albuterol. No improvement with ophthalmic olopatadine.  Requesting pneumonia vaccine.      Past Medical History:   Diagnosis Date    Diverticulitis     HIV (human immunodeficiency virus infection)     Hyperlipemia     Hypertension     Osteoporosis     Sleep apnea     Vertigo        Patient Active Problem List   Diagnosis    Diverticulitis    Chronic nausea    Human immunodeficiency virus (HIV) disease    Essential hypertension    Diverticular disease    Chronic nonintractable headache    HIV (human immunodeficiency virus infection)    Hyperlipemia    Hypertension    Osteoporosis    YANICK (obstructive sleep apnea)    Vitamin D deficiency    Quit using tobacco in remote past    Proteinuria    Opioid dependence    Gastroesophageal reflux disease    Hypertensive chronic kidney disease with stage 1 through stage 4 chronic kidney disease, or unspecified chronic kidney disease    Carotid artery disease    Attention deficit hyperactivity disorder, combined type    Severe obesity (BMI 35.0-39.9) with comorbidity    Nausea, vomiting, and diarrhea       Past Surgical History:   Procedure Laterality Date    LAPAROSCOPIC SIGMOIDECTOMY N/A  12/10/2018    Procedure: COLECTOMY, SIGMOID, LAPAROSCOPIC-;  Surgeon: Reyes Nelson MD;  [facility];  Service: General;  Laterality: N/A;    LAPAROSCOPIC TOTAL HYSTERECTOMY  2018    URETERAL STENT PLACEMENT Left 12/10/2018    Procedure: INSERTION, STENT, URETER;  Surgeon: Reyes Nelson MD;  [facility];  Service: General;  Laterality: Left;       Family History   Problem Relation Age of Onset    Hypertension Mother     Thyroid disease Mother     Hypertension Father     Heart attacks under age 50 Father     Other Father         Brain tumor    Other Sister         Brain tumor       Social History     Tobacco Use   Smoking Status Never   Smokeless Tobacco Never       Social History     Social History Narrative    Tobacco: None    EtOH: None    Drug: None    Employment: Caregiver for      Education:2 year college    Lives with  and 17 yo daughter       Medications have been reviewed and reconciled.     Review of patient's allergies indicates:   Allergen Reactions    Ace inhibitors Other (See Comments)     dizziness      Efavirenz-emtricitabin-tenofov Other (See Comments)     dizziness      Penicillins Hives     Hives (skin)^ and causes yeast infection    Pork extract      Pt would like pork added  Because of strong Muslim beliefs    Pork/porcine containing products      Pt would like pork added  Because of strong Muslim beliefs    Tramadol Nausea And Vomiting    Emtricita-rilpivirine-tenof df Other (See Comments)     Affecting patients kidneys      Labetalol     Lactase     Metoprolol     Amlodipine Rash    Clonidine Rash    Doxazosin Rash    Hydrochlorothiazide Rash        Review of Systems   HENT:  Positive for ear pain (intermittent), postnasal drip, rhinorrhea and sneezing. Negative for sinus pressure, sinus pain and sore throat.    Eyes:  Positive for redness. Negative for itching.   Respiratory:  Positive for cough. Negative for chest tightness, shortness  "of breath and wheezing.    Cardiovascular:  Negative for chest pain.       Objective:        /79 (BP Location: Left arm, Patient Position: Sitting, BP Method: Medium (Automatic))   Pulse 67   Temp 98.3 °F (36.8 °C) (Oral)   Resp 18   Ht 5' 3" (1.6 m)   Wt 90 kg (198 lb 6.6 oz)   LMP  (LMP Unknown)   SpO2 97%   BMI 35.15 kg/m²      Physical Exam  Constitutional:       General: She is not in acute distress.     Appearance: She is well-developed.   HENT:      Head: Normocephalic and atraumatic.      Right Ear: Ear canal and external ear normal.      Left Ear: Ear canal and external ear normal.      Ears:      Comments: Tympanic Membranes pearly grey, light reflex not appreciated.     Nose: Rhinorrhea (clear) present.      Comments: + inflamed/boggy inferior nasal turbinates  Eyes:      General: No scleral icterus.     Extraocular Movements: Extraocular movements intact.      Conjunctiva/sclera: Conjunctivae normal.   Neck:      Thyroid: No thyromegaly.   Cardiovascular:      Rate and Rhythm: Normal rate and regular rhythm.      Heart sounds: Normal heart sounds. No murmur heard.    No friction rub. No gallop.   Pulmonary:      Effort: Pulmonary effort is normal.      Breath sounds: Normal breath sounds. No wheezing or rales.   Musculoskeletal:         General: Normal range of motion.      Cervical back: Normal range of motion and neck supple.   Lymphadenopathy:      Cervical: No cervical adenopathy.   Skin:     General: Skin is warm and dry.      Findings: No erythema or rash.   Neurological:      Mental Status: She is alert and oriented to person, place, and time.      Cranial Nerves: No cranial nerve deficit.   Psychiatric:         Mood and Affect: Mood normal.         Behavior: Behavior normal.         Assessment:       1. Chronic cough    2. Need for vaccination for Strep pneumoniae        Plan:       Gill was seen today for cough.    Diagnoses and all orders for this visit:    Chronic cough  -     " fluticasone propionate (FLONASE) 50 mcg/actuation nasal spray; 2 sprays (100 mcg total) by Each Nostril route once daily.  -     montelukast (SINGULAIR) 10 mg tablet; Take 1 tablet (10 mg total) by mouth every evening.    Need for vaccination for Strep pneumoniae  -     (In Office Administered) Pneumococcal Conjugate Vaccine (20 Valent) (IM)        Chronic Cough  --Suspect this is due to AR. Cough does not respond to albuterol. Somewhat improved with cetirizine. Advised to add intranasal steroid. Start montelukast 10 mg hs. F/U cough in 4 weeks    Primary hypertension  Comments:  Controlled. Continue diltiazem 120 mg daily, hydralazine 50 mg hs and propranolol 40 mg hs    Hyperlipidemia, unspecified hyperlipidemia type  Comments:  Continue atorvastatin 80 mg hs. , TG 68, HDL 50, LDL 91.4 12/2021    HM   PCV-20 today 10/07/202     Follow up in about 4 weeks (around 11/4/2022) for F/U Cough.  Discuss ketorolac for AC if needed on f/u    I spent a total of 30 minutes on the day of the visit.This includes face to face time and non-face to face time preparing to see the patient (eg, review of tests), obtaining and/or reviewing separately obtained history, documenting clinical information in the electronic or other health record, independently interpreting results and communicating results to the patient/family/caregiver, or care coordinator.     Previous Visit(s)  ============  Weight gain  Comments:  r/o medical causes with labs above. Uncontrolled YANICK could be a contributor as well. Diet/lifestyle modifications reviewed.   Orders:  -     Ambulatory referral/consult to Family Practice  -     CBC Auto Differential; Future  -     Comprehensive Metabolic Panel; Future  -     Hemoglobin A1C; Future  -     TSH; Future  -     Follicle Stimulating Hormone; Future    Severe obesity (BMI 35.0-39.9) with comorbidity  Comments:  26 lb unintentional weight gain over past 1 year. Discussed diet/lifestyle modifications. May  benefit from weight management vs bariatric med referral.    Human immunodeficiency virus (HIV) disease  Comments:  Management per ID. Continue Biktarvy daily      Carotid artery disease, unspecified laterality, unspecified type  Comments:  Last carotid artery duplex 4/2021 at [facility]. Need to request records.    YANICK (obstructive sleep apnea)  Comments:  Reports having CPAP. Not following with sleep med physician. Referral should be considered.    Osteoporosis, unspecified osteoporosis type, unspecified pathological fracture presence  Comments:  No DEXA on file. Will obtain on future visit. Continue alendronate 70 mg qweek    Chronic pain syndrome  Comments:  Due to DJD. Followed by [facility] PM&R Dr. Ariel Peralta. Adherent with oxycodone 5 mg q6 prn    Uncomplicated opioid dependence  Comments:  Due to DJD. Followed by [facility] PM&R Dr. Ariel Peralta. Adherent with oxycodone 5 mg q6 prn    Vitamin D deficiency  Comments:  Vitamin D due to be assessed. Continue ergocalciferol 50K U qweek x 7 weeks.    Gastroesophageal reflux disease without esophagitis  Comments:  Continue lansoprazole 30 mg daily    Attention deficit hyperactivity disorder, combined type  Comments:  Continue following with psych. Rx'd Vyvanse 60 mg daily    Chronic nonintractable headache, unspecified headache type  Comments:  rx'd topamax 200 mg hs--not taking      R/o metabolic pathology. Could be associated with menopause. YANICK also could cause this issue  Consider referral to nutrition vs bariatric medicine pending labs    Need to determine all psychiatric diagnoses as she is rx'd Effexor , risperidone 0.5. ? H/o affective psychosis.

## 2022-10-07 NOTE — PROGRESS NOTES
CHW - Initial Contact    This Community Health Worker completed the Social Determinant of Health questionnaire with patient during clinic visit today.    Pt identified barriers of most importance are: Please see SDOH icons   Referrals to community agencies completed with patient/caregiver consent outside of [facility] include: No referrals   Referrals were put through [facility] - no: No referrals   Support and Services: Initial   Other information discussed the patient needs / wants help with: The pt shared that she would like to for CHW to assist her in finding an apt for her son who lives her.   Follow up required: Yes  Follow-up Outreach - Due: 10/13/2022

## 2022-10-07 NOTE — PATIENT INSTRUCTIONS
You can try Rhinocort or Nasacort (over the counter) instead of Flonase if you wish.     Dr. Boykin

## 2022-10-13 ENCOUNTER — PATIENT MESSAGE (OUTPATIENT)
Dept: PRIMARY CARE CLINIC | Facility: CLINIC | Age: 52
End: 2022-10-13
Payer: MEDICARE

## 2022-10-13 DIAGNOSIS — Z78.0 ASYMPTOMATIC MENOPAUSAL STATE: ICD-10-CM

## 2022-10-13 DIAGNOSIS — Z12.31 SCREENING MAMMOGRAM FOR BREAST CANCER: Primary | ICD-10-CM

## 2022-10-14 ENCOUNTER — PATIENT OUTREACH (OUTPATIENT)
Dept: ADMINISTRATIVE | Facility: OTHER | Age: 52
End: 2022-10-14
Payer: MEDICARE

## 2022-10-14 NOTE — PROGRESS NOTES
CHW - Follow Up    This Community Health Worker completed a follow up visit with patient via telephone today.  Pt/Caregiver reported: The pt shared that she is seeking case management for her and her  health diagnosis (HIV). The pt is seeking affordable housing for her son and his 2 kids.   Community Health Worker provided: CHW provided the pt with three contacts for affordable apts in her son price range [facility]  Follow up required: yes  Follow-up Outreach - Due: 10/20/2022

## 2022-10-21 ENCOUNTER — IMMUNIZATION (OUTPATIENT)
Dept: PRIMARY CARE CLINIC | Facility: CLINIC | Age: 52
End: 2022-10-21
Payer: MEDICARE

## 2022-10-21 ENCOUNTER — PATIENT OUTREACH (OUTPATIENT)
Dept: ADMINISTRATIVE | Facility: OTHER | Age: 52
End: 2022-10-21
Payer: MEDICARE

## 2022-10-21 DIAGNOSIS — Z23 NEED FOR VACCINATION: Primary | ICD-10-CM

## 2022-10-21 PROCEDURE — 0134A COVID-19, MRNA, LNP-S, BIVALENT BOOSTER, PF, 50 MCG/0.5 ML: CPT | Mod: PBBFAC | Performed by: EMERGENCY MEDICINE

## 2022-10-21 PROCEDURE — 91313 COVID-19, MRNA, LNP-S, BIVALENT BOOSTER, PF, 50 MCG/0.5 ML: ICD-10-PCS | Mod: S$GLB,,, | Performed by: EMERGENCY MEDICINE

## 2022-10-21 PROCEDURE — 91313 COVID-19, MRNA, LNP-S, BIVALENT BOOSTER, PF, 50 MCG/0.5 ML: CPT | Mod: S$GLB,,, | Performed by: EMERGENCY MEDICINE

## 2022-10-21 NOTE — PROGRESS NOTES
CHW - Follow Up    This Community Health Worker completed a follow up visit with patient via telephone today.  Pt/Caregiver reported: The pt informed CHW that [facility] does not offer case management for HIV anymore.  Community Health Worker provided: CHW informed the pt that she will look into more resources.   Follow up required: Yes   Follow-up Outreach - Due: 10/27/2022

## 2022-10-31 ENCOUNTER — PATIENT MESSAGE (OUTPATIENT)
Dept: PRIMARY CARE CLINIC | Facility: CLINIC | Age: 52
End: 2022-10-31
Payer: MEDICARE

## 2022-11-28 ENCOUNTER — PATIENT OUTREACH (OUTPATIENT)
Dept: ADMINISTRATIVE | Facility: OTHER | Age: 52
End: 2022-11-28
Payer: MEDICARE

## 2022-11-28 NOTE — PROGRESS NOTES
CHW - Case Closure    This Community Health Worker spoke to patient via telephone today.   Pt/Caregiver reported: The pt informed CHW that she will contact new resource given to her by CHW ([facility]).  Pt/Caregiver denied any additional needs at this time and agrees with episode closure at this time.  Provided patient with Community Health Worker's contact information and encouraged him/her to contact this Community Health Worker if additional needs arise.         Daily Progress Note   Jose Blakely MD      7/10/2020    Chief Complaint   Patient presents with    Leg Swelling     Patient is here for left leg swelling, redness from knee to ankle. Skin is peeling and very itchy. HISTORY OF PRESENT ILLNESS:                The patient is a 49 y.o. male who presents with left leg pain and swelling. His leg is very red and scaly, and he says the itching can be intense. Denae Godinez says he is wearing a short compression sock that goes to his ankle. The red, scaliness has been going on for about two months. He denies fever or chills. Past Medical History:   Diagnosis Date    Acute MI ([facility] [address] 2010    inferior wall    Buerger's disease ([facility] [address]     CAD (coronary artery disease)     S/P IWMIi 4/10 w/ RCA FIONA X3, normal LVEF. Cath 11/10 nonobstructive LAD dz, RCA stent with moderate instent restenosis.  s/p OM fiona X2 12/10    Closed fracture of cervical spine ([facility] [address]     2000; C2-3; fell off ladder    Depression     not on meds    GERD (gastroesophageal reflux disease)     managed by PCP    Hyperlipidemia     rec semi annual lipids with LDL goal <70    Hypertension     controlled    PAD [facility]peripheral artery disease) [facility]     Aortogram 90% SFA followed by Dr. Jose Roberto WASSERMAN (postoperative nausea and vomiting)     after CABG    Wears glasses     reading       Past Surgical History:   Procedure Laterality Date    CARDIAC CATHETERIZATION      multiple    CARDIAC SURGERY      CABG--2 vessels    CORONARY ANGIOPLASTY      multiple cardiac stents    FEMORAL-TIBIAL BYPASS GRAFT Left 2/5/2020    LEFT FEMORAL TO POSTERIOR TIBIAL BYPASS WITH REVERSED LEFT ARM CEPHALIC VEIN performed by Kimberly Gale MD at [address] Left 07/28/2018    stent  x 2; vascular dz; Left EIA angioplasty:  Left SFA/Pop atherectomy/angioplasty of native arteries    VASCULAR SURGERY Left 07/21/2011    IR FEMORAL POPLITEAL BYPASS GRAFT        Social History     Socioeconomic History    Marital status:      Spouse name: Not on file    Number of children: Not on file    Years of education: Not on file    Highest education level: Not on file   Occupational History    Not on file   Social Needs    Financial resource strain: Not on file    Food insecurity     Worry: Not on file     Inability: Not on file    Transportation needs     Medical: Not on file     Non-medical: Not on file   Tobacco Use    Smoking status: Former Smoker     Packs/day: 0.25     Years: 25.00     Pack years: 6.25     Types: Cigarettes     Last attempt to quit: 3/5/2014     Years since quittin.3    Smokeless tobacco: Never Used   Substance and Sexual Activity    Alcohol use:  Yes     Alcohol/week: 48.0 standard drinks     Types: 48 Cans of beer per week     Comment: patient states drinks at least 8 beers/day    Drug use: Never    Sexual activity: Yes     Partners: Female   Lifestyle    Physical activity     Days per week: Not on file     Minutes per session: Not on file    Stress: Not on file   Relationships    Social connections     Talks on phone: Not on file     Gets together: Not on file     Attends Restorationism service: Not on file     Active member of club or organization: Not on file     Attends meetings of clubs or organizations: Not on file     Relationship status: Not on file    Intimate partner violence     Fear of current or ex partner: Not on file     Emotionally abused: Not on file     Physically abused: Not on file     Forced sexual activity: Not on file   Other Topics Concern    Not on file   Social History Narrative    Not on file       Family History   Problem Relation Age of Onset    Heart Disease Mother     Diabetes Father     High Blood Pressure Father     High Blood Pressure Brother     High Blood Pressure Paternal Uncle     Heart Disease Paternal Uncle     Stroke Paternal Uncle     Diabetes Paternal Uncle          Current Outpatient Medications:    sulfamethoxazole-trimethoprim (BACTRIM DS) 800-160 MG per tablet, Take 1 tablet by mouth 2 times daily for 10 days, Disp: 20 tablet, Rfl: 0    simvastatin (ZOCOR) 40 MG tablet, TAKE 1 TABLET NIGHTLY, Disp: 90 tablet, Rfl: 3    metoprolol tartrate (LOPRESSOR) 25 MG tablet, TAKE 1 TABLET BY MOUTH TWICE A DAY, Disp: 180 tablet, Rfl: 3    clopidogrel (PLAVIX) 75 MG tablet, TAKE 1 TABLET BY MOUTH EVERY DAY, Disp: 90 tablet, Rfl: 3    amLODIPine (NORVASC) 5 MG tablet, TAKE 1 TABLET BY MOUTH EVERY DAY, Disp: 90 tablet, Rfl: 3    nitroGLYCERIN (NITROSTAT) 0.4 MG SL tablet, Place 1 tablet under the tongue every 5 minutes as needed for Chest pain (Patient taking differently: Place 0.4 mg under the tongue every 5 minutes as needed for Chest pain ), Disp: 25 tablet, Rfl: 3    aspirin 325 MG tablet, Take 325 mg by mouth daily. , Disp: , Rfl:     Patient has no known allergies.     Vitals:    07/10/20 0944   BP: (!) 176/98   Site: Left Upper Arm   Pulse: 80   Weight: 173 lb (78.5 kg)       Orders Only on 07/09/2020   Component Date Value Ref Range Status    Cholesterol, Total 07/09/2020 184  125-200^125^200 mg/dL Final    HDL 07/09/2020 109  >=40^>=40 mg/dL Final    Comment: National Cholesterol Education Program Adult Treatment Panel Guidelines (ATPIII):  TOTAL CHOLESTEROL:                      LESS THAN 200 mg/dL Desirable                     200-239 mg/dL Borderline                     GREATER OR EQUAL  mg/dL High  TRIGLYCERIDE:                      LESS THAN 150 mg/dL Normal                     150-199 mg/dL Borderline High                     200-499 mg/dL High                     GREATER OR EQUAL  mg/dL Very High  HDL CHOLESTEROL:                      LESS THAN 40 mg/dL Low                     GREATER THAN 60 mg/dL Desirable  LDL CHOLESTEROL:                     LESS THAN 100 mg/dL Optimal                     100-129 mg/dL Near optimal/above optimal                     130-159 mg/dL Borderline High 160-189 mg/dL High                     GREATER OR EQUAL  mg/dL Very High  NOTE: CLINICAL DECISION MAKING, INCLUDING POSSIBLE TREATMENT, WILL DEPEND ON RISK FACTOR   ANALYSIS AND CONSULTATION WITH A PHYSICIAN.  LDL Calculated 07/09/2020 58  <=100^^<=100 mg/dL Final    Triglycerides 07/09/2020 83  <=150^^<=150 mg/dL Final       Review of Systems    Physical Exam  Vitals signs reviewed. Constitutional:       Appearance: He is well-developed. HENT:      Head: Normocephalic. Eyes:      Pupils: Pupils are equal, round, and reactive to light. Neck:      Musculoskeletal: Normal range of motion and neck supple. Vascular: No carotid bruit. Cardiovascular:      Rate and Rhythm: Normal rate and regular rhythm. Pulses:           Carotid pulses are 2+ on the right side with bruit and 2+ on the left side. Radial pulses are 2+ on the right side and 2+ on the left side. Femoral pulses are 2+ on the right side and 2+ on the left side. Popliteal pulses are 1+ on the right side and 2+ on the left side. Dorsalis pedis pulses are 2+ on the right side and 1+ on the left side. Posterior tibial pulses are 2+ on the right side and 1+ on the left side. Heart sounds: Normal heart sounds.       Comments: 7/10/2020  +2 of graft at knee  MEASUREMENTS 7/10/2020:    RIGHT ANKLE: 19.2 cm  RIGHT CALF: 34.2 cm    LEFT ANKLE: 22.3 cm  LEFT CALF: 36.0 cm       Doppler 7/10/2020:  Rt DP: monophasic (strong)  Rt PT: monophasic  Rt AT:     Lt DP: biphasic  Lt PT: monophasic (very strong)  Lt AT:    Bypass graft very strong    Doppler 1/21/2020:  Rt DP:  Biphasic  Rt PT: monophasic (strong)   Rt AT:     Lt DP: monophasic (weak)  Lt PT: monophasic (very weak)  Lt AT:          Doppler 11/15/19  Rt DP:   Rt PT:   Rt AT:     Lt DP: monophasic  Lt PT: monophasic  Lt AT:      Doppler 10/25/19:  Rt DP: biphasic  Rt PT: monophasic (weak)  Rt AT:     Lt DP: monophasic  Lt PT: monophasic  Lt AT:    ~~~previous visit~~~~      Doppler signals in right DP and PT with waveforms obtained  Normal multiphasic DP doppler signal in left foot  Left PT signal also multiphasic  Swelling of revascularization to left foot and ankle and lower leg  Skin remains intact  Varicose veins at the left ankle region      Ankle Brachial Index Report 8/13/18     Right  Left  Arm   167   168   Posterior Tibial 94   WNO   Dorsalis Pedis 102  160    LISA:   0.61  0.96    Ankle Brachial Index Report 9/17/18     Right  Left  Arm   164   172   Posterior Tibial 104   158   Dorsalis Pedis 92  148    LISA:   0.60  0.92      Pulmonary:      Effort: Pulmonary effort is normal. No respiratory distress. Breath sounds: Normal breath sounds. No wheezing or rales. Abdominal:      General: There is no distension. Palpations: There is no mass. Tenderness: There is no abdominal tenderness. There is no guarding. Musculoskeletal:         General: No tenderness. Arms:       Left lower leg: He exhibits swelling. Legs:       Left foot: Swelling present. Skin:     General: Skin is warm. Neurological:      Cranial Nerves: No cranial nerve deficit. Sensory: Sensory deficit (hypersensitive from revascularization effect) present. Coordination: Coordination normal.      Gait: Gait normal.           ASSESSMENT:    Problem List Items Addressed This Visit     PAD (peripheral artery disease) ([facility] [address] - Primary    Cellulitis of left leg          PLAN:    Adela Em should return in two weeks for a wound check. Today he will have a prescription for antibiotics, bactrim DS, one by mouth twice daily for 10 days. He should cover the wounds with gauze and use an ace wrap to cover his leg. Elevate the leg as much as possible, higher than the heart.        Michelle Parker MA am scribing for and in the presence of Azucena Alvarenga MD on this date of 07/10/20    I Gaurav Plasencia MD personally performed the services described in this documentation as scribed by the Medical Assistant Riaz Alvarez in my presence and it is both accurate and complete.         Electronically signed by Shaggy Bunch MD on 7/10/2020 at 11:12 AM

## 2022-12-12 ENCOUNTER — TELEPHONE (OUTPATIENT)
Dept: PRIMARY CARE CLINIC | Facility: CLINIC | Age: 52
End: 2022-12-12
Payer: MEDICARE

## 2022-12-12 NOTE — TELEPHONE ENCOUNTER
Spoke to pt who states she wants to go to GI for gastric reflux and bariatrics for wt loss. Pt also requested reflux rx until she sees GI.

## 2022-12-12 NOTE — TELEPHONE ENCOUNTER
----- Message from Pearl Holman sent at 12/12/2022  2:04 PM CST -----  Contact: pt [phone]  Patient is requesting a GI referral and [facility], pls advise

## 2022-12-13 ENCOUNTER — TELEPHONE (OUTPATIENT)
Dept: PRIMARY CARE CLINIC | Facility: CLINIC | Age: 52
End: 2022-12-13
Payer: MEDICARE

## 2022-12-13 NOTE — TELEPHONE ENCOUNTER
----- Message from Loree Lacey sent at 12/13/2022  2:19 PM CST -----  Contact: Pt Mobile [phone]  Patient is returning a phone call.  Who left a message for the patient:   Does patient know what this is regarding:    Would you like a call back, or a response through your [facility] portal?:   Call  Comments:  Patient said that she received a call on yesterday, and she would like for you to know that she went to see an ENT doctor due to her Vertigo.

## 2023-04-25 ENCOUNTER — TELEPHONE (OUTPATIENT)
Dept: OTOLARYNGOLOGY | Facility: CLINIC | Age: 53
End: 2023-04-25
Payer: MEDICARE

## 2023-04-25 ENCOUNTER — TELEPHONE (OUTPATIENT)
Dept: GASTROENTEROLOGY | Facility: CLINIC | Age: 53
End: 2023-04-25
Payer: MEDICARE

## 2023-04-25 NOTE — TELEPHONE ENCOUNTER
----- Message from Jermaine Mckeon sent at 4/25/2023 12:57 PM CDT -----  Regarding: Appt  Contact: @[phone]  Patient is calling to schedule an appt. Patient has trouble with dizziness. Patient would like for someone to call her as soon as possible so she can get an appt. Please call and advise @[phone]

## 2023-04-25 NOTE — TELEPHONE ENCOUNTER
----- Message from Luh Adame sent at 4/25/2023 12:38 PM CDT -----  Regarding: schedule apt  Contact: [phone]  Pt calling to schedule apt for acid reflux please call to discuss further.

## 2023-05-01 ENCOUNTER — TELEPHONE (OUTPATIENT)
Dept: RHEUMATOLOGY | Facility: CLINIC | Age: 53
End: 2023-05-01
Payer: MEDICARE

## 2023-05-01 NOTE — TELEPHONE ENCOUNTER
----- Message from Coleman Murray sent at 5/1/2023  2:08 PM CDT -----  Type:  Needs Medical Advice    Who Called: pt  Symptoms (please be specific): Ra //Joint pain   How long has patient had these symptoms:  04/12/2023   Pharmacy name and phone #:    Would the patient rather a call back or a response via [facility]? call  Best Call Back Number: [phone]  Additional Information: pt would like to est care no avail appointment in [facility] to schedule pt

## 2023-05-10 ENCOUNTER — TELEPHONE (OUTPATIENT)
Dept: RHEUMATOLOGY | Facility: CLINIC | Age: 53
End: 2023-05-10
Payer: MEDICARE

## 2023-05-10 NOTE — TELEPHONE ENCOUNTER
----- Message from Kadi Buchanan sent at 5/10/2023  8:35 AM CDT -----  Type:  Needs Medical Advice    Who Called: pt  Symptoms (please be specific): pt is following up on a referral that was sent to that provider last week and the pt is waiting to here from them for scheduling      Would the patient rather a call back or a response via [facility]? call  Best Call Back Number: [phone]  Additional Information:

## 2023-05-10 NOTE — TELEPHONE ENCOUNTER
I notified the patient that I did not have anything available right know and that I put her on the waiting list. Patient voices understanding.

## 2023-05-31 ENCOUNTER — TELEPHONE (OUTPATIENT)
Dept: RHEUMATOLOGY | Facility: CLINIC | Age: 53
End: 2023-05-31
Payer: MEDICARE

## 2023-05-31 ENCOUNTER — HOSPITAL ENCOUNTER (OUTPATIENT)
Dept: RADIOLOGY | Facility: HOSPITAL | Age: 53
Discharge: HOME | End: 2023-05-31
Attending: FAMILY MEDICINE
Payer: MEDICARE

## 2023-05-31 DIAGNOSIS — Z12.31 SCREENING MAMMOGRAM FOR BREAST CANCER: ICD-10-CM

## 2023-05-31 PROCEDURE — 77067 SCR MAMMO BI INCL CAD: CPT | Mod: TC

## 2023-05-31 PROCEDURE — 77067 SCR MAMMO BI INCL CAD: CPT | Mod: 26,,, | Performed by: RADIOLOGY

## 2023-05-31 PROCEDURE — 77063 BREAST TOMOSYNTHESIS BI: CPT | Mod: 26,,, | Performed by: RADIOLOGY

## 2023-05-31 PROCEDURE — 77067 MAMMO DIGITAL SCREENING BILAT WITH TOMO: ICD-10-PCS | Mod: 26,,, | Performed by: RADIOLOGY

## 2023-05-31 PROCEDURE — 77063 MAMMO DIGITAL SCREENING BILAT WITH TOMO: ICD-10-PCS | Mod: 26,,, | Performed by: RADIOLOGY

## 2023-05-31 NOTE — TELEPHONE ENCOUNTER
I notified patient that there was no appointment available right now and that I had her on the waiting list. Patient voices understanding.

## 2023-05-31 NOTE — TELEPHONE ENCOUNTER
----- Message from Víctor Spring sent at 5/31/2023 12:32 PM CDT -----  Contact: Pt.  .Type:  Needs Medical Advice    Who Called: pt    Would the patient rather a call back or a response via [facility]?  Call back  Best Call Back Number: [phone]  Additional Information: Pt. Is returning a call back to the office.

## 2023-06-01 DIAGNOSIS — I12.9 HYPERTENSIVE CHRONIC KIDNEY DISEASE WITH STAGE 1 THROUGH STAGE 4 CHRONIC KIDNEY DISEASE, OR UNSPECIFIED CHRONIC KIDNEY DISEASE: ICD-10-CM

## 2023-06-03 DIAGNOSIS — J30.2 SEASONAL ALLERGIES: ICD-10-CM

## 2023-06-04 ENCOUNTER — E-VISIT (OUTPATIENT)
Dept: PRIMARY CARE CLINIC | Facility: CLINIC | Age: 53
End: 2023-06-04
Payer: MEDICARE

## 2023-06-04 DIAGNOSIS — M06.9 RHEUMATOID ARTHRITIS, INVOLVING UNSPECIFIED SITE, UNSPECIFIED WHETHER RHEUMATOID FACTOR PRESENT: Primary | ICD-10-CM

## 2023-06-04 PROCEDURE — 99499 NO LOS: ICD-10-PCS | Mod: 95,,, | Performed by: STUDENT IN AN ORGANIZED HEALTH CARE EDUCATION/TRAINING PROGRAM

## 2023-06-04 PROCEDURE — 99499 UNLISTED E&M SERVICE: CPT | Mod: 95,,, | Performed by: STUDENT IN AN ORGANIZED HEALTH CARE EDUCATION/TRAINING PROGRAM

## 2023-06-04 NOTE — TELEPHONE ENCOUNTER
No care due was identified.  [facility] Embedded Care Due Messages. Reference number: [id number].   6/03/2023 10:11:36 PM CDT

## 2023-06-12 ENCOUNTER — TELEPHONE (OUTPATIENT)
Dept: SURGERY | Facility: CLINIC | Age: 53
End: 2023-06-12
Payer: MEDICARE

## 2023-06-15 ENCOUNTER — TELEPHONE (OUTPATIENT)
Dept: BARIATRICS | Facility: CLINIC | Age: 53
End: 2023-06-15
Payer: MEDICARE

## 2023-06-15 ENCOUNTER — TELEPHONE (OUTPATIENT)
Dept: PRIMARY CARE CLINIC | Facility: CLINIC | Age: 53
End: 2023-06-15
Payer: MEDICARE

## 2023-06-15 RX ORDER — CETIRIZINE HYDROCHLORIDE 10 MG/1
10 TABLET ORAL DAILY
Qty: 90 TABLET | Refills: 0 | Status: SHIPPED | OUTPATIENT
Start: 2023-06-15 | End: 2023-12-12 | Stop reason: SDUPTHER

## 2023-06-15 NOTE — TELEPHONE ENCOUNTER
----- Message from Nupurcarlos Antonio sent at 6/15/2023  2:04 PM CDT -----  Contact: [phone]  1MEDICALADVICE     Patient is calling for Medical Advice regarding:swelling    How long has patient had these symptoms:days    Pharmacy name and phone#:  [facility] - [place], [place] - [address]  [address] [address]  Phone: [phone] Fax: [phone]    [id number] - [place] - [address]  [address]  [place] [address]  Phone: [phone] Fax: [phone]        Would like response via [employer]:  call   Comments:

## 2023-06-15 NOTE — TELEPHONE ENCOUNTER
Called and spoke with pt about message left. She states that her left leg is swelling as well as both hands. States that she is taking meds for HTN without a diuretic. She is asking for advice. Ccardizem 240 mg Hydralazine 50 mg

## 2023-06-16 NOTE — TELEPHONE ENCOUNTER
Patient will need an appointment.  I have never seen patient before and am unable to give advice about what is going on.  She is scheduled for August please get her something sooner.

## 2023-06-19 ENCOUNTER — PATIENT MESSAGE (OUTPATIENT)
Dept: ADMINISTRATIVE | Facility: OTHER | Age: 53
End: 2023-06-19
Payer: MEDICARE

## 2023-06-21 ENCOUNTER — OFFICE VISIT (OUTPATIENT)
Dept: PRIMARY CARE CLINIC | Facility: CLINIC | Age: 53
End: 2023-06-21
Payer: MEDICARE

## 2023-06-21 ENCOUNTER — LAB VISIT (OUTPATIENT)
Dept: LAB | Facility: HOSPITAL | Age: 53
End: 2023-06-21
Attending: STUDENT IN AN ORGANIZED HEALTH CARE EDUCATION/TRAINING PROGRAM
Payer: MEDICARE

## 2023-06-21 ENCOUNTER — PATIENT MESSAGE (OUTPATIENT)
Dept: PRIMARY CARE CLINIC | Facility: CLINIC | Age: 53
End: 2023-06-21

## 2023-06-21 DIAGNOSIS — R06.09 DOE (DYSPNEA ON EXERTION): Primary | ICD-10-CM

## 2023-06-21 DIAGNOSIS — I12.9 HYPERTENSIVE CHRONIC KIDNEY DISEASE WITH STAGE 1 THROUGH STAGE 4 CHRONIC KIDNEY DISEASE, OR UNSPECIFIED CHRONIC KIDNEY DISEASE: ICD-10-CM

## 2023-06-21 DIAGNOSIS — M81.0 OSTEOPOROSIS, UNSPECIFIED OSTEOPOROSIS TYPE, UNSPECIFIED PATHOLOGICAL FRACTURE PRESENCE: ICD-10-CM

## 2023-06-21 LAB
ALBUMIN SERPL BCP-MCNC: 4.3 G/DL (ref 3.5–5.2)
ALP SERPL-CCNC: 117 U/L (ref 55–135)
ALT SERPL W/O P-5'-P-CCNC: 29 U/L (ref 10–44)
ANION GAP SERPL CALC-SCNC: 11 MMOL/L (ref 8–16)
AST SERPL-CCNC: 29 U/L (ref 10–40)
BILIRUB SERPL-MCNC: 0.6 MG/DL (ref 0.1–1)
BUN SERPL-MCNC: 7 MG/DL (ref 6–20)
CALCIUM SERPL-MCNC: 10.4 MG/DL (ref 8.7–10.5)
CHLORIDE SERPL-SCNC: 100 MMOL/L (ref 95–110)
CO2 SERPL-SCNC: 28 MMOL/L (ref 23–29)
CREAT SERPL-MCNC: 1.1 MG/DL (ref 0.5–1.4)
EST. GFR  (NO RACE VARIABLE): >60 ML/MIN/1.73 M^2
GLUCOSE SERPL-MCNC: 87 MG/DL (ref 70–110)
POTASSIUM SERPL-SCNC: 3.9 MMOL/L (ref 3.5–5.1)
PROT SERPL-MCNC: 8.7 G/DL (ref 6–8.4)
SODIUM SERPL-SCNC: 139 MMOL/L (ref 136–145)

## 2023-06-21 PROCEDURE — 80053 COMPREHEN METABOLIC PANEL: CPT | Performed by: STUDENT IN AN ORGANIZED HEALTH CARE EDUCATION/TRAINING PROGRAM

## 2023-06-21 PROCEDURE — 99214 OFFICE O/P EST MOD 30 MIN: CPT | Mod: HCNC,95,, | Performed by: STUDENT IN AN ORGANIZED HEALTH CARE EDUCATION/TRAINING PROGRAM

## 2023-06-21 PROCEDURE — 99214 PR OFFICE/OUTPT VISIT, EST, LEVL IV, 30-39 MIN: ICD-10-PCS | Mod: HCNC,95,, | Performed by: STUDENT IN AN ORGANIZED HEALTH CARE EDUCATION/TRAINING PROGRAM

## 2023-06-21 PROCEDURE — 36415 COLL VENOUS BLD VENIPUNCTURE: CPT | Performed by: STUDENT IN AN ORGANIZED HEALTH CARE EDUCATION/TRAINING PROGRAM

## 2023-06-21 NOTE — PROGRESS NOTES
06/21/2023    Gill Cleary  [id number]    CC: swelling      HPI    This patient is new to me presents for acute concern.  Patient has complex medical history.  Has been having lower leg swelling for the last 2 weeks. Also with stiffness in joints and hands feeling tight. Denies and changes in the last 2 weeks. Endorses SOB that started  before the swelling, but seems to be worse in the last 2 weeks. Notes that she almost keeled over yesterday.  Patient is currently at bedside with  and states someone needs to be with him at all times source visit difficult for her to go get lab testing complete.    Negative 10 point ROS outside of HPI    Social History     Socioeconomic History    Marital status:    Tobacco Use    Smoking status: Never    Smokeless tobacco: Never   Substance and Sexual Activity    Alcohol use: No    Drug use: Never    Sexual activity: Never   Social History Narrative    Tobacco: None    EtOH: None    Drug: None    Employment: Caregiver for      Education:2 year college    Lives with  and 19 yo daughter     Social Determinants of Health     Financial Resource Strain: Low Risk     Difficulty of Paying Living Expenses: Not hard at all   Food Insecurity: No Food Insecurity    Worried About Running Out of Food in the Last Year: Never true    Ran Out of Food in the Last Year: Never true   Transportation Needs: No Transportation Needs    Lack of Transportation (Medical): No    Lack of Transportation (Non-Medical): No   Physical Activity: Inactive    Days of Exercise per Week: 0 days    Minutes of Exercise per Session: 0 min   Stress: Stress Concern Present    Feeling of Stress : Very much   Social Connections: Moderately Integrated    Frequency of Communication with Friends and Family: Never    Frequency of Social Gatherings with Friends and Family: More than three times a week    Attends Gnosticist Services: More than 4 times per year    Active Member of Clubs or Organizations: No     Attends Club or Organization Meetings: Never    Marital Status:    Housing Stability: High Risk    Unable to Pay for Housing in the Last Year: Yes    Number of Places Lived in the Last Year: 1    Unstable Housing in the Last Year: No           Current Outpatient Medications:     albuterol (PROVENTIL/VENTOLIN HFA) 90 mcg/actuation inhaler, Inhale 2 puffs into the lungs every 6 (six) hours as needed for Wheezing. Rescue, Disp: 8 g, Rfl: 0    alendronate (FOSAMAX) 70 MG tablet, Take 1 tablet (70 mg total) by mouth every 7 days., Disp: 12 tablet, Rfl: 0    atorvastatin (LIPITOR) 80 MG tablet, Take 1 tablet by mouth once daily., Disp: , Rfl:     zrnmsmcmi-tpljcrac-ebxcxhb ala (BIKTARVY) -25 mg per tablet, Take 1 tablet by mouth once daily., Disp: , Rfl:     cetirizine (ZYRTEC) 10 MG tablet, Take 1 tablet (10 mg total) by mouth once daily., Disp: 90 tablet, Rfl: 0    diltiaZEM (CARDIZEM CD) 240 MG 24 hr capsule, Take 1 capsule (240 mg total) by mouth once daily., Disp: 30 capsule, Rfl: 1    ergocalciferol (ERGOCALCIFEROL) 50,000 unit Cap, Take 50,000 Units by mouth every 30 days., Disp: , Rfl:     fluticasone propionate (FLONASE) 50 mcg/actuation nasal spray, 2 sprays (100 mcg total) by Each Nostril route once daily., Disp: 16 g, Rfl: 0    hydrALAZINE (APRESOLINE) 50 MG tablet, Take 50 mg by mouth every evening., Disp: , Rfl:     lisdexamfetamine (VYVANSE) 60 MG capsule, Take 60 mg by mouth every morning., Disp: , Rfl:     oxyCODONE (ROXICODONE) 5 MG immediate release tablet, Take 7.5 mg by mouth 3 (three) times daily as needed for Pain., Disp: , Rfl:     propranoloL (INDERAL) 40 MG tablet, Take 40 mg by mouth every evening., Disp: , Rfl:     risperiDONE (RISPERDAL) 1 MG tablet, Take 1 mg by mouth every evening., Disp: , Rfl:     venlafaxine (EFFEXOR-XR) 150 MG Cp24, Take 150 mg by mouth every morning., Disp: , Rfl:     venlafaxine (EFFEXOR-XR) 75 MG 24 hr capsule, Take 75 mg by mouth every morning., Disp:  , Rfl:       Physical Exam  Vitals unable to obtain    Gen: well appearing  Resp: speaks in full sentences. Non labored breathing  Cv: non-diaphoretic      1. BECKFORD (dyspnea on exertion)  Recommended completing work up or being evaled in person  - Echo; Future  - BNP; Future  - X-Ray Chest PA And Lateral; Future    2. Osteoporosis, unspecified osteoporosis type, unspecified pathological fracture presence  -refilled per patient request alendronate-vitamin D3 (FOSAMAX PLUS D) 70 mg- 2,800 unit per tablet; Take 1 tablet by mouth every 7 days.  Dispense: 12 tablet; Refill: 1  Last DXA 5/2023 with osteopenia    Yaima Burton MD  Family Medicine

## 2023-06-22 ENCOUNTER — PATIENT MESSAGE (OUTPATIENT)
Dept: ADMINISTRATIVE | Facility: OTHER | Age: 53
End: 2023-06-22
Payer: MEDICARE

## 2023-06-22 RX ORDER — TIZANIDINE 4 MG/1
TABLET ORAL
COMMUNITY
End: 2023-09-12 | Stop reason: ALTCHOICE

## 2023-06-22 RX ORDER — IBUPROFEN 600 MG/1
600 TABLET ORAL 3 TIMES DAILY
COMMUNITY
Start: 2023-06-03 | End: 2023-10-10

## 2023-06-22 RX ORDER — TOPIRAMATE 25 MG/1
TABLET ORAL
COMMUNITY
End: 2023-08-15

## 2023-06-22 RX ORDER — GABAPENTIN 100 MG/1
CAPSULE ORAL
COMMUNITY
End: 2023-09-12 | Stop reason: ALTCHOICE

## 2023-06-22 RX ORDER — OMEPRAZOLE 40 MG/1
40 CAPSULE, DELAYED RELEASE ORAL
COMMUNITY
Start: 2023-05-30 | End: 2023-11-08 | Stop reason: SDUPTHER

## 2023-06-22 RX ORDER — ALENDRONATE SODIUM AND CHOLECALCIFEROL 70; 2800 MG/1; [IU]/1
TABLET ORAL
COMMUNITY
Start: 2020-05-06 | End: 2023-06-22 | Stop reason: SDUPTHER

## 2023-06-22 RX ORDER — ALENDRONATE SODIUM AND CHOLECALCIFEROL 70; 2800 MG/1; [IU]/1
1 TABLET ORAL
Qty: 12 TABLET | Refills: 1 | Status: SHIPPED | OUTPATIENT
Start: 2023-06-22 | End: 2023-09-12 | Stop reason: ALTCHOICE

## 2023-06-22 RX ORDER — HYDROXYZINE PAMOATE 25 MG/1
CAPSULE ORAL
COMMUNITY

## 2023-06-22 RX ORDER — PREGABALIN 150 MG/1
150 CAPSULE ORAL 2 TIMES DAILY
COMMUNITY
Start: 2023-06-07

## 2023-06-30 ENCOUNTER — TELEPHONE (OUTPATIENT)
Dept: PRIMARY CARE CLINIC | Facility: CLINIC | Age: 53
End: 2023-06-30
Payer: MEDICARE

## 2023-06-30 NOTE — TELEPHONE ENCOUNTER
Spoke to pt re: home covid test, pt states someone is delivering test to her. Nurse advised pt to call or message back results. Pt verbalized understanding.

## 2023-06-30 NOTE — TELEPHONE ENCOUNTER
----- Message from Yaima Burton MD sent at 6/30/2023  2:11 PM CDT -----  Regarding: COVIDTEST  Contact: Pt [phone]  Can she do an at home covid test.    ----- Message -----  From: Mackenzie Whittaker LPN  Sent: 6/30/2023   9:51 AM CDT  To: Yaima Burton MD      ----- Message -----  From: Caryn Barbosa  Sent: 6/30/2023   9:14 AM CDT  To: Josephine Erwin Staff    1MEDICALADVICE     Patient is calling for Medical Advice regarding: Covid symptoms / needs testing    How long has patient had these symptoms: 1 week    Pharmacy name and phone#:   [facility] - [place], [place] [address] [address]  [address]  [facility] [address]  Phone: [phone] Fax: [phone]    Would like response via [employer]:  Call back    Comments: Patient states that everything taste funny, has bad cough/fever.    Please call and advise.    Thank You

## 2023-07-05 ENCOUNTER — PATIENT MESSAGE (OUTPATIENT)
Dept: PRIMARY CARE CLINIC | Facility: CLINIC | Age: 53
End: 2023-07-05
Payer: MEDICARE

## 2023-07-11 ENCOUNTER — PATIENT MESSAGE (OUTPATIENT)
Dept: ADMINISTRATIVE | Facility: OTHER | Age: 53
End: 2023-07-11
Payer: MEDICARE

## 2023-07-11 ENCOUNTER — PATIENT MESSAGE (OUTPATIENT)
Dept: PRIMARY CARE CLINIC | Facility: CLINIC | Age: 53
End: 2023-07-11
Payer: MEDICARE

## 2023-07-15 ENCOUNTER — OFFICE VISIT (OUTPATIENT)
Dept: PRIMARY CARE CLINIC | Facility: CLINIC | Age: 53
End: 2023-07-15
Payer: MEDICARE

## 2023-07-15 DIAGNOSIS — R60.0 BILATERAL LEG EDEMA: ICD-10-CM

## 2023-07-15 DIAGNOSIS — I10 ESSENTIAL HYPERTENSION: Primary | ICD-10-CM

## 2023-07-15 PROCEDURE — 99214 PR OFFICE/OUTPT VISIT, EST, LEVL IV, 30-39 MIN: ICD-10-PCS | Mod: HCNC,95,, | Performed by: STUDENT IN AN ORGANIZED HEALTH CARE EDUCATION/TRAINING PROGRAM

## 2023-07-15 PROCEDURE — 99214 OFFICE O/P EST MOD 30 MIN: CPT | Mod: HCNC,95,, | Performed by: STUDENT IN AN ORGANIZED HEALTH CARE EDUCATION/TRAINING PROGRAM

## 2023-07-15 RX ORDER — FUROSEMIDE 20 MG/1
20 TABLET ORAL DAILY
Qty: 30 TABLET | Refills: 1 | Status: SHIPPED | OUTPATIENT
Start: 2023-07-15 | End: 2023-09-12 | Stop reason: SDUPTHER

## 2023-07-15 NOTE — PROGRESS NOTES
07/15/2023    Gill Cleary  [id number]    CC:  lower leg swelling      HPI    This pt presents virtually for bilateral lower leg swelling. PMH sig for HIV, HTN, and chronic pain. For the last week pt has been at her husbands bedside in the hospital, but states she has been moving around as usual. Hx of diuretics, but hasn't had any lately.    Bilateral lower edema present for the last 3 week. Has been able to move around, though staying in the hospital with .     HTN  Has been checking and states its well controlled      Negative 10 point ROS outside of HPI    Social History     Socioeconomic History    Marital status:    Tobacco Use    Smoking status: Never    Smokeless tobacco: Never   Substance and Sexual Activity    Alcohol use: No    Drug use: Never    Sexual activity: Never   Social History Narrative    Tobacco: None    EtOH: None    Drug: None    Employment: Caregiver for      Education:2 year college    Lives with  and 19 yo daughter     Social Determinants of Health     Financial Resource Strain: Low Risk     Difficulty of Paying Living Expenses: Not hard at all   Food Insecurity: No Food Insecurity    Worried About Running Out of Food in the Last Year: Never true    Ran Out of Food in the Last Year: Never true   Transportation Needs: No Transportation Needs    Lack of Transportation (Medical): No    Lack of Transportation (Non-Medical): No   Physical Activity: Inactive    Days of Exercise per Week: 0 days    Minutes of Exercise per Session: 0 min   Stress: Stress Concern Present    Feeling of Stress : Very much   Social Connections: Moderately Integrated    Frequency of Communication with Friends and Family: Never    Frequency of Social Gatherings with Friends and Family: More than three times a week    Attends [employer] Services: More than 4 times per year    Active Member of Clubs or Organizations: No    Attends Club or Organization Meetings: Never    Marital Status:     Housing Stability: High Risk    Unable to Pay for Housing in the Last Year: Yes    Number of Places Lived in the Last Year: 1    Unstable Housing in the Last Year: No           Current Outpatient Medications:     albuterol (PROVENTIL/VENTOLIN HFA) 90 mcg/actuation inhaler, Inhale 2 puffs into the lungs every 6 (six) hours as needed for Wheezing. Rescue, Disp: 8 g, Rfl: 0    alendronate-vitamin D3 (FOSAMAX PLUS D) 70 mg- 2,800 unit per tablet, Take 1 tablet by mouth every 7 days., Disp: 12 tablet, Rfl: 1    atorvastatin (LIPITOR) 80 MG tablet, Take 1 tablet by mouth once daily., Disp: , Rfl:     chgbnwfpm-sbvnoihy-xzjahkr ala (BIKTARVY) -25 mg per tablet, Take 1 tablet by mouth once daily., Disp: , Rfl:     cetirizine (ZYRTEC) 10 MG tablet, Take 1 tablet (10 mg total) by mouth once daily., Disp: 90 tablet, Rfl: 0    diltiaZEM (CARDIZEM CD) 240 MG 24 hr capsule, Take 1 capsule (240 mg total) by mouth once daily., Disp: 30 capsule, Rfl: 1    ergocalciferol (ERGOCALCIFEROL) 50,000 unit Cap, Take 50,000 Units by mouth every 30 days., Disp: , Rfl:     fluticasone propionate (FLONASE) 50 mcg/actuation nasal spray, 2 sprays (100 mcg total) by Each Nostril route once daily., Disp: 16 g, Rfl: 0    gabapentin (NEURONTIN) 100 MG capsule, gabapentin 100 mg capsule  TAKE 1 CAPSULE BY MOUTH THREE TIMES A DAY, Disp: , Rfl:     hydrALAZINE (APRESOLINE) 50 MG tablet, Take 50 mg by mouth every evening., Disp: , Rfl:     hydrOXYzine pamoate (VISTARIL) 25 MG Cap, hydroxyzine pamoate 25 mg capsule  TAKE 1 CAPSULE (25 MG TOTAL) BY MOUTH EVERY 8 (EIGHT) HOURS AS NEEDED (ALLERGIES)., Disp: , Rfl:     ibuprofen (ADVIL,MOTRIN) 600 MG tablet, Take 600 mg by mouth 3 (three) times daily., Disp: , Rfl:     lisdexamfetamine (VYVANSE) 60 MG capsule, Take 60 mg by mouth every morning., Disp: , Rfl:     omeprazole (PRILOSEC) 40 MG capsule, Take 40 mg by mouth., Disp: , Rfl:     oxyCODONE (ROXICODONE) 5 MG immediate release tablet, Take 7.5 mg  by mouth 3 (three) times daily as needed for Pain., Disp: , Rfl:     pregabalin (LYRICA) 150 MG capsule, Take 150 mg by mouth 2 (two) times daily., Disp: , Rfl:     propranoloL (INDERAL) 40 MG tablet, Take 40 mg by mouth every evening., Disp: , Rfl:     risperiDONE (RISPERDAL) 1 MG tablet, Take 1 mg by mouth every evening., Disp: , Rfl:     tiZANidine (ZANAFLEX) 4 MG tablet, tizanidine 4 mg tablet  TAKE 1 TABLET BY MOUTH EVERY EIGHT HOURS AS NEEDED, Disp: , Rfl:     topiramate (TOPAMAX) 25 MG tablet, topiramate 25 mg tablet  TAKE ONE TABLET BY MOUTH AT BEDTIME X 1 WEEK, TAKE TWO TABLETS AT BEDTIME X 1 WEEK, TAKE 3 TABS AT BEDTIME X 1 WEEK, THEN TAKE 4 TABLETS AT BEDTIME, Disp: , Rfl:     venlafaxine (EFFEXOR-XR) 150 MG Cp24, Take 150 mg by mouth every morning., Disp: , Rfl:     venlafaxine (EFFEXOR-XR) 75 MG 24 hr capsule, Take 75 mg by mouth every morning., Disp: , Rfl:       Physical Exam  Vitals unable to obtain    Gen: well appearing  Resp: speaks in full sentences. Non labored breathing  Cv: non-diaphoretic  Msk: bilateral ankle edema    1. Essential hypertension  START  - furosemide (LASIX) 20 MG tablet; Take 1 tablet (20 mg total) by mouth once daily.  Dispense: 30 tablet; Refill: 1  -continue home regimen  Check cmp in 2-3 weeks     2. Bilateral leg edema  See above  Recommended low sodium and compression socks,  Recommend in person visit      Yaima Burton MD  Family Medicine

## 2023-08-02 ENCOUNTER — PATIENT MESSAGE (OUTPATIENT)
Dept: PRIMARY CARE CLINIC | Facility: CLINIC | Age: 53
End: 2023-08-02
Payer: MEDICARE

## 2023-08-03 DIAGNOSIS — I10 PRIMARY HYPERTENSION: ICD-10-CM

## 2023-08-03 NOTE — TELEPHONE ENCOUNTER
----- Message from Caryn Barbosa sent at 8/3/2023  9:29 AM CDT -----  Contact: Pt [phone]  Requesting an RX refill or new RX.  Is this a refill or new RX: Refill  RX name and strength (copy/paste from chart):  diltiaZEM (CARDIZEM CD) 240 MG 24 hr capsule  Is this a 30 day or 90 day RX: 30  Pharmacy name and phone # (copy/paste from chart):    [id number] - [place] - [address] AT [facility] OF [address]  [address] [address]  Phone: [phone] Fax: [phone]      Please call and advise.    Thank You

## 2023-08-03 NOTE — TELEPHONE ENCOUNTER
No care due was identified.  Health [facility] Embedded Care Due Messages. Reference number: [id number].   8/03/2023 1:42:20 PM CDT

## 2023-08-07 RX ORDER — DILTIAZEM HYDROCHLORIDE 240 MG/1
240 CAPSULE, COATED, EXTENDED RELEASE ORAL DAILY
Qty: 90 CAPSULE | Refills: 1 | Status: SHIPPED | OUTPATIENT
Start: 2023-08-07 | End: 2023-09-09 | Stop reason: SDUPTHER

## 2023-08-09 ENCOUNTER — PATIENT MESSAGE (OUTPATIENT)
Dept: PRIMARY CARE CLINIC | Facility: CLINIC | Age: 53
End: 2023-08-09
Payer: MEDICARE

## 2023-08-14 ENCOUNTER — PES CALL (OUTPATIENT)
Dept: ADMINISTRATIVE | Facility: CLINIC | Age: 53
End: 2023-08-14
Payer: MEDICARE

## 2023-08-14 ENCOUNTER — PATIENT MESSAGE (OUTPATIENT)
Dept: PRIMARY CARE CLINIC | Facility: CLINIC | Age: 53
End: 2023-08-14
Payer: MEDICARE

## 2023-08-14 ENCOUNTER — PATIENT MESSAGE (OUTPATIENT)
Dept: OTHER | Facility: OTHER | Age: 53
End: 2023-08-14
Payer: MEDICARE

## 2023-08-15 ENCOUNTER — TELEPHONE (OUTPATIENT)
Dept: ADMINISTRATIVE | Facility: CLINIC | Age: 53
End: 2023-08-15
Payer: MEDICARE

## 2023-08-15 ENCOUNTER — OFFICE VISIT (OUTPATIENT)
Dept: HOME HEALTH SERVICES | Facility: CLINIC | Age: 53
End: 2023-08-15
Payer: MEDICARE

## 2023-08-15 DIAGNOSIS — I10 ESSENTIAL HYPERTENSION: ICD-10-CM

## 2023-08-15 DIAGNOSIS — G47.33 OSA (OBSTRUCTIVE SLEEP APNEA): ICD-10-CM

## 2023-08-15 DIAGNOSIS — F11.20 UNCOMPLICATED OPIOID DEPENDENCE: ICD-10-CM

## 2023-08-15 DIAGNOSIS — E66.01 SEVERE OBESITY (BMI 35.0-39.9) WITH COMORBIDITY: ICD-10-CM

## 2023-08-15 DIAGNOSIS — Z00.00 ENCOUNTER FOR PREVENTIVE HEALTH EXAMINATION: Primary | ICD-10-CM

## 2023-08-15 DIAGNOSIS — F33.0 MILD EPISODE OF RECURRENT MAJOR DEPRESSIVE DISORDER: ICD-10-CM

## 2023-08-15 DIAGNOSIS — B20 HUMAN IMMUNODEFICIENCY VIRUS (HIV) DISEASE: ICD-10-CM

## 2023-08-15 DIAGNOSIS — F90.2 ATTENTION DEFICIT HYPERACTIVITY DISORDER, COMBINED TYPE: ICD-10-CM

## 2023-08-15 DIAGNOSIS — E78.5 HYPERLIPIDEMIA, UNSPECIFIED HYPERLIPIDEMIA TYPE: ICD-10-CM

## 2023-08-15 PROBLEM — F33.40 RECURRENT MAJOR DEPRESSIVE DISORDER, IN REMISSION: Status: ACTIVE | Noted: 2023-08-15

## 2023-08-15 PROBLEM — R11.2 NAUSEA, VOMITING, AND DIARRHEA: Status: RESOLVED | Noted: 2022-04-28 | Resolved: 2023-08-15

## 2023-08-15 PROBLEM — K57.92 DIVERTICULITIS: Status: RESOLVED | Noted: 2018-12-10 | Resolved: 2023-08-15

## 2023-08-15 PROBLEM — R19.7 NAUSEA, VOMITING, AND DIARRHEA: Status: RESOLVED | Noted: 2022-04-28 | Resolved: 2023-08-15

## 2023-08-15 PROBLEM — Z91.89 QUIT USING TOBACCO IN REMOTE PAST: Status: RESOLVED | Noted: 2020-09-10 | Resolved: 2023-08-15

## 2023-08-15 PROCEDURE — G9919 PR SCREENING AND POSITIVE: ICD-10-PCS | Mod: CPTII,NDTC,, | Performed by: NURSE PRACTITIONER

## 2023-08-15 PROCEDURE — G9919 SCRN ND POS ND PROV OF REC: HCPCS | Mod: CPTII,NDTC,, | Performed by: NURSE PRACTITIONER

## 2023-08-15 NOTE — PROGRESS NOTES
The patient location is: [place]  The chief complaint leading to consultation is: AWV    Visit type: audiovisual    Face to Face time with patient: 40    60 minutes of total time spent on the encounter, which includes face to face time and non-face to face time preparing to see the patient (eg, review of tests), Obtaining and/or reviewing separately obtained history, Documenting clinical information in the electronic or other health record, Independently interpreting results (not separately reported) and communicating results to the patient/family/caregiver, or Care coordination (not separately reported).         Each patient to whom he or she provides medical services by telemedicine is:  (1) informed of the relationship between the physician and patient and the respective role of any other health care provider with respect to management of the patient; and (2) notified that he or she may decline to receive medical services by telemedicine and may withdraw from such care at any time.    Notes:     Patient on chronic opioids. Risk factors reviewed. Risk factors may include Sleep-disordered breathing, renal or hepatic insufficiency, increased risk for falls and fractures, risk of opioid misuse/overdose/opioid use disorder. Pain evaluated during visit. Current treatment plan documented. Per patient request referred her to [facility].    Gill Cleary presented for a  Medicare AWV and comprehensive Health Risk Assessment today. The following components were reviewed and updated:    Medical history  Family History  Social history  Allergies and Current Medications  Health Risk Assessment  Health Maintenance  Care Team     Patient screened moderate and/or high risk for one or more social determinants of health (SDOH). Patient connected to community resources through the ED Navigator.      ** See Completed Assessments for Annual Wellness Visit within the encounter summary.**         The following assessments were  completed:  Living Situation  CAGE  Depression Screening  Fall Risk Assessment (MACH 10)  Hearing Assessment(HHI)  Cognitive Function Screening  Nutrition Screening  ADL Screening  PAQ Screening      There were no vitals filed for this visit.  There is no height or weight on file to calculate BMI.  Physical Exam  Constitutional:       Appearance: Normal appearance.   Neurological:      Mental Status: She is alert and oriented to person, place, and time.   Psychiatric:         Mood and Affect: Mood normal.         Behavior: Behavior normal.         Thought Content: Thought content normal.         Judgment: Judgment normal.               Diagnoses and health risks identified today and associated recommendations/orders:    1. Encounter for preventive health examination  Discussed Care Gaps:  Due for Hepatitis C Screening  Due for Tetanus Vaccine  Patient will follow-up with PCP to have screening completed.      - Ambulatory referral/consult to Pain Clinic; Future    2. Human immunodeficiency virus (HIV) disease  Stable. Taking Biktarvy. Followed by PCP.    3. Severe obesity (BMI 35.0-39.9) with comorbidity  Stable. 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 Discussed nutrition/appetite. Followed by PCP.    4. Uncomplicated opioid dependence  Stable. Taking Oxycodone twice daily as needed for chronic low back pain. States her pain is normally around a 7. Requesting new Pain doctor. Referral placed.     5. Essential hypertension  Stable. Taking Cardizem, Hydralazine,and Propranolol. Followed by PCP.     6. Attention deficit hyperactivity disorder, combined type  Stable. Taking Vyvanse. Followed by PCP.    7. YANICK (obstructive sleep apnea)  Stable. Followed by PCP.    8. Hyperlipidemia, unspecified hyperlipidemia type  Stable. Taking Atorvastatin. Followed by PCP.    9. Mild episode of recurrent depressive disorder  Stable. Taking 225mg Effexor daily. Reports that she  is doing well currently. Reports that she follows-up with psychiatry monthly.     In addition to AWV, patient reported that her legs were swollen. Reported that one of her legs was more swollen than the other. I advised for her to have ED or PCP visit today to rule out blood clot. Denied any shortness of breath or chest pain. Patient verbalized understanding that I recommended ED follow-up today.     Provided Gill with a 5-10 year written screening schedule and personal prevention plan. Recommendations were developed using the USPSTF age appropriate recommendations. Education, counseling, and referrals were provided as needed. After Visit Summary printed and given to patient which includes a list of additional screenings\tests needed.    No follow-ups on file.    Natalie Vazquez, MISHA  I offered to discuss advanced care planning, including how to pick a person who would make decisions for you if you were unable to make them for yourself, called a health care power of , and what kind of decisions you might make such as use of life sustaining treatments such as ventilators and tube feeding when faced with a life limiting illness recorded on a living will that they will need to know. (How you want to be cared for as you near the end of your natural life)     X Patient is interested in learning more about how to make advanced directives.  I provided them paperwork and offered to discuss this with them.

## 2023-08-15 NOTE — PATIENT INSTRUCTIONS
Counseling and Referral of Other Preventative  (Italic type indicates deductible and co-insurance are waived)    Patient Name: Gill Cleary  Today's Date: 8/15/2023    Health Maintenance       Date Due Completion Date    Hepatitis C Screening Never done ---    TETANUS VACCINE Never done ---    Influenza Vaccine (1) 09/01/2023 12/11/2020 (ClinicallyNA)    Override on 12/11/2020: Not Clinically Appropriate    Mammogram 05/31/2024 5/31/2023    Hemoglobin A1c (Diabetic Prevention Screening) 12/02/2024 12/2/2021    Lipid Panel 12/02/2026 12/2/2021        No orders of the defined types were placed in this encounter.    The following information is provided to all patients.  This information is to help you find resources for any of the problems found today that may be affecting your health:                Living healthy guide: www.[facility].[place].[facility]      Understanding Diabetes: www.diabetes.org      Eating healthy: www.cdc.gov/healthyweight      [facility] home safety checklist: www.cdc.gov/steadi/patient.html      Agency on Aging: www.goea.[place].[facility]      Alcoholics anonymous (AA): www.aa.org      Physical Activity: www.alejandra.nih.gov/it1ojln      Tobacco use: www.quitwithusla.org

## 2023-08-18 ENCOUNTER — TELEPHONE (OUTPATIENT)
Dept: PODIATRY | Facility: CLINIC | Age: 53
End: 2023-08-18
Payer: MEDICARE

## 2023-08-18 NOTE — TELEPHONE ENCOUNTER
----- Message from Tucker Banks LPN sent at 8/9/2023  4:52 PM CDT -----  Regarding: FW: Appointment  Contact: [phone]    ----- Message -----  From: Marlee Eisenberg  Sent: 8/9/2023  11:41 AM CDT  To: [facility]  Subject: Appointment                                      Pt calling to get an appt sooner than January for pain in both feet. Please call pt to schedule

## 2023-08-18 NOTE — TELEPHONE ENCOUNTER
Spoke with patient. Stated she is experiencing bilateral foot pain. She was informed the next available opening with the provider is in January. She agreed to schedule an appointment and will check in from time to time for cancellations, placed on waiting list. Verbalized understanding. No further issues discussed.

## 2023-08-22 ENCOUNTER — TELEPHONE (OUTPATIENT)
Dept: PRIMARY CARE CLINIC | Facility: CLINIC | Age: 53
End: 2023-08-22
Payer: MEDICARE

## 2023-08-22 NOTE — TELEPHONE ENCOUNTER
----- Message from Eve Pagan Patient Care Assistant sent at 8/16/2023 10:03 AM CDT -----  Contact: Pt [phone]  Please advise can she see Bonny ?this is who she's requesting  ----- Message -----  From: Caryn Barbosa  Sent: 8/14/2023   1:31 PM CDT  To: Josephine Erwin Staff    Patient want's to know if it is ok for her to see Bonny Zapata since you are booked full October.      1MEDICALADVICE     Patient is calling for Medical Advice regarding:High Blood pressure/Swelling in the legs    How long has patient had these symptoms: 2 weeks    Pharmacy name and phone#:   [id number] - [place] - [address] AT [facility] [address]  [address] [address]  Phone: [phone] Fax: [phone]    Would like response via [employer]:  Call back    Comments:  Patient would like to see you if you can squeeze her in asap.    Please call and advise.    Thank You

## 2023-08-22 NOTE — TELEPHONE ENCOUNTER
Ms. Garland was offered a sooner appointment with Dr. Burton on 08/18 and canceled. She is not scheduled in September to see Dr. Burton.

## 2023-09-05 ENCOUNTER — OFFICE VISIT (OUTPATIENT)
Dept: BARIATRICS | Facility: CLINIC | Age: 53
End: 2023-09-05
Payer: MEDICAID

## 2023-09-05 VITALS
OXYGEN SATURATION: 96 % | HEART RATE: 90 BPM | DIASTOLIC BLOOD PRESSURE: 61 MMHG | BODY MASS INDEX: 38.99 KG/M2 | WEIGHT: 220.13 LBS | SYSTOLIC BLOOD PRESSURE: 129 MMHG

## 2023-09-05 DIAGNOSIS — G47.33 OSA (OBSTRUCTIVE SLEEP APNEA): ICD-10-CM

## 2023-09-05 DIAGNOSIS — I10 ESSENTIAL HYPERTENSION: ICD-10-CM

## 2023-09-05 DIAGNOSIS — Z71.3 ENCOUNTER FOR WEIGHT LOSS COUNSELING: ICD-10-CM

## 2023-09-05 DIAGNOSIS — E78.5 HYPERLIPIDEMIA, UNSPECIFIED HYPERLIPIDEMIA TYPE: ICD-10-CM

## 2023-09-05 DIAGNOSIS — E66.01 CLASS 2 SEVERE OBESITY DUE TO EXCESS CALORIES WITH SERIOUS COMORBIDITY AND BODY MASS INDEX (BMI) OF 38.0 TO 38.9 IN ADULT: Primary | ICD-10-CM

## 2023-09-05 PROCEDURE — 1160F PR REVIEW ALL MEDS BY PRESCRIBER/CLIN PHARMACIST DOCUMENTED: ICD-10-PCS | Mod: HCNC,CPTII,S$GLB, | Performed by: STUDENT IN AN ORGANIZED HEALTH CARE EDUCATION/TRAINING PROGRAM

## 2023-09-05 PROCEDURE — 3008F BODY MASS INDEX DOCD: CPT | Mod: HCNC,CPTII,S$GLB, | Performed by: STUDENT IN AN ORGANIZED HEALTH CARE EDUCATION/TRAINING PROGRAM

## 2023-09-05 PROCEDURE — 3078F DIAST BP <80 MM HG: CPT | Mod: HCNC,CPTII,S$GLB, | Performed by: STUDENT IN AN ORGANIZED HEALTH CARE EDUCATION/TRAINING PROGRAM

## 2023-09-05 PROCEDURE — 99204 PR OFFICE/OUTPT VISIT, NEW, LEVL IV, 45-59 MIN: ICD-10-PCS | Mod: HCNC,S$GLB,, | Performed by: STUDENT IN AN ORGANIZED HEALTH CARE EDUCATION/TRAINING PROGRAM

## 2023-09-05 PROCEDURE — 1160F RVW MEDS BY RX/DR IN RCRD: CPT | Mod: HCNC,CPTII,S$GLB, | Performed by: STUDENT IN AN ORGANIZED HEALTH CARE EDUCATION/TRAINING PROGRAM

## 2023-09-05 PROCEDURE — 99999 PR PBB SHADOW E&M-EST. PATIENT-LVL V: ICD-10-PCS | Mod: PBBFAC,HCNC,, | Performed by: STUDENT IN AN ORGANIZED HEALTH CARE EDUCATION/TRAINING PROGRAM

## 2023-09-05 PROCEDURE — 1159F MED LIST DOCD IN RCRD: CPT | Mod: HCNC,CPTII,S$GLB, | Performed by: STUDENT IN AN ORGANIZED HEALTH CARE EDUCATION/TRAINING PROGRAM

## 2023-09-05 PROCEDURE — 99999 PR PBB SHADOW E&M-EST. PATIENT-LVL V: CPT | Mod: PBBFAC,HCNC,, | Performed by: STUDENT IN AN ORGANIZED HEALTH CARE EDUCATION/TRAINING PROGRAM

## 2023-09-05 PROCEDURE — 3074F SYST BP LT 130 MM HG: CPT | Mod: HCNC,CPTII,S$GLB, | Performed by: STUDENT IN AN ORGANIZED HEALTH CARE EDUCATION/TRAINING PROGRAM

## 2023-09-05 PROCEDURE — 3008F PR BODY MASS INDEX (BMI) DOCUMENTED: ICD-10-PCS | Mod: HCNC,CPTII,S$GLB, | Performed by: STUDENT IN AN ORGANIZED HEALTH CARE EDUCATION/TRAINING PROGRAM

## 2023-09-05 PROCEDURE — 3078F PR MOST RECENT DIASTOLIC BLOOD PRESSURE < 80 MM HG: ICD-10-PCS | Mod: HCNC,CPTII,S$GLB, | Performed by: STUDENT IN AN ORGANIZED HEALTH CARE EDUCATION/TRAINING PROGRAM

## 2023-09-05 PROCEDURE — 1159F PR MEDICATION LIST DOCUMENTED IN MEDICAL RECORD: ICD-10-PCS | Mod: HCNC,CPTII,S$GLB, | Performed by: STUDENT IN AN ORGANIZED HEALTH CARE EDUCATION/TRAINING PROGRAM

## 2023-09-05 PROCEDURE — 99204 OFFICE O/P NEW MOD 45 MIN: CPT | Mod: HCNC,S$GLB,, | Performed by: STUDENT IN AN ORGANIZED HEALTH CARE EDUCATION/TRAINING PROGRAM

## 2023-09-05 PROCEDURE — 3074F PR MOST RECENT SYSTOLIC BLOOD PRESSURE < 130 MM HG: ICD-10-PCS | Mod: HCNC,CPTII,S$GLB, | Performed by: STUDENT IN AN ORGANIZED HEALTH CARE EDUCATION/TRAINING PROGRAM

## 2023-09-05 RX ORDER — SEMAGLUTIDE 0.68 MG/ML
0.5 INJECTION, SOLUTION SUBCUTANEOUS
Qty: 3 ML | Refills: 2 | Status: SHIPPED | OUTPATIENT
Start: 2023-09-05 | End: 2023-10-09 | Stop reason: SDUPTHER

## 2023-09-05 NOTE — PROGRESS NOTES
Subjective     Patient ID: Gill Cleary is a 52 y.o. female.    Chief Complaint: Consult and Obesity    Patient presents for treatment of obesity.   Weight gain following hysterectomy (2018), weighed about 130 lbs prior to that  Currently using Provitalize (probiotics with weight loss properties)  Takes care of  who had a stroke, now with frontal lobe syndrome    Co-morbidities   HTN  HLD  YANICK  HIV  GERD  CKD  CAD    Negative for thyroid cancer    Has taken topiramate in the past for headches    Current Physical Activity  No regular exercise    Current Eating Habits - sometimes only eats 1-2 meal per day  Breakfast - mushroom coffee with 4 teaspoons of sugar (down from 6); scrambled eggs, turkey sausage, toast  Lunch - turkey sandwich, strawberries; salad from [employer] with grilled chicken; Popeyes occasionally  Dinner - salad, BBQ chicken with sweet potatoes; baked chicken with vegetable (corn or vegetable asian medley or broccoli)  Snacks - hard candy  Beverages - Ice sparkling water    Medical Weight Loss  9/5/2023: 220.1 lbs, BMI 40.2, BFP 50.9%, .9 lbs, SMM 60.2 lbs, BMR 1430 kcal      Review of Systems   Constitutional:  Negative for chills and fever.   Respiratory:  Negative for shortness of breath.    Cardiovascular:  Negative for chest pain.   Gastrointestinal:  Negative for abdominal pain, nausea and vomiting.   Neurological:  Negative for dizziness and light-headedness.          Objective    Latest Reference Range & Units 06/21/23 15:55   Sodium 136 - 145 mmol/L 139   Potassium 3.5 - 5.1 mmol/L 3.9   Chloride 95 - 110 mmol/L 100   CO2 23 - 29 mmol/L 28   Anion Gap 8 - 16 mmol/L 11   BUN 6 - 20 mg/dL 7   Creatinine 0.5 - 1.4 mg/dL 1.1   eGFR >60 mL/min/1.73 m^2 >60.0   Glucose 70 - 110 mg/dL 87   Calcium 8.7 - 10.5 mg/dL 10.4   Alkaline Phosphatase 55 - 135 U/L 117   PROTEIN TOTAL 6.0 - 8.4 g/dL 8.7 (H)   Albumin 3.5 - 5.2 g/dL 4.3   BILIRUBIN TOTAL 0.1 - 1.0 mg/dL 0.6   AST 10 - 40 U/L  29   ALT 10 - 44 U/L 29   (H): Data is abnormally high    Vitals:    09/05/23 1528   BP: 129/61   Pulse: 90       Physical Exam  Vitals reviewed.   Constitutional:       General: She is not in acute distress.     Appearance: Normal appearance. She is obese. She is not ill-appearing, toxic-appearing or diaphoretic.   HENT:      Head: Normocephalic and atraumatic.   Cardiovascular:      Rate and Rhythm: Normal rate.   Pulmonary:      Effort: Pulmonary effort is normal. No respiratory distress.   Skin:     General: Skin is warm and dry.   Neurological:      Mental Status: She is alert and oriented to person, place, and time.            Assessment and Plan     1. Class 2 severe obesity due to excess calories with serious comorbidity and body mass index (BMI) of 38.0 to 38.9 in adult  -     semaglutide (OZEMPIC) 0.25 mg or 0.5 mg (2 mg/3 mL) pen injector; Inject 0.5 mg into the skin every 7 days. Start with 0.25 mg once a week for 4 weeks, then increase to 0.5 mg once a week  Dispense: 3 mL; Refill: 2    2. Essential hypertension  Overview:  dx update  Overview:   dx update    Orders:  -     semaglutide (OZEMPIC) 0.25 mg or 0.5 mg (2 mg/3 mL) pen injector; Inject 0.5 mg into the skin every 7 days. Start with 0.25 mg once a week for 4 weeks, then increase to 0.5 mg once a week  Dispense: 3 mL; Refill: 2    3. Hyperlipidemia, unspecified hyperlipidemia type  -     semaglutide (OZEMPIC) 0.25 mg or 0.5 mg (2 mg/3 mL) pen injector; Inject 0.5 mg into the skin every 7 days. Start with 0.25 mg once a week for 4 weeks, then increase to 0.5 mg once a week  Dispense: 3 mL; Refill: 2    4. YANICK (obstructive sleep apnea)  -     semaglutide (OZEMPIC) 0.25 mg or 0.5 mg (2 mg/3 mL) pen injector; Inject 0.5 mg into the skin every 7 days. Start with 0.25 mg once a week for 4 weeks, then increase to 0.5 mg once a week  Dispense: 3 mL; Refill: 2    5. Encounter for weight loss counseling      - Log all food and beverage intake with a daily  calorie goal of 5017-4750 calories per day    - Moderate intensity aerobic exercise for 150 minutes per week

## 2023-09-05 NOTE — PATIENT INSTRUCTIONS
Start Ozempic once a week. Start with 0.25mg once a week x 4 weeks, then 0.5 mg weekly.     Decrease portions as soon as you start Ozempic. Avoid fried, greasy, fatty foods.     Some nausea in the first 2 weeks is not unusual.     If you get pain across the upper abdomen and around to your back, please call the office.             Copyright © 2011, [facility]. For more information about The Healthy Eating Plate, please see The Nutrition Source, Department of Nutrition, [employer], www.thenutritionsource.org, and [employer], www.health.[place].edu.      Meal Planning & Grocery Shopping    Meal planning builds the foundation for healthy eating. When you have structured ideas for healthy meals and foods available at home to prepare those meals, weight control becomes easier.  If only healthy foods are available at home, then you will be much more likely to eat healthy foods. And you will be less likely to go to a restaurant or  a fast food meal, which tend to be unhealthy and higher in calories than meals prepared at home.      Take 5-10 minutes each week to plan meals for the next 7 days.  Make a grocery list based on the meal plan.    Grocery Shopping Tips:  Shop on a full stomach.  Schedule your shopping for times when you are most motivated and able to be disciplined about your purchases. For example, after a stressful day at work it may be difficult to make the healthiest choices. Shopping at other times, such as early in the morning or after dinner, may be easier.  Focus your shopping on the outside aisles of the store, which tend to contain more fresh foods and lower calorie foods. The inside aisles tend to have more processed foods.  Stick to your list. Avoid buying unhealthy items just because they are on sale.   Compare nutrition labels to check the number of calories and percentage of fat.      What to buy:    Vegetables  Fresh vegetables  Frozen  vegetables with no sauce or added salt  Canned vegetables with no sauce or added salt    Protein  Lean meats, such as chicken and turkey  Limit red meats, such as beef to no more than 1x/week  Limit processed meats, such as cold cuts, ballard, sausage, and hot dogs. Look for brands that have no nitrites and are minimally processed. Consider turkey sausage or turkey ballard.  Fish and Shellfish  Eggs  Dried beans  Canned beans (reduced sodium)    Fat  Use healthy oils, such as olive oil or canola oil, for cooking, salad dressings, etc.  Unflavored nuts and seeds  Nut butters (no added sugar)    Dairy  Yogurt (no sugar added)  Cheese  Low-fat milk  Unsweetened nondairy milks (almond milk, soy milk, etc)    Fruit  Fresh Fruit  Frozen fruit with no added sugar  Canned fruit with no added sugar  Dried fruit with no added sugar  100% fruit juice    Whole Grains  Single ingredient grains, such as oats, quinoa, brown rice  Whole-wheat pasta  Sprouted whole-grain bread    What to avoid:  - Avoid fried foods  - Avoid foods with added sugar  - Avoid sugar-sweetened beverages  - Avoid ultra-processed foods      Lifestyle Activity    Lifestyle activity consists of all the activities that burn calories during the course of a normal day. Using the stairs, washing the dishes, or even getting up to turn off the television are all examples of lifestyle activity. All activities, no matter how small, burn calories. Increasing lifestyle activity can help with weight control, so building physical activity into your everyday routine is important.     A pedometer is a tool that can help you track how much lifestyle activity you are getting. It can help you stay active. A pedometer counts each step you take and displays your total steps on the screen. Many smartphones, including iPhones and Androids, have applications that automatically count your steps. If you decide to use this method, make sure you are holding or wearing your smartphone so  it can count all of your steps.     During the next 2 weeks, aim for 5,000 or more steps per day. Each week aim to increase your daily step goal by 250 so that you will reach an average of 10,000 steps per day (equal to walking 4 to 5 miles).     Start making more active choices in your routine in order to increase the amount of walking you do each day.     Strategies to Increase Lifestyle Activity:    Take several 5-10-minute walks during the day.   Set a reminder to take a 5 minute break from your desk every hour.   Choose the farthest entrance to a building that you are entering.   Host walking meetings at work.   Walk down the gonzalez to contact co-workers face-to-face, instead of emailing or calling.  Walk to a restroom, water cooler, or copy machine on a different floor at work.   Walk during your lunch break.   Park farther away in parking lots.   Get off the bus or train earlier and walk farther to your destination.   Take the stairs rather than the elevator or the escalator.   Start a walking club with co-workers or neighbors.   Walk - don't drive - for trips less than one mile.   Take an after-dinner walk with family.   Go for a walk while talking on your wireless phone.   Walk the dog more often.   If you prefer to stay indoors because of the weather, try walking in a shopping mall or doing laps around a large store.   Purchase a treadmill to use at home.   Schedule time for walking every week and stick to it like any other appointment.   Or think of your own strategy: _______________________________       Physical Activity    Physical activity improves your health and helps with weight control, especially weight loss maintenance.      When starting to incorporate an exercise routine into your day, it is helpful to set specific goals.  For example, I will walk at moderate intensity from 12:30-12:45pm on Monday, Wednesday, and Friday.  Schedule your exercise time into your calendar.  Think of any  potential barriers that may come up and prevent you from exercising.  Develop solutions or back-up plans for when these barriers may arise.    Walking is an ideal activity to start with if you do not currently have an exercise routine. You can make the activity more fun by doing it with a friend or listening to music or a book on tape while you do it. If you don't enjoy walking, think of other activities you like, such as bike riding or swimming.  Other alternatives include group fitness classes or exercise at home using DVDs, Apps, etc.    If you are new to exercising, then start with 10 minutes 3-4 days per week.  After two weeks, increase to 15 minutes 3-4 days per week.  If you already exercise more than this, continue at your higher level, or increase by 10-15 minutes if able.         Aerobic Activity  Aerobic Activity gets you breathing harder and your heart beating faster.  Eventually, you should work up to 150 - 300 minutes of moderate-intensity aerobic activity every week or 75 - 150 minutes of vigorous-intensity aerobic activity every week.  An easy way to gauge the intensity of your activity is with the Talk Test.  During moderate activity, you should be able to talk, but not sing without having to pause for a breath.  During vigorous activity, you shouldn't be able to say more than a few words without pausing for a breath.  You should not push yourself until you are completely out of breath, tired, or sore.    Examples of Aerobic Activity:  Walking  Running  Swimming  Biking  Playing sports like tennis or basketball  Dancing  Jumping Rope      Strength Training  It is also recommended that you perform muscle-strengthening activities at least 2 days per week.  Be sure to include activities that work all major muscle groups (legs, arms, abdomen, back, buttocks, chest, and shoulders)    Examples of Strength Training  Lifting weights  Working with resistance band  Using your body weight for resistance  (squats, push-ups, sit-ups)  Pilates  Yoga      https://health.gov/moveyourway/activity-planner/      Remember to keep a record of your activity to track your progress.

## 2023-09-09 DIAGNOSIS — I10 PRIMARY HYPERTENSION: ICD-10-CM

## 2023-09-10 NOTE — TELEPHONE ENCOUNTER
No care due was identified.  [facility] Embedded Care Due Messages. Reference number: [id number].   9/09/2023 7:56:38 PM CDT   no

## 2023-09-12 ENCOUNTER — OFFICE VISIT (OUTPATIENT)
Dept: PRIMARY CARE CLINIC | Facility: CLINIC | Age: 53
End: 2023-09-12
Payer: MEDICARE

## 2023-09-12 VITALS
BODY MASS INDEX: 39.01 KG/M2 | SYSTOLIC BLOOD PRESSURE: 131 MMHG | DIASTOLIC BLOOD PRESSURE: 67 MMHG | HEART RATE: 78 BPM | WEIGHT: 220.25 LBS

## 2023-09-12 DIAGNOSIS — N18.2 STAGE 2 CHRONIC KIDNEY DISEASE: ICD-10-CM

## 2023-09-12 DIAGNOSIS — Z79.899 LONG TERM CURRENT USE OF DIURETIC: ICD-10-CM

## 2023-09-12 DIAGNOSIS — G47.33 OSA ON CPAP: Primary | ICD-10-CM

## 2023-09-12 DIAGNOSIS — I10 ESSENTIAL HYPERTENSION: ICD-10-CM

## 2023-09-12 PROBLEM — N18.31 STAGE 3A CHRONIC KIDNEY DISEASE: Status: ACTIVE | Noted: 2023-09-12

## 2023-09-12 PROBLEM — I65.23 BILATERAL CAROTID ARTERY OCCLUSION: Status: ACTIVE | Noted: 2022-07-08

## 2023-09-12 PROCEDURE — 3078F DIAST BP <80 MM HG: CPT | Mod: HCNC,CPTII,S$GLB, | Performed by: STUDENT IN AN ORGANIZED HEALTH CARE EDUCATION/TRAINING PROGRAM

## 2023-09-12 PROCEDURE — 3075F PR MOST RECENT SYSTOLIC BLOOD PRESS GE 130-139MM HG: ICD-10-PCS | Mod: HCNC,CPTII,S$GLB, | Performed by: STUDENT IN AN ORGANIZED HEALTH CARE EDUCATION/TRAINING PROGRAM

## 2023-09-12 PROCEDURE — 3008F PR BODY MASS INDEX (BMI) DOCUMENTED: ICD-10-PCS | Mod: HCNC,CPTII,S$GLB, | Performed by: STUDENT IN AN ORGANIZED HEALTH CARE EDUCATION/TRAINING PROGRAM

## 2023-09-12 PROCEDURE — 3078F PR MOST RECENT DIASTOLIC BLOOD PRESSURE < 80 MM HG: ICD-10-PCS | Mod: HCNC,CPTII,S$GLB, | Performed by: STUDENT IN AN ORGANIZED HEALTH CARE EDUCATION/TRAINING PROGRAM

## 2023-09-12 PROCEDURE — 1159F PR MEDICATION LIST DOCUMENTED IN MEDICAL RECORD: ICD-10-PCS | Mod: HCNC,CPTII,S$GLB, | Performed by: STUDENT IN AN ORGANIZED HEALTH CARE EDUCATION/TRAINING PROGRAM

## 2023-09-12 PROCEDURE — 99214 OFFICE O/P EST MOD 30 MIN: CPT | Mod: HCNC,S$GLB,, | Performed by: STUDENT IN AN ORGANIZED HEALTH CARE EDUCATION/TRAINING PROGRAM

## 2023-09-12 PROCEDURE — 3075F SYST BP GE 130 - 139MM HG: CPT | Mod: HCNC,CPTII,S$GLB, | Performed by: STUDENT IN AN ORGANIZED HEALTH CARE EDUCATION/TRAINING PROGRAM

## 2023-09-12 PROCEDURE — 3008F BODY MASS INDEX DOCD: CPT | Mod: HCNC,CPTII,S$GLB, | Performed by: STUDENT IN AN ORGANIZED HEALTH CARE EDUCATION/TRAINING PROGRAM

## 2023-09-12 PROCEDURE — 99999 PR PBB SHADOW E&M-EST. PATIENT-LVL III: ICD-10-PCS | Mod: PBBFAC,HCNC,, | Performed by: STUDENT IN AN ORGANIZED HEALTH CARE EDUCATION/TRAINING PROGRAM

## 2023-09-12 PROCEDURE — 99214 PR OFFICE/OUTPT VISIT, EST, LEVL IV, 30-39 MIN: ICD-10-PCS | Mod: HCNC,S$GLB,, | Performed by: STUDENT IN AN ORGANIZED HEALTH CARE EDUCATION/TRAINING PROGRAM

## 2023-09-12 PROCEDURE — 1159F MED LIST DOCD IN RCRD: CPT | Mod: HCNC,CPTII,S$GLB, | Performed by: STUDENT IN AN ORGANIZED HEALTH CARE EDUCATION/TRAINING PROGRAM

## 2023-09-12 PROCEDURE — 99999 PR PBB SHADOW E&M-EST. PATIENT-LVL III: CPT | Mod: PBBFAC,HCNC,, | Performed by: STUDENT IN AN ORGANIZED HEALTH CARE EDUCATION/TRAINING PROGRAM

## 2023-09-12 RX ORDER — OLOPATADINE HYDROCHLORIDE 1 MG/ML
SOLUTION/ DROPS OPHTHALMIC
COMMUNITY

## 2023-09-12 RX ORDER — CARBOXYMETHYLCELLULOSE SODIUM 5 MG/ML
1 SOLUTION/ DROPS OPHTHALMIC
COMMUNITY

## 2023-09-12 RX ORDER — MONTELUKAST SODIUM 10 MG/1
10 TABLET ORAL EVERY OTHER DAY
COMMUNITY
End: 2023-09-21 | Stop reason: SDUPTHER

## 2023-09-12 RX ORDER — FUROSEMIDE 40 MG/1
40 TABLET ORAL DAILY
Qty: 30 TABLET | Refills: 6 | Status: SHIPPED | OUTPATIENT
Start: 2023-09-12 | End: 2024-09-11

## 2023-09-12 NOTE — PROGRESS NOTES
09/12/2023    Gill Cleary  [id number]    Chief Complaint   Patient presents with    Leg Swelling     Discuss cpap Machine       HPI    This pt is known to me and presents for follow up. Pt has extensive medical hx. Since last virtual visit she has followed up with bariatrics, cardiology and nephrology. Pt is concerned to LE edema that has been present for the last 4 months.    Lower extremity swelling: furosemide 40 mg helping but still with fluid. States that she has cut out most sodium. Has followed up with cardiology and told that heart was fine. Was seen by nephrology and lasix was increased to 40 mg daily, but was told that kidneys were good and to follow up in one year. Her next step is establishing with Rheumatology for additional work up.    YANICK on CPAP  Dx in 2016  Uses cpap nightly  Needs new headgear    Negative 10 point ROS outside of HPI    Social History     Socioeconomic History    Marital status:    Tobacco Use    Smoking status: Never    Smokeless tobacco: Never   Substance and Sexual Activity    Alcohol use: No    Drug use: Never    Sexual activity: Never   Social History Narrative    Tobacco: None    EtOH: None    Drug: None    Employment: Caregiver for      Education:2 year college    Lives with  and 17 yo daughter     Social Determinants of Health     Financial Resource Strain: Low Risk  (8/15/2023)    Overall Financial Resource Strain (CARDIA)     Difficulty of Paying Living Expenses: Not hard at all   Food Insecurity: No Food Insecurity (8/15/2023)    Hunger Vital Sign     Worried About Running Out of Food in the Last Year: Never true     Ran Out of Food in the Last Year: Never true   Transportation Needs: No Transportation Needs (8/15/2023)    PRAPARE - Transportation     Lack of Transportation (Medical): No     Lack of Transportation (Non-Medical): No   Physical Activity: Inactive (8/15/2023)    Exercise Vital Sign     Days of Exercise per Week: 0 days     Minutes of  Exercise per Session: 0 min   Stress: Stress Concern Present (8/15/2023)    [place] [place] [employer] - Occupational Stress Questionnaire     Feeling of Stress : Very much   Social Connections: Moderately Integrated (8/15/2023)    Social Connection and Isolation Panel [NHANES]     Frequency of Communication with Friends and Family: Never     Frequency of Social Gatherings with Friends and Family: Never     Attends Orthodox Services: 1 to 4 times per year     Active Member of Clubs or Organizations: No     Attends Club or Organization Meetings: 1 to 4 times per year     Marital Status:    Housing Stability: Low Risk  (8/15/2023)    Housing Stability Vital Sign     Unable to Pay for Housing in the Last Year: No     Number of Places Lived in the Last Year: 1     Unstable Housing in the Last Year: No           Current Outpatient Medications:     fmesoqkkk-wdeynves-ldsznvi ala (BIKTARVY) -25 mg per tablet, Take 1 tablet by mouth once daily., Disp: , Rfl:     cetirizine (ZYRTEC) 10 MG tablet, Take 1 tablet (10 mg total) by mouth once daily., Disp: 90 tablet, Rfl: 0    diltiaZEM (CARDIZEM CD) 240 MG 24 hr capsule, Take 1 capsule (240 mg total) by mouth once daily., Disp: 90 capsule, Rfl: 1    ergocalciferol (ERGOCALCIFEROL) 50,000 unit Cap, Take 50,000 Units by mouth every 30 days., Disp: , Rfl:     hydrALAZINE (APRESOLINE) 50 MG tablet, Take 50 mg by mouth every evening., Disp: , Rfl:     hydrOXYzine pamoate (VISTARIL) 25 MG Cap, hydroxyzine pamoate 25 mg capsule  TAKE 1 CAPSULE (25 MG TOTAL) BY MOUTH EVERY 8 (EIGHT) HOURS AS NEEDED (ALLERGIES)., Disp: , Rfl:     ibuprofen (ADVIL,MOTRIN) 600 MG tablet, Take 600 mg by mouth 3 (three) times daily., Disp: , Rfl:     lisdexamfetamine (VYVANSE) 60 MG capsule, Take 60 mg by mouth every morning., Disp: , Rfl:     omeprazole (PRILOSEC) 40 MG capsule, Take 40 mg by mouth., Disp: , Rfl:     oxyCODONE (ROXICODONE) 5 MG immediate release tablet, Take 7.5  mg by mouth 3 (three) times daily as needed for Pain., Disp: , Rfl:     pregabalin (LYRICA) 150 MG capsule, Take 150 mg by mouth 2 (two) times daily., Disp: , Rfl:     propranoloL (INDERAL) 40 MG tablet, Take 40 mg by mouth every evening., Disp: , Rfl:     risperiDONE (RISPERDAL) 1 MG tablet, Take 1 mg by mouth every evening., Disp: , Rfl:     semaglutide (OZEMPIC) 0.25 mg or 0.5 mg (2 mg/3 mL) pen injector, Inject 0.5 mg into the skin every 7 days. Start with 0.25 mg once a week for 4 weeks, then increase to 0.5 mg once a week, Disp: 3 mL, Rfl: 2    venlafaxine (EFFEXOR-XR) 150 MG Cp24, Take 150 mg by mouth every morning., Disp: , Rfl:     venlafaxine (EFFEXOR-XR) 75 MG 24 hr capsule, Take 75 mg by mouth every morning., Disp: , Rfl:     atorvastatin (LIPITOR) 80 MG tablet, Take 1 tablet by mouth once daily., Disp: , Rfl:     carboxymethylcellulose (REFRESH PLUS) 0.5 % Dpet, 1 drop as needed., Disp: , Rfl:     furosemide (LASIX) 40 MG tablet, Take 1 tablet (40 mg total) by mouth once daily., Disp: 30 tablet, Rfl: 6    montelukast (SINGULAIR) 10 mg tablet, Take 10 mg by mouth every other day., Disp: , Rfl:     olopatadine (PATANOL) 0.1 % ophthalmic solution, Apply to eye., Disp: , Rfl:       Physical Exam  Vitals:    09/12/23 1038   BP: 131/67   Pulse: 78     Gen: well appearing, NAD  Resp: non labored breathing, no crackles, no wheezes, CTAB  CV: RRR no murmur, gallops, rubs, 1+ LE edema  Abd: soft nontender BS present no organomegaly    1. Essential hypertension  Well controlled continue current regimen; prescribed furosemide 40 mg per nephrology  - furosemide (LASIX) 40 MG tablet; Take 1 tablet (40 mg total) by mouth once daily.  Dispense: 30 tablet; Refill: 6    2. YANICK on CPAP  - CPAP/BIPAP SUPPLIES    Will fax to [facility]     3. Long term current use of diuretic  - Basic Metabolic Panel; Future obtain in 2 weeks    4. Stage 2 chronic kidney disease  Management per nephrology  Continue current  regimen  Reviewed labs from 9/5 in care everywhere     RTC in 6 months     Yaima Burton MD  Family Medicine

## 2023-09-13 RX ORDER — DILTIAZEM HYDROCHLORIDE 240 MG/1
240 CAPSULE, COATED, EXTENDED RELEASE ORAL DAILY
Qty: 90 CAPSULE | Refills: 1 | Status: SHIPPED | OUTPATIENT
Start: 2023-09-13 | End: 2024-09-12

## 2023-09-20 ENCOUNTER — PATIENT MESSAGE (OUTPATIENT)
Dept: PRIMARY CARE CLINIC | Facility: CLINIC | Age: 53
End: 2023-09-20
Payer: MEDICARE

## 2023-09-21 ENCOUNTER — TELEPHONE (OUTPATIENT)
Dept: RHEUMATOLOGY | Facility: CLINIC | Age: 53
End: 2023-09-21
Payer: MEDICARE

## 2023-09-21 RX ORDER — MONTELUKAST SODIUM 10 MG/1
10 TABLET ORAL EVERY OTHER DAY
Qty: 90 TABLET | Refills: 3 | Status: SHIPPED | OUTPATIENT
Start: 2023-09-21 | End: 2023-12-01 | Stop reason: SDUPTHER

## 2023-09-21 NOTE — TELEPHONE ENCOUNTER
----- Message from Coleman Murray sent at 9/21/2023 11:28 AM CDT -----  Type:  Needs Medical Advice    Who Called: pt  Would the patient rather a call back or a response via [facility]? call  Best Call Back Number: [phone]  Additional Information: pt would like a call regarding her 1pm appointment please call very important

## 2023-09-21 NOTE — TELEPHONE ENCOUNTER
No care due was identified.  [facility] Embedded Care Due Messages. Reference number: [id number].   9/21/2023 10:26:43 AM CDT

## 2023-09-25 ENCOUNTER — PATIENT MESSAGE (OUTPATIENT)
Dept: PRIMARY CARE CLINIC | Facility: CLINIC | Age: 53
End: 2023-09-25
Payer: MEDICARE

## 2023-09-27 ENCOUNTER — PATIENT MESSAGE (OUTPATIENT)
Dept: PRIMARY CARE CLINIC | Facility: CLINIC | Age: 53
End: 2023-09-27
Payer: MEDICARE

## 2023-09-27 ENCOUNTER — TELEPHONE (OUTPATIENT)
Dept: PRIMARY CARE CLINIC | Facility: CLINIC | Age: 53
End: 2023-09-27
Payer: MEDICARE

## 2023-10-02 DIAGNOSIS — F11.20 UNCOMPLICATED OPIOID DEPENDENCE: Primary | ICD-10-CM

## 2023-10-02 DIAGNOSIS — G89.4 CHRONIC PAIN SYNDROME: ICD-10-CM

## 2023-10-05 ENCOUNTER — PATIENT MESSAGE (OUTPATIENT)
Dept: PRIMARY CARE CLINIC | Facility: CLINIC | Age: 53
End: 2023-10-05
Payer: MEDICARE

## 2023-10-06 ENCOUNTER — TELEPHONE (OUTPATIENT)
Dept: RHEUMATOLOGY | Facility: CLINIC | Age: 53
End: 2023-10-06
Payer: MEDICARE

## 2023-10-09 ENCOUNTER — OFFICE VISIT (OUTPATIENT)
Dept: BARIATRICS | Facility: CLINIC | Age: 53
End: 2023-10-09
Payer: MEDICARE

## 2023-10-09 VITALS
DIASTOLIC BLOOD PRESSURE: 79 MMHG | SYSTOLIC BLOOD PRESSURE: 131 MMHG | HEART RATE: 81 BPM | HEIGHT: 63 IN | OXYGEN SATURATION: 96 % | WEIGHT: 220.5 LBS | BODY MASS INDEX: 39.07 KG/M2

## 2023-10-09 DIAGNOSIS — E66.01 CLASS 2 SEVERE OBESITY DUE TO EXCESS CALORIES WITH SERIOUS COMORBIDITY AND BODY MASS INDEX (BMI) OF 39.0 TO 39.9 IN ADULT: Primary | ICD-10-CM

## 2023-10-09 DIAGNOSIS — E78.5 HYPERLIPIDEMIA, UNSPECIFIED HYPERLIPIDEMIA TYPE: ICD-10-CM

## 2023-10-09 DIAGNOSIS — G47.33 OSA (OBSTRUCTIVE SLEEP APNEA): ICD-10-CM

## 2023-10-09 DIAGNOSIS — I10 ESSENTIAL HYPERTENSION: ICD-10-CM

## 2023-10-09 DIAGNOSIS — Z71.3 ENCOUNTER FOR WEIGHT LOSS COUNSELING: ICD-10-CM

## 2023-10-09 PROCEDURE — 1159F PR MEDICATION LIST DOCUMENTED IN MEDICAL RECORD: ICD-10-PCS | Mod: HCNC,CPTII,S$GLB, | Performed by: STUDENT IN AN ORGANIZED HEALTH CARE EDUCATION/TRAINING PROGRAM

## 2023-10-09 PROCEDURE — 99213 OFFICE O/P EST LOW 20 MIN: CPT | Mod: HCNC,S$GLB,, | Performed by: STUDENT IN AN ORGANIZED HEALTH CARE EDUCATION/TRAINING PROGRAM

## 2023-10-09 PROCEDURE — 99213 PR OFFICE/OUTPT VISIT, EST, LEVL III, 20-29 MIN: ICD-10-PCS | Mod: HCNC,S$GLB,, | Performed by: STUDENT IN AN ORGANIZED HEALTH CARE EDUCATION/TRAINING PROGRAM

## 2023-10-09 PROCEDURE — 3075F SYST BP GE 130 - 139MM HG: CPT | Mod: HCNC,CPTII,S$GLB, | Performed by: STUDENT IN AN ORGANIZED HEALTH CARE EDUCATION/TRAINING PROGRAM

## 2023-10-09 PROCEDURE — 3008F PR BODY MASS INDEX (BMI) DOCUMENTED: ICD-10-PCS | Mod: HCNC,CPTII,S$GLB, | Performed by: STUDENT IN AN ORGANIZED HEALTH CARE EDUCATION/TRAINING PROGRAM

## 2023-10-09 PROCEDURE — 99999 PR PBB SHADOW E&M-EST. PATIENT-LVL IV: ICD-10-PCS | Mod: PBBFAC,HCNC,, | Performed by: STUDENT IN AN ORGANIZED HEALTH CARE EDUCATION/TRAINING PROGRAM

## 2023-10-09 PROCEDURE — 3008F BODY MASS INDEX DOCD: CPT | Mod: HCNC,CPTII,S$GLB, | Performed by: STUDENT IN AN ORGANIZED HEALTH CARE EDUCATION/TRAINING PROGRAM

## 2023-10-09 PROCEDURE — 3078F PR MOST RECENT DIASTOLIC BLOOD PRESSURE < 80 MM HG: ICD-10-PCS | Mod: HCNC,CPTII,S$GLB, | Performed by: STUDENT IN AN ORGANIZED HEALTH CARE EDUCATION/TRAINING PROGRAM

## 2023-10-09 PROCEDURE — 1160F RVW MEDS BY RX/DR IN RCRD: CPT | Mod: HCNC,CPTII,S$GLB, | Performed by: STUDENT IN AN ORGANIZED HEALTH CARE EDUCATION/TRAINING PROGRAM

## 2023-10-09 PROCEDURE — 3078F DIAST BP <80 MM HG: CPT | Mod: HCNC,CPTII,S$GLB, | Performed by: STUDENT IN AN ORGANIZED HEALTH CARE EDUCATION/TRAINING PROGRAM

## 2023-10-09 PROCEDURE — 1160F PR REVIEW ALL MEDS BY PRESCRIBER/CLIN PHARMACIST DOCUMENTED: ICD-10-PCS | Mod: HCNC,CPTII,S$GLB, | Performed by: STUDENT IN AN ORGANIZED HEALTH CARE EDUCATION/TRAINING PROGRAM

## 2023-10-09 PROCEDURE — 99999 PR PBB SHADOW E&M-EST. PATIENT-LVL IV: CPT | Mod: PBBFAC,HCNC,, | Performed by: STUDENT IN AN ORGANIZED HEALTH CARE EDUCATION/TRAINING PROGRAM

## 2023-10-09 PROCEDURE — 3075F PR MOST RECENT SYSTOLIC BLOOD PRESS GE 130-139MM HG: ICD-10-PCS | Mod: HCNC,CPTII,S$GLB, | Performed by: STUDENT IN AN ORGANIZED HEALTH CARE EDUCATION/TRAINING PROGRAM

## 2023-10-09 PROCEDURE — 1159F MED LIST DOCD IN RCRD: CPT | Mod: HCNC,CPTII,S$GLB, | Performed by: STUDENT IN AN ORGANIZED HEALTH CARE EDUCATION/TRAINING PROGRAM

## 2023-10-09 RX ORDER — SEMAGLUTIDE 0.68 MG/ML
0.5 INJECTION, SOLUTION SUBCUTANEOUS
Qty: 3 ML | Refills: 2 | Status: SHIPPED | OUTPATIENT
Start: 2023-10-09

## 2023-10-09 NOTE — PROGRESS NOTES
Subjective     Patient ID: Gill Cleary is a 52 y.o. female.    Chief Complaint: Follow-up, Obesity, and Weight Check    Patient presents for treatment of obesity.   Weight gain following hysterectomy (2018), weighed about 130 lbs prior to that  Currently using Provitalize (probiotics with weight loss properties)  Takes care of  who had a stroke, now with frontal lobe syndrome    Co-morbidities   HTN  HLD  YANICK  HIV  GERD  CKD  CAD    Negative for thyroid cancer    Has taken topiramate in the past for headches    Current Physical Activity  No regular exercise  Plans to start adrien chi    Current Eating Habits - sometimes only eats 1-2 meal per day  Breakfast - mushroom coffee with 4 teaspoons of sugar (down from 6); scrambled eggs, turkey sausage, toast  Lunch - turkey sandwich, strawberries; salad from chick [employer]ela e with grilled chicken; Popeyes occasionally  Dinner - salad, BBQ chicken with sweet potatoes; baked chicken with vegetable (corn or vegetable asian medley or broccoli)  Snacks - hard candy  Beverages - Ice sparkling water    Medical Weight Loss  9/5/2023: 220.1 lbs, BMI 40.2, BFP 50.9%, .9 lbs, SMM 60.2 lbs, BMR 1430 kcal  10/9/2023: 220.5 lbs, BMI 39.1, BFP 50.1%, .3 lbs, SMM 62 lbs, BMR 1449 kcal      Review of Systems   Constitutional:  Negative for chills and fever.   Respiratory:  Negative for shortness of breath.    Cardiovascular:  Negative for chest pain.   Gastrointestinal:  Negative for abdominal pain, nausea and vomiting.   Neurological:  Negative for dizziness and light-headedness.          Objective    Latest Reference Range & Units 06/21/23 15:55   Sodium 136 - 145 mmol/L 139   Potassium 3.5 - 5.1 mmol/L 3.9   Chloride 95 - 110 mmol/L 100   CO2 23 - 29 mmol/L 28   Anion Gap 8 - 16 mmol/L 11   BUN 6 - 20 mg/dL 7   Creatinine 0.5 - 1.4 mg/dL 1.1   eGFR >60 mL/min/1.73 m^2 >60.0   Glucose 70 - 110 mg/dL 87   Calcium 8.7 - 10.5 mg/dL 10.4   Alkaline Phosphatase 55 - 135 U/L 117    PROTEIN TOTAL 6.0 - 8.4 g/dL 8.7 (H)   Albumin 3.5 - 5.2 g/dL 4.3   BILIRUBIN TOTAL 0.1 - 1.0 mg/dL 0.6   AST 10 - 40 U/L 29   ALT 10 - 44 U/L 29   (H): Data is abnormally high    Vitals:    10/09/23 1357   BP: 131/79   Pulse: 81       Physical Exam  Vitals reviewed.   Constitutional:       General: She is not in acute distress.     Appearance: Normal appearance. She is obese. She is not ill-appearing, toxic-appearing or diaphoretic.   HENT:      Head: Normocephalic and atraumatic.   Cardiovascular:      Rate and Rhythm: Normal rate.   Pulmonary:      Effort: Pulmonary effort is normal. No respiratory distress.   Skin:     General: Skin is warm and dry.   Neurological:      Mental Status: She is alert and oriented to person, place, and time.            Assessment and Plan     1. Class 2 severe obesity due to excess calories with serious comorbidity and body mass index (BMI) of 39.0 to 39.9 in adult  -     semaglutide (OZEMPIC) 0.25 mg or 0.5 mg (2 mg/3 mL) pen injector; Inject 0.5 mg into the skin every 7 days.  Dispense: 3 mL; Refill: 2    2. Essential hypertension  Overview:  dx update  Overview:   dx update    Orders:  -     semaglutide (OZEMPIC) 0.25 mg or 0.5 mg (2 mg/3 mL) pen injector; Inject 0.5 mg into the skin every 7 days.  Dispense: 3 mL; Refill: 2    3. Hyperlipidemia, unspecified hyperlipidemia type  -     semaglutide (OZEMPIC) 0.25 mg or 0.5 mg (2 mg/3 mL) pen injector; Inject 0.5 mg into the skin every 7 days.  Dispense: 3 mL; Refill: 2    4. YANICK (obstructive sleep apnea)  -     semaglutide (OZEMPIC) 0.25 mg or 0.5 mg (2 mg/3 mL) pen injector; Inject 0.5 mg into the skin every 7 days.  Dispense: 3 mL; Refill: 2    5. Encounter for weight loss counseling        - Log all food and beverage intake with a daily calorie goal of 1761-1095 calories per day    - Moderate intensity aerobic exercise for 150 minutes per week

## 2023-10-10 ENCOUNTER — OFFICE VISIT (OUTPATIENT)
Dept: RHEUMATOLOGY | Facility: CLINIC | Age: 53
End: 2023-10-10
Payer: MEDICARE

## 2023-10-10 VITALS
WEIGHT: 219.13 LBS | HEART RATE: 71 BPM | HEIGHT: 63 IN | DIASTOLIC BLOOD PRESSURE: 75 MMHG | SYSTOLIC BLOOD PRESSURE: 119 MMHG | BODY MASS INDEX: 38.83 KG/M2

## 2023-10-10 DIAGNOSIS — M25.50 ARTHRALGIA, UNSPECIFIED JOINT: Primary | ICD-10-CM

## 2023-10-10 DIAGNOSIS — E66.01 MORBID OBESITY: ICD-10-CM

## 2023-10-10 PROCEDURE — 3078F DIAST BP <80 MM HG: CPT | Mod: HCNC,CPTII,S$GLB, | Performed by: STUDENT IN AN ORGANIZED HEALTH CARE EDUCATION/TRAINING PROGRAM

## 2023-10-10 PROCEDURE — 3008F PR BODY MASS INDEX (BMI) DOCUMENTED: ICD-10-PCS | Mod: HCNC,CPTII,S$GLB, | Performed by: STUDENT IN AN ORGANIZED HEALTH CARE EDUCATION/TRAINING PROGRAM

## 2023-10-10 PROCEDURE — 1159F MED LIST DOCD IN RCRD: CPT | Mod: HCNC,CPTII,S$GLB, | Performed by: STUDENT IN AN ORGANIZED HEALTH CARE EDUCATION/TRAINING PROGRAM

## 2023-10-10 PROCEDURE — 3008F BODY MASS INDEX DOCD: CPT | Mod: HCNC,CPTII,S$GLB, | Performed by: STUDENT IN AN ORGANIZED HEALTH CARE EDUCATION/TRAINING PROGRAM

## 2023-10-10 PROCEDURE — 99204 PR OFFICE/OUTPT VISIT, NEW, LEVL IV, 45-59 MIN: ICD-10-PCS | Mod: HCNC,S$GLB,, | Performed by: STUDENT IN AN ORGANIZED HEALTH CARE EDUCATION/TRAINING PROGRAM

## 2023-10-10 PROCEDURE — 99999 PR PBB SHADOW E&M-EST. PATIENT-LVL IV: ICD-10-PCS | Mod: PBBFAC,HCNC,, | Performed by: STUDENT IN AN ORGANIZED HEALTH CARE EDUCATION/TRAINING PROGRAM

## 2023-10-10 PROCEDURE — 99204 OFFICE O/P NEW MOD 45 MIN: CPT | Mod: HCNC,S$GLB,, | Performed by: STUDENT IN AN ORGANIZED HEALTH CARE EDUCATION/TRAINING PROGRAM

## 2023-10-10 PROCEDURE — 1160F RVW MEDS BY RX/DR IN RCRD: CPT | Mod: HCNC,CPTII,S$GLB, | Performed by: STUDENT IN AN ORGANIZED HEALTH CARE EDUCATION/TRAINING PROGRAM

## 2023-10-10 PROCEDURE — 3074F SYST BP LT 130 MM HG: CPT | Mod: HCNC,CPTII,S$GLB, | Performed by: STUDENT IN AN ORGANIZED HEALTH CARE EDUCATION/TRAINING PROGRAM

## 2023-10-10 PROCEDURE — 3078F PR MOST RECENT DIASTOLIC BLOOD PRESSURE < 80 MM HG: ICD-10-PCS | Mod: HCNC,CPTII,S$GLB, | Performed by: STUDENT IN AN ORGANIZED HEALTH CARE EDUCATION/TRAINING PROGRAM

## 2023-10-10 PROCEDURE — 1159F PR MEDICATION LIST DOCUMENTED IN MEDICAL RECORD: ICD-10-PCS | Mod: HCNC,CPTII,S$GLB, | Performed by: STUDENT IN AN ORGANIZED HEALTH CARE EDUCATION/TRAINING PROGRAM

## 2023-10-10 PROCEDURE — 1160F PR REVIEW ALL MEDS BY PRESCRIBER/CLIN PHARMACIST DOCUMENTED: ICD-10-PCS | Mod: HCNC,CPTII,S$GLB, | Performed by: STUDENT IN AN ORGANIZED HEALTH CARE EDUCATION/TRAINING PROGRAM

## 2023-10-10 PROCEDURE — 99999 PR PBB SHADOW E&M-EST. PATIENT-LVL IV: CPT | Mod: PBBFAC,HCNC,, | Performed by: STUDENT IN AN ORGANIZED HEALTH CARE EDUCATION/TRAINING PROGRAM

## 2023-10-10 PROCEDURE — 3074F PR MOST RECENT SYSTOLIC BLOOD PRESSURE < 130 MM HG: ICD-10-PCS | Mod: HCNC,CPTII,S$GLB, | Performed by: STUDENT IN AN ORGANIZED HEALTH CARE EDUCATION/TRAINING PROGRAM

## 2023-10-10 RX ORDER — ANIDULAFUNGIN 100 MG/30ML
INJECTION, POWDER, LYOPHILIZED, FOR SOLUTION INTRAVENOUS
COMMUNITY
End: 2023-10-10

## 2023-10-10 RX ORDER — METHYLPREDNISOLONE 4 MG/1
TABLET ORAL
COMMUNITY
Start: 2023-09-12 | End: 2024-01-25
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
## 2023-10-11 DIAGNOSIS — N17.9 AKI (ACUTE KIDNEY INJURY): Primary | ICD-10-CM

## 2023-10-17 ENCOUNTER — PATIENT MESSAGE (OUTPATIENT)
Dept: BARIATRICS | Facility: CLINIC | Age: 53
End: 2023-10-17
Payer: MEDICARE

## 2023-10-19 ENCOUNTER — PATIENT MESSAGE (OUTPATIENT)
Dept: PRIMARY CARE CLINIC | Facility: CLINIC | Age: 53
End: 2023-10-19
Payer: MEDICARE

## 2023-10-19 DIAGNOSIS — E83.39 LOW PHOSPHATE LEVELS: Primary | ICD-10-CM

## 2023-10-24 ENCOUNTER — PATIENT MESSAGE (OUTPATIENT)
Dept: PRIMARY CARE CLINIC | Facility: CLINIC | Age: 53
End: 2023-10-24
Payer: MEDICARE

## 2023-10-24 ENCOUNTER — OFFICE VISIT (OUTPATIENT)
Dept: RHEUMATOLOGY | Facility: CLINIC | Age: 53
End: 2023-10-24
Payer: MEDICARE

## 2023-10-24 DIAGNOSIS — M54.40 CHRONIC LOW BACK PAIN WITH SCIATICA, SCIATICA LATERALITY UNSPECIFIED, UNSPECIFIED BACK PAIN LATERALITY: ICD-10-CM

## 2023-10-24 DIAGNOSIS — G89.29 CHRONIC LOW BACK PAIN WITH SCIATICA, SCIATICA LATERALITY UNSPECIFIED, UNSPECIFIED BACK PAIN LATERALITY: ICD-10-CM

## 2023-10-24 DIAGNOSIS — R21 RASH: Primary | ICD-10-CM

## 2023-10-24 DIAGNOSIS — M79.89 LEG SWELLING: ICD-10-CM

## 2023-10-24 DIAGNOSIS — M06.00 SERONEGATIVE RHEUMATOID ARTHRITIS: Primary | ICD-10-CM

## 2023-10-24 DIAGNOSIS — M67.919 ROTATOR CUFF DISORDER, UNSPECIFIED LATERALITY: ICD-10-CM

## 2023-10-24 PROCEDURE — 99214 OFFICE O/P EST MOD 30 MIN: CPT | Mod: HCNC,95,, | Performed by: STUDENT IN AN ORGANIZED HEALTH CARE EDUCATION/TRAINING PROGRAM

## 2023-10-24 PROCEDURE — 99214 PR OFFICE/OUTPT VISIT, EST, LEVL IV, 30-39 MIN: ICD-10-PCS | Mod: HCNC,95,, | Performed by: STUDENT IN AN ORGANIZED HEALTH CARE EDUCATION/TRAINING PROGRAM

## 2023-10-24 RX ORDER — HYDROXYCHLOROQUINE SULFATE 200 MG/1
200 TABLET, FILM COATED ORAL 2 TIMES DAILY
Qty: 60 TABLET | Refills: 5 | Status: SHIPPED | OUTPATIENT
Start: 2023-10-24
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
## 2023-10-24 NOTE — PATIENT INSTRUCTIONS
Start taking hydroxychloroquine 200 mg twice a day with meals.  Referral to Ophthalmology to monitor hydroxychloroquine toxicity.  Follow-up with PCP for leg swelling.  Labs in 3 months  Follow-up with me in 3 months

## 2023-10-25 ENCOUNTER — PATIENT MESSAGE (OUTPATIENT)
Dept: OPTOMETRY | Facility: CLINIC | Age: 53
End: 2023-10-25
Payer: MEDICARE

## 2023-10-26 ENCOUNTER — PATIENT MESSAGE (OUTPATIENT)
Dept: RHEUMATOLOGY | Facility: CLINIC | Age: 53
End: 2023-10-26
Payer: MEDICARE

## 2023-10-26 ENCOUNTER — TELEPHONE (OUTPATIENT)
Dept: OPTOMETRY | Facility: CLINIC | Age: 53
End: 2023-10-26
Payer: MEDICARE

## 2023-10-26 NOTE — TELEPHONE ENCOUNTER
----- Message from Jaelyn Garcias sent at 10/26/2023  1:34 PM CDT -----  Contact: pt @ [phone]  MAURA THOMPSON calling regarding Appointment Access  (message) for #pt is calling to reschedule appt 2/29 for and afternoon appt, asking for call back

## 2023-11-07 ENCOUNTER — PATIENT MESSAGE (OUTPATIENT)
Dept: PRIMARY CARE CLINIC | Facility: CLINIC | Age: 53
End: 2023-11-07
Payer: MEDICARE

## 2023-11-07 ENCOUNTER — TELEPHONE (OUTPATIENT)
Dept: OTOLARYNGOLOGY | Facility: CLINIC | Age: 53
End: 2023-11-07
Payer: MEDICARE

## 2023-11-07 NOTE — TELEPHONE ENCOUNTER
Left message on voicemail returning pts call. It would be up to pt if she wants to keep appt or cancel. Thanks, Bety

## 2023-11-07 NOTE — TELEPHONE ENCOUNTER
----- Message from Oren Murray sent at 11/7/2023 12:33 PM CST -----  Regarding: appt on 11/17/2023 / inquiry  Contact: PT @ [phone]  Pt is calling asking to speak w/ someone in the office to discuss upcoming appt. Pt states that she is currently not having symptoms, but is not sure if she should keep upcoming appt. Please call to advise. Thanks.

## 2023-11-08 RX ORDER — OXYCODONE HYDROCHLORIDE 15 MG/1
7.5 TABLET ORAL
COMMUNITY
Start: 2023-09-14

## 2023-11-08 RX ORDER — LISDEXAMFETAMINE DIMESYLATE 50 MG/1
50 CAPSULE ORAL EVERY MORNING
COMMUNITY
Start: 2023-09-12

## 2023-11-08 RX ORDER — ATORVASTATIN CALCIUM 80 MG/1
80 TABLET, FILM COATED ORAL
COMMUNITY
Start: 2023-10-31

## 2023-11-08 RX ORDER — OXYCODONE HYDROCHLORIDE 10 MG/1
10 TABLET ORAL 3 TIMES DAILY
COMMUNITY
Start: 2023-10-12

## 2023-11-08 NOTE — TELEPHONE ENCOUNTER
No care due was identified.  [facility] Embedded Care Due Messages. Reference number: [id number].   11/08/2023 11:11:48 AM CST

## 2023-11-13 RX ORDER — OMEPRAZOLE 40 MG/1
40 CAPSULE, DELAYED RELEASE ORAL EVERY MORNING
Qty: 90 CAPSULE | Refills: 3 | Status: SHIPPED | OUTPATIENT
Start: 2023-11-13

## 2023-11-14 ENCOUNTER — TELEPHONE (OUTPATIENT)
Dept: OTOLARYNGOLOGY | Facility: CLINIC | Age: 53
End: 2023-11-14
Payer: MEDICARE

## 2023-11-14 NOTE — TELEPHONE ENCOUNTER
Returned patient call, she states that she is feeling a lot better and is not sure if she needs to keep the appointment. I advised her that it is up to her if she wants to keep it, she can keep it just so that she is established and if it happens again she can be seen a little bit sooner than a new patient wait would be. Patient states that she would like to keep appointment on Friday at this time.

## 2023-11-14 NOTE — TELEPHONE ENCOUNTER
----- Message from Magnolia Gallegos sent at 11/14/2023  1:23 PM CST -----  Contact: [phone]  The patient would like a call back at your earliest convince.  The pt can be reached at   [phone]

## 2023-11-17 NOTE — TELEPHONE ENCOUNTER
Yes she needs to have an appointment, which can be virtual or in person. I can not have a visit through [employer] messages.

## 2023-11-29 ENCOUNTER — PATIENT MESSAGE (OUTPATIENT)
Dept: RHEUMATOLOGY | Facility: CLINIC | Age: 53
End: 2023-11-29
Payer: MEDICARE

## 2023-11-30 ENCOUNTER — PATIENT MESSAGE (OUTPATIENT)
Dept: BARIATRICS | Facility: CLINIC | Age: 53
End: 2023-11-30
Payer: MEDICARE

## 2023-12-01 ENCOUNTER — PATIENT MESSAGE (OUTPATIENT)
Dept: RHEUMATOLOGY | Facility: CLINIC | Age: 53
End: 2023-12-01
Payer: MEDICARE

## 2023-12-01 NOTE — TELEPHONE ENCOUNTER
No care due was identified.  [facility] Embedded Care Due Messages. Reference number: [id number].   12/01/2023 2:15:22 PM CST

## 2023-12-07 RX ORDER — MONTELUKAST SODIUM 10 MG/1
10 TABLET ORAL EVERY OTHER DAY
Qty: 90 TABLET | Refills: 3 | Status: SHIPPED | OUTPATIENT
Start: 2023-12-07

## 2023-12-12 ENCOUNTER — PATIENT MESSAGE (OUTPATIENT)
Dept: ADMINISTRATIVE | Facility: OTHER | Age: 53
End: 2023-12-12
Payer: MEDICARE

## 2023-12-12 DIAGNOSIS — J30.2 SEASONAL ALLERGIES: ICD-10-CM

## 2023-12-13 RX ORDER — CETIRIZINE HYDROCHLORIDE 10 MG/1
10 TABLET ORAL DAILY
Qty: 90 TABLET | Refills: 0 | Status: SHIPPED | OUTPATIENT
Start: 2023-12-13 | End: 2024-12-12

## 2023-12-13 NOTE — TELEPHONE ENCOUNTER
No care due was identified.  [facility] Embedded Care Due Messages. Reference number: [id number].   12/12/2023 8:23:45 PM CST

## 2023-12-22 ENCOUNTER — PATIENT MESSAGE (OUTPATIENT)
Dept: RHEUMATOLOGY | Facility: CLINIC | Age: 53
End: 2023-12-22
Payer: MEDICARE

## 2024-01-11 DIAGNOSIS — Z00.00 ENCOUNTER FOR MEDICARE ANNUAL WELLNESS EXAM: ICD-10-CM

## 2024-01-24 ENCOUNTER — OFFICE VISIT (OUTPATIENT)
Dept: OPHTHALMOLOGY | Facility: CLINIC | Age: 54
End: 2024-01-24
Attending: OPHTHALMOLOGY
Payer: MEDICARE

## 2024-01-24 DIAGNOSIS — H25.13 NUCLEAR SCLEROSIS OF BOTH EYES: Primary | ICD-10-CM

## 2024-01-24 DIAGNOSIS — H18.623 KERATOCONUS, UNSTABLE, BILATERAL: ICD-10-CM

## 2024-01-24 PROCEDURE — 99214 OFFICE O/P EST MOD 30 MIN: CPT | Mod: HCNC,S$GLB,, | Performed by: OPHTHALMOLOGY

## 2024-01-24 PROCEDURE — 92136 OPHTHALMIC BIOMETRY: CPT | Mod: HCNC,RT,S$GLB, | Performed by: OPHTHALMOLOGY

## 2024-01-24 PROCEDURE — 99999 PR PBB SHADOW E&M-EST. PATIENT-LVL IV: CPT | Mod: PBBFAC,HCNC,, | Performed by: OPHTHALMOLOGY

## 2024-01-24 PROCEDURE — 1160F RVW MEDS BY RX/DR IN RCRD: CPT | Mod: HCNC,CPTII,S$GLB, | Performed by: OPHTHALMOLOGY

## 2024-01-24 PROCEDURE — 1159F MED LIST DOCD IN RCRD: CPT | Mod: HCNC,CPTII,S$GLB, | Performed by: OPHTHALMOLOGY

## 2024-01-24 RX ORDER — PHENYLEPHRINE HYDROCHLORIDE 100 MG/ML
1 SOLUTION/ DROPS OPHTHALMIC
Status: CANCELLED | OUTPATIENT
Start: 2024-01-24

## 2024-01-24 RX ORDER — PREDNISOLONE ACETATE-GATIFLOXACIN-BROMFENAC .75; 5; 1 MG/ML; MG/ML; MG/ML
1 SUSPENSION/ DROPS OPHTHALMIC 3 TIMES DAILY
Qty: 5 ML | Refills: 3 | Status: SHIPPED | OUTPATIENT
Start: 2024-01-24

## 2024-01-24 RX ORDER — CYCLOP/TROP/PROPA/PHEN/KET/WAT 1-1-0.1%
1 DROPS (EA) OPHTHALMIC (EYE)
Status: CANCELLED | OUTPATIENT
Start: 2024-01-24

## 2024-01-24 RX ORDER — MOXIFLOXACIN 5 MG/ML
1 SOLUTION/ DROPS OPHTHALMIC
Status: CANCELLED | OUTPATIENT
Start: 2024-01-24
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
## 2024-01-25 ENCOUNTER — OFFICE VISIT (OUTPATIENT)
Dept: RHEUMATOLOGY | Facility: CLINIC | Age: 54
End: 2024-01-25
Payer: MEDICARE

## 2024-01-25 DIAGNOSIS — G89.29 CHRONIC LOW BACK PAIN WITH SCIATICA, SCIATICA LATERALITY UNSPECIFIED, UNSPECIFIED BACK PAIN LATERALITY: ICD-10-CM

## 2024-01-25 DIAGNOSIS — M06.00 SERONEGATIVE RHEUMATOID ARTHRITIS: Primary | ICD-10-CM

## 2024-01-25 DIAGNOSIS — E66.01 MORBID OBESITY: ICD-10-CM

## 2024-01-25 DIAGNOSIS — M54.40 CHRONIC LOW BACK PAIN WITH SCIATICA, SCIATICA LATERALITY UNSPECIFIED, UNSPECIFIED BACK PAIN LATERALITY: ICD-10-CM

## 2024-01-25 PROCEDURE — 99214 OFFICE O/P EST MOD 30 MIN: CPT | Mod: HCNC,95,, | Performed by: STUDENT IN AN ORGANIZED HEALTH CARE EDUCATION/TRAINING PROGRAM

## 2024-01-25 RX ORDER — PREDNISONE 5 MG/1
TABLET ORAL
Qty: 42 TABLET | Refills: 0 | Status: SHIPPED | OUTPATIENT
Start: 2024-01-25 | End: 2024-02-15
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
## 2024-01-30 ENCOUNTER — PATIENT MESSAGE (OUTPATIENT)
Dept: ADMINISTRATIVE | Facility: OTHER | Age: 54
End: 2024-01-30
Payer: MEDICARE

## 2024-02-01 ENCOUNTER — PATIENT MESSAGE (OUTPATIENT)
Dept: RHEUMATOLOGY | Facility: CLINIC | Age: 54
End: 2024-02-01
Payer: MEDICARE

## 2024-02-02 ENCOUNTER — PATIENT MESSAGE (OUTPATIENT)
Dept: RHEUMATOLOGY | Facility: CLINIC | Age: 54
End: 2024-02-02
Payer: MEDICARE

## 2024-02-22 ENCOUNTER — TELEPHONE (OUTPATIENT)
Dept: OPHTHALMOLOGY | Facility: CLINIC | Age: 54
End: 2024-02-22
Payer: MEDICARE

## 2024-02-22 DIAGNOSIS — H25.11 NUCLEAR SCLEROTIC CATARACT OF RIGHT EYE: Primary | ICD-10-CM

## 2024-03-04 ENCOUNTER — TELEPHONE (OUTPATIENT)
Dept: PRIMARY CARE CLINIC | Facility: CLINIC | Age: 54
End: 2024-03-04
Payer: MEDICARE

## 2024-03-04 ENCOUNTER — PATIENT MESSAGE (OUTPATIENT)
Dept: RHEUMATOLOGY | Facility: CLINIC | Age: 54
End: 2024-03-04
Payer: MEDICARE

## 2024-03-04 NOTE — TELEPHONE ENCOUNTER
----- Message from Loree Lacey sent at 3/4/2024  2:49 PM CST -----  Contact: Home  [phone]  Patient would like to scheduled an Enhanced Wellness Visit and she would like an afternoon appointment.

## 2024-03-06 ENCOUNTER — TELEPHONE (OUTPATIENT)
Dept: OTOLARYNGOLOGY | Facility: CLINIC | Age: 54
End: 2024-03-06
Payer: MEDICARE

## 2024-03-06 NOTE — TELEPHONE ENCOUNTER
Called pt back. Advised that Dr. Shepherd goes to the [facility] location on Fridays. The next available appts at that location are in May. Pt states that she would like to see any provider in another office sooner. Offered her [place], but she states that she would rather be seen on the [facility]. Advised that I would send message to the [facility] staff to contact her for an appt. Gave pt the ENT number [phone] in case she does not receive a call to schedule. Thanks, Bety

## 2024-03-06 NOTE — TELEPHONE ENCOUNTER
----- Message from Lola Armenta sent at 3/6/2024  1:15 PM CST -----  Regarding: Appt  Contact: Pt  [phone]  Pt is calling to schedule a np appt for vertigo please call

## 2024-03-08 ENCOUNTER — TELEPHONE (OUTPATIENT)
Dept: OTOLARYNGOLOGY | Facility: CLINIC | Age: 54
End: 2024-03-08
Payer: MEDICARE

## 2024-03-11 ENCOUNTER — TELEPHONE (OUTPATIENT)
Dept: OPHTHALMOLOGY | Facility: CLINIC | Age: 54
End: 2024-03-11
Payer: MEDICARE

## 2024-03-11 NOTE — TELEPHONE ENCOUNTER
Patient given arrival time of 7:00 am on Thursday March 14. Nothing to eat or drink after 11:59 pm.  Start drops into the operative eye today. [address] [facility]     
[id number]

## 2024-03-13 NOTE — PRE-PROCEDURE INSTRUCTIONS
Pt unavailable to speak at the moment.  Portal msg sent as follows:    Dear Gill     Below you will find basic pre-procedure instructions in preparation for your procedure on 3/14/24 with Dr. Aldrich     You should have received your arrival time already from Dr's office.     - Nothing to eat or drink after midnight the night before your procedure until after your procedure, except AM meds with small sips of water.     - HOLD all oral Diabetic medications night before and morning of procedure  - HOLD all Insulin morning of procedure  - HOLD all Fluid pills morning of procedure  - HOLD all non-insulin shots until after surgery (Ozempic, Mounjaro, Trulicity, Victoza, Byetta, Wegovy and Adlyxin) (7 days prior)  - HOLD all vitamins, minerals and herbal supplements morning of procedure   - TAKE all B/P meds, EXCEPT those that contain a fluid pill (ex. Lasix, Hydroclorothiazide/HCTZ)  - USE inhalers as needed and bring AM of surgery  - USE eye drops as directed  -TAKE blood thinner meds AM of surgery unless otherwise instructed by your provider to not take     - Shower and wash face with dial soap for 3 mins PM prior and AM of surgery  - No powder, lotions, creams, oils, gels, ointments, makeup,  or jewelry    - Wear comfortable clothing (button up shirt)     (Patient is required to have a responsible ride to transport home, ride may not leave while patient is in surgery)     -- [facility], 2nd floor Surgery Center, located   @ [address] [place] [address]  2nd Floor Registration        If you have any questions or concerns please feel free to contact your surgeon's office.           Please reply to this message as receipt of delivery.     LUIZ Jones  [facility]  Pre-Admit - Anesthesia Dept

## 2024-03-14 ENCOUNTER — HOSPITAL ENCOUNTER (OUTPATIENT)
Facility: HOSPITAL | Age: 54
Discharge: HOME | End: 2024-03-14
Attending: OPHTHALMOLOGY | Admitting: OPHTHALMOLOGY
Payer: MEDICARE

## 2024-03-14 VITALS
TEMPERATURE: 98 F | DIASTOLIC BLOOD PRESSURE: 57 MMHG | WEIGHT: 216 LBS | OXYGEN SATURATION: 96 % | HEIGHT: 63 IN | RESPIRATION RATE: 20 BRPM | HEART RATE: 64 BPM | BODY MASS INDEX: 38.27 KG/M2 | SYSTOLIC BLOOD PRESSURE: 115 MMHG

## 2024-03-14 DIAGNOSIS — H25.11 NUCLEAR SCLEROTIC CATARACT OF RIGHT EYE: Primary | ICD-10-CM

## 2024-03-14 DIAGNOSIS — H25.13 NUCLEAR SCLEROSIS OF BOTH EYES: ICD-10-CM

## 2024-03-14 PROCEDURE — 99152 MOD SED SAME PHYS/QHP 5/>YRS: CPT | Performed by: OPHTHALMOLOGY

## 2024-03-14 PROCEDURE — 36000706: Performed by: OPHTHALMOLOGY

## 2024-03-14 PROCEDURE — 71000015 HC POSTOP RECOV 1ST HR: Performed by: OPHTHALMOLOGY

## 2024-03-14 PROCEDURE — 63600175 PHARM REV CODE 636 W HCPCS: Performed by: OPHTHALMOLOGY

## 2024-03-14 PROCEDURE — 94761 N-INVAS EAR/PLS OXIMETRY MLT: CPT | Mod: 59

## 2024-03-14 PROCEDURE — 66984 XCAPSL CTRC RMVL W/O ECP: CPT | Mod: RT,,, | Performed by: OPHTHALMOLOGY

## 2024-03-14 PROCEDURE — 25000003 PHARM REV CODE 250: Performed by: OPHTHALMOLOGY

## 2024-03-14 PROCEDURE — 36000707: Performed by: OPHTHALMOLOGY

## 2024-03-14 PROCEDURE — 99900035 HC TECH TIME PER 15 MIN (STAT)

## 2024-03-14 PROCEDURE — V2632 POST CHMBR INTRAOCULAR LENS: HCPCS | Performed by: OPHTHALMOLOGY

## 2024-03-14 DEVICE — LENS EYHANCE +16.0D: Type: IMPLANTABLE DEVICE | Site: EYE | Status: FUNCTIONAL

## 2024-03-14 RX ORDER — PROPARACAINE HYDROCHLORIDE 5 MG/ML
SOLUTION/ DROPS OPHTHALMIC
Status: DISCONTINUED | OUTPATIENT
Start: 2024-03-14 | End: 2024-03-14 | Stop reason: HOSPADM

## 2024-03-14 RX ORDER — MOXIFLOXACIN 5 MG/ML
SOLUTION/ DROPS OPHTHALMIC
Status: DISCONTINUED | OUTPATIENT
Start: 2024-03-14 | End: 2024-03-14 | Stop reason: HOSPADM

## 2024-03-14 RX ORDER — FENTANYL CITRATE 50 UG/ML
25 INJECTION, SOLUTION INTRAMUSCULAR; INTRAVENOUS
Status: DISCONTINUED | OUTPATIENT
Start: 2024-03-14 | End: 2024-03-14 | Stop reason: HOSPADM

## 2024-03-14 RX ORDER — MOXIFLOXACIN 5 MG/ML
1 SOLUTION/ DROPS OPHTHALMIC
Status: DISCONTINUED | OUTPATIENT
Start: 2024-03-14 | End: 2024-03-14 | Stop reason: HOSPADM

## 2024-03-14 RX ORDER — PROPARACAINE HYDROCHLORIDE 5 MG/ML
1 SOLUTION/ DROPS OPHTHALMIC
Status: DISCONTINUED | OUTPATIENT
Start: 2024-03-14 | End: 2024-03-14 | Stop reason: HOSPADM

## 2024-03-14 RX ORDER — MIDAZOLAM HYDROCHLORIDE 1 MG/ML
1 INJECTION INTRAMUSCULAR; INTRAVENOUS
Status: DISCONTINUED | OUTPATIENT
Start: 2024-03-14 | End: 2024-03-14

## 2024-03-14 RX ORDER — MIDAZOLAM HYDROCHLORIDE 1 MG/ML
1 INJECTION INTRAMUSCULAR; INTRAVENOUS
Status: DISCONTINUED | OUTPATIENT
Start: 2024-03-14 | End: 2024-03-14 | Stop reason: HOSPADM

## 2024-03-14 RX ORDER — LIDOCAINE HYDROCHLORIDE 40 MG/ML
INJECTION, SOLUTION RETROBULBAR
Status: DISCONTINUED | OUTPATIENT
Start: 2024-03-14 | End: 2024-03-14 | Stop reason: HOSPADM

## 2024-03-14 RX ORDER — PHENYLEPHRINE HYDROCHLORIDE 100 MG/ML
1 SOLUTION/ DROPS OPHTHALMIC
Status: DISCONTINUED | OUTPATIENT
Start: 2024-03-14 | End: 2024-03-14 | Stop reason: HOSPADM

## 2024-03-14 RX ORDER — MOXIFLOXACIN 5 MG/ML
1 SOLUTION/ DROPS OPHTHALMIC
Status: COMPLETED | OUTPATIENT
Start: 2024-03-14 | End: 2024-03-14

## 2024-03-14 RX ORDER — CYCLOP/TROP/PROPA/PHEN/KET/WAT 1-1-0.1%
1 DROPS (EA) OPHTHALMIC (EYE)
Status: COMPLETED | OUTPATIENT
Start: 2024-03-14 | End: 2024-03-14

## 2024-03-14 RX ADMIN — MOXIFLOXACIN OPHTHALMIC 1 DROP: 5 SOLUTION/ DROPS OPHTHALMIC at 07:03

## 2024-03-14 RX ADMIN — Medication 1 DROP: at 07:03

## 2024-03-14 RX ADMIN — MOXIFLOXACIN 1 DROP: 5 SOLUTION/ DROPS OPHTHALMIC at 09:03

## 2024-03-14 RX ADMIN — MIDAZOLAM 2 MG: 1 INJECTION INTRAMUSCULAR; INTRAVENOUS at 08:03

## 2024-03-14 NOTE — OP NOTE
SURGEON:  Kim Aldrich M.D.    PREOPERATIVE DIAGNOSIS:    Nuclear Sclerotic Cataract Right Eye    POSTOPERATIVE DIAGNOSIS:    Nuclear Sclerotic Cataract Right Eye    PROCEDURES:    Phacoemulsification with  intraocular lens, Right eye ([id number])    DATE OF SURGERY: 03/14/2024    IMPLANT: diboo 16.0    ANESTHESIA:  moderate sedation with topical Lidocaine. Patient ID confirmed and re-evaluated the patient and anesthesia plan confirming it is suitable for the patient's condition and procedure.    COMPLICATIONS:  None    ESTIMATED BLOOD LOSS: None    SPECIMENS: None    INDICATIONS:    The patient has a history of painless progressive visual loss and difficulty with activities of daily living, which specifically include difficult driving at night due to glare and difficulty reading small print, secondary to cataract formation.  After a thorough discussion of the risks, benefits, and alternatives to cataract surgery, including, but not limited to, the rare risks of infection, retinal detachment, hemorrhage, need for additional surgery, loss of vision, and even loss of the eye, the patient voices understanding and desires to proceed.    DESCRIPTION OF PROCEDURE:    The patients IOL calculations were reviewed, and the lens selection confirmed.   After verification and marking of the proper eye in the preop holding area, the patient was brought to the operating room in supine position where the eye was prepped and draped in standard sterile fashion with 5% Betadine and a lid speculum placed in the eye.   Topical 4% Lidocaine was used in addition to the preoperative anesthesia and the procedure was begun by the creation of a paracentesis incision through which viscoelastic was used to fill the anterior chamber.  Next, a keratome blade was used to create a triplanar temporal clear corneal incision and a cystotome and Utrata forceps used to fashion a continuous curvilinear capsulorrhexis.  Hydrodissection was carried out using  the Marquez hydrodissection cannula and the nucleus was found to be mobile.  Phacoemulsification of the nucleus was carried out using a quick chop technique, and all remaining epinuclear and cortical material was removed.  The eye was then reformed with Viscoelastic and the  intraocular lens was implanted into the capsular bag.  All remaining viscoelastics were removed from the eye and at the end of the case the pupil was round, the lens was well-centered within the capsular bag and all wounds were found to be water tight.  Drops of Vigamox and Pred Forte were instilled and a shield was placed over the eye. The patient will follow up with Dr. Aldrich in the morning.

## 2024-03-14 NOTE — PLAN OF CARE
"Discharge instructions given to patient and they verbalized understanding of all.  VSS, denies n/v and tolerating PO, rates pain level tolerable. IV removed, and family notified for patient discharge home. Patient's family not in facility.  Gave specific instructions to family member to [facility], surgery discharge area.  Patient placed in w/c and placed in bay 14 to await ride to come (coming from [place]).      1000 - Rounded on patient.  No complaints. Patient states her ride was "still in [place]" when she talked to her.  Patient remains in bay 14 in w/c. Will monitor.     1030 - Patient continues to wait for family transportation, in bay 14 in wheelchair.  No complaints or requests.  Will monitor.     1045 - Patient transported to surgery  bay via w/c for discharge.    "

## 2024-03-14 NOTE — DISCHARGE INSTRUCTIONS
CATARACT SURGERY    POST-OPERATIVE INSTRUCTIONS    · Apply drops THREE times a day into operative eye for 30 days.    · DO NOT rub your eye    · Wear protective sunglasses during the day    · Resume moderate activity    · Bathe/shower/wash face normally    · DO NOT apply makeup around the operative eye for 1 week.         You should expect    - Blurry vision and halos for 24-48 hours    - Dilated pupil for 24-48 hours    - Scratchy feeling in the eye for 1-2 days    - Curved shadow in your peripheral vision for 2-3 weeks    - Occasional flickering of lights for up to 1 week    -If you experience severe pain or nausea, please call Dr Aldrich or the on-call doctor at [phone]    - Plan to see Dr Aldrich tomorrow .      [facility]    [address] [facility] [address]    ** Most patients can drive the next day, but if you do not feel comfortable driving, please arrange for transportation.

## 2024-03-14 NOTE — H&P
CC: Painless, progressive loss of vision  Present Illness: Nuclear sclerotic cataract  Allergies/Current Meds: see meds  Mental Status: A&O x3  Pertinent Medical History: n/a     Physical Exam  General: NAD  HEENT: Eye white/quiet  Lungs: Adequate respirations  Heart: + pulses  Abdomen: soft  Rectal/GI/: deferred     Impression: Cataract  Plan: Phacoemulsification with lens implant    ASA Score: II    Mallampati Score: II    Plan for Sedation: Moderate Sedation    Patient or Family History of Anesthesia problems : No    Any changes affecting the anesthesia assessment which may have changed since the initial assessment and H and P: No

## 2024-03-14 NOTE — DISCHARGE SUMMARY
Outcome: Successful outpatient ophthalmic surgical procedure  Preprinted Instructions given to patient.  Regular diet.  Activity: No restrictions  Meds: see Med Rec  Condition: stable  Follow up: 1 day with Dr Aldrich  Disposition: Home  Diagnosis: s/p eye surgery  Date of discharge: 03/14/2024

## 2024-03-14 NOTE — H&P
Pre-Operative History & Physical  Ophthalmology      SUBJECTIVE:     History of Present Illness:  Patient is a 53 y.o. female presents with Nuclear sclerotic cataract of right eye [H25.11]  Nuclear sclerosis of both eyes [H25.13].    MEDICATIONS:   PTA Medications   Medication Sig    atorvastatin (LIPITOR) 80 MG tablet Take 80 mg by mouth.    tqwlkelhz-azghrfmd-vfdximt ala (BIKTARVY) -25 mg per tablet Take 1 tablet by mouth once daily.    carboxymethylcellulose (REFRESH PLUS) 0.5 % Dpet 1 drop as needed.    cetirizine (ZYRTEC) 10 MG tablet Take 1 tablet (10 mg total) by mouth once daily.    diltiaZEM (CARDIZEM CD) 240 MG 24 hr capsule Take 1 capsule (240 mg total) by mouth once daily.    hydrALAZINE (APRESOLINE) 50 MG tablet Take 50 mg by mouth every evening.    hydroxychloroquine (PLAQUENIL) 200 mg tablet Take 1 tablet (200 mg total) by mouth 2 (two) times daily. With meals    hydrOXYzine pamoate (VISTARIL) 25 MG Cap hydroxyzine pamoate 25 mg capsule   TAKE 1 CAPSULE (25 MG TOTAL) BY MOUTH EVERY 8 (EIGHT) HOURS AS NEEDED (ALLERGIES).    lisdexamfetamine (VYVANSE) 60 MG capsule Take 60 mg by mouth every morning.    montelukast (SINGULAIR) 10 mg tablet Take 1 tablet (10 mg total) by mouth every other day.    omeprazole (PRILOSEC) 40 MG capsule Take 1 capsule (40 mg total) by mouth every morning.    oxyCODONE (ROXICODONE) 10 mg Tab immediate release tablet Take 10 mg by mouth 3 (three) times daily.    prednisolon/gatiflox/bromfenac (PREDNISOL ACE-GATIFLOX-BROMFEN) 1-0.5-0.075 % DrpS Apply 1 drop to eye 3 (three) times daily. in operative eye for 1 month after surgery    pregabalin (LYRICA) 150 MG capsule Take 150 mg by mouth 2 (two) times daily.    propranoloL (INDERAL) 40 MG tablet Take 40 mg by mouth every evening.    risperiDONE (RISPERDAL) 1 MG tablet Take 1 mg by mouth every evening.    venlafaxine (EFFEXOR-XR) 150 MG Cp24 Take 150 mg by mouth every morning.    venlafaxine (EFFEXOR-XR) 75 MG 24 hr capsule  Take 75 mg by mouth every morning.    ergocalciferol (ERGOCALCIFEROL) 50,000 unit Cap Take 50,000 Units by mouth every 7 days.    furosemide (LASIX) 40 MG tablet Take 1 tablet (40 mg total) by mouth once daily.    olopatadine (PATANOL) 0.1 % ophthalmic solution Apply to eye.    oxyCODONE (ROXICODONE) 15 MG Tab Take 7.5 mg by mouth.    oxyCODONE (ROXICODONE) 5 MG immediate release tablet Take 7.5 mg by mouth 3 (three) times daily as needed for Pain.    semaglutide (OZEMPIC) 0.25 mg or 0.5 mg (2 mg/3 mL) pen injector Inject 0.5 mg into the skin every 7 days.    VYVANSE 50 mg capsule Take 50 mg by mouth every morning.       ALLERGIES:   Review of patient's allergies indicates:   Allergen Reactions    Ace inhibitors Other (See Comments) and Rash     dizziness    dizziness  dizziness  dizziness      Efavirenz-emtricitabin-tenofov Other (See Comments)     dizziness    dizziness  dizziness  dizziness    Penicillins Hives, Nausea And Vomiting and Other (See Comments)     Hives (skin)^ and causes yeast infection  Hives (skin)^      Pork extract      Pt would like pork added  Because of strong Lutheran beliefs    Pork/porcine containing products      Pt would like pork added  Because of strong Lutheran beliefs    Tramadol Nausea And Vomiting and Other (See Comments)    Emtricita-rilpivirine-tenof df Other (See Comments)     Affecting patients kidneys    Affecting patients kidneys  Affecting patients kidneys  Affecting patients kidneys    Lactase     Lisinopril     Tilactase     Amlodipine Rash    Clonidine Rash    Doxazosin Rash    Hydrochlorothiazide Rash and Other (See Comments)    Hydrocodone-acetaminophen Hives, Itching and Rash    Labetalol Rash    Metoprolol Rash    Nebivolol hcl Rash       PAST MEDICAL HISTORY:   Past Medical History:   Diagnosis Date    Diverticulitis     HIV (human immunodeficiency virus infection)     Hyperlipemia     Hypertension     Nausea, vomiting, and diarrhea 4/28/2022    Osteoporosis      Quit using tobacco in remote past 9/10/2020    Sleep apnea     Vertigo      PAST SURGICAL HISTORY:   Past Surgical History:   Procedure Laterality Date    LAPAROSCOPIC SIGMOIDECTOMY N/A 12/10/2018    Procedure: COLECTOMY, SIGMOID, LAPAROSCOPIC-;  Surgeon: Reyes Nelson MD;  Location: [address];  Service: General;  Laterality: N/A;    LAPAROSCOPIC TOTAL HYSTERECTOMY  2018    URETERAL STENT PLACEMENT Left 12/10/2018    Procedure: INSERTION, STENT, URETER;  Surgeon: Reyes Nelson MD;  Location: [address];  Service: General;  Laterality: Left;     PAST FAMILY HISTORY:   Family History   Problem Relation Age of Onset    Hypertension Mother     Thyroid disease Mother     Hypertension Father     Heart attacks under age 50 Father     Other Father         Brain tumor    Other Sister         Brain tumor     SOCIAL HISTORY:   Social History     Tobacco Use    Smoking status: Never    Smokeless tobacco: Never   Substance Use Topics    Alcohol use: No    Drug use: Never        MENTAL STATUS: Alert    REVIEW OF SYSTEMS: Negative    OBJECTIVE:     Vital Signs (Most Recent)  Temp: 98.1 °F (36.7 °C) (03/14/24 0708)  Pulse: 72 (03/14/24 0708)  Resp: 16 (03/14/24 0708)  BP: (!) 122/58 (03/14/24 0708)  SpO2: 98 % (03/14/24 0708)    Physical Exam:  General: NAD  HEENT: cataract  Lungs: Adequate respirations, symmetrical movements, non-labored  Heart: Intact distal pulses  Abdomen: Soft, nondistended, nontender    ASSESSMENT/PLAN:     Patient is a 53 y.o. female with Nuclear sclerotic cataract of right eye [H25.11]  Nuclear sclerosis of both eyes [H25.13].    - Plan for surgical correction with CEIOL OD.   - Allergies reviewed:   Review of patient's allergies indicates:   Allergen Reactions    Ace inhibitors Other (See Comments) and Rash     dizziness    dizziness  dizziness  dizziness      Efavirenz-emtricitabin-tenofov Other (See Comments)     dizziness    dizziness  dizziness  dizziness    Penicillins Hives,  Nausea And Vomiting and Other (See Comments)     Hives (skin)^ and causes yeast infection  Hives (skin)^      Pork extract      Pt would like pork added  Because of strong Jain beliefs    Pork/porcine containing products      Pt would like pork added  Because of strong Jain beliefs    Tramadol Nausea And Vomiting and Other (See Comments)    Emtricita-rilpivirine-tenof df Other (See Comments)     Affecting patients kidneys    Affecting patients kidneys  Affecting patients kidneys  Affecting patients kidneys    Lactase     Lisinopril     Tilactase     Amlodipine Rash    Clonidine Rash    Doxazosin Rash    Hydrochlorothiazide Rash and Other (See Comments)    Hydrocodone-acetaminophen Hives, Itching and Rash    Labetalol Rash    Metoprolol Rash    Nebivolol hcl Rash     - Risks/benefits/alternatives of the procedure including, but not limited to scarring, bleeding, infection, loss or decreased vision, and/or need for possible repeat surgery discussed with the patient and family.  - Informed consent obtained prior to surgery and the patient/family voiced good understanding.    Marissa Pritchett MD  [facility], PGY-3

## 2024-03-15 ENCOUNTER — TELEPHONE (OUTPATIENT)
Dept: RHEUMATOLOGY | Facility: CLINIC | Age: 54
End: 2024-03-15
Payer: MEDICARE

## 2024-03-15 ENCOUNTER — OFFICE VISIT (OUTPATIENT)
Dept: OPHTHALMOLOGY | Facility: CLINIC | Age: 54
End: 2024-03-15
Payer: MEDICARE

## 2024-03-15 DIAGNOSIS — H25.13 NUCLEAR SCLEROTIC CATARACT, BILATERAL: ICD-10-CM

## 2024-03-15 DIAGNOSIS — Z98.890 POST-OPERATIVE STATE: Primary | ICD-10-CM

## 2024-03-15 PROCEDURE — 1159F MED LIST DOCD IN RCRD: CPT | Mod: CPTII,S$GLB,, | Performed by: OPHTHALMOLOGY

## 2024-03-15 PROCEDURE — 99999 PR PBB SHADOW E&M-EST. PATIENT-LVL IV: CPT | Mod: PBBFAC,,, | Performed by: OPHTHALMOLOGY

## 2024-03-15 PROCEDURE — 1160F RVW MEDS BY RX/DR IN RCRD: CPT | Mod: CPTII,S$GLB,, | Performed by: OPHTHALMOLOGY

## 2024-03-15 PROCEDURE — 99024 POSTOP FOLLOW-UP VISIT: CPT | Mod: S$GLB,,, | Performed by: OPHTHALMOLOGY

## 2024-03-15 NOTE — PROGRESS NOTES
HPI    PT is here for 1 day po phaco/IOL OD  Vision is clearer but not good and no pain  PGB TID OD    DATE OF SURGERY: 03/14/2024   IMPLANT: diboo 16.0    Last edited by Rochelle Bullard on 3/15/2024  8:08 AM.            Assessment /Plan     For exam results, see Encounter Report.    Post-operative state    Nuclear sclerotic cataract, bilateral      Slit lamp exam:  L/L: nl  K: clear, wound sealed  AC: 1+ cell  Lens: IOL centered and stable    POD1 s/p Phaco/IOL  Appropriate precautions and post op medications reviewed.  Patient instructed to call or come in if symptoms of redness, decreased vision, or pain are experienced.

## 2024-03-15 NOTE — TELEPHONE ENCOUNTER
Called Aamir newman/ [facility] to inform her that patient rescheduled and her next appointment is 6/27/24 at 2:30PM. No answer [facility] w/ call back number.

## 2024-03-15 NOTE — TELEPHONE ENCOUNTER
----- Message from Guicho Verdugo sent at 3/14/2024 10:57 AM CDT -----  Contact: Aamir  Type:  Clinical notes     Who Called: Aamir newman/ [facility]  Would the patient rather a call back or a response via [facility]? call  Best Call Back Number: [phone] fax #: [phone]  Additional Information:   Aamir requesting clinical notes be faxed after tomorrow visit  to close out pt's  referral

## 2024-03-26 ENCOUNTER — OFFICE VISIT (OUTPATIENT)
Dept: OPHTHALMOLOGY | Facility: CLINIC | Age: 54
End: 2024-03-26
Payer: MEDICARE

## 2024-03-26 ENCOUNTER — OFFICE VISIT (OUTPATIENT)
Dept: PRIMARY CARE CLINIC | Facility: CLINIC | Age: 54
End: 2024-03-26
Payer: MEDICARE

## 2024-03-26 DIAGNOSIS — Z13.1 DIABETES MELLITUS SCREENING: ICD-10-CM

## 2024-03-26 DIAGNOSIS — Z98.890 POST-OPERATIVE STATE: ICD-10-CM

## 2024-03-26 DIAGNOSIS — R22.43 LOCALIZED SWELLING OF BOTH LOWER LEGS: ICD-10-CM

## 2024-03-26 DIAGNOSIS — R73.9 HYPERGLYCEMIA: Primary | ICD-10-CM

## 2024-03-26 DIAGNOSIS — H25.13 NUCLEAR SCLEROSIS, BILATERAL: Primary | ICD-10-CM

## 2024-03-26 DIAGNOSIS — B35.1 ONYCHOMYCOSIS: ICD-10-CM

## 2024-03-26 PROCEDURE — 99999 PR PBB SHADOW E&M-EST. PATIENT-LVL III: CPT | Mod: PBBFAC,,, | Performed by: OPHTHALMOLOGY

## 2024-03-26 PROCEDURE — 99024 POSTOP FOLLOW-UP VISIT: CPT | Mod: S$GLB,,, | Performed by: OPHTHALMOLOGY

## 2024-03-26 PROCEDURE — 92136 OPHTHALMIC BIOMETRY: CPT | Mod: 26,LT,S$GLB, | Performed by: OPHTHALMOLOGY

## 2024-03-26 PROCEDURE — 99214 OFFICE O/P EST MOD 30 MIN: CPT | Mod: HCNC,95,, | Performed by: STUDENT IN AN ORGANIZED HEALTH CARE EDUCATION/TRAINING PROGRAM

## 2024-03-26 PROCEDURE — 1160F RVW MEDS BY RX/DR IN RCRD: CPT | Mod: CPTII,S$GLB,, | Performed by: OPHTHALMOLOGY

## 2024-03-26 PROCEDURE — 1159F MED LIST DOCD IN RCRD: CPT | Mod: CPTII,S$GLB,, | Performed by: OPHTHALMOLOGY

## 2024-03-26 RX ORDER — MOXIFLOXACIN 5 MG/ML
1 SOLUTION/ DROPS OPHTHALMIC
Status: CANCELLED | OUTPATIENT
Start: 2024-03-26

## 2024-03-26 RX ORDER — SODIUM CHLORIDE 0.9 % (FLUSH) 0.9 %
10 SYRINGE (ML) INJECTION
Status: DISCONTINUED | OUTPATIENT
Start: 2024-03-26 | End: 2024-05-02 | Stop reason: ALTCHOICE

## 2024-03-26 RX ORDER — TERBINAFINE HYDROCHLORIDE 250 MG/1
250 TABLET ORAL DAILY
Qty: 90 TABLET | Refills: 0 | Status: SHIPPED | OUTPATIENT
Start: 2024-03-26 | End: 2024-06-24

## 2024-03-26 RX ORDER — PHENYLEPHRINE HYDROCHLORIDE 100 MG/ML
1 SOLUTION/ DROPS OPHTHALMIC
Status: CANCELLED | OUTPATIENT
Start: 2024-03-26

## 2024-03-26 RX ORDER — CYCLOP/TROP/PROPA/PHEN/KET/WAT 1-1-0.1%
1 DROPS (EA) OPHTHALMIC (EYE)
Status: CANCELLED | OUTPATIENT
Start: 2024-03-26

## 2024-03-26 NOTE — PROGRESS NOTES
Assessment /Plan     For exam results, see Encounter Report.    Nuclear sclerosis, bilateral    Post-operative state      Slit lamp exam:  L/L: nl  K: clear, wound sealed  AC: trace cell  Iris/Lens: IOL centered and stable    POW1 s/p phaco: Surgery healing well with no signs of infection or abnormal inflammation.    Patient wishes to proceed with surgery in the second eye. Risks, benefits, alternatives reviewed. IOL selection reviewed.     Left eye  IOL: DIBOO 14.0    ADV KCN OD>OS, WEARS RGPS

## 2024-03-26 NOTE — PROGRESS NOTES
04/05/2024    Gill Cleary  [id number]    CC: toenail discoloration  Location: [place]  Visit type: audiovisual     Face to Face time with patient: 10  total minutes of total time spent on the encounter, which includes face to face time and non-face to face time preparing to see the patient (eg, review of tests), Obtaining and/or reviewing separately obtained history, Documenting clinical information in the electronic or other health record, Independently interpreting results (not separately reported) and communicating results to the patient/family/caregiver, or Care coordination (not separately reported).            Each patient to whom he or she provides medical services by telemedicine is:  (1) informed of the relationship between the physician and patient and the respective role of any other health care provider with respect to management of the patient; and (2) notified that he or she may decline to receive medical services by telemedicine and may withdraw from such care at any time.    HPI  This pt is known to me and presents virtually for toenail discoloration. This has been evaluated by dermatology, but Rx treatment isn't working. Patient is also still concerned about lower leg edema.       Negative 10 point ROS outside of HPI    Social History     Socioeconomic History    Marital status:    Tobacco Use    Smoking status: Never    Smokeless tobacco: Never   Substance and Sexual Activity    Alcohol use: No    Drug use: Never    Sexual activity: Never   Social History Narrative    Tobacco: None    EtOH: None    Drug: None    Employment: Caregiver for      Education:2 year college    Lives with  and 17 yo daughter     Social Determinants of Health     Financial Resource Strain: Medium Risk (4/2/2024)    Overall Financial Resource Strain (CARDIA)     Difficulty of Paying Living Expenses: Somewhat hard   Food Insecurity: No Food Insecurity (4/2/2024)    Hunger Vital Sign     Worried About  Running Out of Food in the Last Year: Never true     Ran Out of Food in the Last Year: Never true   Transportation Needs: No Transportation Needs (4/2/2024)    PRAPARE - Transportation     Lack of Transportation (Medical): No     Lack of Transportation (Non-Medical): No   Physical Activity: Inactive (4/2/2024)    Exercise Vital Sign     Days of Exercise per Week: 0 days     Minutes of Exercise per Session: 0 min   Stress: Stress Concern Present (4/2/2024)    Citizen of [place] of Occupational Health - Occupational Stress Questionnaire     Feeling of Stress : Very much   Social Connections: Moderately Integrated (4/2/2024)    Social Connection and Isolation Panel [NHANES]     Frequency of Communication with Friends and Family: Never     Frequency of Social Gatherings with Friends and Family: Never     Attends Faith Services: 1 to 4 times per year     Active Member of Clubs or Organizations: Yes     Attends Club or Organization Meetings: More than 4 times per year     Marital Status:    Housing Stability: Low Risk  (4/2/2024)    Housing Stability Vital Sign     Unable to Pay for Housing in the Last Year: No     Number of Places Lived in the Last Year: 1     Unstable Housing in the Last Year: No           Current Outpatient Medications:     atorvastatin (LIPITOR) 80 MG tablet, Take 80 mg by mouth., Disp: , Rfl:     hkorhsigk-adutsble-dyoekcq ala (BIKTARVY) -25 mg per tablet, Take 1 tablet by mouth once daily., Disp: , Rfl:     carboxymethylcellulose (REFRESH PLUS) 0.5 % Dpet, 1 drop as needed., Disp: , Rfl:     cetirizine (ZYRTEC) 10 MG tablet, Take 1 tablet (10 mg total) by mouth once daily., Disp: 90 tablet, Rfl: 0    diltiaZEM (CARDIZEM CD) 240 MG 24 hr capsule, Take 1 capsule (240 mg total) by mouth once daily., Disp: 90 capsule, Rfl: 1    ergocalciferol (ERGOCALCIFEROL) 50,000 unit Cap, Take 50,000 Units by mouth every 7 days., Disp: , Rfl:     furosemide (LASIX) 40 MG tablet, Take 1 tablet (40 mg  total) by mouth once daily. (Patient not taking: Reported on 3/26/2024), Disp: 30 tablet, Rfl: 6    hydrALAZINE (APRESOLINE) 50 MG tablet, Take 50 mg by mouth every evening., Disp: , Rfl:     hydroxychloroquine (PLAQUENIL) 200 mg tablet, Take 1 tablet (200 mg total) by mouth 2 (two) times daily. With meals, Disp: 60 tablet, Rfl: 5    hydrOXYzine pamoate (VISTARIL) 25 MG Cap, hydroxyzine pamoate 25 mg capsule  TAKE 1 CAPSULE (25 MG TOTAL) BY MOUTH EVERY 8 (EIGHT) HOURS AS NEEDED (ALLERGIES)., Disp: , Rfl:     lisdexamfetamine (VYVANSE) 60 MG capsule, Take 60 mg by mouth every morning., Disp: , Rfl:     montelukast (SINGULAIR) 10 mg tablet, Take 1 tablet (10 mg total) by mouth every other day., Disp: 90 tablet, Rfl: 3    olopatadine (PATANOL) 0.1 % ophthalmic solution, Apply to eye., Disp: , Rfl:     omeprazole (PRILOSEC) 40 MG capsule, Take 1 capsule (40 mg total) by mouth every morning., Disp: 90 capsule, Rfl: 3    oxyCODONE (ROXICODONE) 10 mg Tab immediate release tablet, Take 10 mg by mouth 3 (three) times daily., Disp: , Rfl:     oxyCODONE (ROXICODONE) 15 MG Tab, Take 7.5 mg by mouth., Disp: , Rfl:     oxyCODONE (ROXICODONE) 5 MG immediate release tablet, Take 7.5 mg by mouth 3 (three) times daily as needed for Pain., Disp: , Rfl:     prednisolon/gatiflox/bromfenac (PREDNISOL ACE-GATIFLOX-BROMFEN) 1-0.5-0.075 % DrpS, Apply 1 drop to eye 3 (three) times daily. in operative eye for 1 month after surgery, Disp: 5 mL, Rfl: 3    pregabalin (LYRICA) 150 MG capsule, Take 150 mg by mouth 2 (two) times daily., Disp: , Rfl:     propranoloL (INDERAL) 40 MG tablet, Take 40 mg by mouth every evening., Disp: , Rfl:     risperiDONE (RISPERDAL) 1 MG tablet, Take 1 mg by mouth every evening., Disp: , Rfl:     semaglutide (OZEMPIC) 0.25 mg or 0.5 mg (2 mg/3 mL) pen injector, Inject 0.5 mg into the skin every 7 days., Disp: 3 mL, Rfl: 2    terbinafine HCL (LAMISIL) 250 mg tablet, Take 1 tablet (250 mg total) by mouth once daily.,  Disp: 90 tablet, Rfl: 0    venlafaxine (EFFEXOR-XR) 150 MG Cp24, Take 150 mg by mouth every morning., Disp: , Rfl:     venlafaxine (EFFEXOR-XR) 75 MG 24 hr capsule, Take 75 mg by mouth every morning., Disp: , Rfl:     VYVANSE 50 mg capsule, Take 50 mg by mouth every morning., Disp: , Rfl:     Current Facility-Administered Medications:     sodium chloride 0.9% flush 10 mL, 10 mL, Intravenous, PRN, Kim Aldrich MD      Physical Exam  Vitals unable to obtain    Gen: well appearing  Resp: speaks in full sentences. Non labored breathing  Cv: non-diaphoretic      1. Hyperglycemia  - Hemoglobin A1C; Future    2. Diabetes mellitus screening  - Hemoglobin A1C; Future    3. Onychomycosis  - START terbinafine HCL (LAMISIL) 250 mg tablet; Take 1 tablet (250 mg total) by mouth once daily.  Dispense: 90 tablet; Refill: 0    4. Localized swelling of both lower legs  - US Lower Extremity Veins Bilateral; Future      RTC in 3 months    Yaima Burton MD  Family Medicine

## 2024-04-01 ENCOUNTER — HOSPITAL ENCOUNTER (OUTPATIENT)
Dept: RADIOLOGY | Facility: HOSPITAL | Age: 54
Discharge: HOME | End: 2024-04-01
Attending: STUDENT IN AN ORGANIZED HEALTH CARE EDUCATION/TRAINING PROGRAM
Payer: MEDICARE

## 2024-04-01 DIAGNOSIS — R22.43 LOCALIZED SWELLING OF BOTH LOWER LEGS: ICD-10-CM

## 2024-04-01 PROCEDURE — 93970 EXTREMITY STUDY: CPT | Mod: TC,HCNC

## 2024-04-01 PROCEDURE — 93970 EXTREMITY STUDY: CPT | Mod: 26,HCNC,, | Performed by: RADIOLOGY

## 2024-04-03 ENCOUNTER — PATIENT MESSAGE (OUTPATIENT)
Dept: ADMINISTRATIVE | Facility: HOSPITAL | Age: 54
End: 2024-04-03
Payer: MEDICARE

## 2024-04-12 ENCOUNTER — TELEPHONE (OUTPATIENT)
Dept: OPHTHALMOLOGY | Facility: CLINIC | Age: 54
End: 2024-04-12
Payer: MEDICARE

## 2024-04-12 DIAGNOSIS — H25.12 NUCLEAR SCLEROTIC CATARACT OF LEFT EYE: Primary | ICD-10-CM

## 2024-04-13 ENCOUNTER — PATIENT MESSAGE (OUTPATIENT)
Dept: ADMINISTRATIVE | Facility: HOSPITAL | Age: 54
End: 2024-04-13
Payer: MEDICARE

## 2024-04-16 ENCOUNTER — PATIENT OUTREACH (OUTPATIENT)
Dept: ADMINISTRATIVE | Facility: HOSPITAL | Age: 54
End: 2024-04-16
Payer: MEDICARE

## 2024-04-16 DIAGNOSIS — I12.9 HYPERTENSIVE CHRONIC KIDNEY DISEASE WITH STAGE 1 THROUGH STAGE 4 CHRONIC KIDNEY DISEASE, OR UNSPECIFIED CHRONIC KIDNEY DISEASE: Primary | ICD-10-CM

## 2024-04-16 NOTE — PROGRESS NOTES
Health Maintenance Due   Topic Date Due    Annual UACr  Never done    COVID-19 Vaccine (6 - 2023-24 season) 09/01/2023    Mammogram  05/31/2024    URINE SCHEDULED. 05/2024

## 2024-04-29 ENCOUNTER — TELEPHONE (OUTPATIENT)
Dept: OPHTHALMOLOGY | Facility: CLINIC | Age: 54
End: 2024-04-29
Payer: MEDICARE

## 2024-04-29 NOTE — TELEPHONE ENCOUNTER
Patient given arrival time of 8:30 am on Thursday May 2 . Nothing to eat or drink after 11:59 pm. Start drops into the operative eye today. [address] [facility]

## 2024-05-01 NOTE — PRE-PROCEDURE INSTRUCTIONS
Unable to reach pt via phone.  Left voicemail with arrival time also informing pt of need for responsible  accompaniment and instructing pt to follow pre-procedure instructions provided via [facility] portal.  The following message was sent to pt's portal.       Dear Gill     Below you will find basic pre-procedure instructions in preparation for your procedure on 5/2/24 with Dr. Aldrich     You should have received your arrival time already from Dr's office.     - Nothing to eat or drink after midnight the night before your procedure until after your procedure, except AM meds with small sips of water.     - HOLD all oral Diabetic medications night before and morning of procedure  - HOLD all Insulin morning of procedure  - HOLD all Fluid pills morning of procedure  - HOLD all non-insulin shots until after surgery (Ozempic, Mounjaro, Trulicity, Victoza, Byetta, Wegovy and Adlyxin) (7 days prior)  - HOLD all vitamins, minerals and herbal supplements morning of procedure   - TAKE all B/P meds, EXCEPT those that contain a fluid pill (ex. Lasix, Hydroclorothiazide/HCTZ, Spirnolactone)  - USE inhalers as needed and bring AM of surgery  - USE EYE DROPS as directed  -TAKE blood thinner meds AM of surgery unless otherwise instructed by your provider to not take     - Shower and wash face with dial soap for 3 mins PM prior and AM of surgery  - No powder, lotions, creams, oils, gels, ointments, makeup,  or jewelry    - Wear comfortable clothing (button up shirt)     (Patient is required to have a responsible ride to transport home, ride may not leave while patient is in surgery)     -- [facility], 2nd floor Surgery Center, located   @ [address] [place] [address]  2nd Floor Registration        If you have any questions or concerns please feel free to contact your surgeon's office.           Please reply to this message as receipt of delivery.     [place], LPN [facility]  Pre-Admit - Anesthesia Dept

## 2024-05-02 ENCOUNTER — HOSPITAL ENCOUNTER (OUTPATIENT)
Facility: HOSPITAL | Age: 54
Discharge: HOME | End: 2024-05-02
Attending: OPHTHALMOLOGY | Admitting: OPHTHALMOLOGY
Payer: MEDICARE

## 2024-05-02 VITALS
TEMPERATURE: 98 F | DIASTOLIC BLOOD PRESSURE: 60 MMHG | SYSTOLIC BLOOD PRESSURE: 127 MMHG | HEART RATE: 61 BPM | RESPIRATION RATE: 16 BRPM | OXYGEN SATURATION: 97 %

## 2024-05-02 DIAGNOSIS — H25.11 NUCLEAR SCLEROTIC CATARACT OF RIGHT EYE: Primary | ICD-10-CM

## 2024-05-02 DIAGNOSIS — H25.12 NUCLEAR SCLEROTIC CATARACT OF LEFT EYE: ICD-10-CM

## 2024-05-02 PROCEDURE — 36000706: Performed by: OPHTHALMOLOGY

## 2024-05-02 PROCEDURE — V2632 POST CHMBR INTRAOCULAR LENS: HCPCS | Performed by: OPHTHALMOLOGY

## 2024-05-02 PROCEDURE — 94761 N-INVAS EAR/PLS OXIMETRY MLT: CPT

## 2024-05-02 PROCEDURE — 25000003 PHARM REV CODE 250: Performed by: OPHTHALMOLOGY

## 2024-05-02 PROCEDURE — 66984 XCAPSL CTRC RMVL W/O ECP: CPT | Mod: 79,LT,, | Performed by: OPHTHALMOLOGY

## 2024-05-02 PROCEDURE — 99900035 HC TECH TIME PER 15 MIN (STAT)

## 2024-05-02 PROCEDURE — 36000707: Performed by: OPHTHALMOLOGY

## 2024-05-02 PROCEDURE — 71000015 HC POSTOP RECOV 1ST HR: Performed by: OPHTHALMOLOGY

## 2024-05-02 PROCEDURE — 63600175 PHARM REV CODE 636 W HCPCS: Performed by: OPHTHALMOLOGY

## 2024-05-02 DEVICE — LENS EYHANCE +14.0D: Type: IMPLANTABLE DEVICE | Site: EYE | Status: FUNCTIONAL

## 2024-05-02 RX ORDER — TETRACAINE HYDROCHLORIDE 5 MG/ML
1 SOLUTION OPHTHALMIC
Status: COMPLETED | OUTPATIENT
Start: 2024-05-02 | End: 2024-05-02

## 2024-05-02 RX ORDER — PHENYLEPHRINE HYDROCHLORIDE 25 MG/ML
1 SOLUTION/ DROPS OPHTHALMIC
Status: COMPLETED | OUTPATIENT
Start: 2024-05-02 | End: 2024-05-02

## 2024-05-02 RX ORDER — PHENYLEPHRINE HYDROCHLORIDE 100 MG/ML
1 SOLUTION/ DROPS OPHTHALMIC
Status: DISCONTINUED | OUTPATIENT
Start: 2024-05-02 | End: 2024-05-02 | Stop reason: HOSPADM

## 2024-05-02 RX ORDER — MOXIFLOXACIN 5 MG/ML
1 SOLUTION/ DROPS OPHTHALMIC
Status: COMPLETED | OUTPATIENT
Start: 2024-05-02 | End: 2024-05-02

## 2024-05-02 RX ORDER — TETRACAINE HYDROCHLORIDE 5 MG/ML
SOLUTION OPHTHALMIC
Status: DISCONTINUED | OUTPATIENT
Start: 2024-05-02 | End: 2024-05-02 | Stop reason: HOSPADM

## 2024-05-02 RX ORDER — MIDAZOLAM HYDROCHLORIDE 1 MG/ML
1 INJECTION, SOLUTION INTRAMUSCULAR; INTRAVENOUS
Status: DISCONTINUED | OUTPATIENT
Start: 2024-05-02 | End: 2024-05-02 | Stop reason: HOSPADM

## 2024-05-02 RX ORDER — LIDOCAINE HYDROCHLORIDE 40 MG/ML
INJECTION, SOLUTION RETROBULBAR
Status: DISCONTINUED | OUTPATIENT
Start: 2024-05-02 | End: 2024-05-02 | Stop reason: HOSPADM

## 2024-05-02 RX ORDER — TROPICAMIDE 10 MG/ML
1 SOLUTION/ DROPS OPHTHALMIC
Status: COMPLETED | OUTPATIENT
Start: 2024-05-02 | End: 2024-05-02

## 2024-05-02 RX ORDER — SODIUM CHLORIDE 0.9 % (FLUSH) 0.9 %
10 SYRINGE (ML) INJECTION
Status: DISCONTINUED | OUTPATIENT
Start: 2024-05-02 | End: 2024-05-02 | Stop reason: HOSPADM

## 2024-05-02 RX ORDER — PROPARACAINE HYDROCHLORIDE 5 MG/ML
1 SOLUTION/ DROPS OPHTHALMIC
Status: DISCONTINUED | OUTPATIENT
Start: 2024-05-02 | End: 2024-05-02 | Stop reason: HOSPADM

## 2024-05-02 RX ORDER — MOXIFLOXACIN 5 MG/ML
SOLUTION/ DROPS OPHTHALMIC
Status: DISCONTINUED | OUTPATIENT
Start: 2024-05-02 | End: 2024-05-02 | Stop reason: HOSPADM

## 2024-05-02 RX ADMIN — MOXIFLOXACIN 1 DROP: 5 SOLUTION/ DROPS OPHTHALMIC at 09:05

## 2024-05-02 RX ADMIN — MIDAZOLAM HYDROCHLORIDE 1 MG: 1 INJECTION, SOLUTION INTRAMUSCULAR; INTRAVENOUS at 09:05

## 2024-05-02 RX ADMIN — MOXIFLOXACIN OPHTHALMIC 1 DROP: 5 SOLUTION/ DROPS OPHTHALMIC at 08:05

## 2024-05-02 RX ADMIN — PHENYLEPHRINE HYDROCHLORIDE 1 DROP: 25 SOLUTION/ DROPS OPHTHALMIC at 08:05

## 2024-05-02 RX ADMIN — TROPICAMIDE 1 DROP: 10 SOLUTION/ DROPS OPHTHALMIC at 08:05

## 2024-05-02 RX ADMIN — MIDAZOLAM HYDROCHLORIDE 2 MG: 1 INJECTION, SOLUTION INTRAMUSCULAR; INTRAVENOUS at 09:05

## 2024-05-02 RX ADMIN — TETRACAINE HYDROCHLORIDE 1 DROP: 5 SOLUTION/ DROPS OPHTHALMIC at 08:05

## 2024-05-02 NOTE — DISCHARGE INSTRUCTIONS
CATARACT SURGERY    POST-OPERATIVE INSTRUCTIONS    · Apply drops THREE times a day into operative eye for 30 days.    · DO NOT rub your eye    · Wear protective sunglasses during the day    · Resume moderate activity    · Bathe/shower/wash face normally    · DO NOT apply makeup around the operative eye for 1 week.         You should expect    - Blurry vision and halos for 24-48 hours    - Dilated pupil for 24-48 hours    - Scratchy feeling in the eye for 1-2 days    - Curved shadow in your peripheral vision for 2-3 weeks    - Occasional flickering of lights for up to 1 week    -If you experience severe pain or nausea, please call Dr Aldrich or the on-call doctor at [phone]    - Plan to see Dr Aldrich tomorrow .      [facility]    [address] [facility] [address]    ** Most patients can drive the next day, but if you do not feel comfortable driving, please arrange for transportation.

## 2024-05-02 NOTE — DISCHARGE SUMMARY
Outcome: Successful outpatient ophthalmic surgical procedure  Preprinted Instructions given to patient.  Regular diet.  Activity: No restrictions  Meds: see Med Rec  Condition: stable  Follow up: 1 day with Dr Aldrich  Disposition: Home  Diagnosis: s/p eye surgery  Date of discharge: 05/02/2024

## 2024-05-02 NOTE — OP NOTE
SURGEON:  Kim Aldrich M.D.    PREOPERATIVE DIAGNOSIS:    Nuclear Sclerotic Cataract Left Eye    POSTOPERATIVE DIAGNOSIS:    Nuclear Sclerotic Cataract Left Eye    PROCEDURES:    Phacoemulsification with  intraocular lens, Left eye ([id number])    DATE OF SURGERY: 05/02/2024    IMPLANT: DIBOO 14.0    ANESTHESIA:  Moderate sedation with topical Lidocaine. Patient ID confirmed and re-evaluated the patient and anesthesia plan confirming it is suitable for the patient's condition and procedure.    COMPLICATIONS:  None    ESTIMATED BLOOD LOSS: None    SPECIMENS: None    INDICATIONS:    The patient has a history of painless progressive visual loss and difficulty with activities of daily living, which specifically include difficult driving at night due to glare and difficulty reading small print, secondary to cataract formation.  After a thorough discussion of the risks, benefits, and alternatives to cataract surgery, including, but not limited to, the rare risks of infection, retinal detachment, hemorrhage, need for additional surgery, loss of vision, and even loss of the eye, the patient voices understanding and desires to proceed.    DESCRIPTION OF PROCEDURE:    The patients IOL calculations were reviewed, and the lens selection confirmed.   After verification and marking of the proper eye in the preop holding area, the patient was brought to the operating room in supine position where the eye was prepped and draped in standard sterile fashion with 5% Betadine and a lid speculum placed in the eye.   Topical 4% Lidocaine was used in addition to the preoperative anesthesia and the procedure was begun by the creation of a paracentesis incision through which viscoelastic was used to fill the anterior chamber.  Next, a keratome blade was used to create a triplanar temporal clear corneal incision and a cystotome and Utrata forceps used to fashion a continuous curvilinear capsulorrhexis.  Hydrodissection was carried out using the  Marquez hydrodissection cannula and the nucleus was found to be mobile.  Phacoemulsification of the nucleus was carried out using a quick chop technique, and all remaining epinuclear and cortical material was removed.  The eye was then reformed with Viscoelastic and the  intraocular lens was implanted into the capsular bag.  All remaining viscoelastics were removed from the eye and at the end of the case the pupil was round, the lens was well-centered within the capsular bag and all wounds were found to be water tight.  Drops of Vigamox and Pred Forte were instilled and a shield was placed over the eye. The patient will follow up with Dr. Aldrich in the morning.

## 2024-05-03 ENCOUNTER — OFFICE VISIT (OUTPATIENT)
Dept: OPHTHALMOLOGY | Facility: CLINIC | Age: 54
End: 2024-05-03
Payer: MEDICARE

## 2024-05-03 DIAGNOSIS — Z98.890 POST-OPERATIVE STATE: Primary | ICD-10-CM

## 2024-05-03 PROCEDURE — 1159F MED LIST DOCD IN RCRD: CPT | Mod: CPTII,S$GLB,, | Performed by: OPHTHALMOLOGY

## 2024-05-03 PROCEDURE — 99024 POSTOP FOLLOW-UP VISIT: CPT | Mod: S$GLB,,, | Performed by: OPHTHALMOLOGY

## 2024-05-03 PROCEDURE — 99999 PR PBB SHADOW E&M-EST. PATIENT-LVL III: CPT | Mod: PBBFAC,,, | Performed by: OPHTHALMOLOGY

## 2024-05-03 NOTE — PROGRESS NOTES
HPI    PROCEDURES:    Phacoemulsification with  intraocular lens, Left eye ([id number])     DATE OF SURGERY: 05/02/2024     IMPLANT: DIBOO 14.0    Gtt's:  PGB TID OS    Patient is here today for 1 day post op OS.  Pt. States seeing way better than before surgery.  Pt. Denies pain or discomfort.  Last edited by Opal Duvall on 5/3/2024  9:17 AM.            Assessment /Plan     For exam results, see Encounter Report.    Post-operative state      Slit lamp exam:  L/L: nl  K: clear, wound sealed  AC: 1+ cell  Lens: IOL centered and stable    POD1 s/p Phaco/IOL  Appropriate precautions and post op medications reviewed.  Patient instructed to call or come in if symptoms of redness, decreased vision, or pain are experienced.  Hx KCN with CLT use OS

## 2024-05-27 ENCOUNTER — PATIENT MESSAGE (OUTPATIENT)
Dept: RHEUMATOLOGY | Facility: CLINIC | Age: 54
End: 2024-05-27
Payer: MEDICARE

## 2024-05-27 ENCOUNTER — PATIENT MESSAGE (OUTPATIENT)
Dept: PRIMARY CARE CLINIC | Facility: CLINIC | Age: 54
End: 2024-05-27
Payer: MEDICARE

## 2024-05-27 ENCOUNTER — PATIENT OUTREACH (OUTPATIENT)
Dept: ADMINISTRATIVE | Facility: HOSPITAL | Age: 54
End: 2024-05-27
Payer: MEDICARE

## 2024-05-27 DIAGNOSIS — J30.2 SEASONAL ALLERGIES: ICD-10-CM

## 2024-05-27 DIAGNOSIS — M06.00 SERONEGATIVE RHEUMATOID ARTHRITIS: ICD-10-CM

## 2024-05-27 NOTE — PROGRESS NOTES
Health Maintenance Due   Topic Date Due    Annual UACr  Never done    COVID-19 Vaccine (7 - 2023-24 season) 09/01/2023    Mammogram  05/31/2024    Mammogram, lab 06/04/2024 scheduled.,mailed appointment reminder.

## 2024-05-27 NOTE — TELEPHONE ENCOUNTER
No care due was identified.  [facility] Embedded Care Due Messages. Reference number: [id number].   5/27/2024 11:05:54 AM CDT

## 2024-05-28 RX ORDER — HYDROXYCHLOROQUINE SULFATE 200 MG/1
200 TABLET, FILM COATED ORAL 2 TIMES DAILY
Qty: 60 TABLET | Refills: 1 | Status: SHIPPED | OUTPATIENT
Start: 2024-05-28

## 2024-06-01 RX ORDER — CETIRIZINE HYDROCHLORIDE 10 MG/1
10 TABLET ORAL DAILY
Qty: 90 TABLET | Refills: 3 | Status: SHIPPED | OUTPATIENT
Start: 2024-06-01 | End: 2025-06-01

## 2024-06-06 ENCOUNTER — TELEPHONE (OUTPATIENT)
Dept: OPHTHALMOLOGY | Facility: CLINIC | Age: 54
End: 2024-06-06
Payer: MEDICARE

## 2024-06-06 NOTE — TELEPHONE ENCOUNTER
Please let pt know that if the cream isn't working then she needs to follow up with dermatology. It could be a medication side effect.

## 2024-06-06 NOTE — TELEPHONE ENCOUNTER
Patient states her left is dry when she wakes up in the morning. Recommend she use Systane Nightime Gel or Refresh Celluvisc before bed. Patient is aware to give us a call if symptoms worsens and we can schedule an appointment with Dr. Perry for dry eye evaluation

## 2024-06-12 DIAGNOSIS — I10 ESSENTIAL HYPERTENSION: ICD-10-CM

## 2024-06-12 RX ORDER — FUROSEMIDE 40 MG/1
40 TABLET ORAL
Qty: 90 TABLET | Refills: 2 | Status: SHIPPED | OUTPATIENT
Start: 2024-06-12

## 2024-06-12 NOTE — TELEPHONE ENCOUNTER
Refill Routing Note   Medication(s) are not appropriate for processing by [facility] for the following reason(s):        Drug-disease interaction    [facility] action(s):  Defer        Medication Therapy Plan: furosemide and Hypertensive chronic kidney disease with stage 1 through stage 4 chronic kidney disease, or unspecified chronic kidney disease    Pharmacist review requested: Yes     Appointments  past 12m or future 3m with PCP    Date Provider   Last Visit   3/26/2024 Yaima Burton MD   Next Visit   Visit date not found Yaima Burton MD   ED visits in past 90 days: 0        Note composed:1:01 PM 06/12/2024

## 2024-06-12 NOTE — TELEPHONE ENCOUNTER
Refill Decision Note   Gill Cleary  is requesting a refill authorization.  Brief Assessment and Rationale for Refill:  Approve     Medication Therapy Plan:        Pharmacist review requested: Yes   Comments:     Note composed:3:33 PM 06/12/2024

## 2024-06-12 NOTE — TELEPHONE ENCOUNTER
No care due was identified.  [facility] Embedded Care Due Messages. Reference number: [id number].   6/12/2024 8:42:40 AM CDT

## 2024-06-14 DIAGNOSIS — I10 PRIMARY HYPERTENSION: ICD-10-CM

## 2024-06-14 NOTE — TELEPHONE ENCOUNTER
No care due was identified.  [facility] Embedded Care Due Messages. Reference number: [id number].   6/14/2024 2:05:54 PM CDT

## 2024-06-15 RX ORDER — DILTIAZEM HYDROCHLORIDE 240 MG/1
240 CAPSULE, COATED, EXTENDED RELEASE ORAL
Qty: 90 CAPSULE | Refills: 3 | Status: SHIPPED | OUTPATIENT
Start: 2024-06-15

## 2024-06-15 NOTE — TELEPHONE ENCOUNTER
Refill Decision Note   Gill Evin  is requesting a refill authorization.  Brief Assessment and Rationale for Refill:  Approve     Medication Therapy Plan:         Comments:     Note composed:2:19 PM 06/15/2024

## 2024-06-20 ENCOUNTER — OFFICE VISIT (OUTPATIENT)
Dept: PRIMARY CARE CLINIC | Facility: CLINIC | Age: 54
End: 2024-06-20
Payer: MEDICARE

## 2024-06-20 VITALS
SYSTOLIC BLOOD PRESSURE: 111 MMHG | TEMPERATURE: 98 F | DIASTOLIC BLOOD PRESSURE: 69 MMHG | OXYGEN SATURATION: 98 % | BODY MASS INDEX: 43.77 KG/M2 | HEIGHT: 63 IN | WEIGHT: 247 LBS | HEART RATE: 70 BPM

## 2024-06-20 DIAGNOSIS — G89.29 CHRONIC RIGHT SHOULDER PAIN: ICD-10-CM

## 2024-06-20 DIAGNOSIS — M25.511 CHRONIC RIGHT SHOULDER PAIN: ICD-10-CM

## 2024-06-20 DIAGNOSIS — Z79.891 LONG TERM (CURRENT) USE OF OPIATE ANALGESIC: ICD-10-CM

## 2024-06-20 DIAGNOSIS — I10 ESSENTIAL HYPERTENSION: ICD-10-CM

## 2024-06-20 DIAGNOSIS — Z01.818 PREOP EXAMINATION: Primary | ICD-10-CM

## 2024-06-20 PROCEDURE — 3008F BODY MASS INDEX DOCD: CPT | Mod: HCNC,CPTII,S$GLB, | Performed by: STUDENT IN AN ORGANIZED HEALTH CARE EDUCATION/TRAINING PROGRAM

## 2024-06-20 PROCEDURE — 99999 PR PBB SHADOW E&M-EST. PATIENT-LVL V: CPT | Mod: PBBFAC,HCNC,, | Performed by: STUDENT IN AN ORGANIZED HEALTH CARE EDUCATION/TRAINING PROGRAM

## 2024-06-20 PROCEDURE — 1159F MED LIST DOCD IN RCRD: CPT | Mod: HCNC,CPTII,S$GLB, | Performed by: STUDENT IN AN ORGANIZED HEALTH CARE EDUCATION/TRAINING PROGRAM

## 2024-06-20 PROCEDURE — 3078F DIAST BP <80 MM HG: CPT | Mod: HCNC,CPTII,S$GLB, | Performed by: STUDENT IN AN ORGANIZED HEALTH CARE EDUCATION/TRAINING PROGRAM

## 2024-06-20 PROCEDURE — 3044F HG A1C LEVEL LT 7.0%: CPT | Mod: HCNC,CPTII,S$GLB, | Performed by: STUDENT IN AN ORGANIZED HEALTH CARE EDUCATION/TRAINING PROGRAM

## 2024-06-20 PROCEDURE — 99214 OFFICE O/P EST MOD 30 MIN: CPT | Mod: HCNC,S$GLB,, | Performed by: STUDENT IN AN ORGANIZED HEALTH CARE EDUCATION/TRAINING PROGRAM

## 2024-06-20 PROCEDURE — 3074F SYST BP LT 130 MM HG: CPT | Mod: HCNC,CPTII,S$GLB, | Performed by: STUDENT IN AN ORGANIZED HEALTH CARE EDUCATION/TRAINING PROGRAM

## 2024-06-20 NOTE — PROGRESS NOTES
Chief Complaint   Patient presents with    medical clearance     Shoulder surgery on 06/27/2024        Subjective:     Gill Cleary who presents to the office today for a preoperative consultation at the request of surgeon Dr. Cornelius Morelos who plans on performing Right shoulder arthroscopy on 6/27/24. Chronic conditions: chronic opioid dependence, HIV, HTN, RA and obesity    Planned anesthesia: general.     The patient has the following known anesthesia issues:  NONE .     Patients bleeding risk: no recent abnormal bleeding and no remote history of abnormal bleeding. Is on daily ASA 81 mg     CAD: does follow with cardiologist; last visit 3 months ago. No changes made at that visit.     No history of lung disease    HIV: follows with [facility] and is up to date care; will be getting routine labs today.    FUNCTIONAL STATUS >4 mets         The following portions of the patient's history were reviewed and updated as appropriate: allergies, current medications, past family history, past medical history, past social history, past surgical history, and problem list.    Review of Systems  Pertinent items are noted in HPI.      Objective:     Vitals:    06/20/24 1047   BP: 111/69   Pulse: 70   Temp: 98.2 °F (36.8 °C)       GEN: NAD, AAox3, well nourished  HEENT: NCAT, EOMI, PEERL, no scleral injection, TM normal, moist mucous membranes, oropharynx clear, no erythema, no exudates  NECK: full rom, no cervical lymphadenopathy, no thyroidmegally  CV: RRR, no m/r/g, trace LE edema  LUNGS: CTAB, non-labored breathing, no wheezes, no crackles  ABD: soft, non-distended, no rebound/guarding, no organomegaly  EXT: n c/c/e, warm, 5/5 UE and LE strength  NEURO: CNII-XII intact no focal deficit  PSYCH: nl affect, no hallucinations, nl speech  Skin: intact, no rashes/lesions/erythema          Assessment:       Gill Cleary  with planned surgery as above.    Known risk factors for perioperative complications: None      1. Preop  examination  - SCHEDULED EKG 12-LEAD (to Muse); Future  - X-Ray Chest PA And Lateral; Future  - Hemoglobin A1C; Future  - CBC Auto Differential; Future  - Comprehensive Metabolic Panel; Future    2. Long term (current) use of opiate analgesic  - Hemoglobin A1C; Future    3. Essential hypertension  Well controlled  Continue current regimen    4. Chronic right shoulder pain  Follows with ortho.    Pending labs and imaging patient medially optimized for general anesthesia.

## 2024-06-21 ENCOUNTER — TELEPHONE (OUTPATIENT)
Dept: PRIMARY CARE CLINIC | Facility: CLINIC | Age: 54
End: 2024-06-21
Payer: MEDICARE

## 2024-06-21 NOTE — TELEPHONE ENCOUNTER
----- Message from Yaima Burton MD sent at 6/21/2024 10:13 AM CDT -----  Please let patient know that her labs were normal and will be faxed to her surgeon for clearance.

## 2024-06-26 DIAGNOSIS — M06.00 SERONEGATIVE RHEUMATOID ARTHRITIS: ICD-10-CM

## 2024-06-26 RX ORDER — HYDROXYCHLOROQUINE SULFATE 200 MG/1
200 TABLET, FILM COATED ORAL 2 TIMES DAILY
Qty: 180 TABLET | Refills: 1 | Status: SHIPPED | OUTPATIENT
Start: 2024-06-26

## 2024-06-27 ENCOUNTER — PATIENT MESSAGE (OUTPATIENT)
Dept: RHEUMATOLOGY | Facility: CLINIC | Age: 54
End: 2024-06-27
Payer: MEDICARE

## 2024-07-09 ENCOUNTER — PATIENT MESSAGE (OUTPATIENT)
Dept: ADMINISTRATIVE | Facility: HOSPITAL | Age: 54
End: 2024-07-09
Payer: MEDICARE

## 2024-07-10 ENCOUNTER — PATIENT MESSAGE (OUTPATIENT)
Dept: ADMINISTRATIVE | Facility: OTHER | Age: 54
End: 2024-07-10
Payer: MEDICARE

## 2024-07-10 ENCOUNTER — PATIENT MESSAGE (OUTPATIENT)
Dept: ADMINISTRATIVE | Facility: HOSPITAL | Age: 54
End: 2024-07-10
Payer: MEDICARE

## 2024-07-12 ENCOUNTER — TELEPHONE (OUTPATIENT)
Dept: OPTOMETRY | Facility: CLINIC | Age: 54
End: 2024-07-12
Payer: MEDICARE

## 2024-07-14 ENCOUNTER — PATIENT MESSAGE (OUTPATIENT)
Dept: RHEUMATOLOGY | Facility: CLINIC | Age: 54
End: 2024-07-14
Payer: MEDICARE

## 2024-08-15 ENCOUNTER — OFFICE VISIT (OUTPATIENT)
Dept: RHEUMATOLOGY | Facility: CLINIC | Age: 54
End: 2024-08-15
Payer: MEDICARE

## 2024-08-15 VITALS
DIASTOLIC BLOOD PRESSURE: 71 MMHG | HEART RATE: 80 BPM | BODY MASS INDEX: 44.65 KG/M2 | WEIGHT: 252 LBS | SYSTOLIC BLOOD PRESSURE: 137 MMHG | HEIGHT: 63 IN

## 2024-08-15 DIAGNOSIS — M54.40 CHRONIC LOW BACK PAIN WITH SCIATICA, SCIATICA LATERALITY UNSPECIFIED, UNSPECIFIED BACK PAIN LATERALITY: ICD-10-CM

## 2024-08-15 DIAGNOSIS — G89.29 CHRONIC PAIN OF BOTH KNEES: Primary | ICD-10-CM

## 2024-08-15 DIAGNOSIS — M67.919 ROTATOR CUFF DISORDER, UNSPECIFIED LATERALITY: ICD-10-CM

## 2024-08-15 DIAGNOSIS — M25.561 CHRONIC PAIN OF BOTH KNEES: Primary | ICD-10-CM

## 2024-08-15 DIAGNOSIS — M25.562 CHRONIC PAIN OF BOTH KNEES: Primary | ICD-10-CM

## 2024-08-15 DIAGNOSIS — M06.00 SERONEGATIVE RHEUMATOID ARTHRITIS: ICD-10-CM

## 2024-08-15 DIAGNOSIS — G89.29 CHRONIC LOW BACK PAIN WITH SCIATICA, SCIATICA LATERALITY UNSPECIFIED, UNSPECIFIED BACK PAIN LATERALITY: ICD-10-CM

## 2024-08-15 PROCEDURE — 3078F DIAST BP <80 MM HG: CPT | Mod: HCNC,CPTII,S$GLB, | Performed by: STUDENT IN AN ORGANIZED HEALTH CARE EDUCATION/TRAINING PROGRAM

## 2024-08-15 PROCEDURE — 99999 PR PBB SHADOW E&M-EST. PATIENT-LVL IV: CPT | Mod: PBBFAC,HCNC,, | Performed by: STUDENT IN AN ORGANIZED HEALTH CARE EDUCATION/TRAINING PROGRAM

## 2024-08-15 PROCEDURE — 3075F SYST BP GE 130 - 139MM HG: CPT | Mod: HCNC,CPTII,S$GLB, | Performed by: STUDENT IN AN ORGANIZED HEALTH CARE EDUCATION/TRAINING PROGRAM

## 2024-08-15 PROCEDURE — 99215 OFFICE O/P EST HI 40 MIN: CPT | Mod: HCNC,S$GLB,, | Performed by: STUDENT IN AN ORGANIZED HEALTH CARE EDUCATION/TRAINING PROGRAM

## 2024-08-15 PROCEDURE — 3008F BODY MASS INDEX DOCD: CPT | Mod: HCNC,CPTII,S$GLB, | Performed by: STUDENT IN AN ORGANIZED HEALTH CARE EDUCATION/TRAINING PROGRAM

## 2024-08-15 PROCEDURE — 3044F HG A1C LEVEL LT 7.0%: CPT | Mod: HCNC,CPTII,S$GLB, | Performed by: STUDENT IN AN ORGANIZED HEALTH CARE EDUCATION/TRAINING PROGRAM

## 2024-08-15 PROCEDURE — 1159F MED LIST DOCD IN RCRD: CPT | Mod: HCNC,CPTII,S$GLB, | Performed by: STUDENT IN AN ORGANIZED HEALTH CARE EDUCATION/TRAINING PROGRAM

## 2024-08-15 RX ORDER — PREDNISONE 5 MG/1
TABLET ORAL
Qty: 30 TABLET | Refills: 0 | Status: SHIPPED | OUTPATIENT
Start: 2024-08-15 | End: 2024-08-30

## 2024-08-15 RX ORDER — FUROSEMIDE 20 MG/1
20 TABLET ORAL
COMMUNITY

## 2024-08-15 RX ORDER — MELOXICAM 7.5 MG/1
7.5 TABLET ORAL 2 TIMES DAILY PRN
COMMUNITY
Start: 2024-07-29

## 2024-08-15 RX ORDER — VENLAFAXINE HYDROCHLORIDE 150 MG/1
1 CAPSULE, EXTENDED RELEASE ORAL DAILY
COMMUNITY
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
## 2024-08-15 NOTE — PROGRESS NOTES
8/14/2024    10:25 AM   Rapid3 Question Responses and Scores   MDHAQ Score 1.4   Psychologic Score 2.2   Pain Score 8.5   When you awakened in the morning OVER THE LAST WEEK, did you feel stiff? Yes   If Yes, please indicate the number of hours until you are as limber as you will be for the day 3   Fatigue Score 8.5   Global Health Score 7.5   RAPID3 Score 6.89

## 2024-08-16 ENCOUNTER — PATIENT MESSAGE (OUTPATIENT)
Dept: RHEUMATOLOGY | Facility: CLINIC | Age: 54
End: 2024-08-16
Payer: MEDICARE

## 2024-08-16 DIAGNOSIS — M17.0 BILATERAL PRIMARY OSTEOARTHRITIS OF KNEE: Primary | ICD-10-CM

## 2024-08-21 DIAGNOSIS — M67.919 ROTATOR CUFF DISORDER, UNSPECIFIED LATERALITY: Primary | ICD-10-CM

## 2024-08-27 ENCOUNTER — TELEPHONE (OUTPATIENT)
Dept: PRIMARY CARE CLINIC | Facility: CLINIC | Age: 54
End: 2024-08-27

## 2024-08-27 DIAGNOSIS — Z12.11 COLON CANCER SCREENING: Primary | ICD-10-CM

## 2024-08-27 NOTE — TELEPHONE ENCOUNTER
----- Message from Coleman Murray sent at 8/27/2024  1:22 PM CDT -----  Type:  Needs Medical Advice    Who Called: Pt  Would the patient rather a call back or a response via [facility]? call  Best Call Back Number:  [phone]  Additional Information: Pt is requesting orders be placed in epic for her annal/colon screening to be done please call regarding

## 2024-08-28 ENCOUNTER — PATIENT MESSAGE (OUTPATIENT)
Dept: PRIMARY CARE CLINIC | Facility: CLINIC | Age: 54
End: 2024-08-28
Payer: MEDICARE

## 2024-08-28 DIAGNOSIS — L85.3 DRY SKIN DERMATITIS: Primary | ICD-10-CM

## 2024-08-28 RX ORDER — UREA 40 %
CREAM (GRAM) TOPICAL 2 TIMES DAILY
Qty: 198 G | Refills: 2 | Status: SHIPPED | OUTPATIENT
Start: 2024-08-28

## 2024-08-29 ENCOUNTER — PATIENT MESSAGE (OUTPATIENT)
Dept: RHEUMATOLOGY | Facility: CLINIC | Age: 54
End: 2024-08-29
Payer: MEDICARE

## 2024-09-03 ENCOUNTER — PATIENT OUTREACH (OUTPATIENT)
Dept: ADMINISTRATIVE | Facility: HOSPITAL | Age: 54
End: 2024-09-03
Payer: MEDICARE

## 2024-09-03 LAB — Lab: 254

## 2024-09-16 ENCOUNTER — PATIENT MESSAGE (OUTPATIENT)
Dept: PRIMARY CARE CLINIC | Facility: CLINIC | Age: 54
End: 2024-09-16
Payer: MEDICARE

## 2024-09-16 DIAGNOSIS — G47.33 OSA ON CPAP: Primary | ICD-10-CM

## 2024-09-16 DIAGNOSIS — M06.00 SERONEGATIVE RHEUMATOID ARTHRITIS: ICD-10-CM

## 2024-09-16 DIAGNOSIS — D84.821 DRUG-INDUCED IMMUNODEFICIENCY: ICD-10-CM

## 2024-09-16 DIAGNOSIS — Z79.899 DRUG-INDUCED IMMUNODEFICIENCY: ICD-10-CM

## 2024-09-16 DIAGNOSIS — N18.31 STAGE 3A CHRONIC KIDNEY DISEASE: Primary | ICD-10-CM

## 2024-09-16 RX ORDER — HYDROXYCHLOROQUINE SULFATE 200 MG/1
200 TABLET, FILM COATED ORAL 2 TIMES DAILY
Qty: 60 TABLET | Refills: 1 | Status: SHIPPED | OUTPATIENT
Start: 2024-09-16

## 2024-09-16 RX ORDER — LEFLUNOMIDE 10 MG/1
10 TABLET ORAL DAILY
Qty: 30 TABLET | Refills: 2 | Status: SHIPPED | OUTPATIENT
Start: 2024-09-16

## 2024-09-17 ENCOUNTER — TELEPHONE (OUTPATIENT)
Dept: BARIATRICS | Facility: CLINIC | Age: 54
End: 2024-09-17
Payer: MEDICARE

## 2024-09-17 RX ORDER — SEMAGLUTIDE 0.68 MG/ML
INJECTION, SOLUTION SUBCUTANEOUS
Refills: 0 | OUTPATIENT
Start: 2024-09-17

## 2024-09-20 DIAGNOSIS — K21.9 GASTROESOPHAGEAL REFLUX DISEASE WITHOUT ESOPHAGITIS: ICD-10-CM

## 2024-09-20 DIAGNOSIS — B35.1 ONYCHOMYCOSIS: Primary | ICD-10-CM

## 2024-09-20 RX ORDER — TERBINAFINE HYDROCHLORIDE 250 MG/1
250 TABLET ORAL DAILY
Qty: 30 TABLET | Refills: 0 | Status: SHIPPED | OUTPATIENT
Start: 2024-09-20 | End: 2024-10-20

## 2024-09-20 RX ORDER — OMEPRAZOLE 40 MG/1
40 CAPSULE, DELAYED RELEASE ORAL EVERY MORNING
Qty: 90 CAPSULE | Refills: 3 | Status: SHIPPED | OUTPATIENT
Start: 2024-09-20

## 2024-09-20 RX ORDER — MONTELUKAST SODIUM 10 MG/1
10 TABLET ORAL EVERY OTHER DAY
Qty: 90 TABLET | Refills: 3 | Status: SHIPPED | OUTPATIENT
Start: 2024-09-20

## 2024-09-25 DIAGNOSIS — B35.1 ONYCHOMYCOSIS: ICD-10-CM

## 2024-09-25 RX ORDER — MONTELUKAST SODIUM 10 MG/1
10 TABLET ORAL EVERY OTHER DAY
Qty: 90 TABLET | Refills: 3 | Status: CANCELLED | OUTPATIENT
Start: 2024-09-25

## 2024-09-25 RX ORDER — TERBINAFINE HYDROCHLORIDE 250 MG/1
250 TABLET ORAL DAILY
Qty: 30 TABLET | Refills: 0 | Status: CANCELLED | OUTPATIENT
Start: 2024-09-25 | End: 2024-10-25

## 2024-09-25 RX ORDER — OMEPRAZOLE 40 MG/1
40 CAPSULE, DELAYED RELEASE ORAL EVERY MORNING
Qty: 90 CAPSULE | Refills: 3 | Status: CANCELLED | OUTPATIENT
Start: 2024-09-25

## 2024-10-01 ENCOUNTER — OFFICE VISIT (OUTPATIENT)
Dept: ORTHOPEDICS | Facility: CLINIC | Age: 54
End: 2024-10-01
Payer: MEDICARE

## 2024-10-01 VITALS
HEART RATE: 98 BPM | BODY MASS INDEX: 44.21 KG/M2 | DIASTOLIC BLOOD PRESSURE: 75 MMHG | WEIGHT: 249.56 LBS | SYSTOLIC BLOOD PRESSURE: 136 MMHG

## 2024-10-01 DIAGNOSIS — M17.0 BILATERAL PRIMARY OSTEOARTHRITIS OF KNEE: Primary | ICD-10-CM

## 2024-10-01 PROCEDURE — 99999 PR PBB SHADOW E&M-EST. PATIENT-LVL V: CPT | Mod: PBBFAC,HCNC,,
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
## 2024-10-02 ENCOUNTER — OFFICE VISIT (OUTPATIENT)
Dept: RHEUMATOLOGY | Facility: CLINIC | Age: 54
End: 2024-10-02
Payer: MEDICARE

## 2024-10-02 DIAGNOSIS — M17.0 BILATERAL PRIMARY OSTEOARTHRITIS OF KNEE: ICD-10-CM

## 2024-10-02 DIAGNOSIS — Z79.899 DRUG-INDUCED IMMUNODEFICIENCY: ICD-10-CM

## 2024-10-02 DIAGNOSIS — M06.00 SERONEGATIVE RHEUMATOID ARTHRITIS: Primary | ICD-10-CM

## 2024-10-02 DIAGNOSIS — D84.821 DRUG-INDUCED IMMUNODEFICIENCY: ICD-10-CM

## 2024-10-02 DIAGNOSIS — M54.40 CHRONIC LOW BACK PAIN WITH SCIATICA, SCIATICA LATERALITY UNSPECIFIED, UNSPECIFIED BACK PAIN LATERALITY: ICD-10-CM

## 2024-10-02 DIAGNOSIS — G89.29 CHRONIC LOW BACK PAIN WITH SCIATICA, SCIATICA LATERALITY UNSPECIFIED, UNSPECIFIED BACK PAIN LATERALITY: ICD-10-CM

## 2024-10-02 PROCEDURE — 99214 OFFICE O/P EST MOD 30 MIN: CPT | Mod: HCNC,95,, | Performed by: STUDENT IN AN ORGANIZED HEALTH CARE EDUCATION/TRAINING PROGRAM

## 2024-10-02 PROCEDURE — 3044F HG A1C LEVEL LT 7.0%: CPT | Mod: HCNC,CPTII,95, | Performed by: STUDENT IN AN ORGANIZED HEALTH CARE EDUCATION/TRAINING PROGRAM

## 2024-10-02 RX ORDER — LEFLUNOMIDE 10 MG/1
20 TABLET ORAL DAILY
Qty: 60 TABLET | Refills: 2 | Status: SHIPPED | OUTPATIENT
Start: 2024-10-02

## 2024-10-02 NOTE — PATIENT INSTRUCTIONS
Stopped hydroxychloroquine now due to dark spots in her legs or arms.    Increase leflunomide to 2 tablets once a day
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
## 2024-10-07 NOTE — TELEPHONE ENCOUNTER
No care due was identified.  Health [facility] Embedded Care Due Messages. Reference number: [id number].   10/07/2024 4:21:00 PM CDT

## 2024-10-08 RX ORDER — MONTELUKAST SODIUM 10 MG/1
TABLET ORAL
Qty: 45 TABLET | Refills: 2 | Status: SHIPPED | OUTPATIENT
Start: 2024-10-08

## 2024-10-08 NOTE — TELEPHONE ENCOUNTER
Refill Decision Note   Gillwalter Cleary  is requesting a refill authorization.  Brief Assessment and Rationale for Refill:  Approve     Medication Therapy Plan:         Pharmacist review requested: Yes   Comments:     Note composed:8:54 AM 10/08/2024

## 2024-10-08 NOTE — TELEPHONE ENCOUNTER
Refill Routing Note   Medication(s) are not appropriate for processing by [facility] for the following reason(s):        Drug-disease interaction    [facility] action(s):  Defer      Medication Therapy Plan: High Drug-Disease: montelukast and Mild episode of recurrent major depressive disorder    Pharmacist review requested: Yes     Appointments  past 12m or future 3m with PCP    Date Provider   Last Visit   6/20/2024 Yaima Burton MD   Next Visit   Visit date not found Yaima Burton MD   ED visits in past 90 days: 0        Note composed:11:46 PM 10/07/2024

## 2024-10-09 ENCOUNTER — PATIENT MESSAGE (OUTPATIENT)
Dept: RHEUMATOLOGY | Facility: CLINIC | Age: 54
End: 2024-10-09
Payer: MEDICARE

## 2024-10-09 ENCOUNTER — PATIENT MESSAGE (OUTPATIENT)
Dept: PRIMARY CARE CLINIC | Facility: CLINIC | Age: 54
End: 2024-10-09
Payer: MEDICARE

## 2024-10-09 DIAGNOSIS — M06.00 SERONEGATIVE RHEUMATOID ARTHRITIS: Primary | ICD-10-CM

## 2024-10-14 RX ORDER — PREDNISONE 5 MG/1
TABLET ORAL
Qty: 30 TABLET | Refills: 0 | Status: SHIPPED | OUTPATIENT
Start: 2024-10-14 | End: 2024-10-29

## 2024-10-21 ENCOUNTER — TELEPHONE (OUTPATIENT)
Dept: ORTHOPEDICS | Facility: CLINIC | Age: 54
End: 2024-10-21
Payer: MEDICARE

## 2024-10-21 NOTE — TELEPHONE ENCOUNTER
----- Message from Juan sent at 10/21/2024  4:35 PM CDT -----  Regarding: Reschedule Injection  Contact: [phone]  Calling to reschedule injection appointment on tomorrow due to time no longer working. Please contact patient as soon as possible.

## 2024-11-04 ENCOUNTER — PATIENT MESSAGE (OUTPATIENT)
Dept: RHEUMATOLOGY | Facility: CLINIC | Age: 54
End: 2024-11-04
Payer: MEDICARE

## 2024-11-04 DIAGNOSIS — Z79.899 DRUG-INDUCED IMMUNODEFICIENCY: ICD-10-CM

## 2024-11-04 DIAGNOSIS — D84.821 DRUG-INDUCED IMMUNODEFICIENCY: ICD-10-CM

## 2024-11-04 DIAGNOSIS — M06.00 SERONEGATIVE RHEUMATOID ARTHRITIS: ICD-10-CM

## 2024-11-04 RX ORDER — LEFLUNOMIDE 10 MG/1
20 TABLET ORAL DAILY
Qty: 60 TABLET | Refills: 2 | OUTPATIENT
Start: 2024-11-04

## 2024-11-05 ENCOUNTER — PATIENT MESSAGE (OUTPATIENT)
Dept: CARDIOLOGY | Facility: CLINIC | Age: 54
End: 2024-11-05
Payer: MEDICARE

## 2024-11-05 RX ORDER — LEFLUNOMIDE 10 MG/1
20 TABLET ORAL DAILY
Qty: 60 TABLET | Refills: 2 | Status: SHIPPED | OUTPATIENT
Start: 2024-11-05

## 2024-11-06 ENCOUNTER — PATIENT OUTREACH (OUTPATIENT)
Dept: ADMINISTRATIVE | Facility: HOSPITAL | Age: 54
End: 2024-11-06
Payer: MEDICARE

## 2024-11-06 NOTE — PROGRESS NOTES
Health Maintenance Due   Topic Date Due    Annual UACr  Never done    TETANUS VACCINE  Never done    Mammogram  05/31/2024    Influenza Vaccine (1) 09/01/2024    COVID-19 Vaccine (7 - 2024-25 season) 09/01/2024    Pdf sent to [facility]

## 2024-11-11 ENCOUNTER — PATIENT OUTREACH (OUTPATIENT)
Dept: ADMINISTRATIVE | Facility: HOSPITAL | Age: 54
End: 2024-11-11
Payer: MEDICARE

## 2024-11-14 ENCOUNTER — PATIENT MESSAGE (OUTPATIENT)
Dept: PRIMARY CARE CLINIC | Facility: CLINIC | Age: 54
End: 2024-11-14
Payer: MEDICARE

## 2024-11-14 DIAGNOSIS — R05.3 CHRONIC COUGH: Primary | ICD-10-CM

## 2024-11-15 ENCOUNTER — ON-DEMAND VIRTUAL (OUTPATIENT)
Dept: URGENT CARE | Facility: CLINIC | Age: 54
End: 2024-11-15
Payer: MEDICARE

## 2024-11-15 DIAGNOSIS — R05.1 ACUTE COUGH: Primary | ICD-10-CM

## 2024-11-15 DIAGNOSIS — J06.9 URI WITH COUGH AND CONGESTION: ICD-10-CM

## 2024-11-15 RX ORDER — PREDNISONE 20 MG/1
20 TABLET ORAL DAILY
Qty: 5 TABLET | Refills: 0 | Status: SHIPPED | OUTPATIENT
Start: 2024-11-15 | End: 2024-11-20

## 2024-11-15 NOTE — PROGRESS NOTES
Subjective:      Patient ID: Gill Cleary is a 54 y.o. female.    Vitals:  vitals were not taken for this visit.     Chief Complaint: Cough      Visit Type: TELE AUDIOVISUAL - This visit was conducted virtually based on  subjective information and limited objective exam    Present with the patient at the time of consultation: TELEMED PRESENT WITH PATIENT: None  Two patient identifiers used to verify patient- saying out date of birth and full name.       Past Medical History:   Diagnosis Date    Alopecia     Diverticulitis     HIV (human immunodeficiency virus infection)     Hyperlipemia     Hypertension     Nausea, vomiting, and diarrhea 04/28/2022    Osteoporosis     Quit using tobacco in remote past 09/10/2020    Sleep apnea     Vertigo      Past Surgical History:   Procedure Laterality Date    CATARACT EXTRACTION W/  INTRAOCULAR LENS IMPLANT Right 3/14/2024    Procedure: EXTRACTION, CATARACT, WITH IOL INSERTION;  Surgeon: Kim Aldrich MD;  Location: [facility] OR;  Service: Ophthalmology;  Laterality: Right;    CATARACT EXTRACTION W/  INTRAOCULAR LENS IMPLANT Left 5/2/2024    Procedure: EXTRACTION, CATARACT, WITH IOL INSERTION;  Surgeon: Kim Aldrich MD;  Location: [facility] OR;  Service: Ophthalmology;  Laterality: Left;    LAPAROSCOPIC SIGMOIDECTOMY N/A 12/10/2018    Procedure: COLECTOMY, SIGMOID, LAPAROSCOPIC-;  Surgeon: Reyes Nelson MD;  Location: [facility] OR [address] [place] [address];  Service: General;  Laterality: N/A;    LAPAROSCOPIC TOTAL HYSTERECTOMY  2018    URETERAL STENT PLACEMENT Left 12/10/2018    Procedure: INSERTION, STENT, URETER;  Surgeon: Reyes Nelson MD;  Location: [facility] OR 2ND FLR;  Service: General;  Laterality: Left;     Review of patient's allergies indicates:   Allergen Reactions    Ace inhibitors Other (See Comments) and Rash     dizziness    dizziness  dizziness  dizziness      Efavirenz-emtricitabin-tenofov Other (See Comments)     dizziness    dizziness  dizziness  dizziness    Penicillins Hives,  Nausea And Vomiting and Other (See Comments)     Hives (skin)^ and causes yeast infection  Hives (skin)^      Pork extract      Pt would like pork added  Because of strong Taoist beliefs    Pork/porcine containing products      Pt would like pork added  Because of strong Taoist beliefs    Tramadol Nausea And Vomiting and Other (See Comments)    Emtricita-rilpivirine-tenof df Other (See Comments)     Affecting patients kidneys    Affecting patients kidneys  Affecting patients kidneys  Affecting patients kidneys    Lactase     Lisinopril     Tilactase     Amlodipine Rash    Clonidine Rash    Doxazosin Rash    Hydrochlorothiazide Rash and Other (See Comments)    Hydrocodone-acetaminophen Hives, Itching and Rash    Labetalol Rash    Metoprolol Rash    Nebivolol hcl Rash     Current Outpatient Medications on File Prior to Visit   Medication Sig Dispense Refill    atorvastatin (LIPITOR) 80 MG tablet Take 80 mg by mouth.      agpcetiul-lgkqgnrh-pqxqtmb ala (BIKTARVY) -25 mg per tablet Take 1 tablet by mouth once daily.      carboxymethylcellulose (REFRESH PLUS) 0.5 % Dpet 1 drop as needed.      cetirizine (ZYRTEC) 10 MG tablet Take 1 tablet (10 mg total) by mouth once daily. 90 tablet 3    diltiaZEM (CARDIZEM CD) 240 MG 24 hr capsule TAKE 1 CAPSULE(240 MG) BY MOUTH EVERY DAY 90 capsule 3    furosemide (LASIX) 20 MG tablet Take 20 mg by mouth 3 (three) times a week.      furosemide (LASIX) 40 MG tablet TAKE 1 TABLET(40 MG) BY MOUTH EVERY DAY 90 tablet 2    hydrALAZINE (APRESOLINE) 50 MG tablet Take 50 mg by mouth every evening.      hydrOXYzine pamoate (VISTARIL) 25 MG Cap hydroxyzine pamoate 25 mg capsule   TAKE 1 CAPSULE (25 MG TOTAL) BY MOUTH EVERY 8 (EIGHT) HOURS AS NEEDED (ALLERGIES).      leflunomide (ARAVA) 10 MG Tab Take 2 tablets (20 mg total) by mouth once daily. 60 tablet 2    lisdexamfetamine (VYVANSE) 60 MG capsule Take 60 mg by mouth every morning.      meloxicam (MOBIC) 7.5 MG tablet Take 7.5 mg  by mouth 2 (two) times daily as needed.      montelukast (SINGULAIR) 10 mg tablet TAKE 1 TABLET( 10 MG TOTAL) BY MOUTH EVERY OTHER DAY 45 tablet 2    omeprazole (PRILOSEC) 40 MG capsule Take 1 capsule (40 mg total) by mouth every morning. 90 capsule 3    oxyCODONE (ROXICODONE) 10 mg Tab immediate release tablet Take 10 mg by mouth 3 (three) times daily.      pregabalin (LYRICA) 150 MG capsule Take 150 mg by mouth 2 (two) times daily.      propranoloL (INDERAL) 40 MG tablet Take 40 mg by mouth every evening.      risperiDONE (RISPERDAL) 1 MG tablet Take 1 mg by mouth every evening.      urea (CARMOL) 40 % Crea Apply topically 2 (two) times daily. 198 g 2    venlafaxine (EFFEXOR-XR) 150 MG Cp24 Take 150 mg by mouth every morning.      venlafaxine (EFFEXOR-XR) 150 MG Cp24 Take 1 capsule by mouth once daily.      venlafaxine (EFFEXOR-XR) 75 MG 24 hr capsule Take 75 mg by mouth every morning.      VYVANSE 50 mg capsule Take 50 mg by mouth every morning.       No current facility-administered medications on file prior to visit.     Family History   Problem Relation Name Age of Onset    Hypertension Mother      Thyroid disease Mother      Hypertension Father      Heart attacks under age 50 Father      Other Father          Brain tumor    Other Sister          Brain tumor       Medications Ordered                [id number] - [place], [place] - [address] AT [facility] [address]   [address], [place] [address]    Telephone: [phone]   Fax: [phone]   Hours: Not open 24 hours                         E-Prescribed (1 of 1)              predniSONE (DELTASONE) 20 MG tablet    Sig: Take 1 tablet (20 mg total) by mouth once daily. for 5 days       Start: 11/15/24     Quantity: 5 tablet Refills: 0                           Ohs Peq Odvv Intake    11/15/2024 12:51 PM CST - Filed by Patient   What is your current physical address in the event of a medical emergency? [address]  [id number]   Are you able to take your vital signs? No   Please attach any relevant images or files    Is your employer contracted with [facility]? No         55 yo female with c/o cough for three to four days. She states phlegm is occasional and is looking gray. She denies fever. She denies wheezing. She states sob and sore throat. She has tried otc medication without relief.         Constitution: Negative.   HENT: Negative.     Cardiovascular: Negative.    Respiratory:  Positive for cough.    Gastrointestinal: Negative.    Endocrine: negative.   Genitourinary: Negative.  Negative for frequency and urgency.   Musculoskeletal: Negative.    Skin: Negative.    Allergic/Immunologic: Negative.    Neurological: Negative.    Hematologic/Lymphatic: Negative.    Psychiatric/Behavioral: Negative.          Objective:   The physical exam was conducted virtually.  LOCATION OF PATIENT dominik olsen x 3 ; no acute distress noted; appearance normal; mood and behavior normal; thought process normal  Head- normocephalic  Nose- appears normal, no discharge or erythema  Eyes- pupils appear normal in size, no drainage, no erythema  Ears- normal appearing; no discharge, no erythema  Mouth- appears normal  Oropharynx- no erythema, lesions  Lungs- breathing at a normal rate, no acute distress noted  Heart- no reports of tachycardia, palpitations, chest pain  Abdomen- non distended, non tender reported by patient  Skin- warm and dry, no erythema or edema noted by patient or visualized  Psych- as above; no si/hi      Assessment:     1. Acute cough    2. URI with cough and congestion        Plan:         Thank you for choosing [facility]!    Our goal in the [facility] is to always provide outstanding medical care. You may receive a survey by mail or e-mail in the next week regarding your experience today. We would greatly appreciate you completing and returning the survey. Your  feedback provides us with a way to recognize our staff who provide very good care, and it helps us learn how to improve when your experience was below our aspiration of excellence.         We appreciate you trusting us with your medical care. We hope you feel better soon. We will be happy to take care of you for all of your future medical needs.    You must understand that you've received an Urgent Care treatment only and that you may be released before all your medical problems are known or treated. You, the patient, will arrange for follow up care as instructed.    Follow up with your PCP or specialty clinic as directed in the next 1-2 weeks if not improved or as needed.  You can call [phone] to schedule an appointment with the appropriate provider.    If your condition worsens we recommend that you receive another evaluation in person, with your primary care provider, urgent care or at the emergency room immediately or contact your primary medical clinics after hours call service to discuss your concerns.         Acute cough  -     predniSONE (DELTASONE) 20 MG tablet; Take 1 tablet (20 mg total) by mouth once daily. for 5 days  Dispense: 5 tablet; Refill: 0    URI with cough and congestion  -     predniSONE (DELTASONE) 20 MG tablet; Take 1 tablet (20 mg total) by mouth once daily. for 5 days  Dispense: 5 tablet; Refill: 0

## 2024-11-18 ENCOUNTER — PATIENT MESSAGE (OUTPATIENT)
Dept: RHEUMATOLOGY | Facility: CLINIC | Age: 54
End: 2024-11-18
Payer: MEDICARE

## 2024-11-19 DIAGNOSIS — D84.821 DRUG-INDUCED IMMUNODEFICIENCY: ICD-10-CM

## 2024-11-19 DIAGNOSIS — Z79.899 DRUG-INDUCED IMMUNODEFICIENCY: ICD-10-CM

## 2024-11-19 DIAGNOSIS — M06.00 SERONEGATIVE RHEUMATOID ARTHRITIS: ICD-10-CM

## 2024-11-19 RX ORDER — LEFLUNOMIDE 20 MG/1
20 TABLET ORAL DAILY
Qty: 30 TABLET | Refills: 2 | Status: ACTIVE | OUTPATIENT
Start: 2024-11-19

## 2024-11-20 ENCOUNTER — CLINICAL SUPPORT (OUTPATIENT)
Dept: ENDOSCOPY | Facility: HOSPITAL | Age: 54
End: 2024-11-20
Attending: STUDENT IN AN ORGANIZED HEALTH CARE EDUCATION/TRAINING PROGRAM
Payer: MEDICARE

## 2024-11-20 DIAGNOSIS — Z12.11 COLON CANCER SCREENING: ICD-10-CM

## 2024-11-21 ENCOUNTER — TELEPHONE (OUTPATIENT)
Dept: RHEUMATOLOGY | Facility: CLINIC | Age: 54
End: 2024-11-21
Payer: MEDICARE

## 2024-11-21 NOTE — TELEPHONE ENCOUNTER
Spoke to pharmacist and gave the verbal for them to change leflunomide rx to 20 mg instead of 10 mg 2 x a day so insurance will cover it

## 2024-11-21 NOTE — TELEPHONE ENCOUNTER
----- Message from Brandon sent at 11/21/2024 12:31 PM CST -----  .Type:  Pharmacy Calling to Clarify an RX    Name of Caller:Karena  [id number] - [place], [place] - [address] AT [facility] [address]  Prescription Name:leflunomide (ARAVA) 10 MG Tab  What do they need to clarify?:insurance will not pay for 2x's a day but its does come in 20 mg and wants to know if its ok to change  Best Call Back Number:  Additional Information:

## 2024-12-05 ENCOUNTER — OFFICE VISIT (OUTPATIENT)
Dept: PULMONOLOGY | Facility: CLINIC | Age: 54
End: 2024-12-05
Payer: MEDICARE

## 2024-12-05 VITALS
DIASTOLIC BLOOD PRESSURE: 83 MMHG | BODY MASS INDEX: 41.77 KG/M2 | SYSTOLIC BLOOD PRESSURE: 136 MMHG | WEIGHT: 235.81 LBS | HEART RATE: 88 BPM | OXYGEN SATURATION: 96 %

## 2024-12-05 DIAGNOSIS — R05.9 COUGH, UNSPECIFIED TYPE: ICD-10-CM

## 2024-12-05 DIAGNOSIS — J45.901 MILD ASTHMA EXACERBATION: Primary | ICD-10-CM

## 2024-12-05 DIAGNOSIS — R05.3 CHRONIC COUGH: ICD-10-CM

## 2024-12-05 PROCEDURE — 3079F DIAST BP 80-89 MM HG: CPT | Mod: HCNC,CPTII,S$GLB,

## 2024-12-05 PROCEDURE — 99999 PR PBB SHADOW E&M-EST. PATIENT-LVL V: CPT | Mod: PBBFAC,HCNC,,

## 2024-12-05 PROCEDURE — 1159F MED LIST DOCD IN RCRD: CPT | Mod: HCNC,CPTII,S$GLB,

## 2024-12-05 PROCEDURE — 3044F HG A1C LEVEL LT 7.0%: CPT | Mod: HCNC,CPTII,S$GLB,

## 2024-12-05 PROCEDURE — 3008F BODY MASS INDEX DOCD: CPT | Mod: HCNC,CPTII,S$GLB,

## 2024-12-05 PROCEDURE — 99203 OFFICE O/P NEW LOW 30 MIN: CPT | Mod: HCNC,S$GLB,,

## 2024-12-05 PROCEDURE — 3075F SYST BP GE 130 - 139MM HG: CPT | Mod: HCNC,CPTII,S$GLB,

## 2024-12-05 RX ORDER — AZITHROMYCIN 250 MG/1
TABLET, FILM COATED ORAL
Qty: 6 TABLET | Refills: 0 | Status: SHIPPED | OUTPATIENT
Start: 2024-12-05 | End: 2024-12-10

## 2024-12-05 RX ORDER — IPRATROPIUM BROMIDE AND ALBUTEROL SULFATE 2.5; .5 MG/3ML; MG/3ML
3 SOLUTION RESPIRATORY (INHALATION) EVERY 4 HOURS PRN
Qty: 120 ML | Refills: 11 | Status: SHIPPED | OUTPATIENT
Start: 2024-12-05 | End: 2025-12-05

## 2024-12-05 RX ORDER — GUAIFENESIN 600 MG/1
1200 TABLET, EXTENDED RELEASE ORAL 2 TIMES DAILY
Qty: 40 TABLET | Refills: 0 | Status: SHIPPED | OUTPATIENT
Start: 2024-12-05 | End: 2024-12-15

## 2024-12-05 RX ORDER — PREDNISONE 20 MG/1
20 TABLET ORAL DAILY
Qty: 5 TABLET | Refills: 0 | Status: SHIPPED | OUTPATIENT
Start: 2024-12-05

## 2024-12-05 NOTE — PATIENT INSTRUCTIONS
-Obtain PFT to evaluate for obstructive or restrictive pattern  -Obtain IgE and CBC to check inflammatory markers    Exacerbation: zpack, prednisone, mucinex, duonebs

## 2024-12-05 NOTE — PROGRESS NOTES
History and Physical Note  [facility]    Subjective:     Reason for visit: Cough     Patient ID:  Gill Cleary is a 54 y.o. female    Interval History 2024:  Patient is ihere as a referral from her PCP for evaluation of chronic cough. PMHx of HIV, CAD, hypertension, osteoporosis, acid reflux, diverticulosis and RA. Her family has noticed a recurrent cough.   Cough: daily cough; day   Sputum: yellow   MMRC grade level: 4   Respiratory illness: none   Exercise: limited by knees; RA  Current treatment plan: none, working nebulizer at home    11/15/2024 steroids given at virtual visit and did improve symptoms    Additional Pulmonary History:  Occupational: [employer];  work   Environmental Exposures: none;   Exposure to Animals/Pets: none  Travel History: none;   History of exposures to TB: none  Childhood Illnesses:  none   Tobacco/Smoking:   Social History     Tobacco Use   Smoking Status Former    Current packs/day: 0.00    Average packs/day: 0.5 packs/day for 21.0 years (10.5 ttl pk-yrs)    Types: Cigarettes    Start date:     Quit date:     Years since quittin.9   Smokeless Tobacco Never       Objective:     Vitals:    24 1344   BP: 136/83   BP Location: Left arm   Patient Position: Sitting   Pulse: 88   SpO2: 96%   Weight: 106.9 kg (235 lb 12.5 oz)         Physical Exam  Constitutional:       Appearance: Normal appearance. She is well-developed.   HENT:      Head: Normocephalic.   Cardiovascular:      Rate and Rhythm: Normal rate.      Pulses: Normal pulses.      Heart sounds: Normal heart sounds, S1 normal and S2 normal.   Pulmonary:      Effort: Pulmonary effort is normal.      Breath sounds: Normal breath sounds. No decreased breath sounds.   Musculoskeletal:      Right lower leg: No edema.      Left lower leg: No edema.   Skin:     General: Skin is warm.      Capillary Refill: Capillary refill takes less than 2 seconds.      Findings: No rash.      Nails: There is no  clubbing.   Neurological:      Mental Status: She is alert and oriented to person, place, and time. Mental status is at baseline.   Psychiatric:         Attention and Perception: Attention normal.         Mood and Affect: Mood and affect normal.         Speech: Speech normal.         Behavior: Behavior is cooperative.          Personal Diagnostic Review and Interpretation  12/05/2024:   CXR clear lung fields       Pertinent Studies Reviewed & Interpreted:     Pulmonary Function Tests:   pending    Other Pertinent Laboratories:  Lab Results   Component Value Date    WBC 4.54 12/05/2024    RBC 4.41 12/05/2024    HGB 12.9 12/05/2024    MCV 91 12/05/2024    MCH 29.3 12/05/2024    MCHC 32.0 12/05/2024    RDW 13.9 12/05/2024     12/05/2024    MPV 13.8 (H) 12/05/2024    GRAN 2.1 12/05/2024    GRAN 45.1 12/05/2024    LYMPH 1.7 12/05/2024    LYMPH 38.1 12/05/2024    MONO 0.6 12/05/2024    MONO 13.9 12/05/2024    EOS 0.1 12/05/2024    BASO 0.03 12/05/2024      Latest Reference Range & Units 12/11/18 03:56 12/12/18 04:45 12/13/18 05:57 12/14/18 08:19 12/15/18 05:57 12/16/18 04:41 12/17/18 05:23 12/18/18 04:16 12/19/18 05:12 12/20/18 05:12 12/02/21 14:34 04/14/22 18:20 04/16/22 10:04 01/22/24 15:52 06/20/24 12:30 09/30/24 17:09 12/05/24 14:46   Eos # 0.0 - 0.5 K/uL 0.0 0.0 0.1 0.2 0.1 0.1 0.2 0.2 0.2 0.2 0.0 0.3 0.2 0.1 0.1 0.1  0.1 0.1         Assessment & Plan:       Plan:  Clinical impression is resonably certain and repeated evaluation prn +/- follow up will be needed as below.      1. Mild asthma exacerbation  -     guaiFENesin (MUCINEX) 600 mg 12 hr tablet; Take 2 tablets (1,200 mg total) by mouth 2 (two) times daily. for 10 days  Dispense: 40 tablet; Refill: 0  -     X-Ray Chest PA And Lateral; Future  -     azithromycin (Z-JENNIFER) 250 MG tablet; Take 2 tablets by mouth on day 1; Take 1 tablet by mouth on days 2-5  Dispense: 6 tablet; Refill: 0  -     Complete PFT with bronchodilator; Future  -      albuterol-ipratropium (DUO-NEB) 2.5 mg-0.5 mg/3 mL nebulizer solution; Take 3 mLs by nebulization every 4 (four) hours as needed for Wheezing or Shortness of Breath. Rescue  Dispense: 120 mL; Refill: 11  -     predniSONE (DELTASONE) 20 MG tablet; Take 1 tablet (20 mg total) by mouth once daily.  Dispense: 5 tablet; Refill: 0  -     IGE; Future; Expected date: 12/05/2024  -     CBC auto differential; Future; Expected date: 12/05/2024    2. Chronic cough  -     Ambulatory referral/consult to Pulmonology    3. Cough, unspecified type        Follow up in about 2 weeks (around 12/19/2024), or if symptoms worsen or fail to improve.    Discussed with patient above for education the following:      Patient Instructions   -Obtain PFT to evaluate for obstructive or restrictive pattern  -Obtain IgE and CBC to check inflammatory markers    Exacerbation: zpack, prednisone, mucinex, duonebs    RETURN TO CLINIC IN 2 weeks     Total professional time spent for the encounter: 32 minutes  Time was spent preparing to see the patient, reviewing results of prior testing, obtaining and/or reviewing separately obtained history, performing a medically appropriate examination and interview, counseling and educating the patient/family, ordering medications/tests/procedures, referring and communicating with other health care professionals, documenting clinical information in the electronic health record, and independently interpreting results.    Alyssa Flores, DNP

## 2024-12-12 DIAGNOSIS — I10 ESSENTIAL HYPERTENSION: ICD-10-CM

## 2024-12-12 RX ORDER — FUROSEMIDE 40 MG/1
40 TABLET ORAL
Qty: 90 TABLET | Refills: 1 | Status: SHIPPED | OUTPATIENT
Start: 2024-12-12

## 2024-12-12 NOTE — TELEPHONE ENCOUNTER
No care due was identified.  [facility] Embedded Care Due Messages. Reference number: [id number].   12/12/2024 6:02:46 AM CST

## 2024-12-12 NOTE — TELEPHONE ENCOUNTER
Refill Routing Note   Medication(s) are not appropriate for processing by [facility] for the following reason(s):        Drug-disease interaction  Drug-Disease: furosemide and Hypertensive chronic kidney disease with stage 1 through stage 4 chronic kidney disease, or unspecified chronic kidney disease    [facility] action(s):  Defer               Appointments  past 12m or future 3m with PCP    Date Provider   Last Visit   6/20/2024 Yaima Burton MD   Next Visit   Visit date not found Yaima Burton MD   ED visits in past 90 days: 0        Note composed:7:37 AM 12/12/2024

## 2024-12-16 DIAGNOSIS — N18.9 ANEMIA IN CHRONIC KIDNEY DISEASE, UNSPECIFIED CKD STAGE: Primary | ICD-10-CM

## 2024-12-16 DIAGNOSIS — D63.1 ANEMIA IN CHRONIC KIDNEY DISEASE, UNSPECIFIED CKD STAGE: Primary | ICD-10-CM

## 2024-12-17 ENCOUNTER — OFFICE VISIT (OUTPATIENT)
Dept: PULMONOLOGY | Facility: CLINIC | Age: 54
End: 2024-12-17
Payer: MEDICARE

## 2024-12-17 VITALS — OXYGEN SATURATION: 98 % | BODY MASS INDEX: 40.42 KG/M2 | HEART RATE: 107 BPM | WEIGHT: 228.19 LBS

## 2024-12-17 DIAGNOSIS — R06.09 DOE (DYSPNEA ON EXERTION): ICD-10-CM

## 2024-12-17 DIAGNOSIS — R05.9 COUGH, UNSPECIFIED TYPE: Primary | ICD-10-CM

## 2024-12-17 PROCEDURE — 99212 OFFICE O/P EST SF 10 MIN: CPT | Mod: HCNC,S$GLB,,

## 2024-12-17 PROCEDURE — 1159F MED LIST DOCD IN RCRD: CPT | Mod: HCNC,CPTII,S$GLB,

## 2024-12-17 PROCEDURE — 99999 PR PBB SHADOW E&M-EST. PATIENT-LVL IV: CPT | Mod: PBBFAC,HCNC,,

## 2024-12-17 PROCEDURE — 3008F BODY MASS INDEX DOCD: CPT | Mod: HCNC,CPTII,S$GLB,

## 2024-12-17 PROCEDURE — 3044F HG A1C LEVEL LT 7.0%: CPT | Mod: HCNC,CPTII,S$GLB,

## 2024-12-17 NOTE — PROGRESS NOTES
History and Physical Note  [facility]    Subjective:     Reason for visit: Cough     Patient ID:  Gill Cleary is a 54 y.o. female    Interval History   2024:   Patient is here for evaluation of daily cough and PFT results. PFT showing no obstruction, no restriction and no decreased diffusing capacity. CXR with no acute findings. IgE/Eos WNL. Cough has improved. Need mouth piece for nebulizer. Only notices she is short of breath when someone tells her. She has a productive cough but swallows mucus.     Interval History 2024:  Patient is ihere as a referral from her PCP for evaluation of chronic cough. PMHx of HIV, CAD, hypertension, osteoporosis, acid reflux, diverticulosis and RA. Her family has noticed a recurrent cough.   Cough: daily cough; day   Sputum: yellow   MMRC grade level: 4   Respiratory illness: none   Exercise: limited by knees; RA  Current treatment plan: none, working nebulizer at home    11/15/2024 steroids given at virtual visit and did improve symptoms    Additional Pulmonary History:  Occupational: [employer];  work   Environmental Exposures: none;   Exposure to Animals/Pets: none  Travel History: none;   History of exposures to TB: none  Childhood Illnesses:  none   Tobacco/Smoking:   Social History     Tobacco Use   Smoking Status Former    Current packs/day: 0.00    Average packs/day: 0.5 packs/day for 21.0 years (10.5 ttl pk-yrs)    Types: Cigarettes    Start date:     Quit date:     Years since quittin.9   Smokeless Tobacco Never       Objective:     Vitals:    24 1537   Pulse: 107   SpO2: 98%   Weight: 103.5 kg (228 lb 2.8 oz)           Physical Exam  Constitutional:       Appearance: Normal appearance. She is well-developed.   HENT:      Head: Normocephalic.   Cardiovascular:      Rate and Rhythm: Normal rate.      Pulses: Normal pulses.      Heart sounds: Normal heart sounds, S1 normal and S2 normal.   Pulmonary:      Effort: Pulmonary  effort is normal. No respiratory distress.      Breath sounds: Normal breath sounds. No decreased breath sounds.   Musculoskeletal:      Right lower leg: No edema.      Left lower leg: No edema.   Skin:     General: Skin is warm.      Capillary Refill: Capillary refill takes less than 2 seconds.      Findings: No rash.      Nails: There is no clubbing.   Neurological:      Mental Status: She is alert and oriented to person, place, and time. Mental status is at baseline.   Psychiatric:         Attention and Perception: Attention normal.         Mood and Affect: Mood and affect normal.         Speech: Speech normal.         Behavior: Behavior is cooperative.          Personal Diagnostic Review and Interpretation  12/05/2024:   CXR clear lung fields       Pertinent Studies Reviewed & Interpreted:     Pulmonary Function Tests:   Pulmonary Functions Testing Results:  No obstruction no restriction no decreased diffusing capacity       Other Pertinent Laboratories:  Lab Results   Component Value Date    WBC 6.54 12/17/2024    WBC 6.54 12/17/2024    RBC 4.88 12/17/2024    RBC 4.88 12/17/2024    HGB 14.3 12/17/2024    HGB 14.3 12/17/2024    MCV 92 12/17/2024    MCV 92 12/17/2024    MCH 29.3 12/17/2024    MCH 29.3 12/17/2024    MCHC 31.8 (L) 12/17/2024    MCHC 31.8 (L) 12/17/2024    RDW 13.5 12/17/2024    RDW 13.5 12/17/2024     12/17/2024     12/17/2024    MPV 13.5 (H) 12/17/2024    MPV 13.5 (H) 12/17/2024    GRAN 2.1 12/05/2024    GRAN 45.1 12/05/2024    LYMPH 1.7 12/05/2024    LYMPH 38.1 12/05/2024    MONO 0.6 12/05/2024    MONO 13.9 12/05/2024    EOS 0.1 12/05/2024    BASO 0.03 12/05/2024      Latest Reference Range & Units 12/11/18 03:56 12/12/18 04:45 12/13/18 05:57 12/14/18 08:19 12/15/18 05:57 12/16/18 04:41 12/17/18 05:23 12/18/18 04:16 12/19/18 05:12 12/20/18 05:12 12/02/21 14:34 04/14/22 18:20 04/16/22 10:04 01/22/24 15:52 06/20/24 12:30 09/30/24 17:09 12/05/24 14:46   Eos # 0.0 - 0.5 K/uL 0.0 0.0 0.1  0.2 0.1 0.1 0.2 0.2 0.2 0.2 0.0 0.3 0.2 0.1 0.1 0.1  0.1 0.1         Assessment & Plan:       Plan:  Clinical impression is resonably certain and repeated evaluation prn +/- follow up will be needed as below.      1. Cough, unspecified type    2. BECKFORD (dyspnea on exertion)  -     Ambulatory referral/consult to Cardiology; Future; Expected date: 12/24/2024      MDM: PFT with no evidence of underlying lung disease. I suspect cough is post viral. She is concerned with chronic dyspnea that seems to be worsening over the years. PFT showing no obstruction, no restriction and no decreased diffusing capacity. CXR with no acute findings. IgE/Eos WNL. Referral to cardiology for evaluation of dyspnea. Encouraged to follow up with me as needed if symptoms do not improve or worsen.     RETURN TO CLINIC PRN     Total professional time spent for the encounter: 15 minutes  Time was spent preparing to see the patient, reviewing results of prior testing, obtaining and/or reviewing separately obtained history, performing a medically appropriate examination and interview, counseling and educating the patient/family, ordering medications/tests/procedures, referring and communicating with other health care professionals, documenting clinical information in the electronic health record, and independently interpreting results.    Alyssa Flores, DNP

## 2025-01-02 ENCOUNTER — PATIENT MESSAGE (OUTPATIENT)
Dept: ADMINISTRATIVE | Facility: OTHER | Age: 55
End: 2025-01-02
Payer: MEDICARE

## 2025-01-04 ENCOUNTER — HOSPITAL ENCOUNTER (EMERGENCY)
Facility: HOSPITAL | Age: 55
Discharge: HOME | End: 2025-01-04
Attending: EMERGENCY MEDICINE
Payer: MEDICARE

## 2025-01-04 VITALS
DIASTOLIC BLOOD PRESSURE: 82 MMHG | WEIGHT: 247 LBS | HEART RATE: 92 BPM | RESPIRATION RATE: 20 BRPM | BODY MASS INDEX: 48.49 KG/M2 | HEIGHT: 60 IN | SYSTOLIC BLOOD PRESSURE: 162 MMHG | OXYGEN SATURATION: 97 % | TEMPERATURE: 98 F

## 2025-01-04 DIAGNOSIS — R06.02 SHORTNESS OF BREATH: ICD-10-CM

## 2025-01-04 DIAGNOSIS — J34.89 RHINORRHEA: ICD-10-CM

## 2025-01-04 DIAGNOSIS — J06.9 VIRAL URI WITH COUGH: Primary | ICD-10-CM

## 2025-01-04 LAB
ALBUMIN SERPL BCP-MCNC: 3.6 G/DL (ref 3.5–5.2)
ALP SERPL-CCNC: 87 U/L (ref 40–150)
ALT SERPL W/O P-5'-P-CCNC: 27 U/L (ref 10–44)
ANION GAP SERPL CALC-SCNC: 10 MMOL/L (ref 8–16)
AST SERPL-CCNC: 27 U/L (ref 10–40)
BASOPHILS # BLD AUTO: 0.03 K/UL (ref 0–0.2)
BASOPHILS NFR BLD: 0.7 % (ref 0–1.9)
BILIRUB SERPL-MCNC: 0.4 MG/DL (ref 0.1–1)
BNP SERPL-MCNC: 24 PG/ML (ref 0–99)
BUN SERPL-MCNC: 6 MG/DL (ref 6–20)
CALCIUM SERPL-MCNC: 9.2 MG/DL (ref 8.7–10.5)
CHLORIDE SERPL-SCNC: 109 MMOL/L (ref 95–110)
CO2 SERPL-SCNC: 26 MMOL/L (ref 23–29)
CREAT SERPL-MCNC: 0.8 MG/DL (ref 0.5–1.4)
DIFFERENTIAL METHOD BLD: ABNORMAL
EOSINOPHIL # BLD AUTO: 0.1 K/UL (ref 0–0.5)
EOSINOPHIL NFR BLD: 2.1 % (ref 0–8)
ERYTHROCYTE [DISTWIDTH] IN BLOOD BY AUTOMATED COUNT: 13.9 % (ref 11.5–14.5)
EST. GFR  (NO RACE VARIABLE): >60 ML/MIN/1.73 M^2
GLUCOSE SERPL-MCNC: 142 MG/DL (ref 70–110)
HCT VFR BLD AUTO: 38.9 % (ref 37–48.5)
HGB BLD-MCNC: 12.5 G/DL (ref 12–16)
IMM GRANULOCYTES # BLD AUTO: 0.01 K/UL (ref 0–0.04)
IMM GRANULOCYTES NFR BLD AUTO: 0.2 % (ref 0–0.5)
INFLUENZA A, MOLECULAR: NEGATIVE
INFLUENZA B, MOLECULAR: NEGATIVE
LYMPHOCYTES # BLD AUTO: 1.2 K/UL (ref 1–4.8)
LYMPHOCYTES NFR BLD: 27.8 % (ref 18–48)
MCH RBC QN AUTO: 29.5 PG (ref 27–31)
MCHC RBC AUTO-ENTMCNC: 32.1 G/DL (ref 32–36)
MCV RBC AUTO: 92 FL (ref 82–98)
MONOCYTES # BLD AUTO: 0.6 K/UL (ref 0.3–1)
MONOCYTES NFR BLD: 13.9 % (ref 4–15)
NEUTROPHILS # BLD AUTO: 2.4 K/UL (ref 1.8–7.7)
NEUTROPHILS NFR BLD: 55.3 % (ref 38–73)
NRBC BLD-RTO: 0 /100 WBC
PLATELET # BLD AUTO: 190 K/UL (ref 150–450)
PMV BLD AUTO: 13.7 FL (ref 9.2–12.9)
POTASSIUM SERPL-SCNC: 3.5 MMOL/L (ref 3.5–5.1)
PROT SERPL-MCNC: 7.4 G/DL (ref 6–8.4)
RBC # BLD AUTO: 4.24 M/UL (ref 4–5.4)
SARS-COV-2 RDRP RESP QL NAA+PROBE: NEGATIVE
SODIUM SERPL-SCNC: 145 MMOL/L (ref 136–145)
SPECIMEN SOURCE: NORMAL
TROPONIN I SERPL DL<=0.01 NG/ML-MCNC: <3 NG/L (ref 0–14)
WBC # BLD AUTO: 4.31 K/UL (ref 3.9–12.7)

## 2025-01-04 PROCEDURE — 93005 ELECTROCARDIOGRAM TRACING: CPT | Mod: HCNC

## 2025-01-04 PROCEDURE — 80053 COMPREHEN METABOLIC PANEL: CPT | Mod: HCNC | Performed by: EMERGENCY MEDICINE

## 2025-01-04 PROCEDURE — 93010 ELECTROCARDIOGRAM REPORT: CPT | Mod: HCNC,,, | Performed by: INTERNAL MEDICINE

## 2025-01-04 PROCEDURE — 83880 ASSAY OF NATRIURETIC PEPTIDE: CPT | Mod: HCNC | Performed by: EMERGENCY MEDICINE

## 2025-01-04 PROCEDURE — 87635 SARS-COV-2 COVID-19 AMP PRB: CPT | Mod: HCNC | Performed by: EMERGENCY MEDICINE

## 2025-01-04 PROCEDURE — 85025 COMPLETE CBC W/AUTO DIFF WBC: CPT | Mod: HCNC | Performed by: EMERGENCY MEDICINE

## 2025-01-04 PROCEDURE — 63600175 PHARM REV CODE 636 W HCPCS: Mod: HCNC

## 2025-01-04 PROCEDURE — 96374 THER/PROPH/DIAG INJ IV PUSH: CPT | Mod: HCNC

## 2025-01-04 PROCEDURE — 99285 EMERGENCY DEPT VISIT HI MDM: CPT | Mod: 25,HCNC

## 2025-01-04 PROCEDURE — 87502 INFLUENZA DNA AMP PROBE: CPT | Mod: HCNC | Performed by: EMERGENCY MEDICINE

## 2025-01-04 PROCEDURE — 84484 ASSAY OF TROPONIN QUANT: CPT | Mod: HCNC | Performed by: EMERGENCY MEDICINE

## 2025-01-04 PROCEDURE — 25000003 PHARM REV CODE 250: Mod: HCNC

## 2025-01-04 PROCEDURE — 87536 HIV-1 QUANT&REVRSE TRNSCRPJ: CPT | Mod: HCNC

## 2025-01-04 RX ORDER — FUROSEMIDE 10 MG/ML
40 INJECTION INTRAMUSCULAR; INTRAVENOUS
Status: COMPLETED | OUTPATIENT
Start: 2025-01-04 | End: 2025-01-04

## 2025-01-04 RX ORDER — GUAIFENESIN 600 MG/1
600 TABLET, EXTENDED RELEASE ORAL 2 TIMES DAILY
Status: DISCONTINUED | OUTPATIENT
Start: 2025-01-04 | End: 2025-01-04 | Stop reason: HOSPADM

## 2025-01-04 RX ADMIN — GUAIFENESIN 600 MG: 600 TABLET, EXTENDED RELEASE ORAL at 01:01

## 2025-01-04 RX ADMIN — FUROSEMIDE 40 MG: 10 INJECTION, SOLUTION INTRAVENOUS at 11:01

## 2025-01-04 NOTE — ED PROVIDER NOTES
"Encounter Date: 1/4/2025       History     Chief Complaint   Patient presents with    Shortness of Breath     SOB, "fluid coming out of her nose" leg swelling, and headache.      54 y.o. female with PMH of HIV, CAD, hypertension, osteoporosis, acid reflux, diverticulosis, RA, CKD III presents with a chief complaint of shortness of breath.  Patient notes 2 days of shortness of breath with rhinorrhea and sputum production.  Patient states that 2 days ago she gradually started to experience some SOB and excessive post nasal drip. Denies any fever or sick contacts. Patient states that 1 day ago in the morning she started to experience some dull chest pain, nausea, body aches, and tingling that started in her shoulders and radiates to her fingertips. Patient is also experiencing mild lower extremity swelling. She states that she is prescribed 40mg lasix prn lower extremity swelling but has not been taking it.  In the chart it is noted that she is prescribed it daily.    She denies any skin rashes, photosensitivity, psoriasis, dry mouth, oral ulcers.    The history is provided by the patient.     Review of patient's allergies indicates:   Allergen Reactions    Ace inhibitors Other (See Comments) and Rash     dizziness    dizziness  dizziness  dizziness      Efavirenz-emtricitabin-tenofov Other (See Comments)     dizziness    dizziness  dizziness  dizziness    Penicillins Hives, Nausea And Vomiting and Other (See Comments)     Hives (skin)^ and causes yeast infection  Hives (skin)^      Pork extract      Pt would like pork added  Because of strong Mandaen beliefs    Pork/porcine containing products      Pt would like pork added  Because of strong Mandaen beliefs    Tramadol Nausea And Vomiting and Other (See Comments)    Emtricita-rilpivirine-tenof df Other (See Comments)     Affecting patients kidneys    Affecting patients kidneys  Affecting patients kidneys  Affecting patients kidneys    Lactase     Lisinopril     " Tilactase     Amlodipine Rash    Clonidine Rash    Doxazosin Rash    Hydrochlorothiazide Rash and Other (See Comments)    Hydrocodone-acetaminophen Hives, Itching and Rash    Labetalol Rash    Metoprolol Rash    Nebivolol hcl Rash     Past Medical History:   Diagnosis Date    Alopecia     Diverticulitis     HIV (human immunodeficiency virus infection)     Hyperlipemia     Hypertension     Nausea, vomiting, and diarrhea 2022    Osteoporosis     Quit using tobacco in remote past 09/10/2020    Sleep apnea     Vertigo      Past Surgical History:   Procedure Laterality Date    CATARACT EXTRACTION W/  INTRAOCULAR LENS IMPLANT Right 3/14/2024    Procedure: EXTRACTION, CATARACT, WITH IOL INSERTION;  Surgeon: Kim Aldrich MD;  Location: [facility] OR;  Service: Ophthalmology;  Laterality: Right;    CATARACT EXTRACTION W/  INTRAOCULAR LENS IMPLANT Left 2024    Procedure: EXTRACTION, CATARACT, WITH IOL INSERTION;  Surgeon: Kim Aldrich MD;  Location: [facility] OR;  Service: Ophthalmology;  Laterality: Left;    LAPAROSCOPIC SIGMOIDECTOMY N/A 12/10/2018    Procedure: COLECTOMY, SIGMOID, LAPAROSCOPIC-;  Surgeon: Reyes Nelson MD;  Location: [facility] OR [address] [place] [address];  Service: General;  Laterality: N/A;    LAPAROSCOPIC TOTAL HYSTERECTOMY  2018    URETERAL STENT PLACEMENT Left 12/10/2018    Procedure: INSERTION, STENT, URETER;  Surgeon: Reyes Nelson MD;  Location: [facility] OR [address] [place] [address];  Service: General;  Laterality: Left;     Family History   Problem Relation Name Age of Onset    Hypertension Mother      Thyroid disease Mother      Hypertension Father      Heart attacks under age 50 Father      Other Father          Brain tumor    Other Sister          Brain tumor     Social History     Tobacco Use    Smoking status: Former     Current packs/day: 0.00     Average packs/day: 0.5 packs/day for 21.0 years (10.5 ttl pk-yrs)     Types: Cigarettes     Start date:      Quit date:      Years since quittin.0    Smokeless  tobacco: Never   Substance Use Topics    Alcohol use: No    Drug use: Never     Review of Systems   All other systems reviewed and are negative.      Physical Exam     Initial Vitals [25 0921]   BP Pulse Resp Temp SpO2   120/78 106 20 97.8 °F (36.6 °C) 98 %      MAP       --         Physical Exam    Nursing note and vitals reviewed.  Constitutional: She appears well-developed and well-nourished. She is not diaphoretic. No distress.   HENT:   Head: Normocephalic and atraumatic.   Eyes: EOM are normal. Pupils are equal, round, and reactive to light. Right eye exhibits no discharge. Left eye exhibits no discharge. No scleral icterus.   Neck: Neck supple. No JVD present.   Normal range of motion.  Cardiovascular:  Normal rate, regular rhythm, normal heart sounds and intact distal pulses.     Exam reveals no gallop and no friction rub.       No murmur heard.  Pulmonary/Chest: Effort normal. No respiratory distress. She has decreased breath sounds in the right lower field. She has no wheezes. She has no rhonchi. She has no rales.           Abdominal: Abdomen is soft. She exhibits no distension and no mass. There is no abdominal tenderness. There is no rebound and no guarding.   Musculoskeletal:         General: Edema present. No tenderness. Normal range of motion.      Cervical back: Normal range of motion and neck supple.      Right lower le+ Edema present.      Left lower le+ Edema present.      Comments: No calf asymmetry, cords, tenderness     Neurological: She is alert and oriented to person, place, and time. She has normal strength and normal reflexes. No sensory deficit. She displays a negative Romberg sign. GCS eye subscore is 4. GCS verbal subscore is 5. GCS motor subscore is 6.   Skin: Skin is warm and dry. Capillary refill takes less than 2 seconds.   Psychiatric: Thought content normal.         ED Course   Procedures  Labs Reviewed   CBC W/ AUTO DIFFERENTIAL - Abnormal       Result Value    WBC  4.31      RBC 4.24      Hemoglobin 12.5      Hematocrit 38.9      MCV 92      MCH 29.5      MCHC 32.1      RDW 13.9      Platelets 190      MPV 13.7 (*)     Immature Granulocytes 0.2      Gran # (ANC) 2.4      Immature Grans (Abs) 0.01      Lymph # 1.2      Mono # 0.6      Eos # 0.1      Baso # 0.03      nRBC 0      Gran % 55.3      Lymph % 27.8      Mono % 13.9      Eosinophil % 2.1      Basophil % 0.7      Differential Method Automated     COMPREHENSIVE METABOLIC PANEL - Abnormal    Sodium 145      Potassium 3.5      Chloride 109      CO2 26      Glucose 142 (*)     BUN 6      Creatinine 0.8      Calcium 9.2      Total Protein 7.4      Albumin 3.6      Total Bilirubin 0.4      Alkaline Phosphatase 87      AST 27      ALT 27      eGFR >60.0      Anion Gap 10     INFLUENZA A & B BY MOLECULAR    Influenza A, Molecular Negative      Influenza B, Molecular Negative      Flu A & B Source Nasal swab     SARS-COV-2 RNA AMPLIFICATION, QUAL    SARS-CoV-2 RNA, Amplification, Qual Negative     TROPONIN I HIGH SENSITIVITY    Troponin I High Sensitivity <3     B-TYPE NATRIURETIC PEPTIDE    BNP 24     HIV RNA, QUANTITATIVE, PCR     EKG Readings: (Independently Interpreted)   Initial Reading: No STEMI. Rhythm: Sinus Tachycardia. Heart Rate: 102. ST Segments: Normal ST Segments. T Waves: Normal. Axis: Normal.       Imaging Results              X-Ray Chest AP Portable (Final result)  Result time 01/04/25 10:48:02      Final result by Gibson Alegria MD (01/04/25 10:48:02)                   Impression:      No acute abnormality.      Electronically signed by: Gibson Alegria MD  Date:    01/04/2025  Time:    10:48               Narrative:    EXAMINATION:  XR CHEST AP PORTABLE    CLINICAL HISTORY:  SOB;    TECHNIQUE:  Single frontal view of the chest was performed.    COMPARISON:  12/05/2024    FINDINGS:  The lungs are clear with normal appearance of pulmonary vasculature. No pleural effusion. No evident pneumothorax.    The  cardiac silhouette is normal in size. The hilar and mediastinal contours are unremarkable.    Bones are intact.                                       Medications   guaiFENesin 12 hr tablet 600 mg (600 mg Oral Given 1/4/25 1301)   furosemide injection 40 mg (40 mg Intravenous Given 1/4/25 1152)     Medical Decision Making  54 y.o. female with PMH of HIV, CAD, hypertension, osteoporosis, acid reflux, diverticulosis, RA, CKD III presents with a chief complaint of shortness of breath, nausea, body aches, rhinorrhea and sputum production, worsening  x2 days    Physical exam remarkable for decreased breath sounds in the right lower lobe. Trace pitting edema bilaterally.     CBC, CMP, CXR, flu/covid test    Diff dx at this time include but not limited to: viral URI, MI, opportunistic infection    CXR showing no acute abnormality.    Flu/covid test both negative.     Patient is currently afebrile, and hemodynamically stable. Patient instructed to take guaifenesin BID prn. Patient to follow up with her PCP in 3 days. Return to ED precautions given. Patient verbalized understanding. Patient medically ready for discharge. All questions and concern addressed at this time.       Problems Addressed:  Shortness of breath: acute illness or injury    Amount and/or Complexity of Data Reviewed  Labs: ordered.     Details: CBC, CMP, viral tests unremarkable   Radiology: ordered. Decision-making details documented in ED Course.    Risk  OTC drugs.  Prescription drug management.              Attending Attestation:   Physician Attestation Statement for Resident:  As the supervising MD   Physician Attestation Statement: I have personally seen and examined this patient.   I agree with the above history.  -: Note addended as above.  Patient was slightly tachycardic on presentation but this quickly resolved without any intervention.  She has no immobilization or recent travel.  No history of VTE.  No signs of DVT.  Symptoms sound more  infectious than nature than PE in origin.  Viral swabs here were negative and chest x-ray did reveal any pneumonia.  Patient arrived with a change in the patient traumatic treatment of a presumed viral URI.  She was provided with extensive return precautions.  They did administer 1 time dose of 40 mg Lasix IV given that she has not been taking it at home but she does not appear to be severely hypervolemic to require inpatient diuresis.  Again, extensive return precautions.   As the supervising MD I agree with the above PE.     As the supervising MD I agree with the above treatment, course, plan, and disposition.                                           Clinical Impression:  Final diagnoses:  [R06.02] Shortness of breath  [J06.9] Viral URI with cough (Primary)  [J34.89] Rhinorrhea                 Boyd Kat MD  Resident  01/04/25 1317       Alexander Jerry MD  01/04/25 1423

## 2025-01-04 NOTE — ED TRIAGE NOTES
Pt. Is a 54 yr old -American female presenting to the ED with SOB, cough, and clear liquid coming from nose overnight.  Hx HIV+.

## 2025-01-04 NOTE — DISCHARGE INSTRUCTIONS
Take mucinex and other over-the-counter cold and flu medications as needed to help your congestion and sputum production.  Please use your fluid pill as needed for fluid overload symptoms.   Return to the ER for any fever, worsening shortness of breath, chest pain, or other concerning symptoms.

## 2025-01-05 LAB
OHS QRS DURATION: 68 MS
OHS QRS DURATION: 72 MS
OHS QTC CALCULATION: 474 MS
OHS QTC CALCULATION: 479 MS

## 2025-01-06 ENCOUNTER — PATIENT MESSAGE (OUTPATIENT)
Dept: RHEUMATOLOGY | Facility: CLINIC | Age: 55
End: 2025-01-06
Payer: MEDICARE

## 2025-01-06 ENCOUNTER — OFFICE VISIT (OUTPATIENT)
Dept: PRIMARY CARE CLINIC | Facility: CLINIC | Age: 55
End: 2025-01-06
Payer: MEDICARE

## 2025-01-06 DIAGNOSIS — R20.2 NUMBNESS AND TINGLING SENSATION OF SKIN: Primary | ICD-10-CM

## 2025-01-06 DIAGNOSIS — R20.0 NUMBNESS AND TINGLING SENSATION OF SKIN: Primary | ICD-10-CM

## 2025-01-06 LAB
HIV1 RNA # SERPL NAA+PROBE: <20 COPIES/ML
HIV1 RNA SERPL NAA+PROBE-LOG#: <1.3 LOG COPIES/ML
HIV1 RNA SERPL QL NAA+PROBE: DETECTED

## 2025-01-06 NOTE — PROGRESS NOTES
Subjective     Patient ID: Gill Cleary is a 54 y.o. female.    Chief Complaint: Vibration and Tingling    Gill Cleary is a 54 year old female, presents virtually.  New to me.  PCP is Dr. Burton.  Medical and surgical history, in addition to problem list reviewed and listed below.      The patient location is: [place]  The chief complaint leading to consultation is: Vibration and tingling all over    Visit type: audiovisual    Face to Face time with patient:   22 minutes of total time spent on the encounter, which includes face to face time and non-face to face time preparing to see the patient (eg, review of tests), Obtaining and/or reviewing separately obtained history, Documenting clinical information in the electronic or other health record, Independently interpreting results (not separately reported) and communicating results to the patient/family/caregiver, or Care coordination (not separately reported).     Each patient to whom he or she provides medical services by telemedicine is:  (1) informed of the relationship between the physician and patient and the respective role of any other health care provider with respect to management of the patient; and (2) notified that he or she may decline to receive medical services by telemedicine and may withdraw from such care at any time.    Notes:     Patient reports very small vibration and like slight needles not hurt but poking, to body including face.  States been off and on daily that fades away as day goes on.  Happened last year around February last year, then stopped.  Began again in November 2024.  Patient with recent visit to ER; however, states respiratory symptoms were main focus.  States vibration feeling is heavy on chest and makes breathing feel heavy.  Patient cannot think of aggravates.  Per record, to ER with complain of shortness of breath, fluid coming out nose, headache and swelling in legs.  Chest Xray negative.  Diagnosed with viral URI with  cough and Rhinorrhea.        Review of Systems   Constitutional:  Negative for activity change and unexpected weight change.   HENT:  Positive for nasal congestion, postnasal drip and sneezing. Negative for hearing loss, rhinorrhea and trouble swallowing.    Eyes:  Negative for discharge and visual disturbance.   Respiratory:  Negative for chest tightness and wheezing.    Cardiovascular:  Negative for chest pain and palpitations.   Gastrointestinal:  Negative for blood in stool, constipation, diarrhea and vomiting.   Endocrine: Negative for polydipsia and polyuria.   Genitourinary:  Negative for difficulty urinating, dysuria, hematuria and menstrual problem.   Musculoskeletal:  Positive for arthralgias and joint swelling. Negative for neck pain.   Neurological:  Positive for vertigo, numbness (and tingling to body and face: more intense in face and fingertips) and headaches. Negative for tremors, seizures, syncope, speech difficulty and weakness.   Psychiatric/Behavioral:  Negative for confusion and dysphoric mood.      Past Medical History:   Diagnosis Date    Alopecia     Diverticulitis     HIV (human immunodeficiency virus infection)     Hyperlipemia     Hypertension     Nausea, vomiting, and diarrhea 04/28/2022    Osteoporosis     Quit using tobacco in remote past 09/10/2020    Sleep apnea     Vertigo      Past Surgical History:   Procedure Laterality Date    CATARACT EXTRACTION W/  INTRAOCULAR LENS IMPLANT Right 3/14/2024    Procedure: EXTRACTION, CATARACT, WITH IOL INSERTION;  Surgeon: Kim Aldrich MD;  Location: [facility] OR;  Service: Ophthalmology;  Laterality: Right;    CATARACT EXTRACTION W/  INTRAOCULAR LENS IMPLANT Left 5/2/2024    Procedure: EXTRACTION, CATARACT, WITH IOL INSERTION;  Surgeon: Kim Aldrich MD;  Location: [facility] OR;  Service: Ophthalmology;  Laterality: Left;    LAPAROSCOPIC SIGMOIDECTOMY N/A 12/10/2018    Procedure: COLECTOMY, SIGMOID, LAPAROSCOPIC-;  Surgeon: Reyes Nelson MD;   Location: [facility] OR 2ND FLR;  Service: General;  Laterality: N/A;    LAPAROSCOPIC TOTAL HYSTERECTOMY  2018    URETERAL STENT PLACEMENT Left 12/10/2018    Procedure: INSERTION, STENT, URETER;  Surgeon: Reyes Nelson MD;  Location: [facility] OR 2ND FLR;  Service: General;  Laterality: Left;     Social History     Socioeconomic History    Marital status:    Tobacco Use    Smoking status: Former     Current packs/day: 0.00     Average packs/day: 0.5 packs/day for 21.0 years (10.5 ttl pk-yrs)     Types: Cigarettes     Start date:      Quit date:      Years since quittin.0    Smokeless tobacco: Never   Substance and Sexual Activity    Alcohol use: No    Drug use: Never    Sexual activity: Never   Social History Narrative    Tobacco: None    EtOH: None    Drug: None    Employment: Caregiver for      Education:2 year college    Lives with  and 17 yo daughter     Social Drivers of Health     Financial Resource Strain: Medium Risk (2024)    Received from [facility], [facility]    Overall Financial Resource Strain (CARDIA)     Difficulty of Paying Living Expenses: Somewhat hard   Food Insecurity: Food Insecurity Present (2024)    Received from [facility]    Hunger Vital Sign     Worried About Running Out of Food in the Last Year: Sometimes true     Ran Out of Food in the Last Year: Never true   Transportation Needs: No Transportation Needs (2024)    Received from [facility]    PRAPARE - Transportation     Lack of Transportation (Medical): No     Lack of Transportation (Non-Medical): No   Physical Activity: Inactive (2024)    Received from [facility], [facility]    Exercise Vital Sign     Days of Exercise per Week: 0 days     Minutes of Exercise per Session: 10 min   Stress: Stress Concern Present (2024)    Received from [facility], [facility]    [employer] [place] [employer] - Occupational Stress Questionnaire     Feeling of Stress : Very much   Housing  Stability: Low Risk  (9/1/2024)    Received from [facility]    Housing Stability Vital Sign     Unable to Pay for Housing in the Last Year: No     Number of Places Lived in the Last Year: 1     Unstable Housing in the Last Year: No     Review of patient's allergies indicates:   Allergen Reactions    Ace inhibitors Other (See Comments) and Rash     dizziness    dizziness  dizziness  dizziness      Efavirenz-emtricitabin-tenofov Other (See Comments)     dizziness    dizziness  dizziness  dizziness    Penicillins Hives, Nausea And Vomiting and Other (See Comments)     Hives (skin)^ and causes yeast infection  Hives (skin)^      Pork extract      Pt would like pork added  Because of strong Gnosticism beliefs    Pork/porcine containing products      Pt would like pork added  Because of strong Gnosticism beliefs    Tramadol Nausea And Vomiting and Other (See Comments)    Emtricita-rilpivirine-tenof df Other (See Comments)     Affecting patients kidneys    Affecting patients kidneys  Affecting patients kidneys  Affecting patients kidneys    Lactase     Lisinopril     Tilactase     Amlodipine Rash    Clonidine Rash    Doxazosin Rash    Hydrochlorothiazide Rash and Other (See Comments)    Hydrocodone-acetaminophen Hives, Itching and Rash    Labetalol Rash    Metoprolol Rash    Nebivolol hcl Rash     Patient Active Problem List   Diagnosis    Chronic nausea    Human immunodeficiency virus (HIV) disease    Essential hypertension    Diverticular disease    Chronic nonintractable headache    HIV (human immunodeficiency virus infection)    Hyperlipemia    Hypertension    Osteoporosis    YANICK (obstructive sleep apnea)    Vitamin D deficiency    Proteinuria    Opioid dependence    Gastroesophageal reflux disease    Hypertensive chronic kidney disease with stage 1 through stage 4 chronic kidney disease, or unspecified chronic kidney disease    Carotid artery disease    Attention deficit hyperactivity disorder, combined type    Severe  obesity (BMI 35.0-39.9) with comorbidity    Mild episode of recurrent major depressive disorder    Bilateral carotid artery occlusion    Stage 3a chronic kidney disease    Seronegative rheumatoid arthritis    Nuclear sclerotic cataract of left eye    Shortness of breath        Objective   There were no vitals taken for this visit.      Physical Exam  Constitutional:       General: She is not in acute distress.  Pulmonary:      Effort: Pulmonary effort is normal. No respiratory distress.   Neurological:      Mental Status: She is alert and oriented to person, place, and time.   Psychiatric:         Mood and Affect: Mood normal.         Speech: Speech normal.         Behavior: Behavior normal.            Assessment and Plan     1. Numbness and tingling sensation of skin        -     Continue pregabalin as prescribed.  Patient has Rheumatology appointment.      Current Outpatient Medications on File Prior to Visit   Medication Sig Dispense Refill    albuterol-ipratropium (DUO-NEB) 2.5 mg-0.5 mg/3 mL nebulizer solution Take 3 mLs by nebulization every 4 (four) hours as needed for Wheezing or Shortness of Breath. Rescue 120 mL 11    atorvastatin (LIPITOR) 80 MG tablet Take 80 mg by mouth.      vyvqbkmvx-acranzzi-iidgtol ala (BIKTARVY) -25 mg per tablet Take 1 tablet by mouth once daily.      carboxymethylcellulose (REFRESH PLUS) 0.5 % Dpet 1 drop as needed.      cetirizine (ZYRTEC) 10 MG tablet Take 1 tablet (10 mg total) by mouth once daily. 90 tablet 3    diltiaZEM (CARDIZEM CD) 240 MG 24 hr capsule TAKE 1 CAPSULE(240 MG) BY MOUTH EVERY DAY 90 capsule 3    furosemide (LASIX) 40 MG tablet TAKE 1 TABLET(40 MG) BY MOUTH EVERY DAY 90 tablet 1    hydrALAZINE (APRESOLINE) 50 MG tablet Take 50 mg by mouth every evening.      hydrOXYzine pamoate (VISTARIL) 25 MG Cap hydroxyzine pamoate 25 mg capsule   TAKE 1 CAPSULE (25 MG TOTAL) BY MOUTH EVERY 8 (EIGHT) HOURS AS NEEDED (ALLERGIES).      lisdexamfetamine (VYVANSE) 60 MG  capsule Take 60 mg by mouth every morning.      meloxicam (MOBIC) 7.5 MG tablet Take 7.5 mg by mouth 2 (two) times daily as needed.      montelukast (SINGULAIR) 10 mg tablet TAKE 1 TABLET( 10 MG TOTAL) BY MOUTH EVERY OTHER DAY 45 tablet 2    omeprazole (PRILOSEC) 40 MG capsule Take 1 capsule (40 mg total) by mouth every morning. 90 capsule 3    oxyCODONE (ROXICODONE) 10 mg Tab immediate release tablet Take 10 mg by mouth 3 (three) times daily.      pregabalin (LYRICA) 150 MG capsule Take 150 mg by mouth 2 (two) times daily.      propranoloL (INDERAL) 40 MG tablet Take 40 mg by mouth every evening.      risperiDONE (RISPERDAL) 1 MG tablet Take 1 mg by mouth every evening.      urea (CARMOL) 40 % Crea Apply topically 2 (two) times daily. 198 g 2    venlafaxine (EFFEXOR-XR) 150 MG Cp24 Take 150 mg by mouth every morning.      venlafaxine (EFFEXOR-XR) 75 MG 24 hr capsule Take 75 mg by mouth every morning.      venlafaxine (EFFEXOR-XR) 150 MG Cp24 Take 1 capsule by mouth once daily.      VYVANSE 50 mg capsule Take 50 mg by mouth every morning.       No current facility-administered medications on file prior to visit.          Follow up for scheduled appointment with Rheumatology.    Lori A. Stephens Sandifer, FNP-C

## 2025-01-07 ENCOUNTER — OFFICE VISIT (OUTPATIENT)
Dept: RHEUMATOLOGY | Facility: CLINIC | Age: 55
End: 2025-01-07
Payer: MEDICARE

## 2025-01-07 DIAGNOSIS — G89.29 CHRONIC LOW BACK PAIN WITH SCIATICA, SCIATICA LATERALITY UNSPECIFIED, UNSPECIFIED BACK PAIN LATERALITY: ICD-10-CM

## 2025-01-07 DIAGNOSIS — Z79.899 DRUG-INDUCED IMMUNODEFICIENCY: ICD-10-CM

## 2025-01-07 DIAGNOSIS — M06.00 SERONEGATIVE RHEUMATOID ARTHRITIS: Primary | ICD-10-CM

## 2025-01-07 DIAGNOSIS — M67.919 ROTATOR CUFF DISORDER, UNSPECIFIED LATERALITY: ICD-10-CM

## 2025-01-07 DIAGNOSIS — E66.01 SEVERE OBESITY (BMI 35.0-39.9) WITH COMORBIDITY: ICD-10-CM

## 2025-01-07 DIAGNOSIS — M17.0 BILATERAL PRIMARY OSTEOARTHRITIS OF KNEE: ICD-10-CM

## 2025-01-07 DIAGNOSIS — D84.821 DRUG-INDUCED IMMUNODEFICIENCY: ICD-10-CM

## 2025-01-07 DIAGNOSIS — M54.40 CHRONIC LOW BACK PAIN WITH SCIATICA, SCIATICA LATERALITY UNSPECIFIED, UNSPECIFIED BACK PAIN LATERALITY: ICD-10-CM

## 2025-01-07 RX ORDER — LEFLUNOMIDE 20 MG/1
20 TABLET ORAL DAILY
Qty: 30 TABLET | Refills: 2 | Status: SHIPPED | OUTPATIENT
Start: 2025-01-07
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
## 2025-01-14 DIAGNOSIS — Z00.00 ENCOUNTER FOR MEDICARE ANNUAL WELLNESS EXAM: ICD-10-CM

## 2025-01-17 ENCOUNTER — PATIENT MESSAGE (OUTPATIENT)
Dept: PRIMARY CARE CLINIC | Facility: CLINIC | Age: 55
End: 2025-01-17
Payer: MEDICARE

## 2025-01-17 DIAGNOSIS — R42 VERTIGO: Primary | ICD-10-CM

## 2025-01-22 ENCOUNTER — ON-DEMAND VIRTUAL (OUTPATIENT)
Dept: URGENT CARE | Facility: CLINIC | Age: 55
End: 2025-01-22
Payer: MEDICARE

## 2025-01-22 DIAGNOSIS — R42 VERTIGO: Primary | ICD-10-CM

## 2025-01-22 RX ORDER — MECLIZINE HYDROCHLORIDE 25 MG/1
25 TABLET ORAL 3 TIMES DAILY PRN
Qty: 20 TABLET | Refills: 0 | Status: SHIPPED | OUTPATIENT
Start: 2025-01-22

## 2025-01-22 NOTE — PROGRESS NOTES
Subjective:      Patient ID: Gill Cleary is a 54 y.o. female.    Vitals:  vitals were not taken for this visit.     Chief Complaint: Dizziness      Visit Type: TELE AUDIOVISUAL    Present with the patient at the time of consultation: TELEMED PRESENT WITH PATIENT: None    Patient Location: Home      Past Medical History:   Diagnosis Date    Alopecia     Diverticulitis     HIV (human immunodeficiency virus infection)     Hyperlipemia     Hypertension     Nausea, vomiting, and diarrhea 04/28/2022    Osteoporosis     Quit using tobacco in remote past 09/10/2020    Sleep apnea     Vertigo      Past Surgical History:   Procedure Laterality Date    CATARACT EXTRACTION W/  INTRAOCULAR LENS IMPLANT Right 3/14/2024    Procedure: EXTRACTION, CATARACT, WITH IOL INSERTION;  Surgeon: Kim Aldrich MD;  Location: [facility] OR;  Service: Ophthalmology;  Laterality: Right;    CATARACT EXTRACTION W/  INTRAOCULAR LENS IMPLANT Left 5/2/2024    Procedure: EXTRACTION, CATARACT, WITH IOL INSERTION;  Surgeon: Kim Aldrich MD;  Location: [facility] OR;  Service: Ophthalmology;  Laterality: Left;    LAPAROSCOPIC SIGMOIDECTOMY N/A 12/10/2018    Procedure: COLECTOMY, SIGMOID, LAPAROSCOPIC-;  Surgeon: Reyes Nelson MD;  Location: [facility] OR [address] [place] [address];  Service: General;  Laterality: N/A;    LAPAROSCOPIC TOTAL HYSTERECTOMY  2018    URETERAL STENT PLACEMENT Left 12/10/2018    Procedure: INSERTION, STENT, URETER;  Surgeon: Reyes Nelson MD;  Location: [facility] OR [address] [place] [address];  Service: General;  Laterality: Left;     Review of patient's allergies indicates:   Allergen Reactions    Ace inhibitors Other (See Comments) and Rash     dizziness    dizziness  dizziness  dizziness      Efavirenz-emtricitabin-tenofov Other (See Comments)     dizziness    dizziness  dizziness  dizziness    Penicillins Hives, Nausea And Vomiting and Other (See Comments)     Hives (skin)^ and causes yeast infection  Hives (skin)^      Pork extract      Pt would like pork added   Because of strong Anabaptism beliefs    Pork/porcine containing products      Pt would like pork added  Because of strong Anabaptism beliefs    Tramadol Nausea And Vomiting and Other (See Comments)    Emtricita-rilpivirine-tenof df Other (See Comments)     Affecting patients kidneys    Affecting patients kidneys  Affecting patients kidneys  Affecting patients kidneys    Lactase     Lisinopril     Tilactase     Amlodipine Rash    Clonidine Rash    Doxazosin Rash    Hydrochlorothiazide Rash and Other (See Comments)    Hydrocodone-acetaminophen Hives, Itching and Rash    Labetalol Rash    Metoprolol Rash    Nebivolol hcl Rash     Current Outpatient Medications on File Prior to Visit   Medication Sig Dispense Refill    albuterol-ipratropium (DUO-NEB) 2.5 mg-0.5 mg/3 mL nebulizer solution Take 3 mLs by nebulization every 4 (four) hours as needed for Wheezing or Shortness of Breath. Rescue 120 mL 11    atorvastatin (LIPITOR) 80 MG tablet Take 80 mg by mouth.      sdovakzxx-nyjatccc-oyacnss ala (BIKTARVY) -25 mg per tablet Take 1 tablet by mouth once daily.      carboxymethylcellulose (REFRESH PLUS) 0.5 % Dpet 1 drop as needed.      cetirizine (ZYRTEC) 10 MG tablet Take 1 tablet (10 mg total) by mouth once daily. 90 tablet 3    diltiaZEM (CARDIZEM CD) 240 MG 24 hr capsule TAKE 1 CAPSULE(240 MG) BY MOUTH EVERY DAY 90 capsule 3    furosemide (LASIX) 40 MG tablet TAKE 1 TABLET(40 MG) BY MOUTH EVERY DAY 90 tablet 1    hydrALAZINE (APRESOLINE) 50 MG tablet Take 50 mg by mouth every evening.      hydrOXYzine pamoate (VISTARIL) 25 MG Cap hydroxyzine pamoate 25 mg capsule   TAKE 1 CAPSULE (25 MG TOTAL) BY MOUTH EVERY 8 (EIGHT) HOURS AS NEEDED (ALLERGIES).      leflunomide (ARAVA) 20 MG Tab Take 1 tablet (20 mg total) by mouth once daily. 30 tablet 2    lisdexamfetamine (VYVANSE) 60 MG capsule Take 60 mg by mouth every morning.      meloxicam (MOBIC) 7.5 MG tablet Take 7.5 mg by mouth 2 (two) times daily as needed.       montelukast (SINGULAIR) 10 mg tablet TAKE 1 TABLET( 10 MG TOTAL) BY MOUTH EVERY OTHER DAY 45 tablet 2    omeprazole (PRILOSEC) 40 MG capsule Take 1 capsule (40 mg total) by mouth every morning. 90 capsule 3    oxyCODONE (ROXICODONE) 10 mg Tab immediate release tablet Take 10 mg by mouth 3 (three) times daily.      pregabalin (LYRICA) 150 MG capsule Take 150 mg by mouth 2 (two) times daily.      propranoloL (INDERAL) 40 MG tablet Take 40 mg by mouth every evening.      risperiDONE (RISPERDAL) 1 MG tablet Take 1 mg by mouth every evening.      urea (CARMOL) 40 % Crea Apply topically 2 (two) times daily. 198 g 2    venlafaxine (EFFEXOR-XR) 150 MG Cp24 Take 150 mg by mouth every morning.      venlafaxine (EFFEXOR-XR) 150 MG Cp24 Take 1 capsule by mouth once daily.      venlafaxine (EFFEXOR-XR) 75 MG 24 hr capsule Take 75 mg by mouth every morning.      VYVANSE 50 mg capsule Take 50 mg by mouth every morning.       No current facility-administered medications on file prior to visit.     Family History   Problem Relation Name Age of Onset    Hypertension Mother      Thyroid disease Mother      Hypertension Father      Heart attacks under age 50 Father      Other Father          Brain tumor    Other Sister          Brain tumor       Medications Ordered                [id number] - [place] - [address] AT [facility] [address]   [address], [place] [address]    Telephone: [phone]   Fax: [phone]   Hours: Not open 24 hours                         E-Prescribed (1 of 1)              meclizine (ANTIVERT) 25 mg tablet    Sig: Take 1 tablet (25 mg total) by mouth 3 (three) times daily as needed for Dizziness.       Start: 1/22/25     Quantity: 20 tablet Refills: 0                           Ohs Peq Odvv Intake    1/22/2025  2:33 PM CST - Filed by Patient   What is your current physical address in the event of a medical emergency? [id number]   Are you  able to take your vital signs? Yes   Systolic Blood Pressure: 139   Diastolic Blood Pressure: 84   Weight: 247   Height: 63   Pulse: 97   Temperature: 96.8   Respiration rate:    Pulse Oxygen:    Please attach any relevant images or files    Is your employer contracted with [facility]? No         55 y/o with c/o dizziness, feelings of unsteadiness and numbness.  Pt reports history of vertigo. No currently prescribed any medication. BP per patient 139/84, BP meds taken today.         Neurological:  Positive for dizziness and history of vertigo.        Objective:   The physical exam was conducted virtually.  Physical Exam    Assessment:     1. Vertigo        Plan:     Vertigo  -     meclizine (ANTIVERT) 25 mg tablet; Take 1 tablet (25 mg total) by mouth 3 (three) times daily as needed for Dizziness.  Dispense: 20 tablet; Refill: 0    Follow-up with Primary Care as discussed for further refills.     Patient encouraged to monitor symptoms closely and instructed to follow-up for new or worsening symptoms. Further, in-person, evaluation may be necessary for continued treatment. Please follow up with your primary care doctor or specialist as needed. Verbally discussed plan. Patient confirms understanding and is in agreement with treatment and plan.      You must understand that you've received a Virtual Care evaluation only and that you may be released before all your medical problems are known or treated. You, the patient, will arrange for follow up care as instructed.

## 2025-01-23 ENCOUNTER — PATIENT MESSAGE (OUTPATIENT)
Dept: PRIMARY CARE CLINIC | Facility: CLINIC | Age: 55
End: 2025-01-23
Payer: MEDICAID

## 2025-01-30 ENCOUNTER — PATIENT MESSAGE (OUTPATIENT)
Dept: RHEUMATOLOGY | Facility: CLINIC | Age: 55
End: 2025-01-30
Payer: MEDICAID

## 2025-01-30 DIAGNOSIS — D84.821 DRUG-INDUCED IMMUNODEFICIENCY: ICD-10-CM

## 2025-01-30 DIAGNOSIS — M06.00 SERONEGATIVE RHEUMATOID ARTHRITIS: Primary | ICD-10-CM

## 2025-01-30 DIAGNOSIS — Z79.899 DRUG-INDUCED IMMUNODEFICIENCY: ICD-10-CM

## 2025-02-03 RX ORDER — PREDNISONE 5 MG/1
TABLET ORAL
Qty: 30 TABLET | Refills: 0 | Status: SHIPPED | OUTPATIENT
Start: 2025-02-03 | End: 2025-02-18

## 2025-02-18 ENCOUNTER — TELEPHONE (OUTPATIENT)
Dept: ADMINISTRATIVE | Facility: CLINIC | Age: 55
End: 2025-02-18
Payer: MEDICAID

## 2025-02-21 ENCOUNTER — PATIENT MESSAGE (OUTPATIENT)
Dept: RHEUMATOLOGY | Facility: CLINIC | Age: 55
End: 2025-02-21
Payer: MEDICAID

## 2025-02-27 ENCOUNTER — PATIENT MESSAGE (OUTPATIENT)
Dept: ADMINISTRATIVE | Facility: CLINIC | Age: 55
End: 2025-02-27
Payer: MEDICAID

## 2025-03-12 ENCOUNTER — OFFICE VISIT (OUTPATIENT)
Dept: PODIATRY | Facility: CLINIC | Age: 55
End: 2025-03-12
Payer: MEDICARE

## 2025-03-12 VITALS
DIASTOLIC BLOOD PRESSURE: 77 MMHG | HEIGHT: 60 IN | HEART RATE: 82 BPM | WEIGHT: 247.94 LBS | BODY MASS INDEX: 48.68 KG/M2 | SYSTOLIC BLOOD PRESSURE: 129 MMHG

## 2025-03-12 DIAGNOSIS — M79.89 BILATERAL SWELLING OF FEET: ICD-10-CM

## 2025-03-12 DIAGNOSIS — R20.2 NUMBNESS AND TINGLING OF BOTH FEET: ICD-10-CM

## 2025-03-12 DIAGNOSIS — L60.8 DISCOLORATION OF NAIL: Primary | ICD-10-CM

## 2025-03-12 DIAGNOSIS — M79.675 PAIN OF LEFT GREAT TOE: ICD-10-CM

## 2025-03-12 DIAGNOSIS — B35.1 PAIN DUE TO ONYCHOMYCOSIS OF TOENAIL OF LEFT FOOT: ICD-10-CM

## 2025-03-12 DIAGNOSIS — M79.675 PAIN DUE TO ONYCHOMYCOSIS OF TOENAIL OF LEFT FOOT: ICD-10-CM

## 2025-03-12 DIAGNOSIS — R20.0 NUMBNESS AND TINGLING OF BOTH FEET: ICD-10-CM

## 2025-03-12 PROCEDURE — 99215 OFFICE O/P EST HI 40 MIN: CPT | Mod: PBBFAC,PN | Performed by: PODIATRIST

## 2025-03-12 PROCEDURE — 11720 DEBRIDE NAIL 1-5: CPT | Mod: PBBFAC,PN | Performed by: PODIATRIST

## 2025-03-12 PROCEDURE — 99999 PR PBB SHADOW E&M-EST. PATIENT-LVL V: CPT | Mod: PBBFAC,HCNC,, | Performed by: PODIATRIST

## 2025-03-12 NOTE — PROGRESS NOTES
Subjective:      Patient ID: Gill Cleary is a 54 y.o. female.    Chief Complaint: Nail Problem (Discoloration on toenails), Toe Pain (Left great toe), and Foot Swelling (Right and left foot)    Patient is new to this podiatry clinic w/ mx LE concerns. Scheduled for discoloration of nails for the last yr. L great toe has hurt for this same length of time. States thinks was d/t taking hydrocloroquine & been off it since Aug. Wants tx options.  Also, has swollen feet as well as numbness & tingling ( but this also affects her upper body & face, apparently) - takes Lyrica 150 mg bid. Has had 2 falls in past yr.as well.    PCP Dunbar, Mariah, MD' Lori Ann Sandifer, NP 1/6/25    Lyrica for sciatica. Has RA  Past Medical History:   Diagnosis Date    Alopecia     Diverticulitis     HIV (human immunodeficiency virus infection)     Hyperlipemia     Hypertension     Nausea, vomiting, and diarrhea 04/28/2022    Osteoporosis     Quit using tobacco in remote past 09/10/2020    Sleep apnea     Vertigo      Patient Active Problem List    Diagnosis Date Noted    Shortness of breath 01/04/2025    Nuclear sclerotic cataract of left eye 03/14/2024    Seronegative rheumatoid arthritis 10/24/2023    Stage 3a chronic kidney disease 09/12/2023    Mild episode of recurrent major depressive disorder 08/15/2023    Bilateral carotid artery occlusion 07/08/2022    Severe obesity (BMI 35.0-39.9) with comorbidity 12/05/2021    HIV (human immunodeficiency virus infection)     Hyperlipemia     Hypertension     Osteoporosis     YANICK (obstructive sleep apnea)     Vitamin D deficiency 01/25/2021    Proteinuria 01/25/2021    Gastroesophageal reflux disease 01/25/2021    Hypertensive chronic kidney disease with stage 1 through stage 4 chronic kidney disease, or unspecified chronic kidney disease 01/25/2021    Carotid artery disease 09/10/2020    Chronic nonintractable headache 05/01/2020    Chronic nausea 12/14/2018    Diverticular disease 11/06/2018     Opioid dependence 08/26/2016    Attention deficit hyperactivity disorder, combined type 08/26/2016    Essential hypertension 01/19/2012    Human immunodeficiency virus (HIV) disease 07/16/2008      Objective:      Review of Systems   Constitutional: Negative for malaise/fatigue.   Cardiovascular:  Positive for leg swelling. Negative for claudication.   Respiratory:  Positive for sleep disturbances due to breathing.    Skin:  Positive for color change and dry skin. Negative for poor wound healing, rash and suspicious lesions.   Musculoskeletal:  Positive for falls and myalgias. Negative for joint pain and muscle cramps.   Neurological:  Positive for headaches, numbness, paresthesias and vertigo. Negative for loss of balance.   Psychiatric/Behavioral:  The patient is not nervous/anxious.      Physical Exam  Vitals reviewed.   Constitutional:       General: She is not in acute distress.     Appearance: She is well-developed. She is morbidly obese.   Cardiovascular:      Pulses:           Dorsalis pedis pulses are 2+ on the right side and 2+ on the left side.   Musculoskeletal:         General: Swelling present. No tenderness or signs of injury.      Right lower leg: Edema present.      Left lower leg: Edema present.   Feet:      Right foot:      Toenail Condition: Fungal disease present.     Left foot:      Toenail Condition: Fungal disease present.     Comments: Toenails 1st, 2nd, 3rd, 4th, 5th  B/L are thickened, dystrophic, discolored, w/ crumbly subungual debris. B/L hallux L>R tender to distal nail plate pressure, w/out periungual skin abnormality noted but some loosening from underlying intact nail bed.     Skin:     General: Skin is warm and dry.      Capillary Refill: Capillary refill takes less than 2 seconds.      Findings: No bruising, erythema or lesion.   Neurological:      Mental Status: She is alert and oriented to person, place, and time.      Sensory: Sensory deficit present.      Motor: No weakness.       Gait: Gait normal.      Comments: Paresthesias including numbness & tingling B/L feet w/ no clearly identified trigger or source; no hyperemia.    No TTP & fullness IMS B/L nor w/ lateral met.head compression.   Psychiatric:         Attention and Perception: She is inattentive.         Mood and Affect: Mood and affect normal. Mood is not anxious.         Behavior: Behavior normal. Behavior is cooperative.         Assessment:      Encounter Diagnoses   Name Primary?    Discoloration of nail Yes    Bilateral swelling of feet     Numbness and tingling of both feet     Pain of left great toe     Pain due to onychomycosis of toenail of left foot        Problem List Items Addressed This Visit    None  Visit Diagnoses         Discoloration of nail    -  Primary      Bilateral swelling of feet          Numbness and tingling of both feet          Pain of left great toe        Relevant Orders    Nail debridement B/L hallux      Pain due to onychomycosis of toenail of left foot        Relevant Orders    Nail debridement B/L hallux           Plan:       Gill was seen today for nail problem, toe pain and foot swelling.    Diagnoses and all orders for this visit:    Discoloration of nail    Bilateral swelling of feet    Numbness and tingling of both feet    Pain of left great toe  -     Nail debridement B/L hallux    Pain due to onychomycosis of toenail of left foot  -     Nail debridement B/L hallux    I counseled the patient on her conditions, their implications & medical mgmt.    - Shoe inspection. Patient instructed on proper foot hygeine. We discussed wearing proper shoe gear, daily foot inspections, never walking w/out protective shoe gear.      - W/ patient's permission, nails were aggressively reduced & debrided x 2 B/L hallux to their soft tissue attachment mechanically, removing all offending nail & debris. The area was cleansed w/ alcohol. Patient tolerated the procedure well & related relief of  discomfort.  Patient was advised on appropriate self-debridement of nails w/ correct instrumentation to prevent recurrence of ssx.   She will continue to monitor the areas daily, inspect her feet, wear protective shoe gear when ambulatory, moisturizer to maintain skin integrity & follow in this office p.r.n.  Instructions provided on OTC topical antifungal tx options for nails.    Applied & dispensed tubigrip stockinettes - R foot size E & L foot size F, to be worn whenever out of bed including @ home.    Defer to prescribing provider for parasthesias - currently already on max.Lyrica.        A total of 34 mins.was spent on chart review, patient visit & documentation.

## 2025-03-14 ENCOUNTER — OFFICE VISIT (OUTPATIENT)
Dept: OTOLARYNGOLOGY | Facility: CLINIC | Age: 55
End: 2025-03-14
Payer: MEDICARE

## 2025-03-14 VITALS
HEART RATE: 98 BPM | SYSTOLIC BLOOD PRESSURE: 117 MMHG | WEIGHT: 247 LBS | HEIGHT: 60 IN | DIASTOLIC BLOOD PRESSURE: 80 MMHG | BODY MASS INDEX: 48.49 KG/M2

## 2025-03-14 DIAGNOSIS — R42 VERTIGO: ICD-10-CM

## 2025-03-14 DIAGNOSIS — R26.89 IMBALANCE: ICD-10-CM

## 2025-03-14 DIAGNOSIS — H81.4 CENTRAL POSITIONAL VERTIGO: Primary | ICD-10-CM

## 2025-03-14 DIAGNOSIS — Z91.81 RISK FOR FALLS: ICD-10-CM

## 2025-03-14 DIAGNOSIS — G43.109 MIGRAINOUS VERTIGO: ICD-10-CM

## 2025-03-14 PROCEDURE — 99999 PR PBB SHADOW E&M-EST. PATIENT-LVL V: CPT | Mod: PBBFAC,HCNC,, | Performed by: OTOLARYNGOLOGY

## 2025-03-14 NOTE — PATIENT INSTRUCTIONS
As discussed today, it is somewhat of the confusing picture.  Some of the symptoms are very much consistent with benign paroxysmal positional vertigo but there are no findings of that on examination today it is possible that it is waxing and waning with the nely cornea finding their way into the inner ear canals and back out.  The other more likely possibility is the more central cause.  Given the history of migraines there maybe an element of relapsing and remitting migrainous vertigo.  Patient is to see Dr. Bills in May of this year.  And this should be discussed as well.  We have also ordered a comprehensive audiogram and VNG which will likely be completed before this visit.    Patient has had no benefit from home exercises for vertigo.  I would like her to see physical therapy for evaluation and treatment of this as well as fall prevention.  There significant back issues and knee issues all putting her at higher risk for falls.      No medication changes are recommended at this time.  Information on migraine and migraine diet provided.      Follow up in 2-3 months to assess progress with therapy and review test results.  Return sooner with any concerns.          Voice recognition software was used in the creation of this note/communication and any sound-alike errors which may have occurred from its use should be taken in context when interpreting.  If such errors prevent a clear understanding of the note/communication, please contact the office for clarification.      Luis Ruiz exercises    1. Start sitting upright on the edge of the bed.  2. Turn your head 45 degrees to the left, or as far as is comfortable.  3. Lie down on your right side.  4. Remain in this position for 30 seconds or until any dizziness has subsided.  5. Sit up and turn head back to centre.  6. Turn your head 45 degrees to the right, or as far as is comfortable.  7. Lie down on your left side.  8. Remain in this position for 30 seconds or  until any dizziness has subsided.  9. Sit up and turn head back to centre.The above description is one repetition.    Suggested schedule  Morning 5 repetitions  Afternoon 5 repetitions                Evening 5 repetitions  If the dizziness persists after 2 weeks you may require a further appointment.          References  [email or web address]  [employer], [employer] Physical Therapy for Benign Paroxysmal Positional Vertigo, 1980  [employer] Evidence Clinical Knowledge Summaries, Benign Paroxysmal Positional Vertigo, February 2011

## 2025-03-14 NOTE — PROGRESS NOTES
[facility]    Subjective:      Patient: Gill Cleary Patient PCP: Yaima Burton MD         :  1970     Sex:  female      MRN:  [id number]          Date of Visit: 2025      Chief Complaint: Dizziness      Patient ID: Gill Cleary is a 54 y.o. female     Patient is a  former smoker with a past medical history of HTN (on Lasix, hydralazine, Cardizem, and Inderal)), HLD, CAD, CVD, stage 4 kidney disease, HIV infection, seronegative rheumatoid arthritis (on DMARD Arava), GERD, YANICK, and chronic intractable headache (no actual diagnosis of migraine) on calcium channel blocker and Inderal as well as Effexor 150 mg self-referred for dizziness.    No recent head imaging.  No prior brain MRI.  CT head with and without contrast at [facility] in 2013 for concussion showed no acute intracranial abnormality by report there was some layering of fluid in the right maxillary sinus an otherwise normal mastoids and paranasal sinuses reported.  No images for review.    Labs:  WBC   Date Value Ref Range Status   2025 4.31 3.90 - 12.70 K/uL Final     Hemoglobin   Date Value Ref Range Status   2025 12.5 12.0 - 16.0 g/dL Final     Platelets   Date Value Ref Range Status   2025 190 150 - 450 K/uL Final     Creatinine   Date Value Ref Range Status   2025 0.8 0.5 - 1.4 mg/dL Final     TSH   Date Value Ref Range Status   2024 1.208 0.400 - 4.000 uIU/mL Final     Hemoglobin A1C   Date Value Ref Range Status   2024 5.6 4.0 - 5.6 % Final     Comment:     ADA Screening Guidelines:  5.7-6.4%  Consistent with prediabetes  >or=6.5%  Consistent with diabetes    High levels of fetal hemoglobin interfere with the HbA1C  assay. Heterozygous hemoglobin variants (HbS, HgC, etc)do  not significantly interfere with this assay.   However, presence of multiple variants may affect accuracy.         Past Medical History  She has a past medical history of Alopecia, Diverticulitis, HIV (human immunodeficiency  virus infection), Hyperlipemia, Hypertension, Nausea, vomiting, and diarrhea, Osteoporosis, Quit using tobacco in remote past, Sleep apnea, and Vertigo.    Family / Surgical / Social History  Her family history includes Heart attacks under age 50 in her father; Hypertension in her father and mother; Other in her father and sister; Thyroid disease in her mother.    Past Surgical History:   Procedure Laterality Date    CATARACT EXTRACTION W/  INTRAOCULAR LENS IMPLANT Right 3/14/2024    Procedure: EXTRACTION, CATARACT, WITH IOL INSERTION;  Surgeon: Kim Aldrich MD;  Location: [facility] OR;  Service: Ophthalmology;  Laterality: Right;    CATARACT EXTRACTION W/  INTRAOCULAR LENS IMPLANT Left 2024    Procedure: EXTRACTION, CATARACT, WITH IOL INSERTION;  Surgeon: Kim Aldrich MD;  Location: [facility] OR;  Service: Ophthalmology;  Laterality: Left;    LAPAROSCOPIC SIGMOIDECTOMY N/A 12/10/2018    Procedure: COLECTOMY, SIGMOID, LAPAROSCOPIC-;  Surgeon: Reyes Nelson MD;  [facility];  Service: General;  Laterality: N/A;    LAPAROSCOPIC TOTAL HYSTERECTOMY  2018    URETERAL STENT PLACEMENT Left 12/10/2018    Procedure: INSERTION, STENT, URETER;  Surgeon: Reyes Nelson MD;  Location: [facility] OR [facility];  Service: General;  Laterality: Left;       Social History     Tobacco Use    Smoking status: Former     Current packs/day: 0.00     Average packs/day: 0.5 packs/day for 21.0 years (10.5 ttl pk-yrs)     Types: Cigarettes     Start date:      Quit date:      Years since quittin.2    Smokeless tobacco: Never   Substance and Sexual Activity    Alcohol use: No    Drug use: Never    Sexual activity: Never       Medications  She has a current medication list which includes the following prescription(s): albuterol-ipratropium, atorvastatin, biktarvy, carboxymethylcellulose, cetirizine, diltiazem, furosemide, hydralazine, hydroxyzine pamoate, leflunomide, lisdexamfetamine, meclizine, meloxicam,  montelukast, omeprazole, oxycodone, pregabalin, propranolol, risperidone, urea, venlafaxine, venlafaxine, venlafaxine, and vyvanse.      Allergies  Review of patient's allergies indicates:   Allergen Reactions    Ace inhibitors Other (See Comments) and Rash     dizziness    dizziness  dizziness  dizziness      Efavirenz-emtricitabin-tenofov Other (See Comments)     dizziness    dizziness  dizziness  dizziness    Penicillins Hives, Nausea And Vomiting and Other (See Comments)     Hives (skin)^ and causes yeast infection  Hives (skin)^      Pork extract      Pt would like pork added  Because of strong Faith beliefs    Pork/porcine containing products      Pt would like pork added  Because of strong Faith beliefs    Tramadol Nausea And Vomiting and Other (See Comments)    Emtricita-rilpivirine-tenof df Other (See Comments)     Affecting patients kidneys    Affecting patients kidneys  Affecting patients kidneys  Affecting patients kidneys    Lactase     Lisinopril     Tilactase     Amlodipine Rash    Clonidine Rash    Doxazosin Rash    Hydrochlorothiazide Rash and Other (See Comments)    Hydrocodone-acetaminophen Hives, Itching and Rash    Labetalol Rash    Metoprolol Rash    Nebivolol hcl Rash       All medications, allergies, and past history have been reviewed.    Objective:      Vitals:      1/4/2025     9:21 AM 3/12/2025     2:29 PM 3/14/2025     1:49 PM   Vitals - 1 value per visit   SYSTOLIC 120 129 117   DIASTOLIC 78 77 80   Pulse 106 82 98   Temp 97.8 °F (36.6 °C)     Resp 20     SPO2 98 %     Weight (lb) 247 247.91 247   Weight (kg) 112.038 112.45 112.038   Height 5' (1.524 m) 5' (1.524 m) 5' (1.524 m)   BMI (Calculated) 48.2 48.4 48.2   Pain Score  Zero Seven       Body surface area is 2.18 meters squared.    Physical Exam:    GENERAL  APPEARANCE -  alert, appears stated age, cooperative, and morbidly obese  BARRIER(S) TO COMMUNICATION -  none VOICE - appropriate for age and  gender    INTEGUMENTARY  no suspicious head and neck lesions    HEENT  HEAD: Normocephalic, without obvious abnormality, atraumatic  FACE: INSPECTION - Symmetric, no signs of trauma, no suspicious lesion(s)      STRENGTH - facial symmetry intact     PALPATION -  No masses     SALIVARY GLANDS - non-tender with no appreciable mass    NECK/THYROID: normal atraumatic, no neck masses, normal thyroid, no jvd    EYES  Normal occular alignment and mobility with no visible nystagmus at rest    EARS/NOSE/MOUTH/THROAT  EARS  PINNAE AND EXTERNAL EARS - no suspicious lesion OTOSCOPIC EXAM (surgical microscopy was not used for visualization/instrumentation): EAR EXAM - Normal ear canals, tympanic membranes and mobility, and middle ear spaces bilaterally.  HEARING - grossly intact to voice/finger rub    NOSE AND SINUSES  EXTERNAL NOSE - Grossly normal for age/sex  SEPTUM - normal/no obstruction on anterior exam without decongestion TURBINATES - within normal limits MUCOSA - within normal limits     MOUTH AND THROAT   ORAL CAVITY, LIPS, TEETH, GUMS & TONGUE - moist, no suspicious lesions  OROPHARYNX /TONSILS/PHARYNGEAL WALLS/HYPOPHARYNX - no erythema or exudates  NASOPHARYNX - limited mirror exam - unable to visualize due to anatomy/gag  LARYNX -  - limited mirror exam - unable to visualize due to anatomy/gag      CHEST AND LUNG   INSPECTION & AUSCULTATION - normal effort, no stridor    CARDIOVASCULAR  AUSCULTATION & PERIPHERAL VASCULAR - regular rate and rhythm.    NEUROLOGIC  MENTAL STATUS - alert, interactive CRANIAL NERVES - normal   PAGE-HALLPIKE - RIGHT - no nystagmus LEFT - no nystagmus  HEAD THRUSTS - Catch up saccades absent bilaterally   FUKUDA - unable to tolerate  RHOMBERG - Tandem (No) - falls forward  GAIT - guarded, poor foot lifting    LYMPHATIC  HEAD AND NECK - non-palpable; SUPRACLAVICULAR - deferred      Procedure(s):  None          Assessment:      Problem List Items Addressed This Visit    None  Visit Diagnoses          Central positional vertigo    -  Primary      Vertigo          Migrainous vertigo          Risk for falls          Imbalance                     Plan:      As discussed today, it is somewhat of the confusing picture.  Some of the symptoms are very much consistent with benign paroxysmal positional vertigo but there are no findings of that on examination today it is possible that it is waxing and waning with the nely cornea finding their way into the inner ear canals and back out.  The other more likely possibility is the more central cause.  Given the history of migraines there maybe an element of relapsing and remitting migrainous vertigo.  Patient is to see Dr. Bills in May of this year.  And this should be discussed as well.  We have also ordered a comprehensive audiogram and VNG which will likely be completed before this visit.    Patient has had no benefit from home exercises for vertigo.  I would like her to see physical therapy for evaluation and treatment of this as well as fall prevention.  There significant back issues and knee issues all putting her at higher risk for falls.      No medication changes are recommended at this time.  Information on migraine and migraine diet provided.      Follow up in 2-3 months to assess progress with therapy and review test results.  Return sooner with any concerns.          Voice recognition software was used in the creation of this note/communication and any sound-alike errors which may have occurred from its use should be taken in context when interpreting.  If such errors prevent a clear understanding of the note/communication, please contact the office for clarification.

## 2025-03-20 ENCOUNTER — PATIENT MESSAGE (OUTPATIENT)
Dept: ORTHOPEDICS | Facility: CLINIC | Age: 55
End: 2025-03-20
Payer: MEDICARE

## 2025-03-20 ENCOUNTER — PATIENT MESSAGE (OUTPATIENT)
Dept: OTOLARYNGOLOGY | Facility: CLINIC | Age: 55
End: 2025-03-20
Payer: MEDICARE

## 2025-03-20 DIAGNOSIS — G43.109 MIGRAINOUS VERTIGO: ICD-10-CM

## 2025-03-20 DIAGNOSIS — H81.4 CENTRAL POSITIONAL VERTIGO: Primary | ICD-10-CM

## 2025-03-20 DIAGNOSIS — R26.89 IMBALANCE: ICD-10-CM

## 2025-03-20 NOTE — PROCEDURES
Nail debridement B/L hallux    Date/Time: 3/12/2025 2:00 PM    Performed by: Rocio Petersen DPM  Authorized by: Rocio Petersen DPM    Consent Done?:  Yes (Verbal)    Nail Care Type:  Debride(Left 1st Toe and Right 1st Toe)

## 2025-03-21 ENCOUNTER — PATIENT MESSAGE (OUTPATIENT)
Dept: BARIATRICS | Facility: CLINIC | Age: 55
End: 2025-03-21
Payer: MEDICARE

## 2025-03-24 ENCOUNTER — TELEPHONE (OUTPATIENT)
Dept: BARIATRICS | Facility: CLINIC | Age: 55
End: 2025-03-24
Payer: MEDICARE

## 2025-03-24 NOTE — TELEPHONE ENCOUNTER
----- Message from Pati sent at 3/24/2025 10:05 AM CDT -----  Regarding: schedule consult  Contact: Pt @ [phone]  Pt is calling asking to speak with someone in the office to schedule first initial appt with provider. Pt has completed FS appt and is now ready to schedule with a provider. Pt is asking for a return call back to schedule soon. Please call. Thanks. Additional Info: No Friday appt and pt prefers afternoon about 11am or noon

## 2025-03-24 NOTE — TELEPHONE ENCOUNTER
Called pt to schedule with Dr. Cerna. Pt has been successfully scheduled and verbalized understanding of time/date. Call was disconnected.

## 2025-03-25 ENCOUNTER — TELEPHONE (OUTPATIENT)
Dept: AUDIOLOGY | Facility: CLINIC | Age: 55
End: 2025-03-25
Payer: MEDICARE

## 2025-04-03 ENCOUNTER — PATIENT MESSAGE (OUTPATIENT)
Dept: PRIMARY CARE CLINIC | Facility: CLINIC | Age: 55
End: 2025-04-03
Payer: MEDICARE

## 2025-04-04 DIAGNOSIS — N18.9 ANEMIA IN CHRONIC KIDNEY DISEASE, UNSPECIFIED CKD STAGE: Primary | ICD-10-CM

## 2025-04-04 DIAGNOSIS — D63.1 ANEMIA IN CHRONIC KIDNEY DISEASE, UNSPECIFIED CKD STAGE: Primary | ICD-10-CM

## 2025-04-07 ENCOUNTER — OFFICE VISIT (OUTPATIENT)
Dept: CARDIOLOGY | Facility: CLINIC | Age: 55
End: 2025-04-07
Payer: MEDICARE

## 2025-04-07 VITALS
OXYGEN SATURATION: 99 % | BODY MASS INDEX: 48.21 KG/M2 | SYSTOLIC BLOOD PRESSURE: 132 MMHG | HEART RATE: 84 BPM | HEIGHT: 60 IN | WEIGHT: 245.56 LBS | DIASTOLIC BLOOD PRESSURE: 78 MMHG

## 2025-04-07 DIAGNOSIS — I65.23 BILATERAL CAROTID ARTERY OCCLUSION: ICD-10-CM

## 2025-04-07 DIAGNOSIS — E66.01 SEVERE OBESITY (BMI 35.0-39.9) WITH COMORBIDITY: ICD-10-CM

## 2025-04-07 DIAGNOSIS — R06.02 SOB (SHORTNESS OF BREATH): ICD-10-CM

## 2025-04-07 DIAGNOSIS — I65.23 CAROTID ARTERY STENOSIS, SYMPTOMATIC, BILATERAL: Primary | ICD-10-CM

## 2025-04-07 DIAGNOSIS — N18.31 STAGE 3A CHRONIC KIDNEY DISEASE: ICD-10-CM

## 2025-04-07 DIAGNOSIS — I10 ESSENTIAL HYPERTENSION: ICD-10-CM

## 2025-04-07 DIAGNOSIS — E78.2 MIXED HYPERLIPIDEMIA: ICD-10-CM

## 2025-04-07 DIAGNOSIS — R06.09 DOE (DYSPNEA ON EXERTION): ICD-10-CM

## 2025-04-07 DIAGNOSIS — G47.33 OSA (OBSTRUCTIVE SLEEP APNEA): ICD-10-CM

## 2025-04-07 PROCEDURE — 3075F SYST BP GE 130 - 139MM HG: CPT | Mod: HCNC,CPTII,S$GLB,

## 2025-04-07 PROCEDURE — 1159F MED LIST DOCD IN RCRD: CPT | Mod: HCNC,CPTII,S$GLB,

## 2025-04-07 PROCEDURE — 99999 PR PBB SHADOW E&M-EST. PATIENT-LVL V: CPT | Mod: PBBFAC,HCNC,,

## 2025-04-07 PROCEDURE — 3078F DIAST BP <80 MM HG: CPT | Mod: HCNC,CPTII,S$GLB,

## 2025-04-07 PROCEDURE — 3008F BODY MASS INDEX DOCD: CPT | Mod: HCNC,CPTII,S$GLB,

## 2025-04-07 PROCEDURE — G2211 COMPLEX E/M VISIT ADD ON: HCPCS | Mod: HCNC,S$GLB,,

## 2025-04-07 PROCEDURE — 99204 OFFICE O/P NEW MOD 45 MIN: CPT | Mod: HCNC,S$GLB,,

## 2025-04-07 NOTE — PROGRESS NOTES
[facility] - Cardiology [facility] [address]  Cardiology Clinic Note      Chief Complaint  Chief Complaint   Patient presents with    Establish Care       HPI:  Ms. Cleary is a 54-year-old female with a past medical history of opiate dependency, ADHD, MDD, vitamin-D deficiency, osteoporosis, chronic kidney disease stage 3, HIV, obesity, GERD, YANICK, HLD, hypertension, CAD?, bilateral carotid artery occlusion?    Patient is new to me and here to establish care  Appreciate referral from pulmonology  Previous cardiologist at [facility] and now establishing care with multiple providers at [facility]  Has headaches, lightheadedness, SOB/BECKFORD  Has a history of 50% occluded bilat carotid stenosis  Ok diet and no exercise routine  Former tobacco use; stopped 15yrs ago  Father had a heart attack at 35yrs  Doing well with medications  Takes Lasix for bloating  Takes propanolol for heart palpitations  Denies chest pain  She is independent and active without any debilities    EKG normal sinus rhythm heart rate 90s    Medications  Current Medications[1]     History  Past Medical History:   Diagnosis Date    Alopecia     Diverticulitis     HIV (human immunodeficiency virus infection)     Hyperlipemia     Hypertension     Nausea, vomiting, and diarrhea 04/28/2022    Osteoporosis     Quit using tobacco in remote past 09/10/2020    Sleep apnea     Vertigo      Past Surgical History:   Procedure Laterality Date    CATARACT EXTRACTION W/  INTRAOCULAR LENS IMPLANT Right 3/14/2024    Procedure: EXTRACTION, CATARACT, WITH IOL INSERTION;  Surgeon: Kim Aldrich MD;  Location: [facility] OR;  Service: Ophthalmology;  Laterality: Right;    CATARACT EXTRACTION W/  INTRAOCULAR LENS IMPLANT Left 5/2/2024    Procedure: EXTRACTION, CATARACT, WITH IOL INSERTION;  Surgeon: Kim Aldrich MD;  Location: [facility] OR;  Service: Ophthalmology;  Laterality: Left;    LAPAROSCOPIC SIGMOIDECTOMY N/A 12/10/2018    Procedure: COLECTOMY, SIGMOID, LAPAROSCOPIC-;  Surgeon: Reyes WHITLEY  MD Omar;  Location: [address];  Service: General;  Laterality: N/A;    LAPAROSCOPIC TOTAL HYSTERECTOMY  2018    URETERAL STENT PLACEMENT Left 12/10/2018    Procedure: INSERTION, STENT, URETER;  Surgeon: Reyes Nelson MD;  Location: [address];  Service: General;  Laterality: Left;     Social History[2]  Family History   Problem Relation Name Age of Onset    Hypertension Mother      Thyroid disease Mother      Hypertension Father      Heart attacks under age 50 Father      Other Father          Brain tumor    Other Sister          Brain tumor        Allergies  Review of patient's allergies indicates:   Allergen Reactions    Ace inhibitors Other (See Comments) and Rash     dizziness    dizziness  dizziness  dizziness      Efavirenz-emtricitabin-tenofov Other (See Comments)     dizziness    dizziness  dizziness  dizziness    Penicillins Hives, Nausea And Vomiting and Other (See Comments)     Hives (skin)^ and causes yeast infection  Hives (skin)^      Pork extract      Pt would like pork added  Because of strong [employer] beliefs    Pork/porcine containing products      Pt would like pork added  Because of strong [employer] beliefs    Tramadol Nausea And Vomiting and Other (See Comments)    Emtricita-rilpivirine-tenof df Other (See Comments)     Affecting patients kidneys    Affecting patients kidneys  Affecting patients kidneys  Affecting patients kidneys    Lactase     Lisinopril     Tilactase     Amlodipine Rash    Clonidine Rash    Doxazosin Rash    Hydrochlorothiazide Rash and Other (See Comments)    Hydrocodone-acetaminophen Hives, Itching and Rash    Labetalol Rash    Metoprolol Rash    Nebivolol hcl Rash       Review of Systems   Review of Systems   Constitutional: Negative for chills, decreased appetite, diaphoresis, fever, malaise/fatigue, weight gain and weight loss.   Eyes:  Negative for blurred vision.   Cardiovascular:  Negative for chest pain, claudication, dyspnea on exertion,  irregular heartbeat, leg swelling, near-syncope, orthopnea, palpitations, paroxysmal nocturnal dyspnea and syncope.   Respiratory:  Negative for cough, shortness of breath, snoring, sputum production and wheezing.    Endocrine: Negative for cold intolerance, heat intolerance, polydipsia, polyphagia and polyuria.   Skin:  Negative for color change, dry skin, itching, nail changes and poor wound healing.   Musculoskeletal:  Negative for back pain, gout, joint pain and joint swelling.   Gastrointestinal:  Negative for bloating, abdominal pain, constipation, diarrhea, hematemesis, hematochezia, melena, nausea and vomiting.   Genitourinary:  Negative for dysuria and hematuria.   Neurological:  Negative for dizziness, headaches, light-headedness, numbness, paresthesias and weakness.   Psychiatric/Behavioral:  Negative for altered mental status, depression and memory loss.        Physical Exam  Vitals:    04/07/25 1313   BP: 132/78   Pulse: 84     Wt Readings from Last 1 Encounters:   04/07/25 111.4 kg (245 lb 9.5 oz)     Physical Exam  Constitutional:       General: She is not in acute distress.     Appearance: She is obese.   HENT:      Head: Normocephalic and atraumatic.      Mouth/Throat:      Mouth: Mucous membranes are moist.   Eyes:      Extraocular Movements: Extraocular movements intact.      Pupils: Pupils are equal, round, and reactive to light.   Neck:      Vascular: No carotid bruit or JVD.   Cardiovascular:      Rate and Rhythm: Normal rate and regular rhythm.      Heart sounds: No murmur heard.     No friction rub. No gallop.   Pulmonary:      Effort: Pulmonary effort is normal.      Breath sounds: Normal breath sounds.   Abdominal:      General: Abdomen is flat.      Palpations: Abdomen is soft.   Musculoskeletal:      Right lower leg: No edema.      Left lower leg: No edema.   Skin:     General: Skin is warm.      Capillary Refill: Capillary refill takes less than 2 seconds.   Neurological:      General:  No focal deficit present.   Psychiatric:         Mood and Affect: Mood normal.         Labs  Admission on 01/04/2025, Discharged on 01/04/2025   Component Date Value Ref Range Status    QRS Duration 01/04/2025 68  ms Final    [facility] QTC Calculation 01/04/2025 479  ms Final    SARS-CoV-2 RNA, Amplification, Qual 01/04/2025 Negative  Negative Final    Comment: This test utilizes isothermal nucleic acid amplification technology   to   detect the SARS-CoV-2 RdRp nucleic acid segment. The analytical   sensitivity   (limit of detection) is 500 copies/swab.    A POSITIVE result is indicative of the presence of SARS-CoV-2 RNA;   clinical   correlation with patient history and other diagnostic information is   necessary to determine patient infection status.    A NEGATIVE result means that SARS-CoV-2 nucleic acids are not present   above   the limit of detection. A NEGATIVE result should be treated as   presumptive.   It does not rule out the possibility of COVID-19 and should not be   the sole   basis for treatment decisions.    If COVID-19 is strongly suspected based on clinical and exposure   history,   re-testing using an alternate molecular assay should be considered.    This test is Food and Drug Administration (FDA) approved. Performance   characteristics of this has been independently verified by [facility] Department of Pat                           hology and Laboratory Medicine.      Influenza A, Molecular 01/04/2025 Negative  Negative Final    Influenza B, Molecular 01/04/2025 Negative  Negative Final    Flu A & B Source 01/04/2025 Nasal swab   Final    WBC 01/04/2025 4.31  3.90 - 12.70 K/uL Final    RBC 01/04/2025 4.24  4.00 - 5.40 M/uL Final    Hemoglobin 01/04/2025 12.5  12.0 - 16.0 g/dL Final    Hematocrit 01/04/2025 38.9  37.0 - 48.5 % Final    MCV 01/04/2025 92  82 - 98 fL Final    MCH 01/04/2025 29.5  27.0 - 31.0 pg Final    MCHC 01/04/2025 32.1  32.0 - 36.0 g/dL Final    RDW 01/04/2025 13.9   11.5 - 14.5 % Final    Platelets 01/04/2025 190  150 - 450 K/uL Final    MPV 01/04/2025 13.7 (H)  9.2 - 12.9 fL Final    Immature Granulocytes 01/04/2025 0.2  0.0 - 0.5 % Final    Gran # (ANC) 01/04/2025 2.4  1.8 - 7.7 K/uL Final    Immature Grans (Abs) 01/04/2025 0.01  0.00 - 0.04 K/uL Final    Comment: Mild elevation in immature granulocytes is non specific and   can be seen in a variety of conditions including stress response,   acute inflammation, trauma and pregnancy. Correlation with other   laboratory and clinical findings is essential.      Lymph # 01/04/2025 1.2  1.0 - 4.8 K/uL Final    Mono # 01/04/2025 0.6  0.3 - 1.0 K/uL Final    Eos # 01/04/2025 0.1  0.0 - 0.5 K/uL Final    Baso # 01/04/2025 0.03  0.00 - 0.20 K/uL Final    nRBC 01/04/2025 0  0 /100 WBC Final    Gran % 01/04/2025 55.3  38.0 - 73.0 % Final    Lymph % 01/04/2025 27.8  18.0 - 48.0 % Final    Mono % 01/04/2025 13.9  4.0 - 15.0 % Final    Eosinophil % 01/04/2025 2.1  0.0 - 8.0 % Final    Basophil % 01/04/2025 0.7  0.0 - 1.9 % Final    Differential Method 01/04/2025 Automated   Final    Sodium 01/04/2025 145  136 - 145 mmol/L Final    Potassium 01/04/2025 3.5  3.5 - 5.1 mmol/L Final    Chloride 01/04/2025 109  95 - 110 mmol/L Final    CO2 01/04/2025 26  23 - 29 mmol/L Final    Glucose 01/04/2025 142 (H)  70 - 110 mg/dL Final    BUN 01/04/2025 6  6 - 20 mg/dL Final    Creatinine 01/04/2025 0.8  0.5 - 1.4 mg/dL Final    Calcium 01/04/2025 9.2  8.7 - 10.5 mg/dL Final    Total Protein 01/04/2025 7.4  6.0 - 8.4 g/dL Final    Albumin 01/04/2025 3.6  3.5 - 5.2 g/dL Final    Total Bilirubin 01/04/2025 0.4  0.1 - 1.0 mg/dL Final    Comment: For infants and newborns, interpretation of results should be based  on gestational age, weight and in agreement with clinical  observations.    Premature Infant recommended reference ranges:  Up to 24 hours.............<8.0 mg/dL  Up to 48 hours............<12.0 mg/dL  3-5 days..................<15.0 mg/dL  6-29  days.................<15.0 mg/dL      Alkaline Phosphatase 01/04/2025 87  40 - 150 U/L Final    AST 01/04/2025 27  10 - 40 U/L Final    ALT 01/04/2025 27  10 - 44 U/L Final    eGFR 01/04/2025 >60.0  >60 mL/min/1.73 m^2 Final    Anion Gap 01/04/2025 10  8 - 16 mmol/L Final    Troponin I High Sensitivity 01/04/2025 <3  0 - 14 ng/L Final    BNP 01/04/2025 24  0 - 99 pg/mL Final    Values of less than 100 pg/ml are consistent with non-CHF populations.    QRS Duration 01/04/2025 72  ms Final    [facility] QTC Calculation 01/04/2025 474  ms Final    HIV-1 ULTRAQUANT 01/04/2025 <20 (A)  <20 Copies/mL Final    Log HIV Copies/mL 01/04/2025 <1.30 (A)  <1.30 Log Copies/mL Final    HIV-1 RNA, QUALITATIVE 01/04/2025 Detected (A)  Not Detected Final    Comment: This procedure utilizes a real-time reverse-transcriptase polymerase  chain reaction test from [employer] to amplify a portion of the  radha/integrase region of the HIV-1 genome. The test may be used to aid  in assessing the viral response to antiretroviral treatment as  measured by changes in plasma HIV-1 RNA levels. This test should not  be used to establish a diagnosis of HIV-1 infection. The lower limit  of quantitation is <20 Copies/mL (<1.30 Log IU/mL)and the upper limit  of quantitation is 6 million Copies/mL (6.78 Log IU/mL).     Specimens reported as Detected but <20 Copies/mL contain detectable  levels of HIV-1 RNA but the viral load is below the limit of   quantitation. A Not Detected result does not rule out HIV-1   infection,   clinical correlation is recommended.     Lab Visit on 12/17/2024   Component Date Value Ref Range Status    NIL 12/17/2024 0.64660  IU/mL Final    Comment: The Nil tube value is used to determine if the patient   has a preexisting immune response which could cause a   false-positive reading on the test.   For a test to be valid, the NIL tube must have a value   of less than or equal to 8.0 IU/mL.  The mitogen control tube is used to assure  the patient   has a healthy immune status and also serves as a control   for correct blood handling and incubation. It is used to   detect false negative readings.   The TB antigen tubes are coated with the M. tuberculosis   specific antigens. For a test to be considered positive,   at least one of the TB antigen tube values minus the Nil   tube value must be greater than or equal to 0.35 IU/mL   and be greater than or equal to 25% of the Nil tube value.  Diagnosing or excluding tuberculosis disease, and assessing   the probability of LTNI, requires a combination of   epidemiological, historical, medical, and diagnostic   findings that should be considered when interpreting   the test results.       TB1 - Nil 12/17/2024 -0.022  IU/mL Final    TB2 - Nil 12/17/2024 0.014  IU/mL Final    Mitogen - Nil 12/17/2024 9.902  IU/mL Final    TB Gold Plus 12/17/2024 Negative  Negative Final    M. tuberculosis infection NOT likely.   Lab Visit on 12/17/2024   Component Date Value Ref Range Status    WBC 12/17/2024 6.54  3.90 - 12.70 K/uL Final    RBC 12/17/2024 4.88  4.00 - 5.40 M/uL Final    Hemoglobin 12/17/2024 14.3  12.0 - 16.0 g/dL Final    Hematocrit 12/17/2024 45.0  37.0 - 48.5 % Final    MCV 12/17/2024 92  82 - 98 fL Final    MCH 12/17/2024 29.3  27.0 - 31.0 pg Final    MCHC 12/17/2024 31.8 (L)  32.0 - 36.0 g/dL Final    RDW 12/17/2024 13.5  11.5 - 14.5 % Final    Platelets 12/17/2024 208  150 - 450 K/uL Final    MPV 12/17/2024 13.5 (H)  9.2 - 12.9 fL Final    Immature Granulocytes 12/17/2024 0.3  0.0 - 0.5 % Final    Gran # (ANC) 12/17/2024 3.5  1.8 - 7.7 K/uL Final    Immature Grans (Abs) 12/17/2024 0.02  0.00 - 0.04 K/uL Final    Comment: Mild elevation in immature granulocytes is non specific and   can be seen in a variety of conditions including stress response,   acute inflammation, trauma and pregnancy. Correlation with other   laboratory and clinical findings is essential.      Lymph # 12/17/2024 2.2  1.0 - 4.8  K/uL Final    Mono # 12/17/2024 0.7  0.3 - 1.0 K/uL Final    Eos # 12/17/2024 0.1  0.0 - 0.5 K/uL Final    Baso # 12/17/2024 0.07  0.00 - 0.20 K/uL Final    nRBC 12/17/2024 0  0 /100 WBC Final    Gran % 12/17/2024 53.4  38.0 - 73.0 % Final    Lymph % 12/17/2024 33.9  18.0 - 48.0 % Final    Mono % 12/17/2024 10.2  4.0 - 15.0 % Final    Eosinophil % 12/17/2024 1.1  0.0 - 8.0 % Final    Basophil % 12/17/2024 1.1  0.0 - 1.9 % Final    Platelet Estimate 12/17/2024 Appears normal   Final    Differential Method 12/17/2024 Automated   Final    Sodium 12/17/2024 142  136 - 145 mmol/L Final    Potassium 12/17/2024 3.6  3.5 - 5.1 mmol/L Final    Chloride 12/17/2024 107  95 - 110 mmol/L Final    CO2 12/17/2024 24  23 - 29 mmol/L Final    Glucose 12/17/2024 109  70 - 110 mg/dL Final    BUN 12/17/2024 7  6 - 20 mg/dL Final    Creatinine 12/17/2024 1.0  0.5 - 1.4 mg/dL Final    Calcium 12/17/2024 9.7  8.7 - 10.5 mg/dL Final    Total Protein 12/17/2024 8.1  6.0 - 8.4 g/dL Final    Albumin 12/17/2024 4.1  3.5 - 5.2 g/dL Final    Total Bilirubin 12/17/2024 0.4  0.1 - 1.0 mg/dL Final    Comment: For infants and newborns, interpretation of results should be based  on gestational age, weight and in agreement with clinical  observations.    Premature Infant recommended reference ranges:  Up to 24 hours.............<8.0 mg/dL  Up to 48 hours............<12.0 mg/dL  3-5 days..................<15.0 mg/dL  6-29 days.................<15.0 mg/dL      Alkaline Phosphatase 12/17/2024 95  40 - 150 U/L Final    AST 12/17/2024 28  10 - 40 U/L Final    ALT 12/17/2024 36  10 - 44 U/L Final    eGFR 12/17/2024 >60.0  >60 mL/min/1.73 m^2 Final    Anion Gap 12/17/2024 11  8 - 16 mmol/L Final    CRP 12/17/2024 12.9 (H)  0.0 - 8.2 mg/L Final    Sed Rate 12/17/2024 29 (H)  0 - 20 mm/Hr Final    Hepatitis C Ab 12/17/2024 Non-reactive  Non-reactive Final    Hepatitis B Surface Ag 12/17/2024 Non-reactive  Non-reactive Final    Hep B Core Total Ab 12/17/2024  Non-reactive  Non-reactive Final    Hep B S Ab 12/17/2024 <3.00  mIU/mL Final    Hep B S Ab 12/17/2024 Non-reactive   Final    Individual is considered not immune to HBV infection.    WBC 12/17/2024 6.54  3.90 - 12.70 K/uL Final    RBC 12/17/2024 4.88  4.00 - 5.40 M/uL Final    Hemoglobin 12/17/2024 14.3  12.0 - 16.0 g/dL Final    Hematocrit 12/17/2024 45.0  37.0 - 48.5 % Final    MCV 12/17/2024 92  82 - 98 fL Final    MCH 12/17/2024 29.3  27.0 - 31.0 pg Final    MCHC 12/17/2024 31.8 (L)  32.0 - 36.0 g/dL Final    RDW 12/17/2024 13.5  11.5 - 14.5 % Final    Platelets 12/17/2024 208  150 - 450 K/uL Final    MPV 12/17/2024 13.5 (H)  9.2 - 12.9 fL Final    Immature Granulocytes 12/17/2024 0.3  0.0 - 0.5 % Final    Gran # (ANC) 12/17/2024 3.5  1.8 - 7.7 K/uL Final    Immature Grans (Abs) 12/17/2024 0.02  0.00 - 0.04 K/uL Final    Comment: Mild elevation in immature granulocytes is non specific and   can be seen in a variety of conditions including stress response,   acute inflammation, trauma and pregnancy. Correlation with other   laboratory and clinical findings is essential.      Lymph # 12/17/2024 2.2  1.0 - 4.8 K/uL Final    Mono # 12/17/2024 0.7  0.3 - 1.0 K/uL Final    Eos # 12/17/2024 0.1  0.0 - 0.5 K/uL Final    Baso # 12/17/2024 0.07  0.00 - 0.20 K/uL Final    nRBC 12/17/2024 0  0 /100 WBC Final    Gran % 12/17/2024 53.4  38.0 - 73.0 % Final    Lymph % 12/17/2024 33.9  18.0 - 48.0 % Final    Mono % 12/17/2024 10.2  4.0 - 15.0 % Final    Eosinophil % 12/17/2024 1.1  0.0 - 8.0 % Final    Basophil % 12/17/2024 1.1  0.0 - 1.9 % Final    Platelet Estimate 12/17/2024 Appears normal   Final    Differential Method 12/17/2024 Automated   Final    Glucose 12/17/2024 109  70 - 110 mg/dL Final    Sodium 12/17/2024 142  136 - 145 mmol/L Final    Potassium 12/17/2024 3.6  3.5 - 5.1 mmol/L Final    Chloride 12/17/2024 107  95 - 110 mmol/L Final    CO2 12/17/2024 24  23 - 29 mmol/L Final    BUN 12/17/2024 7  6 - 20 mg/dL  Final    Calcium 12/17/2024 9.7  8.7 - 10.5 mg/dL Final    Creatinine 12/17/2024 1.0  0.5 - 1.4 mg/dL Final    Albumin 12/17/2024 4.1  3.5 - 5.2 g/dL Final    Phosphorus 12/17/2024 2.2 (L)  2.7 - 4.5 mg/dL Final    eGFR 12/17/2024 >60.0  >60 mL/min/1.73 m^2 Final    Anion Gap 12/17/2024 11  8 - 16 mmol/L Final    PTH, Intact 12/17/2024 78.0 (H)  9.0 - 77.0 pg/mL Final   Lab Visit on 12/17/2024   Component Date Value Ref Range Status    Specimen UA 12/17/2024 Urine, Clean Catch   Final    Color, UA 12/17/2024 Yellow  Yellow, Straw, [place] Final    Appearance, UA 12/17/2024 Clear  Clear Final    pH, UA 12/17/2024 6.0  5.0 - 8.0 Final    Specific Gravity, UA 12/17/2024 >1.030 (A)  1.005 - 1.030 Final    Protein, UA 12/17/2024 1+ (A)  Negative Final    Comment: Recommend a 24 hour urine protein or a urine   protein/creatinine ratio if globulin induced proteinuria is  clinically suspected.      Glucose, UA 12/17/2024 Negative  Negative Final    Ketones, UA 12/17/2024 Trace (A)  Negative Final    Bilirubin (UA) 12/17/2024 Negative  Negative Final    Occult Blood UA 12/17/2024 Negative  Negative Final    Nitrite, UA 12/17/2024 Negative  Negative Final    Urobilinogen, UA 12/17/2024 Negative  Negative EU/dL Final    Leukocytes, UA 12/17/2024 Trace (A)  Negative Final    Protein, Urine Random 12/17/2024 37 (H)  0 - 15 mg/dL Final    Creatinine, Urine 12/17/2024 347.0 (H)  15.0 - 325.0 mg/dL Final    Prot/Creat Ratio, Urine 12/17/2024 0.11  0.00 - 0.20 Final    RBC, UA 12/17/2024 0  0 - 4 /hpf Final    WBC, UA 12/17/2024 2  0 - 5 /hpf Final    Bacteria 12/17/2024 None  None-Occ /hpf Final    Squam Epithel, UA 12/17/2024 5  /hpf Final    Hyaline Casts, UA 12/17/2024 3 (A)  0-1/lpf /lpf Final    Microscopic Comment 12/17/2024 SEE COMMENT   Final    Comment: Other formed elements not mentioned in the report are not   present in the microscopic examination.      Clinical Support on 12/17/2024   Component Date Value Ref Range  Status    Post FVC 12/17/2024 2.28  2.06 - 3.45 L Preliminary    Post FEV1 12/17/2024 1.86  1.63 - 2.74 L Preliminary    Post FEV1 FVC 12/17/2024 81.52  69.27 - 90.04 % Preliminary    Post FEF 25 75 12/17/2024 1.92  1.65 - 4.45 L/s Preliminary    Post PEF 12/17/2024 4.43  3.81 - 7.70 L/s Preliminary    Post  12/17/2024 6.19  sec Preliminary    Pre DLCO 12/17/2024 17.33  17.12 - 28.59 ml/(min*mmHg) Preliminary    DLCO ADJ PRE 12/17/2024 17.61  17.12 - 28.59 ml/(min*mmHg) Preliminary    DLCOVA PRE 12/17/2024 4.95  3.23 - 6.35 ml/(min*mmHg*L) Preliminary    DLVAAdj PRE 12/17/2024 5.02  3.23 - 6.35 ml/(min*mmHg*L) Preliminary    VA PRE 12/17/2024 3.50 (L)  4.62 - 4.62 L Preliminary    IVC PRE 12/17/2024 2.45  2.06 - 3.45 L Preliminary    Pre TLC 12/17/2024 3.99  3.78 - 5.76 L Preliminary    VC PRE 12/17/2024 2.45  2.06 - 3.45 L Preliminary    Pre FRC PL 12/17/2024 1.86  1.82 - 3.46 L Preliminary    Pre ERV 12/17/2024 0.32  -28365.14 - 45038.86 L Preliminary    Pre RV 12/17/2024 1.54  1.20 - 2.35 L Preliminary    RVTLC PRE 12/17/2024 38.68  28.07 - 47.25 % Preliminary    Raw PRE 12/17/2024 4.62 (H)  3.06 - 3.06 cmH2O*s/L Preliminary    Pre FVC 12/17/2024 2.37  2.06 - 3.45 L Preliminary    Pre FEV1 12/17/2024 1.93  1.63 - 2.74 L Preliminary    Pre FEV1 FVC 12/17/2024 81.68  69.27 - 90.04 % Preliminary    Pre FEF 25 75 12/17/2024 2.08  1.65 - 4.45 L/s Preliminary    Pre PEF 12/17/2024 4.17  3.81 - 7.70 L/s Preliminary    Pre  12/17/2024 7.28  sec Preliminary    FVC Ref 12/17/2024 2.74   Preliminary    FVC LLN 12/17/2024 2.06   Preliminary    FVC Pre Ref 12/17/2024 86.4  % Preliminary    FVC Post Ref 12/17/2024 83.2  % Preliminary    FVC Chg 12/17/2024 -3.7  % Preliminary    FEV1 Ref 12/17/2024 2.20   Preliminary    FEV1 LLN 12/17/2024 1.63   Preliminary    FEV1 Pre Ref 12/17/2024 88.0  % Preliminary    FEV1 Post Ref 12/17/2024 84.6  % Preliminary    FEV1 Chg 12/17/2024 -3.9  % Preliminary    FEV1 FVC Ref  12/17/2024 80   Preliminary    FEV1 FVC LLN 12/17/2024 69   Preliminary    FEV1 FVC Pre Ref 12/17/2024 101.5  % Preliminary    FEV1 FVC Post Ref 12/17/2024 101.3  % Preliminary    FEV1 FVC Chg 12/17/2024 -0.2  % Preliminary    FEF 25 75 Ref 12/17/2024 3.05   Preliminary    FEF 25 75 LLN 12/17/2024 1.65   Preliminary    FEF 25 75 Pre Ref 12/17/2024 68.3  % Preliminary    FEF 25 75 Post Ref 12/17/2024 63.1  % Preliminary    FEF 25 75 Chg 12/17/2024 -7.6  % Preliminary    PEF Ref 12/17/2024 5.76   Preliminary    PEF LLN 12/17/2024 3.81   Preliminary    PEF Pre Ref 12/17/2024 72.4  % Preliminary    PEF Post Ref 12/17/2024 77.0  % Preliminary    PEF Chg 12/17/2024 6.4  % Preliminary    [id number] Chg 12/17/2024 -15.0  % Preliminary    TLC Ref 12/17/2024 4.77   Preliminary    TLC LLN 12/17/2024 3.78   Preliminary    TLC Pre Ref 12/17/2024 83.7  % Preliminary    VC Ref 12/17/2024 2.74   Preliminary    VC LLN 12/17/2024 2.06   Preliminary    VC Pre Ref 12/17/2024 89.4  % Preliminary    FRCpleth Ref 12/17/2024 2.64   Preliminary    FRCpleth LLN 12/17/2024 1.82   Preliminary    FRCpleth PreRef 12/17/2024 70.5  % Preliminary    ERV Ref 12/17/2024 0.86   Preliminary    ERV LLN 12/17/2024 -95057.14   Preliminary    ERV Pre Ref 12/17/2024 36.6  % Preliminary    RV Ref 12/17/2024 1.78   Preliminary    RV LLN 12/17/2024 1.20   Preliminary    RV Pre Ref 12/17/2024 86.9  % Preliminary    RVTLC Ref 12/17/2024 38   Preliminary    RVTLC LLN 12/17/2024 28   Preliminary    RVTLC Pre Ref 12/17/2024 102.7  % Preliminary    Raw Ref 12/17/2024 3.06   Preliminary    Raw LLN 12/17/2024 3.06   Preliminary    Raw Pre Ref 12/17/2024 150.9  % Preliminary    DLCO Single Breath Ref 12/17/2024 22.86   Preliminary    DLCO Single Breath LLN 12/17/2024 17.12   Preliminary    DLCO Single Breath Pre Ref 12/17/2024 75.8  % Preliminary    DLCOc Single Breath Ref 12/17/2024 22.86   Preliminary    DLCOc Single Breath LLN 12/17/2024 17.12   Preliminary    DLCOc  Single Breath Pre Ref 12/17/2024 77.0  % Preliminary    DLCOVA Ref 12/17/2024 4.79   Preliminary    DLCOVA LLN 12/17/2024 3.23   Preliminary    DLCOVA Pre Ref 12/17/2024 103.2  % Preliminary    DLCOc SBVA Ref 12/17/2024 4.79   Preliminary    DLCOc SBVA LLN 12/17/2024 3.23   Preliminary    DLCOc SBVA Pre Ref 12/17/2024 104.9  % Preliminary    VA Single Breath Ref 12/17/2024 4.62   Preliminary    VA Single Breath LLN 12/17/2024 4.62   Preliminary    VA Single Breath Pre Ref 12/17/2024 75.8  % Preliminary    [facility] Single Breath Ref 12/17/2024 2.74   Preliminary    [facility] Single Breath LLN 12/17/2024 2.06   Preliminary    [facility] Single Breath Pre Ref 12/17/2024 89.4  % Preliminary   Lab Visit on 12/05/2024   Component Date Value Ref Range Status    Total IgE 12/05/2024 <35  0 - 100 IU/mL Final    WBC 12/05/2024 4.54  3.90 - 12.70 K/uL Final    RBC 12/05/2024 4.41  4.00 - 5.40 M/uL Final    Hemoglobin 12/05/2024 12.9  12.0 - 16.0 g/dL Final    Hematocrit 12/05/2024 40.3  37.0 - 48.5 % Final    MCV 12/05/2024 91  82 - 98 fL Final    MCH 12/05/2024 29.3  27.0 - 31.0 pg Final    MCHC 12/05/2024 32.0  32.0 - 36.0 g/dL Final    RDW 12/05/2024 13.9  11.5 - 14.5 % Final    Platelets 12/05/2024 217  150 - 450 K/uL Final    MPV 12/05/2024 13.8 (H)  9.2 - 12.9 fL Final    Immature Granulocytes 12/05/2024 0.2  0.0 - 0.5 % Final    Gran # (ANC) 12/05/2024 2.1  1.8 - 7.7 K/uL Final    Immature Grans (Abs) 12/05/2024 0.01  0.00 - 0.04 K/uL Final    Comment: Mild elevation in immature granulocytes is non specific and   can be seen in a variety of conditions including stress response,   acute inflammation, trauma and pregnancy. Correlation with other   laboratory and clinical findings is essential.      Lymph # 12/05/2024 1.7  1.0 - 4.8 K/uL Final    Mono # 12/05/2024 0.6  0.3 - 1.0 K/uL Final    Eos # 12/05/2024 0.1  0.0 - 0.5 K/uL Final    Baso # 12/05/2024 0.03  0.00 - 0.20 K/uL Final    nRBC 12/05/2024 0  0 /100 WBC Final    Gran %  12/05/2024 45.1  38.0 - 73.0 % Final    Lymph % 12/05/2024 38.1  18.0 - 48.0 % Final    Mono % 12/05/2024 13.9  4.0 - 15.0 % Final    Eosinophil % 12/05/2024 2.0  0.0 - 8.0 % Final    Basophil % 12/05/2024 0.7  0.0 - 1.9 % Final    Platelet Estimate 12/05/2024 Appears normal   Final    Large/Giant Platelets 12/05/2024 Present   Final    Differential Method 12/05/2024 Automated   Final       EKG  Reviewed    Echo   No results found for this or any previous visit.    Imaging  No results found.    Prior coronary angiogram / intervention:  No priors    Assessment and Plan  Ms. Cleary is a 54-year-old female with a past medical history of opiate dependency, ADHD, MDD, vitamin-D deficiency, osteoporosis, chronic kidney disease stage 3, HIV, obesity, GERD, YANICK, HLD, hypertension, CAD?, bilateral carotid artery occlusion?    BECKFORD (dyspnea on exertion)  SOB (shortness of breath)  Intermittent especially with exertion  Echo ordered  Ultrasound bilateral carotid ordered  - Ambulatory referral/consult to Cardiology  - US Carotid Bilateral; Future  - Echo; Future    Bilateral carotid artery occlusion  Carotid artery stenosis, symptomatic, bilateral (Primary)  Per patient, history of 50% bilateral carotid occlusion  Due for repeat ultrasound  Continue OTC aspirin 81 high-intensity statin    Former tobacco use  Quit 15 years ago    Severe obesity (BMI 35.0-39.9) with comorbidity  Encouraged a heart healthy diet that is low in saturated fats and sodium   Also encouraged an increase in activities as tolerated for healthy weight management   Discussed Mediterranean Diet recommendations (Adopted from Gabino et al, [place], 2018.)  - Eat primarily plant-based foods, such as fruits/vegetables, whole grains, legumes & nuts  - Limit refined carbohydrates (white pasta, bread, rice).  - Replace butter with healthy fats such as olive oil.  - Use herbs and spices instead of salt to flavor foods.  - Limit red meat and processed meats to no more  than a few times a month.  - Avoid sugary sodas, bakery goods, and sweets.  - Eat fish and poultry at least twice a week.              - Get plenty of exercise (150 minutes per week).      YANICK (obstructive sleep apnea)  Encouraged nightly CPAP    Essential hypertension  Well controlled  Currently taking Cardizem hydralazine propranolol    Mixed hyperlipidemia  Continue statin    Stage 3a chronic kidney disease  Followed by nephrology    Follow Up  Six months or sooner with new or worsening symptoms       Brandi R. Carter, FNP-C [facility] - Cardiology    Total professional time spent for the encounter: 45 minutes  Time was spent preparing to see the patient, reviewing results of prior testing, obtaining and/or reviewing separately obtained history, performing a medically appropriate examination and interview, counseling and educating the patient/family, ordering medications/tests/procedures, referring and communicating with other health care professionals, documenting clinical information in the electronic health record, and independently interpreting results.    Disclaimer: This document was created using voice recognition software ([employer]). Although it may be edited, this document may contain errors related to incorrect recognition of the spoken word. Please call the physician if clarification is needed.            [1]   Current Outpatient Medications   Medication Sig Dispense Refill    atorvastatin (LIPITOR) 80 MG tablet Take 80 mg by mouth.      oosuvssqy-amguaaff-lnqxmgd ala (BIKTARVY) -25 mg per tablet Take 1 tablet by mouth once daily.      diltiaZEM (CARDIZEM CD) 240 MG 24 hr capsule TAKE 1 CAPSULE(240 MG) BY MOUTH EVERY DAY 90 capsule 3    furosemide (LASIX) 40 MG tablet TAKE 1 TABLET(40 MG) BY MOUTH EVERY DAY (Patient taking differently: Take 40 mg by mouth. Mon, Wed, Fri) 90 tablet 1    hydrALAZINE (APRESOLINE) 50 MG tablet Take 50 mg by mouth every evening.      lisdexamfetamine  (VYVANSE) 60 MG capsule Take 60 mg by mouth every morning.      montelukast (SINGULAIR) 10 mg tablet TAKE 1 TABLET( 10 MG TOTAL) BY MOUTH EVERY OTHER DAY 45 tablet 2    omeprazole (PRILOSEC) 40 MG capsule Take 1 capsule (40 mg total) by mouth every morning. 90 capsule 3    oxyCODONE (ROXICODONE) 10 mg Tab immediate release tablet Take 10 mg by mouth 3 (three) times daily.      pregabalin (LYRICA) 150 MG capsule Take 150 mg by mouth 2 (two) times daily.      propranoloL (INDERAL) 40 MG tablet Take 40 mg by mouth every evening.      risperiDONE (RISPERDAL) 1 MG tablet Take 1 mg by mouth every evening.      venlafaxine (EFFEXOR-XR) 150 MG Cp24 Take 1 capsule by mouth once daily.      venlafaxine (EFFEXOR-XR) 75 MG 24 hr capsule Take 75 mg by mouth every morning.      albuterol-ipratropium (DUO-NEB) 2.5 mg-0.5 mg/3 mL nebulizer solution Take 3 mLs by nebulization every 4 (four) hours as needed for Wheezing or Shortness of Breath. Rescue 120 mL 11    carboxymethylcellulose (REFRESH PLUS) 0.5 % Dpet 1 drop as needed.      cetirizine (ZYRTEC) 10 MG tablet Take 1 tablet (10 mg total) by mouth once daily. 90 tablet 3    hydrOXYzine pamoate (VISTARIL) 25 MG Cap  (Patient not taking: Reported on 4/7/2025)      leflunomide (ARAVA) 20 MG Tab Take 1 tablet (20 mg total) by mouth once daily. 30 tablet 2    meclizine (ANTIVERT) 25 mg tablet Take 1 tablet (25 mg total) by mouth 3 (three) times daily as needed for Dizziness. (Patient not taking: Reported on 4/7/2025) 20 tablet 0    meloxicam (MOBIC) 7.5 MG tablet Take 7.5 mg by mouth 2 (two) times daily as needed.      urea (CARMOL) 40 % Crea Apply topically 2 (two) times daily. 198 g 2    venlafaxine (EFFEXOR-XR) 150 MG Cp24 Take 150 mg by mouth every morning.      VYVANSE 50 mg capsule Take 50 mg by mouth every morning.       No current facility-administered medications for this visit.   [2]   Social History  Socioeconomic History    Marital status:    Tobacco Use    Smoking  status: Former     Current packs/day: 0.00     Average packs/day: 0.5 packs/day for 21.0 years (10.5 ttl pk-yrs)     Types: Cigarettes     Start date:      Quit date:      Years since quittin.2    Smokeless tobacco: Never   Substance and Sexual Activity    Alcohol use: No    Drug use: Never    Sexual activity: Never   Social History Narrative    Tobacco: None    EtOH: None    Drug: None    Employment: Caregiver for      Education:2 year college    Lives with  and 17 yo daughter     Social Drivers of Health     Financial Resource Strain: Medium Risk (2024)    Received from [facility]    Overall Financial Resource Strain (CARDIA)     Difficulty of Paying Living Expenses: Somewhat hard   Food Insecurity: Food Insecurity Present (2024)    Received from [facility]    Hunger Vital Sign     Worried About Running Out of Food in the Last Year: Sometimes true     Ran Out of Food in the Last Year: Never true   Transportation Needs: No Transportation Needs (2024)    Received from [facility]    PRAPARE - Transportation     Lack of Transportation (Medical): No     Lack of Transportation (Non-Medical): No   Physical Activity: Inactive (2024)    Received from [facility]    Exercise Vital Sign     Days of Exercise per Week: 0 days     Minutes of Exercise per Session: 10 min   Stress: Stress Concern Present (2024)    Received from [facility]    [place] [employer] - Occupational Stress Questionnaire     Feeling of Stress : Very much   Housing Stability: Low Risk  (2024)    Received from [facility]    Housing Stability Vital Sign     Unable to Pay for Housing in the Last Year: No     Number of Places Lived in the Last Year: 1     Unstable Housing in the Last Year: No

## 2025-04-09 ENCOUNTER — PATIENT MESSAGE (OUTPATIENT)
Dept: ADMINISTRATIVE | Facility: OTHER | Age: 55
End: 2025-04-09
Payer: MEDICARE

## 2025-04-10 ENCOUNTER — RESULTS FOLLOW-UP (OUTPATIENT)
Dept: CARDIOLOGY | Facility: CLINIC | Age: 55
End: 2025-04-10

## 2025-04-14 ENCOUNTER — PATIENT MESSAGE (OUTPATIENT)
Dept: PRIMARY CARE CLINIC | Facility: CLINIC | Age: 55
End: 2025-04-14
Payer: MEDICARE

## 2025-04-15 ENCOUNTER — TELEPHONE (OUTPATIENT)
Dept: NEPHROLOGY | Facility: CLINIC | Age: 55
End: 2025-04-15
Payer: MEDICARE

## 2025-04-15 NOTE — TELEPHONE ENCOUNTER
----- Message from Kiesha Merino MD sent at 4/14/2025  2:56 PM CDT -----  Regarding: FW: appt  Contact: [phone]    ----- Message -----  From: Nurys Cortes  Sent: 4/14/2025   2:54 PM CDT  To: Angelique Pop Staff  Subject: appt                                             .Type:  Needs Medical AdviceWho Called: pt Symptoms (please be specific): needs to r/s today's missed appt  Would the patient rather a call back or a response via [facility]? [facility] Call Back Number: [phone] Information: asking for an afternoon appt.pt thought this was a VV

## 2025-04-16 ENCOUNTER — PATIENT MESSAGE (OUTPATIENT)
Dept: CARDIOLOGY | Facility: CLINIC | Age: 55
End: 2025-04-16
Payer: MEDICARE

## 2025-04-16 NOTE — TELEPHONE ENCOUNTER
Carotid US 04/09/2025  ECHO 04/09/2025    The patient hs additional questions regarding test results

## 2025-04-30 ENCOUNTER — OFFICE VISIT (OUTPATIENT)
Dept: BARIATRICS | Facility: CLINIC | Age: 55
End: 2025-04-30
Payer: MEDICARE

## 2025-04-30 VITALS
OXYGEN SATURATION: 97 % | HEART RATE: 75 BPM | WEIGHT: 241.81 LBS | DIASTOLIC BLOOD PRESSURE: 80 MMHG | HEIGHT: 63 IN | SYSTOLIC BLOOD PRESSURE: 128 MMHG | BODY MASS INDEX: 42.84 KG/M2

## 2025-04-30 DIAGNOSIS — E66.01 CLASS 3 SEVERE OBESITY DUE TO EXCESS CALORIES WITH SERIOUS COMORBIDITY AND BODY MASS INDEX (BMI) OF 40.0 TO 44.9 IN ADULT: Primary | ICD-10-CM

## 2025-04-30 DIAGNOSIS — E78.5 HYPERLIPIDEMIA, UNSPECIFIED HYPERLIPIDEMIA TYPE: ICD-10-CM

## 2025-04-30 DIAGNOSIS — G47.33 OSA (OBSTRUCTIVE SLEEP APNEA): ICD-10-CM

## 2025-04-30 DIAGNOSIS — I10 ESSENTIAL HYPERTENSION: ICD-10-CM

## 2025-04-30 DIAGNOSIS — E66.813 CLASS 3 SEVERE OBESITY DUE TO EXCESS CALORIES WITH SERIOUS COMORBIDITY AND BODY MASS INDEX (BMI) OF 40.0 TO 44.9 IN ADULT: Primary | ICD-10-CM

## 2025-04-30 DIAGNOSIS — Z71.3 ENCOUNTER FOR WEIGHT LOSS COUNSELING: ICD-10-CM

## 2025-04-30 PROCEDURE — 99999 PR PBB SHADOW E&M-EST. PATIENT-LVL III: CPT | Mod: PBBFAC,HCNC,, | Performed by: STUDENT IN AN ORGANIZED HEALTH CARE EDUCATION/TRAINING PROGRAM

## 2025-04-30 PROCEDURE — 3074F SYST BP LT 130 MM HG: CPT | Mod: CPTII,HCNC,S$GLB, | Performed by: STUDENT IN AN ORGANIZED HEALTH CARE EDUCATION/TRAINING PROGRAM

## 2025-04-30 PROCEDURE — 3008F BODY MASS INDEX DOCD: CPT | Mod: CPTII,HCNC,S$GLB, | Performed by: STUDENT IN AN ORGANIZED HEALTH CARE EDUCATION/TRAINING PROGRAM

## 2025-04-30 PROCEDURE — 1160F RVW MEDS BY RX/DR IN RCRD: CPT | Mod: CPTII,HCNC,S$GLB, | Performed by: STUDENT IN AN ORGANIZED HEALTH CARE EDUCATION/TRAINING PROGRAM

## 2025-04-30 PROCEDURE — 1159F MED LIST DOCD IN RCRD: CPT | Mod: CPTII,HCNC,S$GLB, | Performed by: STUDENT IN AN ORGANIZED HEALTH CARE EDUCATION/TRAINING PROGRAM

## 2025-04-30 PROCEDURE — 99213 OFFICE O/P EST LOW 20 MIN: CPT | Mod: HCNC,S$GLB,, | Performed by: STUDENT IN AN ORGANIZED HEALTH CARE EDUCATION/TRAINING PROGRAM

## 2025-04-30 PROCEDURE — 3079F DIAST BP 80-89 MM HG: CPT | Mod: CPTII,HCNC,S$GLB, | Performed by: STUDENT IN AN ORGANIZED HEALTH CARE EDUCATION/TRAINING PROGRAM

## 2025-04-30 RX ORDER — CYCLOBENZAPRINE HCL 10 MG
10 TABLET ORAL
COMMUNITY
Start: 2025-04-21

## 2025-04-30 RX ORDER — LIFITEGRAST 50 MG/ML
SOLUTION/ DROPS OPHTHALMIC
COMMUNITY
Start: 2025-01-30

## 2025-04-30 RX ORDER — DEXTROAMPHETAMINE SACCHARATE, AMPHETAMINE ASPARTATE MONOHYDRATE, DEXTROAMPHETAMINE SULFATE AND AMPHETAMINE SULFATE 7.5; 7.5; 7.5; 7.5 MG/1; MG/1; MG/1; MG/1
CAPSULE, EXTENDED RELEASE ORAL EVERY MORNING
COMMUNITY
Start: 2025-04-18

## 2025-04-30 RX ORDER — TIRZEPATIDE 2.5 MG/.5ML
2.5 INJECTION, SOLUTION SUBCUTANEOUS
Qty: 2 ML | Refills: 0 | Status: SHIPPED | OUTPATIENT
Start: 2025-04-30

## 2025-04-30 RX ORDER — TIRZEPATIDE 7.5 MG/.5ML
7.5 INJECTION, SOLUTION SUBCUTANEOUS
Qty: 2 ML | Refills: 0 | Status: SHIPPED | OUTPATIENT
Start: 2025-06-25

## 2025-04-30 RX ORDER — TIRZEPATIDE 5 MG/.5ML
5 INJECTION, SOLUTION SUBCUTANEOUS
Qty: 2 ML | Refills: 0 | Status: SHIPPED | OUTPATIENT
Start: 2025-05-28

## 2025-04-30 RX ORDER — LIDOCAINE 50 MG/G
1 PATCH TOPICAL DAILY PRN
COMMUNITY
Start: 2025-04-21

## 2025-04-30 NOTE — PROGRESS NOTES
Subjective     Patient ID: Gill Cleary is a 54 y.o. female.    Chief Complaint: Follow-up, Obesity, and Weight Check    Patient presents for treatment of obesity.   Weight gain following hysterectomy (2018), weighed about 130 lbs prior to that    Co-morbidities   HTN  HLD  YANICK - home sleep study 10/2015 AHI 35.5 (in media file)  HIV  GERD  CKD  CAD    Negative for thyroid cancer    Has taken topiramate in the past for headches    Current Physical Activity  Walking al least 3x/week for 20 minutes    Current Eating Habits - sometimes only eats 1-2 meal per day  Breakfast - mushroom coffee with 4 teaspoons of sugar (down from 6); scrambled eggs, turkey sausage, toast  Lunch - turkey sandwich, strawberries; salad from [employer] with grilled chicken; Popeyes occasionally  Dinner - salad, BBQ chicken with sweet potatoes; baked chicken with vegetable (corn or vegetable asian medley or broccoli)  Snacks - hard candy  Beverages - Ice sparkling water    Typically eats 1 meal per day - frozen meal or cooks  Snacks on crackers or chips, olives, fruits throughout the day, ice cream     Medical Weight Loss  9/5/2023: 220.1 lbs, BMI 40.2, BFP 50.9%, .9 lbs, SMM 60.2 lbs, BMR 1430 kcal  10/9/2023: 220.5 lbs, BMI 39.1, BFP 50.1%, .3 lbs, SMM 62 lbs, BMR 1449 kcal  4/30/2025: 241.8 lbs, BMI 42.8, BFP 47.2%, .2 lbs, SMM 71.4 lbs, BMR 1621 kcal      Review of Systems   Constitutional:  Negative for chills and fever.   Respiratory:  Negative for shortness of breath.    Cardiovascular:  Negative for chest pain.   Gastrointestinal:  Negative for abdominal pain, nausea and vomiting.   Neurological:  Negative for dizziness and light-headedness.          Objective    Latest Reference Range & Units 06/21/23 15:55   Sodium 136 - 145 mmol/L 139   Potassium 3.5 - 5.1 mmol/L 3.9   Chloride 95 - 110 mmol/L 100   CO2 23 - 29 mmol/L 28   Anion Gap 8 - 16 mmol/L 11   BUN 6 - 20 mg/dL 7   Creatinine 0.5 - 1.4 mg/dL 1.1   eGFR  >60 mL/min/1.73 m^2 >60.0   Glucose 70 - 110 mg/dL 87   Calcium 8.7 - 10.5 mg/dL 10.4   Alkaline Phosphatase 55 - 135 U/L 117   PROTEIN TOTAL 6.0 - 8.4 g/dL 8.7 (H)   Albumin 3.5 - 5.2 g/dL 4.3   BILIRUBIN TOTAL 0.1 - 1.0 mg/dL 0.6   AST 10 - 40 U/L 29   ALT 10 - 44 U/L 29   (H): Data is abnormally high    Vitals:    04/30/25 1346   BP: 128/80   Pulse: 75         Physical Exam  Vitals reviewed.   Constitutional:       General: She is not in acute distress.     Appearance: Normal appearance. She is obese. She is not ill-appearing, toxic-appearing or diaphoretic.   HENT:      Head: Normocephalic and atraumatic.   Cardiovascular:      Rate and Rhythm: Normal rate.   Pulmonary:      Effort: Pulmonary effort is normal. No respiratory distress.   Skin:     General: Skin is warm and dry.   Neurological:      Mental Status: She is alert and oriented to person, place, and time.            Assessment and Plan     1. Class 3 severe obesity due to excess calories with serious comorbidity and body mass index (BMI) of 40.0 to 44.9 in adult  -     tirzepatide, weight loss, (ZEPBOUND) 2.5 mg/0.5 mL PnIj; Inject 2.5 mg into the skin every 7 days.  Dispense: 2 mL; Refill: 0  -     tirzepatide, weight loss, (ZEPBOUND) 5 mg/0.5 mL PnIj; Inject 5 mg into the skin every 7 days.  Dispense: 2 mL; Refill: 0  -     tirzepatide, weight loss, (ZEPBOUND) 7.5 mg/0.5 mL PnIj; Inject 7.5 mg into the skin every 7 days.  Dispense: 2 mL; Refill: 0    2. Encounter for weight loss counseling    3. YANICK (obstructive sleep apnea)  -     tirzepatide, weight loss, (ZEPBOUND) 2.5 mg/0.5 mL PnIj; Inject 2.5 mg into the skin every 7 days.  Dispense: 2 mL; Refill: 0  -     tirzepatide, weight loss, (ZEPBOUND) 5 mg/0.5 mL PnIj; Inject 5 mg into the skin every 7 days.  Dispense: 2 mL; Refill: 0  -     tirzepatide, weight loss, (ZEPBOUND) 7.5 mg/0.5 mL PnIj; Inject 7.5 mg into the skin every 7 days.  Dispense: 2 mL; Refill: 0    4. Hyperlipidemia, unspecified  hyperlipidemia type    5. Essential hypertension  Overview:  dx update  Overview:   dx update        - Zepbound weekly injections    - Log all food and beverage intake with a daily calorie goal of 4694-6657 calories per day    - Low intensity aerobic exercise for 150 minutes per week plus weight bearing exercises 2-3x/week

## 2025-05-06 ENCOUNTER — TELEPHONE (OUTPATIENT)
Dept: OTOLARYNGOLOGY | Facility: CLINIC | Age: 55
End: 2025-05-06
Payer: MEDICARE

## 2025-05-06 ENCOUNTER — TELEPHONE (OUTPATIENT)
Dept: BARIATRICS | Facility: CLINIC | Age: 55
End: 2025-05-06
Payer: MEDICARE

## 2025-05-06 NOTE — TELEPHONE ENCOUNTER
Pt scheduled on 05/23 @ 8882 w/Dr. Shepherd at [facility] ENT office.

## 2025-05-06 NOTE — TELEPHONE ENCOUNTER
----- Message from Tech Jasimine sent at 5/6/2025  3:05 PM CDT -----  Regarding: Rescheduling appt  Contact: [phone]  Type:  reschedule appt Who Called: the pt needs to reschedule her 12/1 appt with Dr. Shepherd sometime after 5/19 when she has her audiogram and VNG.Would the patient rather a call back or a response via [facility]? [facility] Call Back Number:  [phone]

## 2025-05-06 NOTE — TELEPHONE ENCOUNTER
----- Message from Cherelle sent at 5/6/2025 10:06 AM CDT -----  Regarding: Patient Advice  Contact: [phone]  Patient calling to advise she is experiencing side effects from the Zepbound. Patient is experiencing nausea , vomiting, severe fatigue, headache, shortness of breath,loss of appetite.

## 2025-05-07 ENCOUNTER — PATIENT MESSAGE (OUTPATIENT)
Dept: RHEUMATOLOGY | Facility: CLINIC | Age: 55
End: 2025-05-07
Payer: MEDICARE

## 2025-05-14 ENCOUNTER — PATIENT MESSAGE (OUTPATIENT)
Dept: BARIATRICS | Facility: CLINIC | Age: 55
End: 2025-05-14
Payer: MEDICARE

## 2025-05-19 ENCOUNTER — TELEPHONE (OUTPATIENT)
Dept: RHEUMATOLOGY | Facility: CLINIC | Age: 55
End: 2025-05-19
Payer: MEDICARE

## 2025-05-19 ENCOUNTER — CLINICAL SUPPORT (OUTPATIENT)
Dept: AUDIOLOGY | Facility: CLINIC | Age: 55
End: 2025-05-19
Payer: MEDICARE

## 2025-05-19 DIAGNOSIS — R26.89 IMBALANCE: ICD-10-CM

## 2025-05-19 DIAGNOSIS — H81.4 CENTRAL POSITIONAL VERTIGO: ICD-10-CM

## 2025-05-19 DIAGNOSIS — G43.109 MIGRAINOUS VERTIGO: ICD-10-CM

## 2025-05-19 DIAGNOSIS — H90.3 SENSORINEURAL HEARING LOSS (SNHL) OF BOTH EARS: Primary | ICD-10-CM

## 2025-05-19 DIAGNOSIS — M06.00 SERONEGATIVE RHEUMATOID ARTHRITIS: ICD-10-CM

## 2025-05-19 DIAGNOSIS — H81.4 VERTIGO OF CENTRAL ORIGIN: Primary | ICD-10-CM

## 2025-05-19 DIAGNOSIS — Z79.899 DRUG-INDUCED IMMUNODEFICIENCY: ICD-10-CM

## 2025-05-19 DIAGNOSIS — D84.821 DRUG-INDUCED IMMUNODEFICIENCY: ICD-10-CM

## 2025-05-19 PROCEDURE — 92540 BASIC VESTIBULAR EVALUATION: CPT | Mod: HCNC,S$GLB,,

## 2025-05-19 PROCEDURE — 92537 CALORIC VSTBLR TEST W/REC: CPT | Mod: HCNC,S$GLB,,

## 2025-05-19 PROCEDURE — 92557 COMPREHENSIVE HEARING TEST: CPT | Mod: HCNC,S$GLB,,

## 2025-05-19 PROCEDURE — 92567 TYMPANOMETRY: CPT | Mod: HCNC,S$GLB,,

## 2025-05-19 NOTE — Clinical Note
Here are the results of Ms. Cleary' VNG.  Please let me know if you have any questions.  Thanks, Dave Nunn, CCC-A

## 2025-05-19 NOTE — PROGRESS NOTES
Gill Cleary was seen today in the clinic for an audiologic evaluation. Ms. Cleary reported that she often misunderstands what people say. She reported intermittent tinnitus. Ms. Cleary denied otalgia and aural fullness.    Tympanometry revealed Type Ad in the right ear and Type A in the left ear.     Audiogram results revealed normal hearing to a mild sensorineural hearing loss (SNHL) in the right ear and a normal hearing to a moderate SNHL in the left ear.      Speech reception thresholds were noted at 15 dBHL in the right ear and 15 dBHL in the left ear.    Speech discrimination scores were 100% in the right ear and 100% in the left ear.    Recommendations:  Otologic evaluation  Annual audiogram or sooner if change perceived  Hearing protection in noise

## 2025-05-19 NOTE — PROGRESS NOTES
VNG EVALUATION    Referring physician:  Dr. Shepherd    History:  Ms. Cleary, a 54 y.o. female, was seen today for a VNG.  Ms. Cleary reported daily episodes of vertigo for 5 years.  Patient reported that the episodes can occur randomly, when she moves her head, or when looking up/laying down in bed.  She noted that in the past, the episodes have lasted an entire day, but recently they last about a minute.  Ms. Cleary reported that in the past, she was having difficulty walking due to the episodes.  Patient reported a history of migraines/headaches.  Ms. Cleary denied taking any medication that may affect the results of today's testing.      Results:  Gaze nystagmus was absent.  Oculomotor testing:  Normal latency, normal velocity, and normal accuracy on saccades.  Abnormal gain on pursuit.  Normal optokinetic gain.  Spontaneous nystagmus was absent    [place]-Hallpike:  The head-hanging left Hallpike revealed 3 d/s left-beating and 5 d/s up-beating non-torsional and non-fatiguing nystagmus in the supine position.  Significant down-beating and torsional nystagmus upon sitting up.  The head-hanging right Hallpike revealed no nystagmus in the supine position.  Significant down-beating and torsional nystagmus upon sitting up.  Static positional testing:  Supine head center revealed no nystagmus.  Head right position revealed <clinically insignificant> 3 d/s right-beating nystagmus.  Head left position revealed <clinically significant> 6 d/s left-beating nystagmus.    Bilateral bithermal caloric testing:  Unilateral weakness: 3% (right)  Directional preponderance 5% (right-beating)  RW: 30 d/s  LW: 29 d/s  RC: 19 d/s   d/s  Fixation suppression was abnormal in the left warm and left cool caloric conditions.    Impression: Abnormal VNG; Abnormal gain on saccades is suggestive of central oculomotor dysfunction.  Abnormal fixation suppression during calorics is suggestive of a central vestibular abnormality.  Down-beating  and torsional nystagmus when sitting upright during both Hallpikes may be suggestive of Anterior canal BPPV.  Patient was not repositioned during the appointment today as results were analyzed after testing as completed.  It is recommended she go to physical therapy to monitor and treat for possible BPPV.      Recommendations:   A formal Risk of Falls assessment, formal vestibular/balance rehab, and monitor/treat possible BPPV  Referral to Neurology  Follow-up with Dr. Shepherd to review the results of today's evaluation

## 2025-05-20 ENCOUNTER — OFFICE VISIT (OUTPATIENT)
Dept: RHEUMATOLOGY | Facility: CLINIC | Age: 55
End: 2025-05-20
Payer: MEDICARE

## 2025-05-20 ENCOUNTER — RESULTS FOLLOW-UP (OUTPATIENT)
Dept: RHEUMATOLOGY | Facility: CLINIC | Age: 55
End: 2025-05-20

## 2025-05-20 VITALS
RESPIRATION RATE: 19 BRPM | WEIGHT: 241.63 LBS | SYSTOLIC BLOOD PRESSURE: 141 MMHG | BODY MASS INDEX: 42.81 KG/M2 | DIASTOLIC BLOOD PRESSURE: 84 MMHG | HEIGHT: 63 IN | OXYGEN SATURATION: 97 % | HEART RATE: 94 BPM

## 2025-05-20 DIAGNOSIS — M79.642 HAND PAIN, LEFT: Primary | ICD-10-CM

## 2025-05-20 DIAGNOSIS — S62.615A CLOSED DISPLACED FRACTURE OF PROXIMAL PHALANX OF LEFT RING FINGER, INITIAL ENCOUNTER: Primary | ICD-10-CM

## 2025-05-20 DIAGNOSIS — M15.9 OSTEOARTHRITIS OF MULTIPLE JOINTS, UNSPECIFIED OSTEOARTHRITIS TYPE: ICD-10-CM

## 2025-05-20 DIAGNOSIS — E66.01 MORBID OBESITY: ICD-10-CM

## 2025-05-20 DIAGNOSIS — M06.00 SERONEGATIVE RHEUMATOID ARTHRITIS: ICD-10-CM

## 2025-05-20 DIAGNOSIS — D84.821 DRUG-INDUCED IMMUNODEFICIENCY: ICD-10-CM

## 2025-05-20 DIAGNOSIS — Z79.899 DRUG-INDUCED IMMUNODEFICIENCY: ICD-10-CM

## 2025-05-20 PROCEDURE — 99999 PR PBB SHADOW E&M-EST. PATIENT-LVL V: CPT | Mod: PBBFAC,HCNC,, | Performed by: STUDENT IN AN ORGANIZED HEALTH CARE EDUCATION/TRAINING PROGRAM

## 2025-05-20 PROCEDURE — 1159F MED LIST DOCD IN RCRD: CPT | Mod: CPTII,HCNC,S$GLB, | Performed by: STUDENT IN AN ORGANIZED HEALTH CARE EDUCATION/TRAINING PROGRAM

## 2025-05-20 PROCEDURE — 3079F DIAST BP 80-89 MM HG: CPT | Mod: CPTII,HCNC,S$GLB, | Performed by: STUDENT IN AN ORGANIZED HEALTH CARE EDUCATION/TRAINING PROGRAM

## 2025-05-20 PROCEDURE — G2211 COMPLEX E/M VISIT ADD ON: HCPCS | Mod: HCNC,S$GLB,, | Performed by: STUDENT IN AN ORGANIZED HEALTH CARE EDUCATION/TRAINING PROGRAM

## 2025-05-20 PROCEDURE — 99214 OFFICE O/P EST MOD 30 MIN: CPT | Mod: HCNC,S$GLB,, | Performed by: STUDENT IN AN ORGANIZED HEALTH CARE EDUCATION/TRAINING PROGRAM

## 2025-05-20 PROCEDURE — 3008F BODY MASS INDEX DOCD: CPT | Mod: CPTII,HCNC,S$GLB, | Performed by: STUDENT IN AN ORGANIZED HEALTH CARE EDUCATION/TRAINING PROGRAM

## 2025-05-20 PROCEDURE — 3077F SYST BP >= 140 MM HG: CPT | Mod: CPTII,HCNC,S$GLB, | Performed by: STUDENT IN AN ORGANIZED HEALTH CARE EDUCATION/TRAINING PROGRAM

## 2025-05-20 RX ORDER — LEFLUNOMIDE 20 MG/1
20 TABLET ORAL DAILY
Qty: 30 TABLET | Refills: 2 | Status: CANCELLED | OUTPATIENT
Start: 2025-05-20

## 2025-05-20 RX ORDER — LEFLUNOMIDE 20 MG/1
20 TABLET ORAL DAILY
Qty: 30 TABLET | Refills: 2 | Status: SHIPPED | OUTPATIENT
Start: 2025-05-20

## 2025-05-20 NOTE — TELEPHONE ENCOUNTER
----- Message from Antonia Sung MD sent at 5/20/2025  3:08 PM CDT -----  Please contact the patient with the attached results. She has a fracture on the 4th finger. I will recommend her to see hand surgeon. I will place urgent referral now. Please assist with scheduling   hansel ASAP  ----- Message -----  From: Christie, Rad Results In  Sent: 5/20/2025   3:05 PM CDT  To: Antonia Sung MD
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
## 2025-05-21 ENCOUNTER — PATIENT MESSAGE (OUTPATIENT)
Dept: RHEUMATOLOGY | Facility: CLINIC | Age: 55
End: 2025-05-21
Payer: MEDICARE

## 2025-05-23 DIAGNOSIS — R52 PAIN: Primary | ICD-10-CM

## 2025-05-26 ENCOUNTER — OFFICE VISIT (OUTPATIENT)
Dept: ORTHOPEDICS | Facility: CLINIC | Age: 55
End: 2025-05-26
Payer: MEDICARE

## 2025-05-26 ENCOUNTER — HOSPITAL ENCOUNTER (OUTPATIENT)
Facility: HOSPITAL | Age: 55
Discharge: HOME | End: 2025-05-26
Payer: MEDICARE

## 2025-05-26 DIAGNOSIS — R52 PAIN: ICD-10-CM

## 2025-05-26 DIAGNOSIS — S62.625A CLOSED DISPLACED FRACTURE OF MIDDLE PHALANX OF LEFT RING FINGER, INITIAL ENCOUNTER: Primary | ICD-10-CM

## 2025-05-26 PROCEDURE — 1159F MED LIST DOCD IN RCRD: CPT | Mod: CPTII,HCNC,S$GLB,

## 2025-05-26 PROCEDURE — 73130 X-RAY EXAM OF HAND: CPT | Mod: TC,HCNC,PN,LT

## 2025-05-26 PROCEDURE — 1160F RVW MEDS BY RX/DR IN RCRD: CPT | Mod: CPTII,HCNC,S$GLB,

## 2025-05-26 PROCEDURE — 99999 PR PBB SHADOW E&M-EST. PATIENT-LVL III: CPT | Mod: PBBFAC,HCNC,,

## 2025-05-26 PROCEDURE — 73130 X-RAY EXAM OF HAND: CPT | Mod: 26,HCNC,LT, | Performed by: RADIOLOGY

## 2025-05-26 PROCEDURE — 99214 OFFICE O/P EST MOD 30 MIN: CPT | Mod: HCNC,S$GLB,,

## 2025-05-26 RX ORDER — IBUPROFEN 800 MG/1
800 TABLET, FILM COATED ORAL 3 TIMES DAILY
Qty: 90 TABLET | Refills: 1 | Status: SHIPPED | OUTPATIENT
Start: 2025-05-26

## 2025-05-26 NOTE — PROGRESS NOTES
Subjective:      Patient ID: Gill Cleary is a 54 y.o. female.    Chief Complaint: Pain of the Left Hand      HPI: Gill Cleary is a 54 y.o. bilateral hand dominant female who presents to clinic for follow up of left 4th middle phalanx fracture sustained on 05/16/2025 when she was catching her  from a fall. Patient was seen by Dr. Goff (Rheumatology) on 05/20/2025 where x-rays confirmed fracture and patient was advised to follow up with ortho. Patient has been NWB. Patient has been taking Oxycodone, Tylenol, and Ibuprofen for pain. Pain today 8/10. Denies numbness and tingling. Denies any prior surgeries to hand. At baseline, patient is independent regarding ADLs. Of note, patient is currently in Pain Management with Dr. Neal Joel and receives monthly prescriptions fro Oxycodone IR 10 mg and Lyrica.     Occupation: caregiver for     Ambulating: unassisted  Diabetic:  No  Smoking:  She quit smoking approximately 20 years ago.  History of DVT/PE: Negative    PAST MEDICAL HISTORY:    Past Medical History:   Diagnosis Date    Alopecia     Diverticulitis     HIV (human immunodeficiency virus infection)     Hyperlipemia     Hypertension     Nausea, vomiting, and diarrhea 04/28/2022    Osteoporosis     Quit using tobacco in remote past 09/10/2020    Sleep apnea     Vertigo      PAST SURGICAL HISTORY:    Past Surgical History:   Procedure Laterality Date    CATARACT EXTRACTION W/  INTRAOCULAR LENS IMPLANT Right 3/14/2024    Procedure: EXTRACTION, CATARACT, WITH IOL INSERTION;  Surgeon: Kim Aldrich MD;  Location: [facility] OR;  Service: Ophthalmology;  Laterality: Right;    CATARACT EXTRACTION W/  INTRAOCULAR LENS IMPLANT Left 5/2/2024    Procedure: EXTRACTION, CATARACT, WITH IOL INSERTION;  Surgeon: Kim Aldrich MD;  Location: [facility] OR;  Service: Ophthalmology;  Laterality: Left;    LAPAROSCOPIC SIGMOIDECTOMY N/A 12/10/2018    Procedure: COLECTOMY, SIGMOID, LAPAROSCOPIC-;  Surgeon: Reyes Nelson,  MD;  Location: [facility] OR [address] [place] [address];  Service: General;  Laterality: N/A;    LAPAROSCOPIC TOTAL HYSTERECTOMY  2018    URETERAL STENT PLACEMENT Left 12/10/2018    Procedure: INSERTION, STENT, URETER;  Surgeon: Reyes Nelson MD;  Location: [facility] OR [address] [place] [address];  Service: General;  Laterality: Left;     FAMILY HISTORY:    Family History   Problem Relation Name Age of Onset    Hypertension Mother      Thyroid disease Mother      Hypertension Father      Heart attacks under age 50 Father      Other Father          Brain tumor    Other Sister          Brain tumor     SOCIAL HISTORY:    Social History     Occupational History    Not on file   Tobacco Use    Smoking status: Former     Current packs/day: 0.00     Average packs/day: 0.5 packs/day for 21.0 years (10.5 ttl pk-yrs)     Types: Cigarettes     Start date:      Quit date:      Years since quittin.4    Smokeless tobacco: Never   Substance and Sexual Activity    Alcohol use: No    Drug use: Never    Sexual activity: Never      MEDICATIONS:   Current Medications[1]    ALLERGIES:   Review of patient's allergies indicates:   Allergen Reactions    Ace inhibitors Other (See Comments) and Rash     dizziness    dizziness  dizziness  dizziness      Efavirenz-emtricitabin-tenofov Other (See Comments)     dizziness    dizziness  dizziness  dizziness    Penicillins Hives, Nausea And Vomiting and Other (See Comments)     Hives (skin)^ and causes yeast infection  Hives (skin)^      Pork extract      Pt would like pork added  Because of strong [employer] beliefs    Pork/porcine containing products      Pt would like pork added  Because of strong [employer] beliefs    Tramadol Nausea And Vomiting and Other (See Comments)    Emtricita-rilpivirine-tenof df Other (See Comments)     Affecting patients kidneys    Affecting patients kidneys  Affecting patients kidneys  Affecting patients kidneys    Hibiscus flower extract     Lactase     Lisinopril     Tilactase     Amlodipine Rash     Clonidine Rash    Doxazosin Rash    Hydrochlorothiazide Rash and Other (See Comments)    Hydrocodone-acetaminophen Hives, Itching and Rash    Labetalol Rash    Metoprolol Rash    Nebivolol hcl Rash       Review of Systems:  Constitution: Negative for chills, fever and night sweats.   HENT: Negative for congestion and headaches.    Eyes: Negative for blurred vision or vision loss.  Cardiovascular: Negative for chest pain and syncope.   Respiratory: Negative for cough and shortness of breath.    Endocrine: Negative for polydipsia, polyphagia and polyuria.   Hematologic/Lymphatic: Negative for bleeding problem. Does not bruise/bleed easily.   Skin: Negative for dry skin, itching and rash.   Musculoskeletal: See HPI.   Gastrointestinal: Negative for abdominal pain and bowel incontinence.   Genitourinary: Negative for bladder incontinence and nocturia.   Neurological: Negative for disturbances in coordination, loss of balance and seizures.   Psychiatric/Behavioral: Negative for depression. The patient does not have insomnia.    Allergic/Immunologic: Negative for hives and persistent infections.          Objective:      There were no vitals filed for this visit.    PHYSICAL EXAM:  General: Alert & oriented x3, well-developed and well-nourished, in no acute distress, sitting comfortably in the exam room.  Skin: Warm and dry. Capillary refill less than 2 seconds.   Head: Normocephalic and atraumatic.   Eyes: Sclera appear normal.   Nose: No deformities seen.   Ears: No deformities seen.   Neck: No tracheal deviation present.   Pulmonary/Chest: Breathing unlabored.   Neurological: Alert and oriented to person, place, and time.   Psychiatric: Mood is pleasant and affect appropriate.       LEFT HAND/WRIST:        Observation/Inspection:     Swelling to the 4th digit.         Palpation:   Tenderness to palpation to the 4th and 5th digits.         Range of Motion:  Wrist: Full to wrist flexion, extension, radial, and ulnar  deviation without pain or difficulty.  Digits: Extremely limited to 4th and 5th digit MCP, PIP, and DIP flexion.   Patient able to extend digits independently. Slightly limited to 4th digit extension due to pain and swelling.   Slightly limited to 1st-3rd digit MCP, PIP, and DIP flexion and extension due to pain.          Strength:   strength not tested today.         Neurovascular Exam:  Digits warm and well perfused, brisk capillary refill <3 seconds throughout  NVI motor/LTS to median, radial, and ulnar nerves, radial pulse 2+    Imaging:   X-Rays: 3 views of left hand obtained in the Orthopedic clinic today, and independently reviewed, show: Comminuted, intra-articular mildly anteriorly displaced fracture at the base of the middle phalanx of the 4th digit        Assessment:       1. Closed displaced fracture of middle phalanx of left ring finger, initial encounter        Plan:       Orders Placed This Encounter    ibuprofen (ADVIL,MOTRIN) 800 MG tablet    Case Request Operating Room: ORIF, FINGER     I made the decision to obtain old records of the patient including previous notes and imaging. I independently reviewed and interpreted lab results today as well as prior imaging.     I explained the nature of the problem to the patient.     I discussed at length with the patient all the different treatment options available for her left hand including analgesics, anti-inflammatories, acetaminophen, bracing, rest, ice, heat, physical therapy, and corticosteroid injections. I explained the potential role of surgery in the treatment of this condition. The patient understands that if non-surgical measures do not adequately control symptoms, surgery will be considered in the future.     Discussed case and imaging with supervising physician, Dr. Chi. Findings include: Comminuted, intra-articular mildly anteriorly displaced fracture at the base of the middle phalanx of the 4th digit    Dr. Chi and I recommend  surgical intervention to fix the digit. Patient would like to proceed with LEFT ring finger middle phalanx ORIF as an outpatient surgery. The risks and benefits of surgery were discussed with the patient today and she verbalized understand. The patient was offered the option of an additional visit with Dr. Chi for an opportunity for further discussion and questions prior to the procedure. The patient declined an additional appointment. Patient was informed that Dr. Chi will be present in pre-operative holding prior to surgery in order to obtain informed consent from the patient and that will provide an additional opportunity for patient to discuss any potential questions with Dr. Chi. Surgical consents were signed. Orders for surgery were entered. Surgery is scheduled for Friday (05/30/2025).     In the meantime, I recommend:  Medications:  Prescription for Ibuprofen 800 mg TID provided today. Encouraged patient to alternate Ibuprofen and Tylenol as needed for pain management. Continue previously prescribed Oxycodone.  I have reviewed the patient's  which show monthly prescriptions for Oxycodone IR 10 mg from Dr. Neal Joel (Pain Management). Last prescription filled on 04/28/2025. Encouraged patient to reach out to Dr. Joel for narcotic medication refills.   Activity Modification:  Avoid use of affected limb. No lifting, gripping, pushing, or pulling with affected limb. Elevate above the heart when possible.   HEP:  Encouraged patient to perform gentle ROM HEP.  I stressed the importance of ROM to avoid digits stiffness. Patient verbalized understanding.  DME:  Finger splint placed on volar aspect of left ring finger. Buddy taped ring finger to middle finger for support and stability.   Pain Management: Encouraged patient to apply ice compress to the affected area 2-3x a day for 15-20 minutes as needed for pain management.    Follow-Up: 2 weeks after surgery for POV #1.     All of the patient's  questions were answered and the patient will contact us if they have any questions or concerns in the interim.    Zoey Mckenzie PA-C  [facility]  Orthopedic Surgery    Medical Dictation software was used during the dictation of portions or the entirety of this medical record.  Phonetic or grammatic errors may exist due to the use of this software. For clarification, refer to the author of the document.             [1]   Current Outpatient Medications:     albuterol-ipratropium (DUO-NEB) 2.5 mg-0.5 mg/3 mL nebulizer solution, Take 3 mLs by nebulization every 4 (four) hours as needed for Wheezing or Shortness of Breath. Rescue, Disp: 120 mL, Rfl: 11    atorvastatin (LIPITOR) 80 MG tablet, Take 80 mg by mouth., Disp: , Rfl:     ikrrovwab-vxycxydy-zotjcfw ala (BIKTARVY) -25 mg per tablet, Take 1 tablet by mouth once daily., Disp: , Rfl:     carboxymethylcellulose (REFRESH PLUS) 0.5 % Dpet, 1 drop as needed., Disp: , Rfl:     cetirizine (ZYRTEC) 10 MG tablet, Take 1 tablet (10 mg total) by mouth once daily., Disp: 90 tablet, Rfl: 3    cyclobenzaprine (FLEXERIL) 10 MG tablet, Take 10 mg by mouth., Disp: , Rfl:     dextroamphetamine-amphetamine (ADDERALL XR) 30 MG 24 hr capsule, Take by mouth every morning., Disp: , Rfl:     diltiaZEM (CARDIZEM CD) 240 MG 24 hr capsule, TAKE 1 CAPSULE(240 MG) BY MOUTH EVERY DAY, Disp: 90 capsule, Rfl: 3    furosemide (LASIX) 40 MG tablet, TAKE 1 TABLET(40 MG) BY MOUTH EVERY DAY (Patient taking differently: Take 40 mg by mouth. Mon, Wed, Fri), Disp: 90 tablet, Rfl: 1    hydrALAZINE (APRESOLINE) 50 MG tablet, Take 50 mg by mouth every evening., Disp: , Rfl:     leflunomide (ARAVA) 20 MG Tab, Take 1 tablet (20 mg total) by mouth once daily., Disp: 30 tablet, Rfl: 2    LIDOcaine (LIDODERM) 5 %, 1 patch daily as needed., Disp: , Rfl:     lisdexamfetamine (VYVANSE) 60 MG capsule, Take 60 mg by mouth every morning., Disp: , Rfl:     meloxicam (MOBIC) 7.5 MG tablet, Take 7.5 mg by  mouth 2 (two) times daily as needed., Disp: , Rfl:     montelukast (SINGULAIR) 10 mg tablet, TAKE 1 TABLET( 10 MG TOTAL) BY MOUTH EVERY OTHER DAY, Disp: 45 tablet, Rfl: 2    omeprazole (PRILOSEC) 40 MG capsule, Take 1 capsule (40 mg total) by mouth every morning., Disp: 90 capsule, Rfl: 3    oxyCODONE (ROXICODONE) 10 mg Tab immediate release tablet, Take 10 mg by mouth 3 (three) times daily., Disp: , Rfl:     pregabalin (LYRICA) 150 MG capsule, Take 150 mg by mouth 2 (two) times daily., Disp: , Rfl:     propranoloL (INDERAL) 40 MG tablet, Take 40 mg by mouth every evening., Disp: , Rfl:     risperiDONE (RISPERDAL) 1 MG tablet, Take 1 mg by mouth every evening., Disp: , Rfl:     [START ON 2025] tirzepatide, weight loss, (ZEPBOUND) 5 mg/0.5 mL PnIj, Inject 5 mg into the skin every 7 days., Disp: 2 mL, Rfl: 0    [START ON 2025] tirzepatide, weight loss, (ZEPBOUND) 7.5 mg/0.5 mL PnIj, Inject 7.5 mg into the skin every 7 days., Disp: 2 mL, Rfl: 0    urea (CARMOL) 40 % Crea, Apply topically 2 (two) times daily., Disp: 198 g, Rfl: 2    venlafaxine (EFFEXOR-XR) 150 MG Cp24, Take 150 mg by mouth every morning., Disp: , Rfl:     venlafaxine (EFFEXOR-XR) 75 MG 24 hr capsule, Take 75 mg by mouth every morning., Disp: , Rfl:     VYVANSE 50 mg capsule, Take 50 mg by mouth every morning., Disp: , Rfl:     XIIDRA 5 % Dpet, SMARTSI Drop(s) In Eye(s) Every 12 Hours, Disp: , Rfl:     ibuprofen (ADVIL,MOTRIN) 800 MG tablet, Take 1 tablet (800 mg total) by mouth 3 (three) times daily., Disp: 90 tablet, Rfl: 1    tirzepatide, weight loss, (ZEPBOUND) 2.5 mg/0.5 mL PnIj, Inject 2.5 mg into the skin every 7 days., Disp: 2 mL, Rfl: 0

## 2025-05-27 ENCOUNTER — TELEPHONE (OUTPATIENT)
Dept: ORTHOPEDICS | Facility: CLINIC | Age: 55
End: 2025-05-27
Payer: MEDICARE

## 2025-05-27 ENCOUNTER — TELEPHONE (OUTPATIENT)
Dept: OTOLARYNGOLOGY | Facility: CLINIC | Age: 55
End: 2025-05-27
Payer: MEDICARE

## 2025-05-27 DIAGNOSIS — E66.01 CLASS 3 SEVERE OBESITY DUE TO EXCESS CALORIES WITH SERIOUS COMORBIDITY AND BODY MASS INDEX (BMI) OF 40.0 TO 44.9 IN ADULT: ICD-10-CM

## 2025-05-27 DIAGNOSIS — E66.813 CLASS 3 SEVERE OBESITY DUE TO EXCESS CALORIES WITH SERIOUS COMORBIDITY AND BODY MASS INDEX (BMI) OF 40.0 TO 44.9 IN ADULT: ICD-10-CM

## 2025-05-27 DIAGNOSIS — G47.33 OSA (OBSTRUCTIVE SLEEP APNEA): ICD-10-CM

## 2025-05-27 RX ORDER — MUPIROCIN 20 MG/G
OINTMENT TOPICAL
Status: CANCELLED | OUTPATIENT
Start: 2025-05-27

## 2025-05-27 RX ORDER — TIRZEPATIDE 2.5 MG/.5ML
2.5 INJECTION, SOLUTION SUBCUTANEOUS
Qty: 2 ML | Refills: 0 | Status: SHIPPED | OUTPATIENT
Start: 2025-05-27

## 2025-05-27 NOTE — TELEPHONE ENCOUNTER
Called pt back. Pt states that she has to have emergency surgery on Friday and needs to reschedule her ENT appt with Dr. Shepherd. R/S to next Monday at [facility]. Thanks, Bety

## 2025-05-27 NOTE — TELEPHONE ENCOUNTER
----- Message from Gin sent at 5/27/2025 11:37 AM CDT -----  Regarding: r/s appt  Contact: [phone]  Pt is calling to reschedule her appt with the provider on 5/30 due to having emergency surgery this date. No appt in  [facility] pls call pt at   [phone], pt is asking can she do a VV

## 2025-05-29 ENCOUNTER — ANESTHESIA EVENT (OUTPATIENT)
Dept: SURGERY | Facility: HOSPITAL | Age: 55
End: 2025-05-29
Payer: MEDICARE

## 2025-05-29 ENCOUNTER — OFFICE VISIT (OUTPATIENT)
Dept: ORTHOPEDICS | Facility: CLINIC | Age: 55
End: 2025-05-29
Payer: MEDICARE

## 2025-05-29 DIAGNOSIS — S62.625A CLOSED DISPLACED FRACTURE OF MIDDLE PHALANX OF LEFT RING FINGER, INITIAL ENCOUNTER: Primary | ICD-10-CM

## 2025-05-29 PROCEDURE — 1160F RVW MEDS BY RX/DR IN RCRD: CPT | Mod: CPTII,S$GLB,,

## 2025-05-29 PROCEDURE — 99214 OFFICE O/P EST MOD 30 MIN: CPT | Mod: S$GLB,,,

## 2025-05-29 PROCEDURE — 1159F MED LIST DOCD IN RCRD: CPT | Mod: CPTII,S$GLB,,

## 2025-05-29 PROCEDURE — 99999 PR PBB SHADOW E&M-EST. PATIENT-LVL II: CPT | Mod: PBBFAC,,,

## 2025-05-29 NOTE — PROGRESS NOTES
Subjective:      Patient ID: Gill Cleary is a 54 y.o. female.    Chief Complaint: Pain of the Left Hand      HPI: Gill Cleary is a 54 y.o. female who presents to clinic for follow up of left 4th middle phalanx fracture sustained on 05/16/2025 when she was catching her  from a fall. Following this incident, she was seen by Dr. Goff (Rheumatology) on 05/20/2025 where x-rays confirmed fracture and patient was advised to follow up with ortho. Patient was last seen by myself on 5/26/2025 for this. Patient has been NWB. Patient has been taking Oxycodone, Tylenol, and Ibuprofen for pain. She reports that the pain is a 8/10 pain today. Denies numbness and tingling.  Patient presents today to sign consents for upcoming surgery.     Of note, patient is currently in Pain Management with Dr. Neal Joel and receives monthly prescriptions fro Oxycodone IR 10 mg and Lyrica.       Occupation: caregiver for     Ambulating: unassisted  Diabetic:  No  Smoking:  She quit smoking approximately 20 years ago.  History of DVT/PE: Negative    PAST MEDICAL HISTORY:    Past Medical History:   Diagnosis Date    Alopecia     Diverticulitis     HIV (human immunodeficiency virus infection)     Hyperlipemia     Hypertension     Nausea, vomiting, and diarrhea 04/28/2022    Osteoporosis     Quit using tobacco in remote past 09/10/2020    Sleep apnea     Vertigo      MEDICATIONS:   Current Medications[1]    ALLERGIES:   Review of patient's allergies indicates:   Allergen Reactions    Ace inhibitors Other (See Comments) and Rash     dizziness    dizziness  dizziness  dizziness      Efavirenz-emtricitabin-tenofov Other (See Comments)     dizziness    dizziness  dizziness  dizziness    Penicillins Hives, Nausea And Vomiting and Other (See Comments)     Hives (skin)^ and causes yeast infection  Hives (skin)^      Pork extract      Pt would like pork added  Because of strong Confucianist beliefs    Pork/porcine containing products       Pt would like pork added  Because of strong Worship beliefs    Tramadol Nausea And Vomiting and Other (See Comments)    Emtricita-rilpivirine-tenof df Other (See Comments)     Affecting patients kidneys    Affecting patients kidneys  Affecting patients kidneys  Affecting patients kidneys    Hibiscus flower extract     Lactase     Lisinopril     Tilactase     Amlodipine Rash    Clonidine Rash    Doxazosin Rash    Hydrochlorothiazide Rash and Other (See Comments)    Hydrocodone-acetaminophen Hives, Itching and Rash    Labetalol Rash    Metoprolol Rash    Nebivolol hcl Rash       Review of Systems:  Constitution: Negative for chills, fever and night sweats.   HENT: Negative for congestion and headaches.    Eyes: Negative for blurred vision or vision loss.  Cardiovascular: Negative for chest pain and syncope.   Respiratory: Negative for cough and shortness of breath.    Endocrine: Negative for polydipsia, polyphagia and polyuria.   Hematologic/Lymphatic: Negative for bleeding problem. Does not bruise/bleed easily.   Skin: Negative for dry skin, itching and rash.   Musculoskeletal: See HPI.   Gastrointestinal: Negative for abdominal pain and bowel incontinence.   Genitourinary: Negative for bladder incontinence and nocturia.   Neurological: Negative for disturbances in coordination, loss of balance and seizures.   Psychiatric/Behavioral: Negative for depression. The patient does not have insomnia.    Allergic/Immunologic: Negative for hives and persistent infections.          Objective:      There were no vitals filed for this visit.    PHYSICAL EXAM:  General: Alert & oriented x3, well-developed and well-nourished, in no acute distress, sitting comfortably in the exam room.  Skin: Warm and dry. Capillary refill less than 2 seconds.   Head: Normocephalic and atraumatic.   Eyes: Sclera appear normal.   Nose: No deformities seen.   Ears: No deformities seen.   Neck: No tracheal deviation present.   Pulmonary/Chest:  Breathing unlabored.   Neurological: Alert and oriented to person, place, and time.   Psychiatric: Mood is pleasant and affect appropriate.     LEFT HAND/WRIST:        Observation/Inspection:     Swelling to the 4th digit.         Palpation:   Tenderness to palpation to the 4th and 5th digits.         Range of Motion:  Wrist: Full to wrist flexion, extension, radial, and ulnar deviation without pain or difficulty.  Digits: Extremely limited to 4th and 5th digit MCP, PIP, and DIP flexion.   Patient able to extend digits independently. Slightly limited to 4th digit extension due to pain and swelling.   Slightly limited to 1st-3rd digit MCP, PIP, and DIP flexion and extension due to pain.          Strength:   strength not tested today.         Neurovascular Exam:  Digits warm and well perfused, brisk capillary refill <3 seconds throughout  NVI motor/LTS to median, radial, and ulnar nerves, radial pulse 2+    Imaging:   X-Rays: 3 views of left hand dated 05/26/2025, and independently reviewed, show: Comminuted, intra-articular mildly anteriorly displaced fracture at the base of the middle phalanx of the 4th digit        Assessment:       1. Closed displaced fracture of middle phalanx of left ring finger, initial encounter        Plan:          I explained the nature of the problem to the patient.     I discussed at length with the patient all the different treatment options available for  her left ring finger including anti-inflammatories, acetaminophen, rest, ice, heat, physical/occupational therapy, and corticosteroid injections. I explained the potential role of surgery in the treatment of this condition. The patient understands that if non-surgical measures do not adequately control symptoms, surgery will be considered in the future.     Discussed case and imaging with supervising physician, Dr. Chi. Findings include: Comminuted, intra-articular mildly anteriorly displaced fracture at the base of the middle  phalanx of the 4th digit    Dr. Chi and I recommend surgical intervention to fix the digit. Patient would like to proceed with LEFT ring finger middle phalanx ORIF as an outpatient surgery. The risks and benefits of surgery were again discussed with the patient today and she verbalized understand. The patient was offered the option of an additional visit with Dr. Chi for an opportunity for further discussion and questions prior to the procedure. The patient declined an additional appointment. Patient was informed that Dr. Chi will be present in pre-operative holding prior to surgery in order to obtain informed consent from the patient and that will provide an additional opportunity for patient to discuss any potential questions with Dr. Chi. Surgical consents were signed. Surgery is scheduled for tomorrow (05/30/2025).     In the meantime, I recommend:  Medications:  Encouraged patient to continue to take Tylenol and previously prescribed Oxycodone as needed for pain management.  Advised patient to hold NSAIDs at this time due to surgery tomorrow.  I have reviewed the patient's  which show monthly prescriptions for Oxycodone IR 10 mg from Dr. Neal Joel (Pain Management). Last prescription filled on 05/27/2025.   Activity Modification:  Avoid use of affected limb. No lifting, gripping, pushing, or pulling with affected limb. Elevate above the heart when possible.   HEP:  Continue to perform gentle ROM HEP.  I again stressed the importance of ROM to avoid digits stiffness. Patient verbalized understanding.  Pain Management:  Continue to apply ice compress to the affected area 2-3x a day for 15-20 minutes as needed for pain management.      Follow-Up: 2 weeks after surgery for POV #1.     All of the patient's questions were answered and the patient will contact us if they have any questions or concerns in the interim.    Zoey Mckenzie PA-C  [facility]  Orthopedic Surgery    Medical Dictation  software was used during the dictation of portions or the entirety of this medical record.  Phonetic or grammatic errors may exist due to the use of this software. For clarification, refer to the author of the document.            [1]   Current Outpatient Medications:     albuterol-ipratropium (DUO-NEB) 2.5 mg-0.5 mg/3 mL nebulizer solution, Take 3 mLs by nebulization every 4 (four) hours as needed for Wheezing or Shortness of Breath. Rescue, Disp: 120 mL, Rfl: 11    atorvastatin (LIPITOR) 80 MG tablet, Take 80 mg by mouth., Disp: , Rfl:     cajjgyxcf-vbdzcmrz-yomchdq ala (BIKTARVY) -25 mg per tablet, Take 1 tablet by mouth once daily., Disp: , Rfl:     carboxymethylcellulose (REFRESH PLUS) 0.5 % Dpet, 1 drop as needed., Disp: , Rfl:     cetirizine (ZYRTEC) 10 MG tablet, Take 1 tablet (10 mg total) by mouth once daily., Disp: 90 tablet, Rfl: 3    cyclobenzaprine (FLEXERIL) 10 MG tablet, Take 10 mg by mouth., Disp: , Rfl:     dextroamphetamine-amphetamine (ADDERALL XR) 30 MG 24 hr capsule, Take by mouth every morning., Disp: , Rfl:     diltiaZEM (CARDIZEM CD) 240 MG 24 hr capsule, TAKE 1 CAPSULE(240 MG) BY MOUTH EVERY DAY, Disp: 90 capsule, Rfl: 3    furosemide (LASIX) 40 MG tablet, TAKE 1 TABLET(40 MG) BY MOUTH EVERY DAY (Patient taking differently: Take 40 mg by mouth. Mon, Wed, Fri), Disp: 90 tablet, Rfl: 1    hydrALAZINE (APRESOLINE) 50 MG tablet, Take 50 mg by mouth every evening., Disp: , Rfl:     ibuprofen (ADVIL,MOTRIN) 800 MG tablet, Take 1 tablet (800 mg total) by mouth 3 (three) times daily., Disp: 90 tablet, Rfl: 1    leflunomide (ARAVA) 20 MG Tab, Take 1 tablet (20 mg total) by mouth once daily., Disp: 30 tablet, Rfl: 2    LIDOcaine (LIDODERM) 5 %, 1 patch daily as needed., Disp: , Rfl:     lisdexamfetamine (VYVANSE) 60 MG capsule, Take 60 mg by mouth every morning., Disp: , Rfl:     meloxicam (MOBIC) 7.5 MG tablet, Take 7.5 mg by mouth 2 (two) times daily as needed., Disp: , Rfl:     montelukast  (SINGULAIR) 10 mg tablet, TAKE 1 TABLET( 10 MG TOTAL) BY MOUTH EVERY OTHER DAY, Disp: 45 tablet, Rfl: 2    omeprazole (PRILOSEC) 40 MG capsule, Take 1 capsule (40 mg total) by mouth every morning., Disp: 90 capsule, Rfl: 3    oxyCODONE (ROXICODONE) 10 mg Tab immediate release tablet, Take 10 mg by mouth 3 (three) times daily., Disp: , Rfl:     pregabalin (LYRICA) 150 MG capsule, Take 150 mg by mouth 2 (two) times daily., Disp: , Rfl:     propranoloL (INDERAL) 40 MG tablet, Take 40 mg by mouth every evening., Disp: , Rfl:     risperiDONE (RISPERDAL) 1 MG tablet, Take 1 mg by mouth every evening., Disp: , Rfl:     tirzepatide, weight loss, (ZEPBOUND) 2.5 mg/0.5 mL PnIj, Inject 2.5 mg into the skin every 7 days., Disp: 2 mL, Rfl: 0    tirzepatide, weight loss, (ZEPBOUND) 5 mg/0.5 mL PnIj, Inject 5 mg into the skin every 7 days., Disp: 2 mL, Rfl: 0    [START ON 2025] tirzepatide, weight loss, (ZEPBOUND) 7.5 mg/0.5 mL PnIj, Inject 7.5 mg into the skin every 7 days., Disp: 2 mL, Rfl: 0    urea (CARMOL) 40 % Crea, Apply topically 2 (two) times daily., Disp: 198 g, Rfl: 2    venlafaxine (EFFEXOR-XR) 150 MG Cp24, Take 150 mg by mouth every morning., Disp: , Rfl:     venlafaxine (EFFEXOR-XR) 75 MG 24 hr capsule, Take 75 mg by mouth every morning., Disp: , Rfl:     VYVANSE 50 mg capsule, Take 50 mg by mouth every morning., Disp: , Rfl:     XIIDRA 5 % Dpet, SMARTSI Drop(s) In Eye(s) Every 12 Hours, Disp: , Rfl:

## 2025-05-30 ENCOUNTER — ANESTHESIA (OUTPATIENT)
Dept: SURGERY | Facility: HOSPITAL | Age: 55
End: 2025-05-30
Payer: MEDICARE

## 2025-05-30 ENCOUNTER — HOSPITAL ENCOUNTER (OUTPATIENT)
Facility: HOSPITAL | Age: 55
Discharge: HOME | End: 2025-05-30
Attending: ORTHOPAEDIC SURGERY | Admitting: ORTHOPAEDIC SURGERY
Payer: MEDICARE

## 2025-05-30 VITALS
TEMPERATURE: 99 F | OXYGEN SATURATION: 94 % | DIASTOLIC BLOOD PRESSURE: 67 MMHG | BODY MASS INDEX: 42.81 KG/M2 | HEIGHT: 63 IN | RESPIRATION RATE: 20 BRPM | HEART RATE: 80 BPM | WEIGHT: 241.63 LBS | SYSTOLIC BLOOD PRESSURE: 143 MMHG

## 2025-05-30 DIAGNOSIS — S62.625A CLOSED DISPLACED FRACTURE OF MIDDLE PHALANX OF LEFT RING FINGER, INITIAL ENCOUNTER: ICD-10-CM

## 2025-05-30 PROCEDURE — 63600175 PHARM REV CODE 636 W HCPCS: Performed by: NURSE ANESTHETIST, CERTIFIED REGISTERED

## 2025-05-30 PROCEDURE — 63600175 PHARM REV CODE 636 W HCPCS: Performed by: ANESTHESIOLOGY

## 2025-05-30 PROCEDURE — 71000016 HC POSTOP RECOV ADDL HR: Performed by: ORTHOPAEDIC SURGERY

## 2025-05-30 PROCEDURE — 71000015 HC POSTOP RECOV 1ST HR: Performed by: ORTHOPAEDIC SURGERY

## 2025-05-30 PROCEDURE — D9220A PRA ANESTHESIA: Mod: CRNA,,, | Performed by: NURSE ANESTHETIST, CERTIFIED REGISTERED

## 2025-05-30 PROCEDURE — 27201423 OPTIME MED/SURG SUP & DEVICES STERILE SUPPLY: Performed by: ORTHOPAEDIC SURGERY

## 2025-05-30 PROCEDURE — 26735 TREAT FINGER FRACTURE EACH: CPT | Mod: AS,F3,,

## 2025-05-30 PROCEDURE — C1769 GUIDE WIRE: HCPCS | Performed by: ORTHOPAEDIC SURGERY

## 2025-05-30 PROCEDURE — D9220A PRA ANESTHESIA: Mod: ANES,,, | Performed by: ANESTHESIOLOGY

## 2025-05-30 PROCEDURE — 36000708 HC OR TIME LEV III 1ST 15 MIN: Performed by: ORTHOPAEDIC SURGERY

## 2025-05-30 PROCEDURE — 26735 TREAT FINGER FRACTURE EACH: CPT | Mod: F3,,, | Performed by: ORTHOPAEDIC SURGERY

## 2025-05-30 PROCEDURE — 25000003 PHARM REV CODE 250: Performed by: ORTHOPAEDIC SURGERY

## 2025-05-30 PROCEDURE — 37000009 HC ANESTHESIA EA ADD 15 MINS: Performed by: ORTHOPAEDIC SURGERY

## 2025-05-30 PROCEDURE — 64415 NJX AA&/STRD BRCH PLXS IMG: CPT | Performed by: ANESTHESIOLOGY

## 2025-05-30 PROCEDURE — 63600175 PHARM REV CODE 636 W HCPCS: Mod: JZ,TB | Performed by: ORTHOPAEDIC SURGERY

## 2025-05-30 PROCEDURE — 37000008 HC ANESTHESIA 1ST 15 MINUTES: Performed by: ORTHOPAEDIC SURGERY

## 2025-05-30 PROCEDURE — 25000003 PHARM REV CODE 250: Performed by: NURSE ANESTHETIST, CERTIFIED REGISTERED

## 2025-05-30 PROCEDURE — C1713 ANCHOR/SCREW BN/BN,TIS/BN: HCPCS | Performed by: ORTHOPAEDIC SURGERY

## 2025-05-30 PROCEDURE — 36000709 HC OR TIME LEV III EA ADD 15 MIN: Performed by: ORTHOPAEDIC SURGERY

## 2025-05-30 DEVICE — IMPLANTABLE DEVICE: Type: IMPLANTABLE DEVICE | Site: FINGER | Status: FUNCTIONAL

## 2025-05-30 RX ORDER — PROPOFOL 10 MG/ML
VIAL (ML) INTRAVENOUS
Status: DISCONTINUED | OUTPATIENT
Start: 2025-05-30 | End: 2025-05-30

## 2025-05-30 RX ORDER — ACETAMINOPHEN 325 MG/1
650 TABLET ORAL EVERY 4 HOURS PRN
Status: DISCONTINUED | OUTPATIENT
Start: 2025-05-30 | End: 2025-05-30 | Stop reason: HOSPADM

## 2025-05-30 RX ORDER — MUPIROCIN 20 MG/G
OINTMENT TOPICAL
Status: DISCONTINUED | OUTPATIENT
Start: 2025-05-30 | End: 2025-05-30 | Stop reason: HOSPADM

## 2025-05-30 RX ORDER — ROPIVACAINE HYDROCHLORIDE 5 MG/ML
INJECTION, SOLUTION EPIDURAL; INFILTRATION; PERINEURAL
Status: COMPLETED | OUTPATIENT
Start: 2025-05-30 | End: 2025-05-30

## 2025-05-30 RX ORDER — OXYCODONE AND ACETAMINOPHEN 5; 325 MG/1; MG/1
1 TABLET ORAL EVERY 4 HOURS PRN
Qty: 30 TABLET | Refills: 0 | Status: SHIPPED | OUTPATIENT
Start: 2025-05-30 | End: 2025-06-09

## 2025-05-30 RX ORDER — ONDANSETRON 8 MG/1
8 TABLET, ORALLY DISINTEGRATING ORAL EVERY 8 HOURS PRN
Status: DISCONTINUED | OUTPATIENT
Start: 2025-05-30 | End: 2025-05-30 | Stop reason: HOSPADM

## 2025-05-30 RX ORDER — LIDOCAINE HYDROCHLORIDE 20 MG/ML
INJECTION INTRAVENOUS
Status: DISCONTINUED | OUTPATIENT
Start: 2025-05-30 | End: 2025-05-30

## 2025-05-30 RX ORDER — PROPOFOL 10 MG/ML
VIAL (ML) INTRAVENOUS CONTINUOUS PRN
Status: DISCONTINUED | OUTPATIENT
Start: 2025-05-30 | End: 2025-05-30

## 2025-05-30 RX ORDER — LABETALOL HYDROCHLORIDE 5 MG/ML
INJECTION, SOLUTION INTRAVENOUS
Status: DISCONTINUED | OUTPATIENT
Start: 2025-05-30 | End: 2025-05-30

## 2025-05-30 RX ORDER — ONDANSETRON HYDROCHLORIDE 2 MG/ML
INJECTION, SOLUTION INTRAVENOUS
Status: DISCONTINUED | OUTPATIENT
Start: 2025-05-30 | End: 2025-05-30

## 2025-05-30 RX ORDER — KETOROLAC TROMETHAMINE 30 MG/ML
30 INJECTION, SOLUTION INTRAMUSCULAR; INTRAVENOUS ONCE
Status: COMPLETED | OUTPATIENT
Start: 2025-05-30 | End: 2025-05-30

## 2025-05-30 RX ORDER — OXYCODONE HYDROCHLORIDE 10 MG/1
10 TABLET ORAL EVERY 4 HOURS PRN
Status: DISCONTINUED | OUTPATIENT
Start: 2025-05-30 | End: 2025-05-30 | Stop reason: HOSPADM

## 2025-05-30 RX ORDER — CEFAZOLIN SODIUM 1 G/3ML
INJECTION, POWDER, FOR SOLUTION INTRAMUSCULAR; INTRAVENOUS
Status: DISCONTINUED | OUTPATIENT
Start: 2025-05-30 | End: 2025-05-30

## 2025-05-30 RX ORDER — DIPHENHYDRAMINE HYDROCHLORIDE 50 MG/ML
INJECTION, SOLUTION INTRAMUSCULAR; INTRAVENOUS
Status: DISCONTINUED | OUTPATIENT
Start: 2025-05-30 | End: 2025-05-30

## 2025-05-30 RX ORDER — DEXMEDETOMIDINE HYDROCHLORIDE 100 UG/ML
INJECTION, SOLUTION INTRAVENOUS
Status: DISCONTINUED | OUTPATIENT
Start: 2025-05-30 | End: 2025-05-30

## 2025-05-30 RX ORDER — DEXAMETHASONE SODIUM PHOSPHATE 4 MG/ML
INJECTION, SOLUTION INTRA-ARTICULAR; INTRALESIONAL; INTRAMUSCULAR; INTRAVENOUS; SOFT TISSUE
Status: DISCONTINUED | OUTPATIENT
Start: 2025-05-30 | End: 2025-05-30

## 2025-05-30 RX ORDER — MIDAZOLAM HYDROCHLORIDE 1 MG/ML
INJECTION INTRAMUSCULAR; INTRAVENOUS
Status: DISCONTINUED | OUTPATIENT
Start: 2025-05-30 | End: 2025-05-30

## 2025-05-30 RX ORDER — FENTANYL CITRATE 50 UG/ML
INJECTION, SOLUTION INTRAMUSCULAR; INTRAVENOUS
Status: DISCONTINUED | OUTPATIENT
Start: 2025-05-30 | End: 2025-05-30

## 2025-05-30 RX ADMIN — LIDOCAINE HYDROCHLORIDE 100 MG: 20 INJECTION, SOLUTION INTRAVENOUS at 03:05

## 2025-05-30 RX ADMIN — DEXMEDETOMIDINE 8 MCG: 200 INJECTION, SOLUTION INTRAVENOUS at 04:05

## 2025-05-30 RX ADMIN — PROPOFOL 60 MG: 10 INJECTION, EMULSION INTRAVENOUS at 03:05

## 2025-05-30 RX ADMIN — MIDAZOLAM HYDROCHLORIDE 2 MG: 1 INJECTION, SOLUTION INTRAMUSCULAR; INTRAVENOUS at 03:05

## 2025-05-30 RX ADMIN — LABETALOL HYDROCHLORIDE 10 MG: 5 INJECTION, SOLUTION INTRAVENOUS at 05:05

## 2025-05-30 RX ADMIN — ROPIVACAINE HYDROCHLORIDE 20 ML: 5 INJECTION, SOLUTION EPIDURAL; INFILTRATION; PERINEURAL at 03:05

## 2025-05-30 RX ADMIN — OXYCODONE HYDROCHLORIDE 10 MG: 10 TABLET ORAL at 06:05

## 2025-05-30 RX ADMIN — DEXAMETHASONE SODIUM PHOSPHATE 4 MG: 4 INJECTION, SOLUTION INTRA-ARTICULAR; INTRALESIONAL; INTRAMUSCULAR; INTRAVENOUS; SOFT TISSUE at 03:05

## 2025-05-30 RX ADMIN — FENTANYL CITRATE 25 MCG: 50 INJECTION INTRAMUSCULAR; INTRAVENOUS at 04:05

## 2025-05-30 RX ADMIN — DEXMEDETOMIDINE 4 MCG: 200 INJECTION, SOLUTION INTRAVENOUS at 05:05

## 2025-05-30 RX ADMIN — KETOROLAC TROMETHAMINE 30 MG: 30 INJECTION, SOLUTION INTRAMUSCULAR; INTRAVENOUS at 06:05

## 2025-05-30 RX ADMIN — CEFAZOLIN 2 G: 330 INJECTION, POWDER, FOR SOLUTION INTRAMUSCULAR; INTRAVENOUS at 03:05

## 2025-05-30 RX ADMIN — SODIUM CHLORIDE, SODIUM LACTATE, POTASSIUM CHLORIDE, AND CALCIUM CHLORIDE: .6; .31; .03; .02 INJECTION, SOLUTION INTRAVENOUS at 04:05

## 2025-05-30 RX ADMIN — PROPOFOL 150 MCG/KG/MIN: 10 INJECTION, EMULSION INTRAVENOUS at 03:05

## 2025-05-30 RX ADMIN — DIPHENHYDRAMINE HYDROCHLORIDE 12.5 MG: 50 INJECTION INTRAMUSCULAR; INTRAVENOUS at 05:05

## 2025-05-30 RX ADMIN — SODIUM CHLORIDE, SODIUM LACTATE, POTASSIUM CHLORIDE, AND CALCIUM CHLORIDE: .6; .31; .03; .02 INJECTION, SOLUTION INTRAVENOUS at 03:05

## 2025-05-30 RX ADMIN — ONDANSETRON 4 MG: 2 INJECTION, SOLUTION INTRAMUSCULAR; INTRAVENOUS at 05:05

## 2025-05-31 ENCOUNTER — PATIENT MESSAGE (OUTPATIENT)
Dept: BARIATRICS | Facility: CLINIC | Age: 55
End: 2025-05-31
Payer: MEDICARE

## 2025-06-02 ENCOUNTER — TELEPHONE (OUTPATIENT)
Dept: OTOLARYNGOLOGY | Facility: CLINIC | Age: 55
End: 2025-06-02

## 2025-06-03 ENCOUNTER — OFFICE VISIT (OUTPATIENT)
Dept: OTOLARYNGOLOGY | Facility: CLINIC | Age: 55
End: 2025-06-03
Payer: MEDICARE

## 2025-06-03 DIAGNOSIS — H81.4 VERTIGO OF CENTRAL ORIGIN: Primary | ICD-10-CM

## 2025-06-03 PROCEDURE — 98006 SYNCH AUDIO-VIDEO EST MOD 30: CPT | Mod: 95,,, | Performed by: OTOLARYNGOLOGY

## 2025-06-05 ENCOUNTER — OFFICE VISIT (OUTPATIENT)
Dept: PRIMARY CARE CLINIC | Facility: CLINIC | Age: 55
End: 2025-06-05
Payer: MEDICARE

## 2025-06-05 DIAGNOSIS — Z12.31 ENCOUNTER FOR SCREENING MAMMOGRAM FOR MALIGNANT NEOPLASM OF BREAST: ICD-10-CM

## 2025-06-05 DIAGNOSIS — M85.89 OSTEOPENIA OF MULTIPLE SITES: Primary | ICD-10-CM

## 2025-06-05 PROCEDURE — 98005 SYNCH AUDIO-VIDEO EST LOW 20: CPT | Mod: HCNC,95,, | Performed by: STUDENT IN AN ORGANIZED HEALTH CARE EDUCATION/TRAINING PROGRAM

## 2025-06-05 NOTE — PROGRESS NOTES
The patient location is: [place]  The chief complaint leading to consultation is: referral     Visit type: audiovisual    Face to Face time with patient: 6 minutes   10 minutes of total time spent on the encounter, which includes face to face time and non-face to face time preparing to see the patient (eg, review of tests), Obtaining and/or reviewing separately obtained history, Documenting clinical information in the electronic or other health record, Independently interpreting results (not separately reported) and communicating results to the patient/family/caregiver, or Care coordination (not separately reported).         Each patient to whom he or she provides medical services by telemedicine is:  (1) informed of the relationship between the physician and patient and the respective role of any other health care provider with respect to management of the patient; and (2) notified that he or she may decline to receive medical services by telemedicine and may withdraw from such care at any time.    Notes:   This pt is known to me and presents virtually for referral. Chronic conditions: HTN, HIV, opioid dependence, chronic pain    Needs a referral for bone density test due to easy finger fracture. States she was catching her  and finger snapped. S/p surgery.    1. Osteopenia of multiple sites  - DXA Bone Density Axial Skeleton 1 or more sites; Future    2. Encounter for screening mammogram for malignant neoplasm of breast  - Mammo Digital Screening Bilat w/ [facility] (XPD); Future

## 2025-06-09 ENCOUNTER — PATIENT MESSAGE (OUTPATIENT)
Dept: ORTHOPEDICS | Facility: CLINIC | Age: 55
End: 2025-06-09
Payer: MEDICARE

## 2025-06-09 ENCOUNTER — TELEPHONE (OUTPATIENT)
Dept: ORTHOPEDICS | Facility: CLINIC | Age: 55
End: 2025-06-09
Payer: MEDICARE

## 2025-06-09 NOTE — TELEPHONE ENCOUNTER
Spoke to patient regarding her cast smell and her finger pain. Patient was informed to send a photo of her cast for inspection, and to take Ibuprofen for the pain. Patient states that she will send an Rx request for a refill on her pain medication.

## 2025-06-09 NOTE — TELEPHONE ENCOUNTER
----- Message from Coleman sent at 6/9/2025  2:07 PM CDT -----  Type:  Needs Medical AdviceWho Called: Patient Symptoms (please be specific): cast has a smell due to getting wet (Damp) patient also reports sharp pain in her fingers How long has patient had these symptoms:  today Pharmacy name and phone #:  Would the patient rather a call back or a response via [facility]? [facility]Be Call Back Number:  [phone] Information:

## 2025-06-09 NOTE — TELEPHONE ENCOUNTER
----- Message from Jesusita sent at 6/9/2025  3:20 PM CDT -----  Regarding: Call back  Contact: [phone]  Type:  Patient Returning CallWho Called: PT Who Left Message for Patient: Nurse Does the patient know what this is regarding?: Yes Would the patient rather a call back or a response via [facility]? Call [facility] Call Back Number: [phone] Additional Information:

## 2025-06-10 ENCOUNTER — TELEPHONE (OUTPATIENT)
Dept: ORTHOPEDICS | Facility: CLINIC | Age: 55
End: 2025-06-10
Payer: MEDICARE

## 2025-06-10 DIAGNOSIS — S62.625A CLOSED DISPLACED FRACTURE OF MIDDLE PHALANX OF LEFT RING FINGER, INITIAL ENCOUNTER: ICD-10-CM

## 2025-06-10 RX ORDER — OXYCODONE AND ACETAMINOPHEN 5; 325 MG/1; MG/1
1 TABLET ORAL EVERY 6 HOURS PRN
Qty: 20 TABLET | Refills: 0 | Status: SHIPPED | OUTPATIENT
Start: 2025-06-10

## 2025-06-10 NOTE — TELEPHONE ENCOUNTER
Spoke with Dr. Chi who advised I send in 20 tablets. Rx sent to pharmacy.     Zoey Mckenzie PA-C  Orthopedic Surgery  [facility]

## 2025-06-10 NOTE — TELEPHONE ENCOUNTER
Spoke to patient regarding her cast and pain medication refill. Patient was informed that her pain medication will be refilled, and that we can see her tomorrow to remove her cast. Patient stated that she is all booked up tomorrow and can see us at her original appt on 6/16/25

## 2025-06-12 ENCOUNTER — OFFICE VISIT (OUTPATIENT)
Dept: ORTHOPEDICS | Facility: CLINIC | Age: 55
End: 2025-06-12
Payer: MEDICARE

## 2025-06-12 ENCOUNTER — HOSPITAL ENCOUNTER (OUTPATIENT)
Dept: RADIOLOGY | Facility: HOSPITAL | Age: 55
Discharge: HOME | End: 2025-06-12
Payer: MEDICARE

## 2025-06-12 ENCOUNTER — OFFICE VISIT (OUTPATIENT)
Dept: NEUROLOGY | Facility: CLINIC | Age: 55
End: 2025-06-12
Payer: MEDICARE

## 2025-06-12 VITALS
HEART RATE: 75 BPM | HEIGHT: 63 IN | SYSTOLIC BLOOD PRESSURE: 129 MMHG | DIASTOLIC BLOOD PRESSURE: 67 MMHG | BODY MASS INDEX: 42.8 KG/M2

## 2025-06-12 DIAGNOSIS — R52 PAIN: Primary | ICD-10-CM

## 2025-06-12 DIAGNOSIS — R52 PAIN: ICD-10-CM

## 2025-06-12 DIAGNOSIS — S62.625A CLOSED DISPLACED FRACTURE OF MIDDLE PHALANX OF LEFT RING FINGER, INITIAL ENCOUNTER: Primary | ICD-10-CM

## 2025-06-12 DIAGNOSIS — I10 ESSENTIAL HYPERTENSION: ICD-10-CM

## 2025-06-12 DIAGNOSIS — G43.809 VESTIBULAR MIGRAINE: Primary | ICD-10-CM

## 2025-06-12 PROCEDURE — 3008F BODY MASS INDEX DOCD: CPT | Mod: CPTII,HCNC,S$GLB, | Performed by: INTERNAL MEDICINE

## 2025-06-12 PROCEDURE — 99204 OFFICE O/P NEW MOD 45 MIN: CPT | Mod: HCNC,S$GLB,, | Performed by: INTERNAL MEDICINE

## 2025-06-12 PROCEDURE — 3074F SYST BP LT 130 MM HG: CPT | Mod: CPTII,HCNC,S$GLB, | Performed by: INTERNAL MEDICINE

## 2025-06-12 PROCEDURE — 99999 PR PBB SHADOW E&M-EST. PATIENT-LVL IV: CPT | Mod: PBBFAC,HCNC,,

## 2025-06-12 PROCEDURE — 3078F DIAST BP <80 MM HG: CPT | Mod: CPTII,HCNC,S$GLB, | Performed by: INTERNAL MEDICINE

## 2025-06-12 PROCEDURE — 1159F MED LIST DOCD IN RCRD: CPT | Mod: CPTII,HCNC,S$GLB, | Performed by: INTERNAL MEDICINE

## 2025-06-12 PROCEDURE — 99024 POSTOP FOLLOW-UP VISIT: CPT | Mod: HCNC,S$GLB,,

## 2025-06-12 PROCEDURE — 73130 X-RAY EXAM OF HAND: CPT | Mod: TC,HCNC,FY,LT

## 2025-06-12 PROCEDURE — 99999 PR PBB SHADOW E&M-EST. PATIENT-LVL IV: CPT | Mod: PBBFAC,HCNC,, | Performed by: INTERNAL MEDICINE

## 2025-06-12 RX ORDER — TOPIRAMATE 50 MG/1
TABLET, FILM COATED ORAL
Qty: 42 TABLET | Refills: 0 | Status: SHIPPED | OUTPATIENT
Start: 2025-06-12 | End: 2025-07-03

## 2025-06-12 RX ORDER — TOPIRAMATE 100 MG/1
100 TABLET, FILM COATED ORAL 2 TIMES DAILY
Qty: 120 TABLET | Refills: 2 | Status: SHIPPED | OUTPATIENT
Start: 2025-07-03

## 2025-06-12 RX ORDER — CLINDAMYCIN HYDROCHLORIDE 150 MG/1
150 CAPSULE ORAL 4 TIMES DAILY
COMMUNITY
Start: 2025-06-05

## 2025-06-12 NOTE — PROGRESS NOTES
Subjective:      Patient ID: Gill Cleary is a 54 y.o. female.    Chief Complaint: Post-op Follow Up    HPI: Gill Cleary returns for a 2 week post-op visit following: ORIF of left ring finger PIP joint fracture and dislocation (base of middle phalanx) (date of surgery 05/30/2025) with Dr. Chi. Overall, the patient is doing well with no complaints of fever, chills, nausea, vomiting, SOB, chest pains, or drainage to surgical incision. The patient reports that pain has been managed with medication. She has not begun formal PT/OT yet.  Patient states she has been using her hand for lifting some things. Patient is the primary care-giver for her . Post-operative complaints include: Patient states she got the splint wet and seems to be unraveling a little.       PAST MEDICAL HISTORY:    Past Medical History:   Diagnosis Date    Alopecia     Diverticulitis     HIV (human immunodeficiency virus infection)     Hyperlipemia     Hypertension     Nausea, vomiting, and diarrhea 04/28/2022    Osteoporosis     Quit using tobacco in remote past 09/10/2020    Sleep apnea     Vertigo      MEDICATIONS:   Current Medications[1]    ALLERGIES:   Review of patient's allergies indicates:   Allergen Reactions    Ace inhibitors Other (See Comments) and Rash     dizziness    dizziness  dizziness  dizziness      Efavirenz-emtricitabin-tenofov Other (See Comments)     dizziness    dizziness  dizziness  dizziness    Penicillins Hives, Nausea And Vomiting and Other (See Comments)     Hives (skin)^ and causes yeast infection  Hives (skin)^      Pork extract      Pt would like pork added  Because of strong Yarsanism beliefs    Pork/porcine containing products      Pt would like pork added  Because of strong Yarsanism beliefs    Tramadol Nausea And Vomiting and Other (See Comments)    Emtricita-rilpivirine-tenof df Other (See Comments)     Affecting patients kidneys    Affecting patients kidneys  Affecting patients kidneys  Affecting  patients kidneys    Hibiscus flower extract     Lactase     Lisinopril     Tilactase     Amlodipine Rash    Clonidine Rash    Doxazosin Rash    Hydrochlorothiazide Rash and Other (See Comments)    Hydrocodone-acetaminophen Hives, Itching and Rash    Labetalol Rash    Metoprolol Rash    Nebivolol hcl Rash       Review of Systems:  Constitution: Negative for chills, fever and night sweats.   HENT: Negative for congestion and headaches.    Eyes: Negative for blurred vision or vision loss.  Cardiovascular: Negative for chest pain and syncope.   Respiratory: Negative for cough and shortness of breath.    Endocrine: Negative for polydipsia, polyphagia and polyuria.   Hematologic/Lymphatic: Negative for bleeding problem. Does not bruise/bleed easily.   Skin: Negative for dry skin, itching and rash.   Musculoskeletal: See HPI.   Gastrointestinal: Negative for abdominal pain and bowel incontinence.   Genitourinary: Negative for bladder incontinence and nocturia.   Neurological: Negative for disturbances in coordination, loss of balance and seizures.   Psychiatric/Behavioral: Negative for depression. The patient does not have insomnia.    Allergic/Immunologic: Negative for hives and persistent infections.        Objective:      There were no vitals filed for this visit.    PHYSICAL EXAM:  General: Alert & oriented x3, well-developed and well-nourished, in no acute distress, sitting comfortably in the exam room.  Skin: Warm and dry. Capillary refill less than 2 seconds.   Head: Normocephalic and atraumatic.   Eyes: Sclera appear normal.   Nose: No deformities seen.   Ears: No deformities seen.   Neck: No tracheal deviation present.   Pulmonary/Chest: Breathing unlabored.   Neurological: Alert and oriented to person, place, and time.   Psychiatric: Mood is pleasant and affect appropriate.     LEFT HAND/WRIST:  Observation/Inspection:  Surgical wound margins are well approximated and healing nicely.   No redness, warmth,  drainage, or other signs of infection.   Ring finger swelling.   Mild flexion at the PIP joint.   Palpation:  Tenderness to palpation to the ring finger.    Otherwise, negative tenderness to palpation to bony prominences and soft tissues throughout.  Range of Motion:   Wrist and fingers ROM intact with some stiffness due to soreness and swelling.   Strength:    strength not tested today.    Neurovascular Exam:   Digits warm and well perfused, brisk capillary refill <3 seconds throughout.  NVI motor/LTS to median, radial, and ulnar nerves, radial pulse 2+.    Imaging:    X-Rays: 3 views of left hand obtained in the Orthopedic clinic today, and independently reviewed, show: Evidence of surgical ORIF hardware. There is evidence of of middle phalanx subluxation on today's x-rays compared to fluoroscopy images taken during surgery.        Assessment:       1. Closed displaced fracture of middle phalanx of left ring finger, initial encounter        Plan:          2 weeks s/p ORIF of left ring finger PIP joint fracture and dislocation (base of middle phalanx)     Overall patient is doing well post-operatively.     Sutures removed today without complication. Incisions are well healed, with no drainage, erythema, or signs of infection.     Wound Care:  Regular wound care explained to the patient.  Okay to wash incision with soap and water and pat to dry.  Medications:  Continue taking NSAIDs as needed for mild-to-moderate pain.  Continue taking Percocet as needed for severe pain.   We did discuss limited medication refills due to her being on chronic Oxycodone 10 mg IR. I have reviewed the patient's  which show monthly prescriptions for Oxycodone IR 10 mg from Dr. Neal Joel (Pain Management). Last prescription filled on 05/27/2025.   Antibiotics:  None prescribed today.  No evidence of wound infection.   Occupational Therapy:  Ambulatory referral to occupational therapy for hand and digit post-op therapy.   HEP:   Encouraged patient to perform gentle ROM HEP daily.   DME:  Patient placed in removable ulnar gutter brace to be worn majority of the time. Okay to remove brace for showering, hygiene, gentle ROM, therapy.  Activity Modifications:  No heavy lifting, gripping, pushing, pulling with the left hand at this time.  I did explain and stressed the importance of this to the patient.  Patient verbalized understanding.  Pain Management:  Encouraged patient to apply ice compress to the affected area 2-3x a day for 15-20 minutes as needed for pain management.      Follow-Up: 4 weeks for POV #2. New left hand to be obtained next visit.     All of the patient's questions were answered and the patient will contact us if they have any questions or concerns in the interim.    Zoey Mckenzie PA-C  [facility]  Orthopedic Surgery    Medical Dictation software was used during the dictation of portions or the entirety of this medical record.  Phonetic or grammatic errors may exist due to the use of this software. For clarification, refer to the author of the document.            [1]   Current Outpatient Medications:     albuterol-ipratropium (DUO-NEB) 2.5 mg-0.5 mg/3 mL nebulizer solution, Take 3 mLs by nebulization every 4 (four) hours as needed for Wheezing or Shortness of Breath. Rescue, Disp: 120 mL, Rfl: 11    atorvastatin (LIPITOR) 80 MG tablet, Take 80 mg by mouth., Disp: , Rfl:     kjqtqqjqu-dwidtumo-fkcbhfo ala (BIKTARVY) -25 mg per tablet, Take 1 tablet by mouth once daily., Disp: , Rfl:     carboxymethylcellulose (REFRESH PLUS) 0.5 % Dpet, 1 drop as needed., Disp: , Rfl:     cyclobenzaprine (FLEXERIL) 10 MG tablet, Take 10 mg by mouth., Disp: , Rfl:     dextroamphetamine-amphetamine (ADDERALL XR) 30 MG 24 hr capsule, Take by mouth every morning., Disp: , Rfl:     diltiaZEM (CARDIZEM CD) 240 MG 24 hr capsule, TAKE 1 CAPSULE(240 MG) BY MOUTH EVERY DAY, Disp: 90 capsule, Rfl: 3    hydrALAZINE (APRESOLINE) 50 MG  tablet, Take 50 mg by mouth every evening., Disp: , Rfl:     ibuprofen (ADVIL,MOTRIN) 800 MG tablet, Take 1 tablet (800 mg total) by mouth 3 (three) times daily., Disp: 90 tablet, Rfl: 1    leflunomide (ARAVA) 20 MG Tab, Take 1 tablet (20 mg total) by mouth once daily., Disp: 30 tablet, Rfl: 2    LIDOcaine (LIDODERM) 5 %, 1 patch daily as needed., Disp: , Rfl:     lisdexamfetamine (VYVANSE) 60 MG capsule, Take 60 mg by mouth every morning., Disp: , Rfl:     meloxicam (MOBIC) 7.5 MG tablet, Take 7.5 mg by mouth 2 (two) times daily as needed., Disp: , Rfl:     montelukast (SINGULAIR) 10 mg tablet, TAKE 1 TABLET( 10 MG TOTAL) BY MOUTH EVERY OTHER DAY, Disp: 45 tablet, Rfl: 2    omeprazole (PRILOSEC) 40 MG capsule, Take 1 capsule (40 mg total) by mouth every morning., Disp: 90 capsule, Rfl: 3    oxyCODONE (ROXICODONE) 10 mg Tab immediate release tablet, Take 10 mg by mouth 3 (three) times daily., Disp: , Rfl:     oxyCODONE-acetaminophen (PERCOCET) 5-325 mg per tablet, Take 1 tablet by mouth every 6 (six) hours as needed (severe pain only)., Disp: 20 tablet, Rfl: 0    pregabalin (LYRICA) 150 MG capsule, Take 150 mg by mouth 2 (two) times daily., Disp: , Rfl:     propranoloL (INDERAL) 40 MG tablet, Take 40 mg by mouth every evening., Disp: , Rfl:     risperiDONE (RISPERDAL) 1 MG tablet, Take 1 mg by mouth every evening., Disp: , Rfl:     tirzepatide, weight loss, (ZEPBOUND) 2.5 mg/0.5 mL PnIj, Inject 2.5 mg into the skin every 7 days., Disp: 2 mL, Rfl: 0    tirzepatide, weight loss, (ZEPBOUND) 5 mg/0.5 mL PnIj, Inject 5 mg into the skin every 7 days., Disp: 2 mL, Rfl: 0    [START ON 6/25/2025] tirzepatide, weight loss, (ZEPBOUND) 7.5 mg/0.5 mL PnIj, Inject 7.5 mg into the skin every 7 days., Disp: 2 mL, Rfl: 0    urea (CARMOL) 40 % Crea, Apply topically 2 (two) times daily. (Patient not taking: Reported on 6/12/2025), Disp: 198 g, Rfl: 2    venlafaxine (EFFEXOR-XR) 150 MG Cp24, Take 150 mg by mouth every morning., Disp: ,  Rfl:     venlafaxine (EFFEXOR-XR) 75 MG 24 hr capsule, Take 75 mg by mouth every morning., Disp: , Rfl:     VYVANSE 50 mg capsule, Take 50 mg by mouth every morning., Disp: , Rfl:     XIIDRA 5 % Dpet, SMARTSI Drop(s) In Eye(s) Every 12 Hours, Disp: , Rfl:     cetirizine (ZYRTEC) 10 MG tablet, Take 1 tablet (10 mg total) by mouth once daily., Disp: 90 tablet, Rfl: 3    clindamycin (CLEOCIN) 150 MG capsule, Take 150 mg by mouth 4 (four) times daily., Disp: , Rfl:     furosemide (LASIX) 40 MG tablet, TAKE 1 TABLET(40 MG) BY MOUTH EVERY DAY, Disp: 90 tablet, Rfl: 1    [START ON 7/3/2025] topiramate (TOPAMAX) 100 MG tablet, Take 1 tablet (100 mg total) by mouth 2 (two) times daily., Disp: 120 tablet, Rfl: 2    topiramate (TOPAMAX) 50 MG tablet, Take 0.5 tablets (25 mg total) by mouth 2 (two) times daily for 7 days, THEN 1 tablet (50 mg total) 2 (two) times daily for 7 days, THEN 1.5 tablets (75 mg total) 2 (two) times daily for 7 days., Disp: 42 tablet, Rfl: 0

## 2025-06-12 NOTE — PROGRESS NOTES
GENERAL NEUROLOGY VISIT   06/12/2025  History:    Patient is a 54 y.o. female with past medical history of chronic headaches, ADHD, MDD, essential hypertension, hyperlipidemia, CAD, HIV, CKD stage 3, YANICK on CPAP presenting to the clinic for evaluation of vertigo.  History obtained from patient and chart review.    History of Present Illness    Patient reports vertigo for at least the last 4-5 years, described as a room-spinning sensation occurring with various head movements, including turning the head up, to the right, or moving quickly. It can also happen when bending over and standing up, even without rapid movement. She has episodes of dizziness and loss of balance associated with the vertigo, typically resolving within minutes. She recalls a severe instance last year lasting for 2 weeks straight, during which she had difficulty walking and required support from walls. The vertigo can occur when lying on her back, even when stationary. The frequency is not daily, but can happen multiple times daily when they do occur. She also reports reduced hearing, noting that others often mention she has not heard something. Occasional tinnitus is reported, but it is described as separate from the vertigo episodes.    She mentions high blood pressure, with readings as high as 153 in the past. She reports fog when her blood pressure is elevated.  She has not intentionally checked her blood pressure during these episodes of dizziness.    She reports a history of migraines since age 13, predating the vertigo symptoms. These occur approximately 2-3 times per week, though the frequency has decreased lately. The migraines are accompanied by visual disturbances described as sparkles and sensitivity to light.  Denies nausea or vomiting.  She does not take any medication for the migraines.    She has been diagnosed with sleep apnea and uses a CPAP machine nightly, which helps with breathing but may be causing longer sleep duration.  She is having difficulty waking up in the morning with CPAP use.    MEDICATIONS:  Patient uses CPAP nightly for sleep apnea.      ROS:  ROS findings as noted in HPI.          Past Medical History:   Diagnosis Date    Alopecia     Diverticulitis     HIV (human immunodeficiency virus infection)     Hyperlipemia     Hypertension     Nausea, vomiting, and diarrhea 04/28/2022    Osteoporosis     Quit using tobacco in remote past 09/10/2020    Sleep apnea     Vertigo        Past Surgical History:   Procedure Laterality Date    CATARACT EXTRACTION W/  INTRAOCULAR LENS IMPLANT Right 3/14/2024    Procedure: EXTRACTION, CATARACT, WITH IOL INSERTION;  Surgeon: Kim Aldrich MD;  Location: [facility] OR;  Service: Ophthalmology;  Laterality: Right;    CATARACT EXTRACTION W/  INTRAOCULAR LENS IMPLANT Left 5/2/2024    Procedure: EXTRACTION, CATARACT, WITH IOL INSERTION;  Surgeon: Kim Aldrich MD;  Location: [facility] OR;  Service: Ophthalmology;  Laterality: Left;    LAPAROSCOPIC SIGMOIDECTOMY N/A 12/10/2018    Procedure: COLECTOMY, SIGMOID, LAPAROSCOPIC-;  Surgeon: Reyes Nelson MD;  Location: [facility] OR [address] [place] [address];  Service: General;  Laterality: N/A;    LAPAROSCOPIC TOTAL HYSTERECTOMY  2018    OPEN REDUCTION AND INTERNAL FIXATION (ORIF) OF INJURY OF FINGER Left 5/30/2025    Procedure: ORIF, FINGER;  Surgeon: Alex Chi Jr., MD;  Location: [facility] OR;  Service: Orthopedics;  Laterality: Left;  need Armaan (rep) and mini c arm    URETERAL STENT PLACEMENT Left 12/10/2018    Procedure: INSERTION, STENT, URETER;  Surgeon: Reyes Nelson MD;  Location: [facility] OR [address] [place] [address];  Service: General;  Laterality: Left;       Social History[1]    Review of patient's allergies indicates:   Allergen Reactions    Ace inhibitors Other (See Comments) and Rash     dizziness    dizziness  dizziness  dizziness      Efavirenz-emtricitabin-tenofov Other (See Comments)     dizziness    dizziness  dizziness  dizziness    Penicillins Hives, Nausea And Vomiting  "and Other (See Comments)     Hives (skin)^ and causes yeast infection  Hives (skin)^      Pork extract      Pt would like pork added  Because of strong [employer] beliefs    Pork/porcine containing products      Pt would like pork added  Because of strong Church beliefs    Tramadol Nausea And Vomiting and Other (See Comments)    Emtricita-rilpivirine-tenof df Other (See Comments)     Affecting patients kidneys    Affecting patients kidneys  Affecting patients kidneys  Affecting patients kidneys    Hibiscus flower extract     Lactase     Lisinopril     Tilactase     Amlodipine Rash    Clonidine Rash    Doxazosin Rash    Hydrochlorothiazide Rash and Other (See Comments)    Hydrocodone-acetaminophen Hives, Itching and Rash    Labetalol Rash    Metoprolol Rash    Nebivolol hcl Rash       Medications Ordered Prior to Encounter[2]     Family history:  Not contributary    Review Of Systems     Constitutional Negative for fevers, chills, weigh loss   HEENT Negative for hearing loss, dysphagia, sore throat, diplopia   Respiratory Negative for shortness of breath, cough    Cardiovascular Negative for chest pain, palpitations    Gastrointestinal Negative for constipation, diarrhea, early satiety    Skin Negative for rashes    Musculoskeletal Negative for joint pains, myalgias.   Neurological See Above    Psychological Negative for sleep disturbances.    Heme/Lymph Negative for easy bruising, easy bleeding    Endocrine Negative for polyuria, polydypsia     Physical Exam:     Physical Examination  /67 (BP Location: Left arm, Patient Position: Sitting)   Pulse 75   Ht 5' 3" (1.6 m)   LMP  (LMP Unknown)   BMI 42.80 kg/m²   Body mass index is 42.8 kg/m².      Neurological Exam  Mental Status:   Alert and oriented to name, date, location  Recent/remote memory, registration, attention span/concentration, fund of knowledge intact by history.    CN:   II, III, IV, VI: PERRL, EOMI, no nystagmus, fundus not visualized due to " inadequate dilation.V: intact to fine touch, temperature, pain.VII: symmetrical facial movement, nice smile, no drooping.VIII: grossly intact to hearing XII: tongue midline    Motor:   5/5 in all 4 extremities, no drift                Reflexes:   No Clonus, down going toes b/l.    Sensation: on both UEs and LEs    Intact to all modalities tested    Coordination:    Tremor: Absent.    Gait:    Normal      Interval/Previous Work-up:   Following images were personally reviewed and interpreted  MRI brain/IAC with and without contrast: [facility]    Impression:   Assessment & Plan    DIZZINESS:  VESTIBULAR MIGRAINE:  - has been evaluated by ENT  - Patient to check BP at home during episodes of dizziness.   - Patient to continue with vestibular therapy sessions as scheduled (once a week).  - MRI brain with and negative for posterior fossa mass/lesion  - suspect vestibular migraine. She does have vague association of headaches after the vestibular symptoms.   - Started TOPAMAX: starting on 25 mg BID to slow escalation to 100 mg BID. Reviewed potential side effects of Topamax, including possible intensification of tingling sensations.    SLEEP APNEA:  - Patient to continue using CPAP machine for sleep apnea. Follow up with sleep medicine doctor to discuss CPAP titration and residual sleep apnea symptoms.        Plan:   Vestibular migraine    Other orders  -     topiramate (TOPAMAX) 50 MG tablet; Take 0.5 tablets (25 mg total) by mouth 2 (two) times daily for 7 days, THEN 1 tablet (50 mg total) 2 (two) times daily for 7 days, THEN 1.5 tablets (75 mg total) 2 (two) times daily for 7 days.  Dispense: 42 tablet; Refill: 0  -     topiramate (TOPAMAX) 100 MG tablet; Take 1 tablet (100 mg total) by mouth 2 (two) times daily.  Dispense: 120 tablet; Refill: 2        RTC 6 months or sooner if needed    Time spent on this encounter: 35 minutes. This includes face to face time and non-face to face time preparing to see the patient (eg,  review of tests), obtaining and/or reviewing separately obtained history, documenting clinical information in the electronic or other health record, independently interpreting results and communicating results to the patient/family/caregiver, or care coordinator.     Disclaimer: This note was prepared using a voice recognition system and is likely to have sound alike errors.  Please call me with any questions.    This note was generated with the assistance of ambient listening technology. Verbal consent was obtained by the patient and accompanying visitor(s) for the recording of patient appointment to facilitate this note. I attest to having reviewed and edited the generated note for accuracy, though some syntax or spelling errors may persist. Please contact the author of this note for any clarification.       Chayo Bills MD  Neurology         [1]   Social History  Socioeconomic History    Marital status:    Tobacco Use    Smoking status: Former     Current packs/day: 0.00     Average packs/day: 0.5 packs/day for 21.0 years (10.5 ttl pk-yrs)     Types: Cigarettes     Start date:      Quit date:      Years since quittin.4    Smokeless tobacco: Never   Substance and Sexual Activity    Alcohol use: No    Drug use: Never    Sexual activity: Never   Social History Narrative    Tobacco: None    EtOH: None    Drug: None    Employment: Caregiver for      Education:2 year college    Lives with  and 17 yo daughter     Social Drivers of Health     Financial Resource Strain: Medium Risk (2025)    Overall Financial Resource Strain (CARDIA)     Difficulty of Paying Living Expenses: Somewhat hard   Food Insecurity: No Food Insecurity (2025)    Hunger Vital Sign     Worried About Running Out of Food in the Last Year: Never true     Ran Out of Food in the Last Year: Never true   Transportation Needs: Unmet Transportation Needs (2025)    PRAPARE - Transportation     Lack of  Transportation (Medical): Patient declined     Lack of Transportation (Non-Medical): Yes   Physical Activity: Inactive (4/8/2025)    Exercise Vital Sign     Days of Exercise per Week: 0 days     Minutes of Exercise per Session: 0 min   Stress: Stress Concern Present (4/8/2025)    [facility] [place] of Occupational [facility] - Occupational Stress Questionnaire     Feeling of Stress : Rather much   Housing Stability: High Risk (4/8/2025)    Housing Stability Vital Sign     Unable to Pay for Housing in the Last Year: Yes     Number of Times Moved in the Last Year: 0     Homeless in the Last Year: No   [2]   Current Outpatient Medications on File Prior to Visit   Medication Sig Dispense Refill    albuterol-ipratropium (DUO-NEB) 2.5 mg-0.5 mg/3 mL nebulizer solution Take 3 mLs by nebulization every 4 (four) hours as needed for Wheezing or Shortness of Breath. Rescue 120 mL 11    atorvastatin (LIPITOR) 80 MG tablet Take 80 mg by mouth.      dexolrpvq-irheaxzm-jzbryry ala (BIKTARVY) -25 mg per tablet Take 1 tablet by mouth once daily.      carboxymethylcellulose (REFRESH PLUS) 0.5 % Dpet 1 drop as needed.      cetirizine (ZYRTEC) 10 MG tablet Take 1 tablet (10 mg total) by mouth once daily. 90 tablet 3    clindamycin (CLEOCIN) 150 MG capsule Take 150 mg by mouth 4 (four) times daily.      cyclobenzaprine (FLEXERIL) 10 MG tablet Take 10 mg by mouth.      dextroamphetamine-amphetamine (ADDERALL XR) 30 MG 24 hr capsule Take by mouth every morning.      diltiaZEM (CARDIZEM CD) 240 MG 24 hr capsule TAKE 1 CAPSULE(240 MG) BY MOUTH EVERY DAY 90 capsule 3    furosemide (LASIX) 40 MG tablet TAKE 1 TABLET(40 MG) BY MOUTH EVERY DAY 90 tablet 1    hydrALAZINE (APRESOLINE) 50 MG tablet Take 50 mg by mouth every evening.      ibuprofen (ADVIL,MOTRIN) 800 MG tablet Take 1 tablet (800 mg total) by mouth 3 (three) times daily. 90 tablet 1    leflunomide (ARAVA) 20 MG Tab Take 1 tablet (20 mg total) by mouth once daily. 30 tablet 2     LIDOcaine (LIDODERM) 5 % 1 patch daily as needed.      meloxicam (MOBIC) 7.5 MG tablet Take 7.5 mg by mouth 2 (two) times daily as needed.      montelukast (SINGULAIR) 10 mg tablet TAKE 1 TABLET( 10 MG TOTAL) BY MOUTH EVERY OTHER DAY 45 tablet 2    omeprazole (PRILOSEC) 40 MG capsule Take 1 capsule (40 mg total) by mouth every morning. 90 capsule 3    oxyCODONE (ROXICODONE) 10 mg Tab immediate release tablet Take 10 mg by mouth 3 (three) times daily.      oxyCODONE-acetaminophen (PERCOCET) 5-325 mg per tablet Take 1 tablet by mouth every 6 (six) hours as needed (severe pain only). 20 tablet 0    pregabalin (LYRICA) 150 MG capsule Take 150 mg by mouth 2 (two) times daily.      propranoloL (INDERAL) 40 MG tablet Take 40 mg by mouth every evening.      risperiDONE (RISPERDAL) 1 MG tablet Take 1 mg by mouth every evening.      tirzepatide, weight loss, (ZEPBOUND) 2.5 mg/0.5 mL PnIj Inject 2.5 mg into the skin every 7 days. 2 mL 0    tirzepatide, weight loss, (ZEPBOUND) 5 mg/0.5 mL PnIj Inject 5 mg into the skin every 7 days. 2 mL 0    [START ON 2025] tirzepatide, weight loss, (ZEPBOUND) 7.5 mg/0.5 mL PnIj Inject 7.5 mg into the skin every 7 days. 2 mL 0    venlafaxine (EFFEXOR-XR) 150 MG Cp24 Take 150 mg by mouth every morning.      venlafaxine (EFFEXOR-XR) 75 MG 24 hr capsule Take 75 mg by mouth every morning.      XIIDRA 5 % Dpet SMARTSI Drop(s) In Eye(s) Every 12 Hours      lisdexamfetamine (VYVANSE) 60 MG capsule Take 60 mg by mouth every morning.      urea (CARMOL) 40 % Crea Apply topically 2 (two) times daily. (Patient not taking: Reported on 2025) 198 g 2    VYVANSE 50 mg capsule Take 50 mg by mouth every morning.       No current facility-administered medications on file prior to visit.

## 2025-06-12 NOTE — TELEPHONE ENCOUNTER
No care due was identified.  [facility] Embedded Care Due Messages. Reference number: [id number].   6/12/2025 5:55:20 AM CDT

## 2025-06-12 NOTE — TELEPHONE ENCOUNTER
Refill Routing Note   Medication(s) are not appropriate for processing by [facility] for the following reason(s):        Required labs abnormal  Required vitals abnormal    [facility] action(s):  Defer               Appointments  past 12m or future 3m with PCP    Date Provider   Last Visit   6/5/2025 Yaima Burton MD   Next Visit   Visit date not found Yaima Burton MD   ED visits in past 90 days: 0        Note composed:10:24 AM 06/12/2025

## 2025-06-13 RX ORDER — FUROSEMIDE 40 MG/1
40 TABLET ORAL
Qty: 90 TABLET | Refills: 1 | Status: SHIPPED | OUTPATIENT
Start: 2025-06-13

## 2025-06-16 ENCOUNTER — PATIENT MESSAGE (OUTPATIENT)
Dept: ORTHOPEDICS | Facility: CLINIC | Age: 55
End: 2025-06-16
Payer: MEDICARE

## 2025-06-16 DIAGNOSIS — S62.625A CLOSED DISPLACED FRACTURE OF MIDDLE PHALANX OF LEFT RING FINGER, INITIAL ENCOUNTER: Primary | ICD-10-CM

## 2025-06-18 ENCOUNTER — PATIENT MESSAGE (OUTPATIENT)
Dept: ORTHOPEDICS | Facility: CLINIC | Age: 55
End: 2025-06-18
Payer: MEDICARE

## 2025-06-23 DIAGNOSIS — S62.625A CLOSED DISPLACED FRACTURE OF MIDDLE PHALANX OF LEFT RING FINGER, INITIAL ENCOUNTER: ICD-10-CM

## 2025-06-23 RX ORDER — OXYCODONE AND ACETAMINOPHEN 5; 325 MG/1; MG/1
1 TABLET ORAL EVERY 6 HOURS PRN
Qty: 20 TABLET | Refills: 0 | Status: SHIPPED | OUTPATIENT
Start: 2025-06-23

## 2025-06-25 DIAGNOSIS — G47.33 OSA ON CPAP: Primary | ICD-10-CM

## 2025-06-27 ENCOUNTER — TELEPHONE (OUTPATIENT)
Dept: SLEEP MEDICINE | Facility: OTHER | Age: 55
End: 2025-06-27
Payer: MEDICARE

## 2025-07-01 DIAGNOSIS — N18.1 CKD (CHRONIC KIDNEY DISEASE) STAGE 1, GFR 90 ML/MIN OR GREATER: Primary | ICD-10-CM

## 2025-07-07 ENCOUNTER — TELEPHONE (OUTPATIENT)
Dept: SLEEP MEDICINE | Facility: OTHER | Age: 55
End: 2025-07-07
Payer: MEDICARE

## 2025-07-11 ENCOUNTER — TELEPHONE (OUTPATIENT)
Dept: ORTHOPEDICS | Facility: CLINIC | Age: 55
End: 2025-07-11
Payer: MEDICARE

## 2025-07-11 DIAGNOSIS — S62.625A CLOSED DISPLACED FRACTURE OF MIDDLE PHALANX OF LEFT RING FINGER, INITIAL ENCOUNTER: Primary | ICD-10-CM

## 2025-07-14 ENCOUNTER — TELEPHONE (OUTPATIENT)
Dept: ORTHOPEDICS | Facility: CLINIC | Age: 55
End: 2025-07-14
Payer: MEDICARE

## 2025-07-14 NOTE — TELEPHONE ENCOUNTER
Copied from CRM #[id number]. Topic: Appointments - Appointment Access  >> Jul 14, 2025  1:04 PM Ahsan wrote:  Type: General Call Back     Name of Caller:pt   Reason pt has an post op for 07/14 the pt needs to reschedule   Would the patient rather a call back or a response via [facility]? Call   Best Call Back Number:[phone]  Additional Information:      Attempted to return pt call, to r/s post appointment from today she did not answer LVM

## 2025-07-14 NOTE — TELEPHONE ENCOUNTER
Copied from CRM #[id number]. Topic: General Inquiry - Return Call  >> Jul 14, 2025  3:46 PM Víctor wrote:  .Type:  Needs Medical Advice    Who Called: Pt    Would the patient rather a call back or a response via [facility]?  Call back  Best Call Back Number: [phone]   Additional Information: Pt. Is returning a call back to the office to reschedule her appt. From today.      Returned pt call , offered pt soonest available 7/17 . Pt declined offer and states she is not available this Thursday but she is available next Thursday . Offered her 7/24 at 1:40. Pt agreed and gave verbal understanding.call ended

## 2025-07-15 DIAGNOSIS — J30.2 SEASONAL ALLERGIES: ICD-10-CM

## 2025-07-15 RX ORDER — CETIRIZINE HYDROCHLORIDE 10 MG/1
10 TABLET ORAL
Qty: 90 TABLET | Refills: 3 | Status: SHIPPED | OUTPATIENT
Start: 2025-07-15

## 2025-07-15 NOTE — TELEPHONE ENCOUNTER
Refill Decision Note   Gill Cleary  is requesting a refill authorization.  Brief Assessment and Rationale for Refill:  Approve     Medication Therapy Plan:         Comments:     Note composed:5:42 PM 07/15/2025             Appointments     Last Visit   6/5/2025 Yaima Burton MD   Next Visit   Visit date not found Yaima Burton MD

## 2025-07-15 NOTE — TELEPHONE ENCOUNTER
No care due was identified.  [facility] Embedded Care Due Messages. Reference number: [id number].   7/15/2025 5:54:52 AM CDT

## 2025-07-20 ENCOUNTER — PATIENT MESSAGE (OUTPATIENT)
Dept: PRIMARY CARE CLINIC | Facility: CLINIC | Age: 55
End: 2025-07-20
Payer: MEDICARE

## 2025-07-21 ENCOUNTER — PATIENT MESSAGE (OUTPATIENT)
Dept: ADMINISTRATIVE | Facility: HOSPITAL | Age: 55
End: 2025-07-21
Payer: MEDICARE

## 2025-07-22 ENCOUNTER — PATIENT MESSAGE (OUTPATIENT)
Dept: ADMINISTRATIVE | Facility: OTHER | Age: 55
End: 2025-07-22
Payer: MEDICARE

## 2025-07-24 ENCOUNTER — TELEPHONE (OUTPATIENT)
Dept: ORTHOPEDICS | Facility: CLINIC | Age: 55
End: 2025-07-24
Payer: MEDICARE

## 2025-07-25 NOTE — TELEPHONE ENCOUNTER
Patient will need a visit to discuss estrogen patches.  Estrogen isn't medication that not everybody can take based on risk factors.  This is something we can discuss in further detail.

## 2025-07-28 ENCOUNTER — PATIENT OUTREACH (OUTPATIENT)
Dept: ADMINISTRATIVE | Facility: HOSPITAL | Age: 55
End: 2025-07-28
Payer: MEDICARE

## 2025-07-28 NOTE — PROGRESS NOTES
VBHM Score: 1     Osteoporosis Screening                 Unable to schedule bone density same day with mammogram  L/m to call back when able to schedule  bone density

## 2025-07-30 ENCOUNTER — PATIENT MESSAGE (OUTPATIENT)
Dept: CARDIOLOGY | Facility: CLINIC | Age: 55
End: 2025-07-30
Payer: MEDICARE

## 2025-07-30 ENCOUNTER — OFFICE VISIT (OUTPATIENT)
Dept: BARIATRICS | Facility: CLINIC | Age: 55
End: 2025-07-30
Payer: MEDICARE

## 2025-07-30 VITALS
DIASTOLIC BLOOD PRESSURE: 61 MMHG | WEIGHT: 230.19 LBS | HEART RATE: 85 BPM | BODY MASS INDEX: 40.79 KG/M2 | SYSTOLIC BLOOD PRESSURE: 97 MMHG | OXYGEN SATURATION: 98 % | HEIGHT: 63 IN

## 2025-07-30 DIAGNOSIS — E66.813 CLASS 3 SEVERE OBESITY DUE TO EXCESS CALORIES WITH SERIOUS COMORBIDITY AND BODY MASS INDEX (BMI) OF 40.0 TO 44.9 IN ADULT: Primary | ICD-10-CM

## 2025-07-30 DIAGNOSIS — I10 ESSENTIAL HYPERTENSION: ICD-10-CM

## 2025-07-30 DIAGNOSIS — E78.5 HYPERLIPIDEMIA, UNSPECIFIED HYPERLIPIDEMIA TYPE: ICD-10-CM

## 2025-07-30 DIAGNOSIS — Z71.3 ENCOUNTER FOR WEIGHT LOSS COUNSELING: ICD-10-CM

## 2025-07-30 DIAGNOSIS — G47.33 OSA (OBSTRUCTIVE SLEEP APNEA): ICD-10-CM

## 2025-07-30 PROCEDURE — 99999 PR PBB SHADOW E&M-EST. PATIENT-LVL III: CPT | Mod: PBBFAC,HCNC,, | Performed by: STUDENT IN AN ORGANIZED HEALTH CARE EDUCATION/TRAINING PROGRAM

## 2025-07-30 PROCEDURE — 3078F DIAST BP <80 MM HG: CPT | Mod: CPTII,HCNC,S$GLB, | Performed by: STUDENT IN AN ORGANIZED HEALTH CARE EDUCATION/TRAINING PROGRAM

## 2025-07-30 PROCEDURE — 99213 OFFICE O/P EST LOW 20 MIN: CPT | Mod: HCNC,S$GLB,, | Performed by: STUDENT IN AN ORGANIZED HEALTH CARE EDUCATION/TRAINING PROGRAM

## 2025-07-30 PROCEDURE — 3008F BODY MASS INDEX DOCD: CPT | Mod: CPTII,HCNC,S$GLB, | Performed by: STUDENT IN AN ORGANIZED HEALTH CARE EDUCATION/TRAINING PROGRAM

## 2025-07-30 PROCEDURE — 4010F ACE/ARB THERAPY RXD/TAKEN: CPT | Mod: CPTII,HCNC,S$GLB, | Performed by: STUDENT IN AN ORGANIZED HEALTH CARE EDUCATION/TRAINING PROGRAM

## 2025-07-30 PROCEDURE — 3074F SYST BP LT 130 MM HG: CPT | Mod: CPTII,HCNC,S$GLB, | Performed by: STUDENT IN AN ORGANIZED HEALTH CARE EDUCATION/TRAINING PROGRAM

## 2025-07-30 PROCEDURE — 1160F RVW MEDS BY RX/DR IN RCRD: CPT | Mod: CPTII,HCNC,S$GLB, | Performed by: STUDENT IN AN ORGANIZED HEALTH CARE EDUCATION/TRAINING PROGRAM

## 2025-07-30 PROCEDURE — 1159F MED LIST DOCD IN RCRD: CPT | Mod: CPTII,HCNC,S$GLB, | Performed by: STUDENT IN AN ORGANIZED HEALTH CARE EDUCATION/TRAINING PROGRAM

## 2025-07-30 RX ORDER — TIRZEPATIDE 10 MG/.5ML
10 INJECTION, SOLUTION SUBCUTANEOUS
Qty: 4 PEN | Refills: 2 | Status: SHIPPED | OUTPATIENT
Start: 2025-07-30

## 2025-07-30 NOTE — PROGRESS NOTES
Subjective     Patient ID: Gill Cleary is a 54 y.o. female.    Chief Complaint: Follow-up, Obesity, and Weight Check    Patient presents for treatment of obesity.   Weight gain following hysterectomy (2018), weighed about 130 lbs prior to that    Co-morbidities   HTN  HLD  YANICK - home sleep study 10/2015 AHI 35.5 (in media file)  HIV  GERD  CKD  CAD    Negative for thyroid cancer    Has taken topiramate in the past for headches    Current Physical Activity  Walking al least 3x/week for 20 minutes    Current Eating Habits - sometimes only eats 1-2 meal per day  Breakfast - mushroom coffee with 4 teaspoons of sugar (down from 6); scrambled eggs, turkey sausage, toast  Lunch - turkey sandwich, strawberries; salad from [employer] with grilled chicken; Popeyes occasionally  Dinner - salad, BBQ chicken with sweet potatoes; baked chicken with vegetable (corn or vegetable asian medley or broccoli)  Snacks - hard candy  Beverages - Ice sparkling water    Typically eats 1 meal per day - frozen meal or cooks  Snacks on crackers or chips, olives, fruits throughout the day, ice cream     Medical Weight Loss  9/5/2023: 220.1 lbs, BMI 40.2, BFP 50.9%, .9 lbs, SMM 60.2 lbs, BMR 1430 kcal  10/9/2023: 220.5 lbs, BMI 39.1, BFP 50.1%, .3 lbs, SMM 62 lbs, BMR 1449 kcal  4/30/2025: 241.8 lbs, BMI 42.8, BFP 47.2%, .2 lbs, SMM 71.4 lbs, BMR 1621 kcal  7/30/2025: 230.2 lbs, BMI 42.1, BFP 51.8%, .3 lbs, SMM 61.5 lbs, BMR 1457 kcal      Review of Systems   Constitutional:  Negative for chills and fever.   Respiratory:  Negative for shortness of breath.    Cardiovascular:  Negative for chest pain.   Gastrointestinal:  Negative for abdominal pain, nausea and vomiting.   Neurological:  Negative for dizziness and light-headedness.          Objective    Latest Reference Range & Units 06/21/23 15:55   Sodium 136 - 145 mmol/L 139   Potassium 3.5 - 5.1 mmol/L 3.9   Chloride 95 - 110 mmol/L 100   CO2 23 - 29 mmol/L 28    Anion Gap 8 - 16 mmol/L 11   BUN 6 - 20 mg/dL 7   Creatinine 0.5 - 1.4 mg/dL 1.1   eGFR >60 mL/min/1.73 m^2 >60.0   Glucose 70 - 110 mg/dL 87   Calcium 8.7 - 10.5 mg/dL 10.4   Alkaline Phosphatase 55 - 135 U/L 117   PROTEIN TOTAL 6.0 - 8.4 g/dL 8.7 (H)   Albumin 3.5 - 5.2 g/dL 4.3   BILIRUBIN TOTAL 0.1 - 1.0 mg/dL 0.6   AST 10 - 40 U/L 29   ALT 10 - 44 U/L 29   (H): Data is abnormally high    Vitals:    07/30/25 1359   BP: 97/61   Pulse: 85         Physical Exam  Vitals reviewed.   Constitutional:       General: She is not in acute distress.     Appearance: Normal appearance. She is obese. She is not ill-appearing, toxic-appearing or diaphoretic.   HENT:      Head: Normocephalic and atraumatic.   Cardiovascular:      Rate and Rhythm: Normal rate.   Pulmonary:      Effort: Pulmonary effort is normal. No respiratory distress.   Skin:     General: Skin is warm and dry.   Neurological:      Mental Status: She is alert and oriented to person, place, and time.            Assessment and Plan     1. Class 3 severe obesity due to excess calories with serious comorbidity and body mass index (BMI) of 40.0 to 44.9 in adult  -     tirzepatide, weight loss, (ZEPBOUND) 10 mg/0.5 mL PnIj; Inject 10 mg into the skin every 7 days.  Dispense: 4 Pen; Refill: 2    2. YANICK (obstructive sleep apnea)  -     tirzepatide, weight loss, (ZEPBOUND) 10 mg/0.5 mL PnIj; Inject 10 mg into the skin every 7 days.  Dispense: 4 Pen; Refill: 2    3. Encounter for weight loss counseling    4. Hyperlipidemia, unspecified hyperlipidemia type    5. Essential hypertension  Overview:  dx update  Overview:   dx update          - Zepbound weekly injections    - Log all food and beverage intake with a daily calorie goal of 8791-8883 calories per day    - Low intensity aerobic exercise for 150 minutes per week plus weight bearing exercises 2-3x/week

## 2025-07-31 ENCOUNTER — PATIENT MESSAGE (OUTPATIENT)
Dept: PRIMARY CARE CLINIC | Facility: CLINIC | Age: 55
End: 2025-07-31
Payer: MEDICARE

## 2025-07-31 DIAGNOSIS — B35.1 ONYCHOMYCOSIS: ICD-10-CM

## 2025-07-31 RX ORDER — OMEPRAZOLE 40 MG/1
40 CAPSULE, DELAYED RELEASE ORAL EVERY MORNING
Qty: 90 CAPSULE | Refills: 3 | OUTPATIENT
Start: 2025-07-31

## 2025-07-31 NOTE — TELEPHONE ENCOUNTER
No care due was identified.  [facility] Embedded Care Due Messages. Reference number: [id number].   7/31/2025 4:32:27 PM CDT

## 2025-08-01 RX ORDER — ATORVASTATIN CALCIUM 80 MG/1
80 TABLET, FILM COATED ORAL NIGHTLY
Qty: 90 TABLET | Refills: 3 | Status: SHIPPED | OUTPATIENT
Start: 2025-08-01

## 2025-08-06 ENCOUNTER — TELEPHONE (OUTPATIENT)
Dept: ORTHOPEDICS | Facility: CLINIC | Age: 55
End: 2025-08-06
Payer: MEDICARE

## 2025-08-07 ENCOUNTER — TELEPHONE (OUTPATIENT)
Dept: ORTHOPEDICS | Facility: CLINIC | Age: 55
End: 2025-08-07
Payer: MEDICARE

## 2025-08-07 NOTE — TELEPHONE ENCOUNTER
Copied from CRM #[id number]. Topic: Appointments - Appointment Rescheduling  >> Aug 7, 2025 12:41 PM Radames wrote:  Type:  Sooner Apoointment Request    Caller is requesting a sooner appointment.  Caller declined first available appointment listed below.  Caller will not accept being placed on the waitlist and is requesting a message be sent to doctor.  Name of Caller:PT  When is the first available appointment? SEP 25TH   Symptoms:POST OP  Would the patient rather a call back or a response via [email or web address]? CALL   Best Call Back Number: [phone]   Additional Information:  PT not feeling well today.

## 2025-08-08 ENCOUNTER — TELEPHONE (OUTPATIENT)
Dept: ORTHOPEDICS | Facility: CLINIC | Age: 55
End: 2025-08-08
Payer: MEDICARE

## 2025-08-08 NOTE — TELEPHONE ENCOUNTER
----- Message from Alex Chi MD sent at 8/8/2025  7:01 AM CDT -----  Please call her and move appt with me to next week (Thursday) Thx!

## 2025-08-13 ENCOUNTER — OFFICE VISIT (OUTPATIENT)
Dept: GASTROENTEROLOGY | Facility: CLINIC | Age: 55
End: 2025-08-13
Payer: MEDICARE

## 2025-08-13 DIAGNOSIS — R19.7 DIARRHEA, UNSPECIFIED TYPE: Primary | ICD-10-CM

## 2025-08-13 DIAGNOSIS — K21.9 GASTROESOPHAGEAL REFLUX DISEASE, UNSPECIFIED WHETHER ESOPHAGITIS PRESENT: ICD-10-CM

## 2025-08-13 RX ORDER — OMEPRAZOLE 40 MG/1
40 CAPSULE, DELAYED RELEASE ORAL DAILY
Qty: 90 CAPSULE | Refills: 1 | Status: SHIPPED | OUTPATIENT
Start: 2025-08-13 | End: 2026-08-13

## 2025-08-14 ENCOUNTER — PATIENT MESSAGE (OUTPATIENT)
Dept: GASTROENTEROLOGY | Facility: CLINIC | Age: 55
End: 2025-08-14
Payer: MEDICARE

## 2025-08-14 ENCOUNTER — PATIENT MESSAGE (OUTPATIENT)
Dept: HEMATOLOGY/ONCOLOGY | Facility: CLINIC | Age: 55
End: 2025-08-14
Payer: MEDICARE

## 2025-08-18 ENCOUNTER — TELEPHONE (OUTPATIENT)
Dept: ORTHOPEDICS | Facility: CLINIC | Age: 55
End: 2025-08-18
Payer: MEDICARE

## 2025-08-20 ENCOUNTER — OFFICE VISIT (OUTPATIENT)
Facility: CLINIC | Age: 55
End: 2025-08-20
Payer: MEDICARE

## 2025-08-20 DIAGNOSIS — E66.01 MORBID OBESITY: ICD-10-CM

## 2025-08-20 DIAGNOSIS — Z79.899 DRUG-INDUCED IMMUNODEFICIENCY: ICD-10-CM

## 2025-08-20 DIAGNOSIS — D84.821 DRUG-INDUCED IMMUNODEFICIENCY: ICD-10-CM

## 2025-08-20 DIAGNOSIS — M15.9 OSTEOARTHRITIS OF MULTIPLE JOINTS, UNSPECIFIED OSTEOARTHRITIS TYPE: ICD-10-CM

## 2025-08-20 DIAGNOSIS — M06.00 SERONEGATIVE RHEUMATOID ARTHRITIS: Primary | ICD-10-CM

## 2025-08-20 PROCEDURE — 4010F ACE/ARB THERAPY RXD/TAKEN: CPT | Mod: CPTII,HCNC,95, | Performed by: STUDENT IN AN ORGANIZED HEALTH CARE EDUCATION/TRAINING PROGRAM

## 2025-08-20 PROCEDURE — 98006 SYNCH AUDIO-VIDEO EST MOD 30: CPT | Mod: HCNC,95,, | Performed by: STUDENT IN AN ORGANIZED HEALTH CARE EDUCATION/TRAINING PROGRAM

## 2025-08-20 PROCEDURE — G2211 COMPLEX E/M VISIT ADD ON: HCPCS | Mod: HCNC,95,, | Performed by: STUDENT IN AN ORGANIZED HEALTH CARE EDUCATION/TRAINING PROGRAM

## 2025-08-20 RX ORDER — LEFLUNOMIDE 20 MG/1
20 TABLET ORAL DAILY
Qty: 30 TABLET | Refills: 2 | Status: SHIPPED | OUTPATIENT
Start: 2025-08-20

## 2025-08-21 ENCOUNTER — HOSPITAL ENCOUNTER (OUTPATIENT)
Dept: RADIOLOGY | Facility: HOSPITAL | Age: 55
Discharge: HOME | End: 2025-08-21
Attending: ORTHOPAEDIC SURGERY
Payer: MEDICARE

## 2025-08-21 ENCOUNTER — OFFICE VISIT (OUTPATIENT)
Facility: CLINIC | Age: 55
End: 2025-08-21
Payer: MEDICARE

## 2025-08-21 VITALS — BODY MASS INDEX: 40.79 KG/M2 | HEIGHT: 63 IN | WEIGHT: 230.19 LBS

## 2025-08-21 DIAGNOSIS — Z09 POSTOPERATIVE EXAMINATION: Primary | ICD-10-CM

## 2025-08-21 DIAGNOSIS — S62.625A CLOSED DISPLACED FRACTURE OF MIDDLE PHALANX OF LEFT RING FINGER, INITIAL ENCOUNTER: ICD-10-CM

## 2025-08-21 PROCEDURE — 99999 PR PBB SHADOW E&M-EST. PATIENT-LVL II: CPT | Mod: PBBFAC,HCNC,, | Performed by: PHYSICIAN ASSISTANT

## 2025-08-21 PROCEDURE — 1159F MED LIST DOCD IN RCRD: CPT | Mod: CPTII,HCNC,S$GLB, | Performed by: PHYSICIAN ASSISTANT

## 2025-08-21 PROCEDURE — 1160F RVW MEDS BY RX/DR IN RCRD: CPT | Mod: CPTII,HCNC,S$GLB, | Performed by: PHYSICIAN ASSISTANT

## 2025-08-21 PROCEDURE — 73130 X-RAY EXAM OF HAND: CPT | Mod: 26,HCNC,LT, | Performed by: RADIOLOGY

## 2025-08-21 PROCEDURE — 99024 POSTOP FOLLOW-UP VISIT: CPT | Mod: HCNC,S$GLB,, | Performed by: PHYSICIAN ASSISTANT

## 2025-08-21 PROCEDURE — 4010F ACE/ARB THERAPY RXD/TAKEN: CPT | Mod: CPTII,HCNC,S$GLB, | Performed by: PHYSICIAN ASSISTANT

## 2025-08-21 PROCEDURE — 73130 X-RAY EXAM OF HAND: CPT | Mod: TC,HCNC,PO,LT

## (undated) DEVICE — DRAPE STERI INSTRUMENT 1018

## (undated) DEVICE — GOWN POLY REINF BRTH SLV LG

## (undated) DEVICE — PAD PREP CUFFED NS 24X48IN

## (undated) DEVICE — DRAPE ABDOMINAL TIBURON 14X11

## (undated) DEVICE — SUT CTD VICRYL BR CR/SH VIL

## (undated) DEVICE — SUT CTD VICRYL 2-0 VIL BR

## (undated) DEVICE — ALCOHOL 70% ISOP W/GREEN 16OZ

## (undated) DEVICE — DRAPE HAND STERILE

## (undated) DEVICE — SUT VICRYL 4-0 27 SH

## (undated) DEVICE — CONNECTOR Y 3/8X3/8X3/8

## (undated) DEVICE — SUT CTD VICRYL 3-0 VIL BR

## (undated) DEVICE — TRAY FOLEY 16FR INFECTION CONT

## (undated) DEVICE — SUT 3-0 VICRYL SH CR/8 18

## (undated) DEVICE — Device

## (undated) DEVICE — SEE MEDLINE ITEM 146417

## (undated) DEVICE — GLOVE SENSICARE PI SURG 7.5

## (undated) DEVICE — GUIDEWIRE STR TIP HIWIRE 150CM

## (undated) DEVICE — SUT 2-0 NYLON D/A

## (undated) DEVICE — SEE MEDLINE ITEM 157117

## (undated) DEVICE — SOL IRR 0.9% NACL 500ML PB

## (undated) DEVICE — BLADE ELECTRO EDGE INSULATED

## (undated) DEVICE — SCISSOR 5MMX35CM DIRECT DRIVE

## (undated) DEVICE — NDL BOX COUNTER

## (undated) DEVICE — SUT ETHILON 5-0 PS-2 18IN

## (undated) DEVICE — CUTTER PROXIMATE BLUE 75MM

## (undated) DEVICE — BLADE SCALP OPHTL BEVEL STR

## (undated) DEVICE — SOL NS 1000CC

## (undated) DEVICE — TUBING HF INSUFFLATION W/ FLTR

## (undated) DEVICE — SLING ARM COMFT NAVY BLU LG

## (undated) DEVICE — DRESSING XEROFORM NONADH 1X8IN

## (undated) DEVICE — COVER OVERHEAD SURG LT BLUE

## (undated) DEVICE — SEE MEDLINE ITEM 152622

## (undated) DEVICE — DRESSING ABSRBNT ISLAND 3.6X8

## (undated) DEVICE — NDL 20GX1-1/2IN IB

## (undated) DEVICE — SYR 30CC LUER LOCK

## (undated) DEVICE — MANIFOLD 4 PORT

## (undated) DEVICE — GAUZE CNFRM STRL 2INX4.1YD

## (undated) DEVICE — TROCAR ENDOPATH XCEL 11X100MM

## (undated) DEVICE — GOWN POLY REINF BRTH SLV XL

## (undated) DEVICE — DRAPE MINI C-ARM 54 X 64

## (undated) DEVICE — SEE MEDLINE ITEM 152487

## (undated) DEVICE — SEE MEDLINE ITEM 146347

## (undated) DEVICE — PACK SURGERY START

## (undated) DEVICE — LUBRICANT SURGILUBE 2 OZ

## (undated) DEVICE — KIT GELPORT LAPAROSCOPIC ABD

## (undated) DEVICE — SUT 1 36IN PDS II VIO MONO

## (undated) DEVICE — SUT FIBERWIRE 4-0 18 BLUE

## (undated) DEVICE — GLOVE SIGNATURE ESSNTL LTX 7.5

## (undated) DEVICE — STAPLER DST GREEN 45X4.8MM

## (undated) DEVICE — RELOAD PROXIMATE CUT BLUE 75MM

## (undated) DEVICE — STOCKINET 4INX48

## (undated) DEVICE — SUT MONOCRYL 4-0 PS-1 UND

## (undated) DEVICE — TROCAR ENDOPATH XCEL 12X100MM

## (undated) DEVICE — SOL BETADINE 5%

## (undated) DEVICE — APPLICATOR CHLORAPREP ORN 26ML

## (undated) DEVICE — ELECTRODE REM PLYHSV RETURN 9

## (undated) DEVICE — SUT CTD VICRYL VIL BR CR/SH

## (undated) DEVICE — SOL POVIDONE SCRUB IODINE 4 OZ

## (undated) DEVICE — LEGGINGS 48X31 INCH

## (undated) DEVICE — IRRIGATOR ENDOSCOPY DISP.

## (undated) DEVICE — SEE MEDLINE ITEM 157144

## (undated) DEVICE — CATH POLLACK OPEN-END FLEXI-TI

## (undated) DEVICE — PACK ECLIPSE BASIC III SURG

## (undated) DEVICE — BLADE SURG CARBON STEEL SZ11

## (undated) DEVICE — SOL CLEARIFY VISUALIZATION LAP

## (undated) DEVICE — CLIPPER BLADE MOD 4406 (CAREF)

## (undated) DEVICE — COVER LIGHT HANDLE 80/CA

## (undated) DEVICE — SEE MEDLINE ITEM 154981

## (undated) DEVICE — NDL HYPO REG 25G X 1 1/2

## (undated) DEVICE — MODULAR SHAFT

## (undated) DEVICE — SEE MEDLINE ITEM 156902

## (undated) DEVICE — SEALER LIGASURE OPEN 5MM 23CM

## (undated) DEVICE — SUT 0 VICRYL / UR6 (J603)

## (undated) DEVICE — SEE MEDLINE ITEM 157181

## (undated) DEVICE — SUT FIBERWIRE 18IN BLUE 3-0

## (undated) DEVICE — TOURNIQUET DB QC DP 24X5.5IN

## (undated) DEVICE — IMPLANTABLE DEVICE
Type: IMPLANTABLE DEVICE | Site: FINGER | Status: NON-FUNCTIONAL
Removed: 2025-05-30

## (undated) DEVICE — BANDAGE ESMARK ELASTIC ST 4X9